# Patient Record
Sex: MALE | Race: BLACK OR AFRICAN AMERICAN | Employment: OTHER | URBAN - METROPOLITAN AREA
[De-identification: names, ages, dates, MRNs, and addresses within clinical notes are randomized per-mention and may not be internally consistent; named-entity substitution may affect disease eponyms.]

---

## 2018-08-08 ENCOUNTER — OP HISTORICAL/CONVERTED ENCOUNTER (OUTPATIENT)
Dept: OTHER | Age: 63
End: 2018-08-08

## 2018-08-10 ENCOUNTER — OP HISTORICAL/CONVERTED ENCOUNTER (OUTPATIENT)
Dept: OTHER | Age: 63
End: 2018-08-10

## 2018-09-22 ENCOUNTER — ED HISTORICAL/CONVERTED ENCOUNTER (OUTPATIENT)
Dept: OTHER | Age: 63
End: 2018-09-22

## 2018-11-04 NOTE — H&P
Patient Name: Diego Brown Account #: [id number] Gender: Male  
 (age): 1955 (61) Provider:    
FREDI Brewer MD  
  
  
Referring Physician:    
Leander Garza [address] [place] [address] [address] [place], [address]  
[phone] (phone) 
[phone] (fax)  
  
[facility], [address] [place] [address] 
[phone] (phone) 
[phone] (fax)  
  
  Chief Complaint: pt in for abdominal pain, colitis  
  
  
  
History of Present Illness:  
Patient is here today for a follow-up visit. Personal history of recent myocardial infarction in 2018. Two stents placed. Remains on blood thinner medication. He is in cardiac rehab three times per week. Patient states he's had chronic issues with intermittent abdominal pain that was worse with laying on his stomach but improves when he lays on his back. One week ago he again had another episode of abdominal pain but did not have any improvement. Pain is located in lower abdomen primarily in suprapubic and RLQ into groin region. Pain rate from 5/10 to 10/10. Denies fever or chills. Associated with mild nausea and now started to have decreased appetite. Denies fever, chills, change in bowel habits or visible blood in stool. Denies unintentional weight loss. Symptoms prompted ED visit and CT scan reveled nonspecific colitis. He was prescribed Cipro and Flagyl. He did not take Cipro and he's not getting any relief with Flagyl. 
  
Colonoscopy  (Dr. Terry Shafer) - normal per patient. Past Medical History Medical Conditions: Colitis High blood pressure High cholesterol Ischemic Heart Disease Sleep apnea Surgical Procedures: Other major surgeries:, 2006 Gallbladder surgery, 2005 Dx Studies: Colonoscopy, 2017 CT Scan, 2018 Endo Posting, 2018 Medications: Acid Control (ranitidine) 150 mg Adult Aspirin Regimen 81 mg Align 4 mg Brilinta 90 mg 
 Detrol LA 2 mg Dutoprol 25-12.5 mg 
Flagyl 500 mg Flonase Allergy Relief 50 mcg/actuation Lipitor 10 mg Allergies: Patient has no known drug allergies Immunizations: No Immunizations Social History Alcohol: None Tobacco: Never smoker Drugs: None Exercise: Exercise 3 or more times a week 1 times a day. Caffeine: None   
  
Family History No history of IBD (Crohn's or UC) Review of Systems:  
Cardiovascular: Denies chest pain, irregular heart beat, palpitations, peripheral edema, syncope, Sweats. Constitutional: Denies fatigue, fever, loss of appetite, weight gain, weight loss. ENMT: Denies nose bleeds, sore throat, hearing loss. Endocrine: Denies excessive thirst, heat intolerance. Eyes: Denies loss of vision. Gastrointestinal: Denies abdominal pain, abdominal swelling, change in bowel habits, constipation, diarrhea, Bloating/gas, heartburn, jaundice, nausea, rectal bleeding, stomach cramps, vomiting, dysphagia, rectal pain, Stool incontinence, hematemesis. Genitourinary: Denies dark urine, dysuria, frequent urination, hematuria, incontinence. Hematologic/Lymphatic: Denies easy bruising, prolonged bleeding. Integumentary: Denies itching, rashes, sun sensitivity. Musculoskeletal: Presents suffers from arthritis, back pain. Denies gout, joint pain, muscle weakness, stiffness. Neurological: Denies dizziness, fainting, frequent headaches, memory loss. Psychiatric: Denies anxiety, depression, difficulty sleeping, hallucinations, nervousness, panic attacks, paranoia. Respiratory: Denies cough, dyspnea, wheezing. Vital Signs: see nursing notes Physical Exam:  
Constitutional:   
  
Appearance: No distress, appears comfortable. Communication: Understands/receives spoken information. Head/face:   
  
Inspection: Normacephalic, atraumatic.   
Palpation: normal.  
Eyes:   
  
Conjunctivae/lids: Normal.  
Pupils/irises: Pupils equal, round and normal.  
 ENMT:   
  
External: Normal.  
Hearing: Normal.  
Neck:   
  
Neck: Normal appearance, trachea midline. Respiratory:   
  
Effort: Normal respiratory effort, comfortable, speaks in complete sentences. Auscultation: normal breath sounds, no rubs, wheezes or rhonchi. Cardiovascular:   
  
Auscultation: normal, S1 and S2, no gallops , no rubs or murmurs . Gastrointestinal/Abdomen:   
  
Abdomen: bowel sounds present, soft, nondistended, mild RLQ tenderness. No rebound or guarding. [facility] Psychiatric:   
  
Judgment/insight: Normal, normal judgement, normal insight. Orientation: oriented to time, space and person. Memory: normal short term memory, normal long term memory, no memory loss. Mood and affect: Normal mood, affect full, no evidence of depression, anxiety or agitation.  
  
  
  
Impressions: abnormal CT scan; Colitis - nonpecific Abdominal pain Atherosclerotic heart disease of native coronary artery without angina pectoris Long-term (current) use of antiplatelets/antithromboticsI've discussed colonoscopy possible biopsy, polypectomy, cautery, injection, alternatives, complications including but not limited to pain, cardiopulmonary event, bleeding, perforation requiring additional blood transfusion or operative repair; all questions answered.

## 2018-11-12 ENCOUNTER — ANESTHESIA (OUTPATIENT)
Dept: ENDOSCOPY | Age: 63
End: 2018-11-12
Payer: OTHER GOVERNMENT

## 2018-11-12 ENCOUNTER — HOSPITAL ENCOUNTER (OUTPATIENT)
Age: 63
Setting detail: OUTPATIENT SURGERY
Discharge: HOME | End: 2018-11-12
Attending: INTERNAL MEDICINE | Admitting: INTERNAL MEDICINE
Payer: OTHER GOVERNMENT

## 2018-11-12 ENCOUNTER — ANESTHESIA EVENT (OUTPATIENT)
Dept: ENDOSCOPY | Age: 63
End: 2018-11-12
Payer: OTHER GOVERNMENT

## 2018-11-12 VITALS
WEIGHT: 198 LBS | HEART RATE: 58 BPM | TEMPERATURE: 98.3 F | RESPIRATION RATE: 17 BRPM | OXYGEN SATURATION: 100 % | HEIGHT: 69 IN | BODY MASS INDEX: 29.33 KG/M2 | DIASTOLIC BLOOD PRESSURE: 64 MMHG | SYSTOLIC BLOOD PRESSURE: 119 MMHG

## 2018-11-12 PROCEDURE — 76040000019: Performed by: INTERNAL MEDICINE

## 2018-11-12 PROCEDURE — 74011250636 HC RX REV CODE- 250/636

## 2018-11-12 PROCEDURE — 76060000031 HC ANESTHESIA FIRST 0.5 HR: Performed by: INTERNAL MEDICINE

## 2018-11-12 PROCEDURE — 74011250636 HC RX REV CODE- 250/636: Performed by: PHYSICIAN ASSISTANT

## 2018-11-12 RX ORDER — FENTANYL CITRATE 50 UG/ML
100 INJECTION, SOLUTION INTRAMUSCULAR; INTRAVENOUS
Status: DISCONTINUED | OUTPATIENT
Start: 2018-11-12 | End: 2018-11-12 | Stop reason: HOSPADM

## 2018-11-12 RX ORDER — MIDAZOLAM HYDROCHLORIDE 1 MG/ML
.25-5 INJECTION, SOLUTION INTRAMUSCULAR; INTRAVENOUS AS NEEDED
Status: DISCONTINUED | OUTPATIENT
Start: 2018-11-12 | End: 2018-11-12 | Stop reason: HOSPADM

## 2018-11-12 RX ORDER — CALCIUM CARBONATE 200(500)MG
1 TABLET,CHEWABLE ORAL DAILY
COMMUNITY
End: 2020-01-22

## 2018-11-12 RX ORDER — PROPOFOL 10 MG/ML
INJECTION, EMULSION INTRAVENOUS AS NEEDED
Status: DISCONTINUED | OUTPATIENT
Start: 2018-11-12 | End: 2018-11-12 | Stop reason: HOSPADM

## 2018-11-12 RX ORDER — ATROPINE SULFATE 0.1 MG/ML
0.5 INJECTION INTRAVENOUS
Status: DISCONTINUED | OUTPATIENT
Start: 2018-11-12 | End: 2018-11-12 | Stop reason: HOSPADM

## 2018-11-12 RX ORDER — FLUMAZENIL 0.1 MG/ML
0.2 INJECTION INTRAVENOUS
Status: DISCONTINUED | OUTPATIENT
Start: 2018-11-12 | End: 2018-11-12 | Stop reason: HOSPADM

## 2018-11-12 RX ORDER — SODIUM CHLORIDE 9 MG/ML
50 INJECTION, SOLUTION INTRAVENOUS CONTINUOUS
Status: DISCONTINUED | OUTPATIENT
Start: 2018-11-12 | End: 2018-11-12 | Stop reason: HOSPADM

## 2018-11-12 RX ORDER — EPINEPHRINE 0.1 MG/ML
1 INJECTION INTRACARDIAC; INTRAVENOUS
Status: DISCONTINUED | OUTPATIENT
Start: 2018-11-12 | End: 2018-11-12 | Stop reason: HOSPADM

## 2018-11-12 RX ORDER — NALOXONE HYDROCHLORIDE 0.4 MG/ML
0.4 INJECTION, SOLUTION INTRAMUSCULAR; INTRAVENOUS; SUBCUTANEOUS
Status: DISCONTINUED | OUTPATIENT
Start: 2018-11-12 | End: 2018-11-12 | Stop reason: HOSPADM

## 2018-11-12 RX ORDER — LIDOCAINE HYDROCHLORIDE 20 MG/ML
INJECTION, SOLUTION EPIDURAL; INFILTRATION; INTRACAUDAL; PERINEURAL AS NEEDED
Status: DISCONTINUED | OUTPATIENT
Start: 2018-11-12 | End: 2018-11-12 | Stop reason: HOSPADM

## 2018-11-12 RX ORDER — DEXTROMETHORPHAN/PSEUDOEPHED 2.5-7.5/.8
1.2 DROPS ORAL
Status: DISCONTINUED | OUTPATIENT
Start: 2018-11-12 | End: 2018-11-12 | Stop reason: HOSPADM

## 2018-11-12 RX ORDER — MIDAZOLAM HYDROCHLORIDE 1 MG/ML
.25-5 INJECTION, SOLUTION INTRAMUSCULAR; INTRAVENOUS
Status: DISCONTINUED | OUTPATIENT
Start: 2018-11-12 | End: 2018-11-12 | Stop reason: HOSPADM

## 2018-11-12 RX ORDER — FENTANYL CITRATE 50 UG/ML
25 INJECTION, SOLUTION INTRAMUSCULAR; INTRAVENOUS AS NEEDED
Status: DISCONTINUED | OUTPATIENT
Start: 2018-11-12 | End: 2018-11-12 | Stop reason: HOSPADM

## 2018-11-12 RX ORDER — PROPOFOL 10 MG/ML
INJECTION, EMULSION INTRAVENOUS
Status: DISCONTINUED | OUTPATIENT
Start: 2018-11-12 | End: 2018-11-12 | Stop reason: HOSPADM

## 2018-11-12 RX ADMIN — PROPOFOL 140 MCG/KG/MIN: 10 INJECTION, EMULSION INTRAVENOUS at 08:03

## 2018-11-12 RX ADMIN — SODIUM CHLORIDE: 9 INJECTION, SOLUTION INTRAVENOUS at 07:45

## 2018-11-12 RX ADMIN — PROPOFOL 80 MG: 10 INJECTION, EMULSION INTRAVENOUS at 08:03

## 2018-11-12 RX ADMIN — LIDOCAINE HYDROCHLORIDE 40 MG: 20 INJECTION, SOLUTION EPIDURAL; INFILTRATION; INTRACAUDAL; PERINEURAL at 08:02

## 2018-11-12 NOTE — PROCEDURES
[address]  [phone]      2018    Colonoscopy Procedure Note  Brisa Brink  :  1955  [facility] Medical Record Number: [id number]    Indications:     Abdominal pain, LLQ, abnormal CT scan  PCP:  Rusty Lyon MD  Anesthesia/Sedation: Conscious Sedation/Moderate Sedation  Endoscopist:  Dr. Cornelia Dewey; no surgical assistants  Complications:  None  Estimate Blood Loss:  None    Permit:  The indications, risks, benefits and alternatives were reviewed with the patient or their decision maker who was provided an opportunity to ask questions and all questions were answered. The specific risks of colonoscopy with conscious sedation were reviewed, including but not limited to anesthetic complication, bleeding, adverse drug reaction, missed lesion, infection, IV site reactions, and intestinal perforation which would lead to the need for surgical repair. Alternatives to colonoscopy including radiographic imaging, observation without testing, or laboratory testing were reviewed including the limitations of those alternatives. After considering the options and having all their questions answered, the patient or their decision maker provided both verbal and written consent to proceed. Procedure in Detail:  After obtaining informed consent, positioning of the patient in the left lateral decubitus position, and conduction of a pre-procedure pause or \"time out\" the endoscope was introduced into the anus and advanced to the cecum, which was identified by the ileocecal valve and appendiceal orifice. The quality of the colonic preparation was good. A careful inspection was made as the colonoscope was withdrawn, findings and interventions are described below.     Appendiceal orifice photographed    Findings:   normal  no mucosal lesion appreciated    Specimens:    none    Complications:   None; patient tolerated the procedure well. Estimated blood loss: none    Impression:  Normal colonoscopy to the cecum, with no evidence of neoplasia, diverticular disease, or mucosal abnormality. Recommendations:      - vascular evaluation for worsening symtptoms   Ten year follow up exam    Thank you for entrusting me with this patient's care. Please do not hesitate to contact me with any questions or if I can be of assistance with any of your other patients' GI needs.     Signed By: Alf Dela Cruz MD                        November 12, 2018

## 2018-11-12 NOTE — DISCHARGE INSTRUCTIONS
Darrius Rodrigez  [id number]  1955    DISCHARGE INSTRUCTIONS  Discomfort:  Redness at IV site- apply warm compress to area; if redness or soreness persist- contact your physician. There may be a slight amount of blood passed from the rectum. Gaseous discomfort - walking, belching will help relieve any discomfort. You may not operate a vehicle for 12 hours. You may not engage in an occupation involving machinery or appliances for rest of today. You may not drink alcoholic beverages for at least 12 hours. Avoid making any critical decisions for at least 24 hours. DIET:   High fiber diet. - however -  remember your colon is empty and a heavy meal will produce gas. Avoid these foods:  vegetables, fried / greasy foods, carbonated drinks for today. ACTIVITY:  You may resume your normal daily activities it is recommended that you spend the remainder of the day resting -  avoid any strenuous activity. CALL M.D. ANY SIGN OF:   Increasing pain, nausea, vomiting  Abdominal distension (swelling)  New increased bleeding (oral or rectal)  Fever (chills)  Pain in chest area  Bloody discharge from nose or mouth  Shortness of breath     Follow-up Instructions:  Call Dr. Queenie Clifford if you have any questions or problems.   Advise ER staff with any worsening symptoms potential need evaluate mesenteric vascular integrity        DISCHARGE SUMMARY from Nurse    The following personal items collected during your admission are returned to you:   Dental Appliance: Dental Appliances: None  Vision: Visual Aid: Glasses  Hearing Aid:    Jewelry:    Clothing:    Other Valuables:    Valuables sent to safe:

## 2018-11-12 NOTE — PERIOP NOTES
Divya Da 1955 
[id number] Situation: 
Verbal report received from: Viri Duong Procedure: Procedure(s): 
COLONOSCOPY Background: 
 
Preoperative diagnosis: COLITIS, ABDOMINAL PAIN Postoperative diagnosis: normal colonoscopy :  Dr. Parveen Burns Assistant(s): Endoscopy Technician-1: Yadira Ken Endoscopy RN-1: Solange Swain RN Specimens: * No specimens in log * H. Pylori  no Assessment: 
Intra-procedure medications Anesthesia gave intra-procedure sedation and medications, see anesthesia flow sheet yes Intravenous fluids: NS@ Lucianne Glatter Vital signs stable yes Abdominal assessment: round and soft yes Recommendation: 
 
Family or Friend yes Permission to share finding with family or friend yes

## 2018-11-12 NOTE — ANESTHESIA PREPROCEDURE EVALUATION
Anesthetic History No history of anesthetic complications Review of Systems / Medical History Patient summary reviewed and nursing notes reviewed Pulmonary Sleep apnea Neuro/Psych Within defined limits Cardiovascular Hypertension: well controlled Past MI, cardiac stents and hyperlipidemia Exercise tolerance: >4 METS Comments: MI 6/2018 : finished cardiac rehab GI/Hepatic/Renal 
  
GERD: well controlled Endo/Other Within defined limits Other Findings Physical Exam 
 
Airway Mallampati: II 
 
 
 
 
 Cardiovascular Rhythm: regular Rate: normal 
 
 
 
 Dental 
No notable dental hx Pulmonary Breath sounds clear to auscultation Abdominal 
 
 
 
 Other Findings Anesthetic Plan ASA: 3 Anesthesia type: MAC Anesthetic plan and risks discussed with: Patient Informed consent obtained.

## 2018-11-12 NOTE — PERIOP NOTES
Received report from [address] [place] [address], see anesthesia records. ABD remains soft and non-tender post procedure. Pt has no complaints at this time and tolerated the procedure well. Endoscope was pre-cleaned at bedside immediately following procedure by Cesilia Garay.

## 2018-11-13 NOTE — ANESTHESIA POSTPROCEDURE EVALUATION
Procedure(s): 
COLONOSCOPY. Anesthesia Post Evaluation Multimodal analgesia: multimodal analgesia not used between 6 hours prior to anesthesia start to PACU discharge Patient location during evaluation: PACU Patient participation: complete - patient participated Level of consciousness: awake Pain score: 0 Pain management: adequate Airway patency: patent Anesthetic complications: no 
Cardiovascular status: acceptable Respiratory status: acceptable Hydration status: acceptable Comments: Late entry: patient seen and evaluated 11/12 Post anesthesia nausea and vomiting:  none Visit Vitals /64 Pulse (!) 58 Temp 36.8 °C (98.3 °F) Resp 17 Ht 5' 9\" (1.753 m) Wt 89.8 kg (198 lb) SpO2 100% BMI 29.24 kg/m²

## 2019-01-03 ENCOUNTER — OP HISTORICAL/CONVERTED ENCOUNTER (OUTPATIENT)
Dept: OTHER | Age: 64
End: 2019-01-03

## 2019-02-10 ENCOUNTER — ED HISTORICAL/CONVERTED ENCOUNTER (OUTPATIENT)
Dept: OTHER | Age: 64
End: 2019-02-10

## 2019-07-09 ENCOUNTER — ED HISTORICAL/CONVERTED ENCOUNTER (OUTPATIENT)
Dept: OTHER | Age: 64
End: 2019-07-09

## 2019-07-22 ENCOUNTER — ANESTHESIA EVENT (OUTPATIENT)
Dept: ENDOSCOPY | Age: 64
End: 2019-07-22
Payer: OTHER GOVERNMENT

## 2019-07-22 ENCOUNTER — ANESTHESIA (OUTPATIENT)
Dept: ENDOSCOPY | Age: 64
End: 2019-07-22
Payer: OTHER GOVERNMENT

## 2019-07-22 ENCOUNTER — HOSPITAL ENCOUNTER (OUTPATIENT)
Age: 64
Setting detail: OUTPATIENT SURGERY
Discharge: HOME | End: 2019-07-22
Attending: INTERNAL MEDICINE | Admitting: INTERNAL MEDICINE
Payer: OTHER GOVERNMENT

## 2019-07-22 VITALS
TEMPERATURE: 97.5 F | HEART RATE: 55 BPM | WEIGHT: 197.53 LBS | RESPIRATION RATE: 18 BRPM | DIASTOLIC BLOOD PRESSURE: 60 MMHG | OXYGEN SATURATION: 99 % | SYSTOLIC BLOOD PRESSURE: 106 MMHG | BODY MASS INDEX: 29.26 KG/M2 | HEIGHT: 69 IN

## 2019-07-22 PROCEDURE — 76040000019: Performed by: INTERNAL MEDICINE

## 2019-07-22 PROCEDURE — 74011250636 HC RX REV CODE- 250/636: Performed by: PHYSICIAN ASSISTANT

## 2019-07-22 PROCEDURE — 76060000031 HC ANESTHESIA FIRST 0.5 HR: Performed by: INTERNAL MEDICINE

## 2019-07-22 PROCEDURE — 74011250636 HC RX REV CODE- 250/636

## 2019-07-22 RX ORDER — ATROPINE SULFATE 0.1 MG/ML
0.5 INJECTION INTRAVENOUS
Status: DISCONTINUED | OUTPATIENT
Start: 2019-07-22 | End: 2019-07-22 | Stop reason: HOSPADM

## 2019-07-22 RX ORDER — LIDOCAINE HYDROCHLORIDE 20 MG/ML
INJECTION, SOLUTION EPIDURAL; INFILTRATION; INTRACAUDAL; PERINEURAL AS NEEDED
Status: DISCONTINUED | OUTPATIENT
Start: 2019-07-22 | End: 2019-07-22 | Stop reason: HOSPADM

## 2019-07-22 RX ORDER — FLUMAZENIL 0.1 MG/ML
0.2 INJECTION INTRAVENOUS
Status: DISCONTINUED | OUTPATIENT
Start: 2019-07-22 | End: 2019-07-22 | Stop reason: HOSPADM

## 2019-07-22 RX ORDER — PROPOFOL 10 MG/ML
INJECTION, EMULSION INTRAVENOUS AS NEEDED
Status: DISCONTINUED | OUTPATIENT
Start: 2019-07-22 | End: 2019-07-22 | Stop reason: HOSPADM

## 2019-07-22 RX ORDER — ROSUVASTATIN CALCIUM 5 MG/1
5 TABLET, COATED ORAL
COMMUNITY
End: 2020-01-22

## 2019-07-22 RX ORDER — EPINEPHRINE 0.1 MG/ML
1 INJECTION INTRACARDIAC; INTRAVENOUS
Status: DISCONTINUED | OUTPATIENT
Start: 2019-07-22 | End: 2019-07-22 | Stop reason: HOSPADM

## 2019-07-22 RX ORDER — SODIUM CHLORIDE 9 MG/ML
50 INJECTION, SOLUTION INTRAVENOUS CONTINUOUS
Status: DISCONTINUED | OUTPATIENT
Start: 2019-07-22 | End: 2019-07-22 | Stop reason: HOSPADM

## 2019-07-22 RX ORDER — PROPOFOL 10 MG/ML
INJECTION, EMULSION INTRAVENOUS
Status: DISCONTINUED | OUTPATIENT
Start: 2019-07-22 | End: 2019-07-22 | Stop reason: HOSPADM

## 2019-07-22 RX ORDER — DEXTROMETHORPHAN/PSEUDOEPHED 2.5-7.5/.8
1.2 DROPS ORAL
Status: DISCONTINUED | OUTPATIENT
Start: 2019-07-22 | End: 2019-07-22 | Stop reason: HOSPADM

## 2019-07-22 RX ORDER — FENTANYL CITRATE 50 UG/ML
25 INJECTION, SOLUTION INTRAMUSCULAR; INTRAVENOUS AS NEEDED
Status: DISCONTINUED | OUTPATIENT
Start: 2019-07-22 | End: 2019-07-22 | Stop reason: HOSPADM

## 2019-07-22 RX ORDER — PANTOPRAZOLE SODIUM 40 MG/1
40 TABLET, DELAYED RELEASE ORAL DAILY
COMMUNITY
End: 2021-07-30

## 2019-07-22 RX ORDER — AMLODIPINE BESYLATE 5 MG/1
5 TABLET ORAL DAILY
COMMUNITY

## 2019-07-22 RX ORDER — MIDAZOLAM HYDROCHLORIDE 1 MG/ML
.25-5 INJECTION, SOLUTION INTRAMUSCULAR; INTRAVENOUS AS NEEDED
Status: DISCONTINUED | OUTPATIENT
Start: 2019-07-22 | End: 2019-07-22 | Stop reason: HOSPADM

## 2019-07-22 RX ORDER — NALOXONE HYDROCHLORIDE 0.4 MG/ML
0.4 INJECTION, SOLUTION INTRAMUSCULAR; INTRAVENOUS; SUBCUTANEOUS
Status: DISCONTINUED | OUTPATIENT
Start: 2019-07-22 | End: 2019-07-22 | Stop reason: HOSPADM

## 2019-07-22 RX ADMIN — SODIUM CHLORIDE 50 ML/HR: 900 INJECTION, SOLUTION INTRAVENOUS at 07:40

## 2019-07-22 RX ADMIN — LIDOCAINE HYDROCHLORIDE 100 MG: 20 INJECTION, SOLUTION EPIDURAL; INFILTRATION; INTRACAUDAL; PERINEURAL at 08:05

## 2019-07-22 RX ADMIN — PROPOFOL 50 MG: 10 INJECTION, EMULSION INTRAVENOUS at 08:11

## 2019-07-22 RX ADMIN — PROPOFOL 50 MG: 10 INJECTION, EMULSION INTRAVENOUS at 08:07

## 2019-07-22 RX ADMIN — PROPOFOL 100 MG: 10 INJECTION, EMULSION INTRAVENOUS at 08:05

## 2019-07-22 RX ADMIN — PROPOFOL 150 MCG/KG/MIN: 10 INJECTION, EMULSION INTRAVENOUS at 08:05

## 2019-07-22 RX ADMIN — PROPOFOL 50 MG: 10 INJECTION, EMULSION INTRAVENOUS at 08:06

## 2019-07-22 RX ADMIN — PROPOFOL 50 MG: 10 INJECTION, EMULSION INTRAVENOUS at 08:10

## 2019-07-22 RX ADMIN — PROPOFOL 100 MG: 10 INJECTION, EMULSION INTRAVENOUS at 08:09

## 2019-07-22 NOTE — H&P
Patient Name: Jennifer Diez   Account #: [id number]    Gender: Male    (age): 1955 (59)       Provider:     Roshan Eddy. Sharon Garcia PA-C        Referring Physician:     Hubert Terry. [address]  [facility] [address], [address]   [phone] (phone)  [phone] (fax)        Chief Complaint: pt in for abdominal pain           History of Present Illness:   Mr. Foreign Mclain is here for a ER follow up visit. He reports \"my stomach is bothering me again\". States that he's had issues in the past but recently symptoms have progressed. Complaints of burning abdominal pain and feels \"the lining of my stomach is irritated\". Symptoms increased at night but occur during the day also. Worse with meals, especially sweets. Asociated with nausea. Denies fever, chills, CP, SOB, dysphagia, vomiting, or change in bowel habits. No improvement with Pepto-Bismol. Denies NSAID use. Does recall that one ago he went to PCP office for persistent cough and symptoms resolved with taking Zantac. Recently symptoms prompted ED visit to to severity. Workup at ED 19 included CT scan no acute process, CBC, CMP, lipase, cardaic levels unremarkable. Symptoms improved with Pepcid and Zofran. Discharged home with pantoprazole and advised GI follow up for EGD. Per patient last EGD was many years ago in [place]     Patient is currently on Brilinta due to MI 2018. Patient has upcoming appointment with cardiologist and will likely be taken off Brilinta (per patient). ? Recent ER where troponin, lipase, CMP unremarkable; CT scan showed lumbar spine disease without intestinal abnormality. ? Past Medical History      Medical Conditions: Colitis  High blood pressure  High cholesterol  Ischemic Heart Disease  Sleep apnea   Surgical Procedures:  Other major surgeries:, 2006  Gallbladder surgery, 2005   Dx Studies: Colonoscopy, 2018  Colonoscopy, 2017  CT Scan, 2018  CT Scan, 2018  Endo Posting, 7/11/2019  Endo Posting, 10/23/2018  Endo Posting, 10/22/2018  Endo Posting, 9/27/2018  Hemoccult cards, 3/15/2019   Medications: Acid Control (ranitidine) 150 mg  Adult Aspirin Regimen 81 mg  Align 4 mg  amlodipine 2.5 mg  Brilinta 90 mg  butalbital-acetaminop-caf-cod -27-30 mg  Flonase Allergy Relief 50 mcg/actuation  metformin 500 mg  metoprolol succinate 25 mg  Protonix 40 mg  rosuvastatin 5 mg  tolterodine 4 mg  tramadol 50 mg Take 1 tablet by mouth every six hours as needed for pain  Zofran 4 mg   Allergies: Patient has no known allergies or drug allergies   Immunizations: No Immunizations      Social History      Alcohol: None   Tobacco: Never smoker   Drugs: None   Exercise: Exercise 3 or more times a week 1 times a day. Caffeine: None   Marital Status:          Occupation:               Family History No history of IBD (Crohn's or UC)        Review of Systems:   Cardiovascular: Denies chest pain, irregular heart beat, palpitations, peripheral edema, syncope, Sweats. Constitutional: Denies fatigue, fever, loss of appetite, weight gain, weight loss. ENMT: Denies nose bleeds, sore throat, hearing loss. Endocrine: Denies excessive thirst, heat intolerance. Eyes: Denies loss of vision. Gastrointestinal: Denies abdominal pain, abdominal swelling, change in bowel habits, constipation, diarrhea, Bloating/gas, heartburn, jaundice, nausea, rectal bleeding, stomach cramps, vomiting, dysphagia, rectal pain, Stool incontinence, hematemesis. Genitourinary: Denies dark urine, dysuria, frequent urination, hematuria, incontinence. Hematologic/Lymphatic: Denies easy bruising, prolonged bleeding. Integumentary: Denies itching, rashes, sun sensitivity. Musculoskeletal: Denies arthritis, back pain, gout, joint pain, muscle weakness, stiffness. Neurological: Denies dizziness, fainting, frequent headaches, memory loss.    Psychiatric: Denies anxiety, depression, difficulty sleeping, hallucinations, nervousness, panic attacks, paranoia. Respiratory: Denies cough, dyspnea, wheezing. Vital Signs: see nursing notes     Physical Exam:   Constitutional:      Appearance: No distress, appears comfortable. Communication: Understands/receives spoken information. Skin:      Inspection: No rash, no jaundice. Head/face: Inspection: Normacephalic, atraumatic. Palpation: normal.   Eyes:      Conjunctivae/lids: Normal.   Pupils/irises: Pupils equal, round and normal.   ENMT:      External: Normal.   Hearing: Normal.   Neck:      Neck: Normal appearance, trachea midline. Jugular veins: No JVD noted. Respiratory:      Effort: Normal respiratory effort, comfortable, speaks in complete sentences. Auscultation: normal breath sounds, no rubs, wheezes or rhonchi. Cardiovascular: Auscultation: normal, S1 and S2, no gallops , no rubs or murmurs . Gastrointestinal/Abdomen:      Abdomen: bowel sounds present, soft, nondistended, mild epigastric tenderness. No rebound or gaurding. .   Musculoskeletal:      Gait/station: normal.   Digits/nails: Normal, no spooning of nails, clubbing, or splinter hemorrhages, no clubbing, cyanosis, petechiae or other inflammatory conditions. Back: no kyphosis or scoliosis. Muscles: normal strength and tone, no atrophy or abnormal movements. Psychiatric:      Judgment/insight: Normal, normal judgement, normal insight. Orientation: oriented to time, space and person. Memory: normal short term memory, normal long term memory, no memory loss. Mood and affect: Normal mood, affect full, no evidence of depression, anxiety or agitation. Impressions: Burning epigastric pain  Nausea  Heartburn  Atherosclerotic heart disease of native coronary artery without angina pectoris  Long-term (current) use of antiplatelets/antithrombotics?  ?       Plan: I have discussed EGD biopsy alternatives complications including but not limited to pain, cardiopulmonary event, bleeding, perforation; all questions answered.

## 2019-07-22 NOTE — DISCHARGE INSTRUCTIONS
Yara De Souza  [id number]  1955    DISCHARGE INSTRUCTIONS  Discomfort:  Redness at IV site- apply warm compress to area; if redness or soreness persist- contact your physician. There may be a slight amount of blood passed from the rectum. Gaseous discomfort - walking, belching will help relieve any discomfort. You may not operate a vehicle for 12 hours. You may not engage in an occupation involving machinery or appliances for rest of today. You may not drink alcoholic beverages for at least 12 hours. Avoid making any critical decisions for at least 24 hours. DIET:   High fiber diet. ACTIVITY:  You may resume your normal daily activities it is recommended that you spend the remainder of the day resting -  avoid any strenuous activity. CALL M.D. ANY SIGN OF:   Increasing pain, nausea, vomiting  Abdominal distension (swelling)  New increased bleeding (oral or rectal)  Fever (chills)  Pain in chest area  Bloody discharge from nose or mouth  Shortness of breath     Follow-up Instructions:  Call Dr. Ketan Yanez if you have any questions or problems.           DISCHARGE SUMMARY from Nurse    The following personal items collected during your admission are returned to you:   Dental Appliance: Dental Appliances: None  Vision:    Hearing Aid:    Jewelry:    Clothing:    Other Valuables:    Valuables sent to safe:

## 2019-07-22 NOTE — ANESTHESIA POSTPROCEDURE EVALUATION
Procedure(s):  ESOPHAGOGASTRODUODENOSCOPY (EGD). MAC    Anesthesia Post Evaluation      Multimodal analgesia: multimodal analgesia not used between 6 hours prior to anesthesia start to PACU discharge  Patient location during evaluation: PACU  Patient participation: complete - patient participated  Level of consciousness: awake  Pain management: adequate  Airway patency: patent  Anesthetic complications: no  Cardiovascular status: acceptable, blood pressure returned to baseline and hemodynamically stable  Respiratory status: acceptable  Hydration status: acceptable  Post anesthesia nausea and vomiting:  controlled      Vitals Value Taken Time   /58 7/22/2019  9:04 AM   Temp 36.4 °C (97.5 °F) 7/22/2019  8:54 AM   Pulse 63 7/22/2019  9:07 AM   Resp 19 7/22/2019  9:07 AM   SpO2 100 % 7/22/2019  9:07 AM   Vitals shown include unvalidated device data.

## 2019-07-22 NOTE — PROGRESS NOTES

## 2019-07-22 NOTE — PROCEDURES
Dima Vines M.D. 2019    Esophagogastroduodenoscopy (EGD) Procedure Note  Millicent Rivera  : 1955  [facility] Medical Record Number: [id number]      Indications:    Abdominal pain, epigastric  Referring Physician:  Chitra Diaz DO  Anesthesia/Sedation: Conscious Sedation/Moderate Sedation  Endoscopist:  Dr. Boogie Villalba  Assistants: None  Permit:  The indications, risks, benefits and alternatives were reviewed with the patient or their decision maker who was provided an opportunity to ask questions and all questions were answered. The specific risks of esophagogastroduodenoscopy with conscious sedation were reviewed, including but not limited to anesthetic complication, bleeding, adverse drug reaction, missed lesion, infection, IV site reactions, and intestinal perforation which would lead to the need for surgical repair. Alternatives to EGD including radiographic imaging, observation without testing, or laboratory testing were reviewed as well as the limitations of those alternatives discussed. After considering the options and having all their questions answered, the patient or their decision maker provided both verbal and written consent to proceed. Procedure in Detail:  After obtaining informed consent, positioning of the patient in the left lateral decubitus position, and conduction of a pre-procedure pause or \"time out\" the endoscope was introduced into the mouth and advanced to the duodenum. A careful inspection was made, and findings or interventions are described below.     Findings:   Esophagus:normal  Stomach: normal   Duodenum/jejunum: normal    Complications/estimated blood loss: none    Therapies:  none    Specimens: none    Implants:none           Recommendations:  -Acid suppression with a proton pump inhibitor as long as using any aspirin dose, anti platelet agent.  Thank you for entrusting me with this patient's care. Please do not hesitate to contact me with any questions or if I can be of assistance with any of your other patients' GI needs.   Signed By: Becky Hatch MD                        July 22, 2019

## 2019-07-22 NOTE — ANESTHESIA PREPROCEDURE EVALUATION
Anesthetic History   No history of anesthetic complications            Review of Systems / Medical History  Patient summary reviewed and pertinent labs reviewed    Pulmonary        Sleep apnea           Neuro/Psych   Within defined limits           Cardiovascular    Hypertension          CAD    Exercise tolerance: >4 METS  Comments: 2 stents after MI last year, on Brilinta   GI/Hepatic/Renal     GERD           Endo/Other  Within defined limits           Other Findings              Physical Exam    Airway  Mallampati: II    Neck ROM: normal range of motion   Mouth opening: Normal     Cardiovascular    Rhythm: regular  Rate: normal         Dental  No notable dental hx       Pulmonary  Breath sounds clear to auscultation               Abdominal  GI exam deferred       Other Findings            Anesthetic Plan    ASA: 3  Anesthesia type: MAC          Induction: Intravenous  Anesthetic plan and risks discussed with: Patient

## 2019-07-22 NOTE — ROUTINE PROCESS
Juan Mathis  1955  [id number]    Situation:  Verbal report received from: Mary Melton  Procedure: Procedure(s):  ESOPHAGOGASTRODUODENOSCOPY (EGD)    Background:    Preoperative diagnosis: ABDOMINAL PAIN  Postoperative diagnosis: 1.- Normal EGD    :  Dr. Ana Kong  Assistant(s): Endoscopy Technician-1: Giovanni HERNANDEZ  Endoscopy RN-1: Carol Schwab, RN    Specimens: * No specimens in log *  H. Pylori  no    Assessment:  Intra-procedure medications     Anesthesia gave intra-procedure sedation and medications, see anesthesia flow sheet yes    Intravenous fluids: NS@ KVO     Vital signs stable     Abdominal assessment: round and soft     Recommendation:  Discharge patient per MD order.   Family or Friend   Permission to share finding with family or friend yes

## 2019-12-29 ENCOUNTER — ED HISTORICAL/CONVERTED ENCOUNTER (OUTPATIENT)
Dept: OTHER | Age: 64
End: 2019-12-29

## 2020-01-22 ENCOUNTER — OFFICE VISIT (OUTPATIENT)
Dept: CARDIOLOGY CLINIC | Age: 65
End: 2020-01-22

## 2020-01-22 VITALS
OXYGEN SATURATION: 97 % | BODY MASS INDEX: 30.33 KG/M2 | DIASTOLIC BLOOD PRESSURE: 82 MMHG | HEART RATE: 84 BPM | SYSTOLIC BLOOD PRESSURE: 126 MMHG | RESPIRATION RATE: 16 BRPM | WEIGHT: 204.8 LBS | HEIGHT: 69 IN

## 2020-01-22 DIAGNOSIS — I25.10 CORONARY ARTERY DISEASE INVOLVING NATIVE CORONARY ARTERY OF NATIVE HEART WITHOUT ANGINA PECTORIS: ICD-10-CM

## 2020-01-22 DIAGNOSIS — Z98.61 PERCUTANEOUS TRANSLUMINAL CORONARY ANGIOPLASTY STATUS: ICD-10-CM

## 2020-01-22 DIAGNOSIS — I49.3 PVC (PREMATURE VENTRICULAR CONTRACTION): Primary | ICD-10-CM

## 2020-01-22 RX ORDER — SILDENAFIL 50 MG/1
50 TABLET, FILM COATED ORAL AS NEEDED
COMMUNITY
End: 2021-07-30

## 2020-01-22 RX ORDER — CYCLOBENZAPRINE HCL 10 MG
TABLET ORAL
COMMUNITY
Start: 2019-11-26 | End: 2021-02-02

## 2020-01-22 RX ORDER — ATORVASTATIN CALCIUM 10 MG/1
10 TABLET, FILM COATED ORAL DAILY
COMMUNITY
End: 2021-02-02

## 2020-01-22 RX ORDER — BUTALBITAL, ACETAMINOPHEN AND CAFFEINE 300; 40; 50 MG/1; MG/1; MG/1
1 CAPSULE ORAL
COMMUNITY
End: 2021-07-30

## 2020-01-22 RX ORDER — ERGOCALCIFEROL 1.25 MG/1
50000 CAPSULE ORAL
COMMUNITY
End: 2021-02-02 | Stop reason: ALTCHOICE

## 2020-01-22 RX ORDER — FLUTICASONE PROPIONATE 50 MCG
SPRAY, SUSPENSION (ML) NASAL
COMMUNITY
Start: 2017-09-08 | End: 2021-07-30

## 2020-01-22 RX ORDER — LIDOCAINE 50 MG/G
PATCH TOPICAL AS NEEDED
COMMUNITY
End: 2021-12-01 | Stop reason: SDUPTHER

## 2020-01-22 RX ORDER — ALFUZOSIN HYDROCHLORIDE 10 MG/1
TABLET, EXTENDED RELEASE ORAL DAILY
COMMUNITY
End: 2021-02-02

## 2020-01-22 RX ORDER — AMLODIPINE BESYLATE 2.5 MG/1
2.5 TABLET ORAL
COMMUNITY
End: 2021-07-30

## 2020-01-22 NOTE — PROGRESS NOTES
HISTORY OF PRESENTING ILLNESS      Miguel Angel Ibarra is a 59 y.o. male with CAD, GERD, ROQUE who experienced chest pain in June 2018 and underwent coronary revascularization for obstructive CAD. Just prior to undergoing PCI he was noted to have cardiac arrest.  He has not experienced recurrent episodes of VT since undergoing PCI. He underwent repeat nuclear imaging and heart catheterization which demonstrated stable CAD. He has been noted to have ventricular bigeminy and PVCs on monitoring. He underwent a stress test with observation of ventricular bigeminy during the exercise portion of the stress test.  He was highly intolerant of beta-blockers in the past.  He now experiences episodes of dizziness and lightheadedness at times however it is unclear whether these are being driven by arrhythmias or an alternate etiology. ACTIVE PROBLEM LIST     There are no active problems to display for this patient.           PAST MEDICAL HISTORY     Past Medical History:   Diagnosis Date    CAD (coronary artery disease) 06/15/2018    MI     GERD (gastroesophageal reflux disease)     Ill-defined condition     Urinary urgency    Ill-defined condition     High cholesterol    Ill-defined condition     Seasonal allergies    Sleep apnea     Uses CPAP           PAST SURGICAL HISTORY     Past Surgical History:   Procedure Laterality Date    CARDIAC SURG PROCEDURE UNLIST      Cardiac cath Stents x2    COLONOSCOPY N/A 11/12/2018    COLONOSCOPY performed by Blade Sheridan MD at [facility] HX GI  2005    GB removed    HX ORTHOPAEDIC  2006    Lumbar laminectomy    HX OTHER SURGICAL  2017    Colonoscopy          ALLERGIES     Allergies   Allergen Reactions    Ibuprofen Other (comments)     Patient states ibuprofen upsets his stomach          FAMILY HISTORY     Family History   Problem Relation Age of Onset    Diabetes Mother     Stroke Father     Hypertension Father     negative for cardiac disease SOCIAL HISTORY     Social History     Socioeconomic History    Marital status:      Spouse name: Not on file    Number of children: Not on file    Years of education: Not on file    Highest education level: Not on file   Tobacco Use    Smoking status: Never Smoker    Smokeless tobacco: Never Used   Substance and Sexual Activity    Alcohol use: No    Drug use: No         MEDICATIONS     Current Outpatient Medications   Medication Sig    rosuvastatin (CRESTOR) 5 mg tablet Take 5 mg by mouth nightly.  amLODIPine (NORVASC) 10 mg tablet Take 10 mg by mouth daily.  pantoprazole (PROTONIX) 40 mg tablet Take 40 mg by mouth daily.  calcium carbonate (TUMS) 200 mg calcium (500 mg) chew Take 1 Tab by mouth daily.  metoprolol succinate (TOPROL-XL) 25 mg XL tablet Take 25 mg by mouth two (2) times a day. 1/2 tablet twice daily   Indications: Myocardial Reinfarction Prevention    ticagrelor (BRILINTA) 90 mg tablet Take 90 mg by mouth two (2) times a day. Indications: Myocardial Reinfarction Prevention    aspirin delayed-release 81 mg tablet Take 81 mg by mouth daily.  fluticasone (FLONASE SENSIMIST) 27.5 mcg/actuation nasal spray 1 [place] by Both Nostrils route as needed for Rhinitis.  codeine-butalbital-acetaminophen-caffeine (FIORICET WITH CODEINE) -47-30 mg capsule Take 1 Cap by mouth as needed for Headache.  Bifidobacterium Infantis (ALIGN) 4 mg cap Take 1 Cap by mouth every evening. No current facility-administered medications for this visit. I have reviewed the nurses notes, vitals, problem list, allergy list, medical history, family, social history and medications. REVIEW OF SYMPTOMS      General: Pt denies excessive weight gain or loss. Pt is able to conduct ADL's  HEENT: Denies blurred vision, headaches, hearing loss, epistaxis and difficulty swallowing. Respiratory: Denies cough, congestion, shortness of breath, RDZ, wheezing or stridor.   Cardiovascular: Denies precordial pain, palpitations, edema or PND  Gastrointestinal: Denies poor appetite, indigestion, abdominal pain or blood in stool  Genitourinary: Denies hematuria, dysuria, increased urinary frequency  Musculoskeletal: Denies joint pain or swelling from muscles or joints  Neurologic: Denies tremor, paresthesias, headache, or sensory motor disturbance  Psychiatric: Denies confusion, insomnia, depression  Integumentray: Denies rash, itching or ulcers. Hematologic: Denies easy bruising, bleeding       PHYSICAL EXAMINATION      There were no vitals filed for this visit. General: Well developed, in no acute distress. HEENT: No jaundice, oral mucosa moist, no oral ulcers  Neck: Supple, no stiffness, no lymphadenopathy, supple  Heart:  Normal S1/S2 negative S3 or S4. Regular, no murmur, gallop or rub, no jugular venous distention  Respiratory: Clear bilaterally x 4, no wheezing or rales  Abdomen:   Soft, non-tender, bowel sounds are active.   Extremities:  No edema, normal cap refill, no cyanosis. Musculoskeletal: No clubbing, no deformities  Neuro: A&Ox3, speech clear, gait stable, cooperative, no focal neurologic deficits  Skin: Skin color is normal. No rashes or lesions. Non diaphoretic, moist.  Vascular: 2+ pulses symmetric in all extremities       DIAGNOSTIC DATA      EKG:        LABORATORY DATA    No results found for: WBC, HGBPOC, HGB, HGBP, HCTPOC, HCT, PHCT, RBCH, PLT, MCV, HGBEXT, HCTEXT, PLTEXT   No results found for: NA, K, CL, CO2, AGAP, GLU, BUN, CREA, BUCR, GFRAA, GFRNA, CA, TBIL, TBILI, GPT, SGOT, AP, TP, ALB, GLOB, AGRAT, ALT        ASSESSMENT      1. PVCs  2. Cardiac arrest Hx  3. CAD, native  4. Percutaneous coronary transluminal angioplasty  5. ROQUE  6. Dizziness/lightheadedness       PLAN     Recommend injection of a loop recorder for enhanced rhythm monitoring to better correlate whether his current symptoms are being driven by arrhythmias or alternate.   If found to have PVCs as a  of his symptoms would consider an antiarrhythmic drug rather than beta-blockers given his intolerance in the past.       FOLLOW-UP     After loop recorder injection      Thank you, Bruna Sheets DO and Susie Chow NP/Artur Carroll MD for allowing me to participate in the care of this extraordinarily pleasant male. Please do not hesitate to contact me for further questions/concerns.          Mak Forte MD  Cardiac Electrophysiology / Cardiology    [address]  [address] [place] [address], [address] [facility]    [facility]  [phone] / [phone] Fax   [phone] / [phone] Fax

## 2020-01-22 NOTE — PROGRESS NOTES
ROOM # 2  Holter monitor:12/18/19  Occasional fast HR  Visit Vitals  /82 (BP 1 Location: Left arm, BP Patient Position: Sitting)   Pulse 84   Resp 16   Ht 5' 9\" (1.753 m)   Wt 204 lb 12.8 oz (92.9 kg)   SpO2 97%   BMI 30.24 kg/m²

## 2020-01-28 ENCOUNTER — ED HISTORICAL/CONVERTED ENCOUNTER (OUTPATIENT)
Dept: OTHER | Age: 65
End: 2020-01-28

## 2020-08-07 ENCOUNTER — VIRTUAL VISIT (OUTPATIENT)
Dept: FAMILY MEDICINE CLINIC | Age: 65
End: 2020-08-07
Payer: MEDICARE

## 2020-08-07 VITALS
DIASTOLIC BLOOD PRESSURE: 70 MMHG | BODY MASS INDEX: 30.51 KG/M2 | SYSTOLIC BLOOD PRESSURE: 116 MMHG | HEIGHT: 69 IN | WEIGHT: 206 LBS | TEMPERATURE: 98 F | HEART RATE: 66 BPM

## 2020-08-07 DIAGNOSIS — F41.9 ANXIETY: ICD-10-CM

## 2020-08-07 DIAGNOSIS — I25.10 CORONARY ARTERIOSCLEROSIS: Primary | ICD-10-CM

## 2020-08-07 DIAGNOSIS — R53.82 CHRONIC FATIGUE: ICD-10-CM

## 2020-08-07 DIAGNOSIS — E78.2 MIXED HYPERLIPIDEMIA: ICD-10-CM

## 2020-08-07 PROBLEM — E55.9 VITAMIN D DEFICIENCY: Status: ACTIVE | Noted: 2020-08-07

## 2020-08-07 PROBLEM — E78.5 HYPERLIPIDEMIA: Status: ACTIVE | Noted: 2020-08-07

## 2020-08-07 PROCEDURE — 99213 OFFICE O/P EST LOW 20 MIN: CPT | Performed by: FAMILY MEDICINE

## 2020-08-07 RX ORDER — CHOLECALCIFEROL (VITAMIN D3) 125 MCG
CAPSULE ORAL
COMMUNITY
End: 2020-11-09 | Stop reason: SDUPTHER

## 2020-08-07 RX ORDER — EVOLOCUMAB 140 MG/ML
140 INJECTION, SOLUTION SUBCUTANEOUS
COMMUNITY

## 2020-08-07 RX ORDER — LORAZEPAM 0.5 MG/1
0.5 TABLET ORAL
Qty: 21 TAB | Refills: 1 | Status: SHIPPED | OUTPATIENT
Start: 2020-08-07 | End: 2021-07-30

## 2020-08-07 RX ORDER — BLOOD-GLUCOSE CONTROL, NORMAL
EACH MISCELLANEOUS
COMMUNITY
End: 2021-07-30

## 2020-08-07 RX ORDER — BLOOD-GLUCOSE METER
KIT MISCELLANEOUS SEE ADMIN INSTRUCTIONS
COMMUNITY
End: 2021-07-30

## 2020-08-07 RX ORDER — LORAZEPAM 0.5 MG/1
0.5 TABLET ORAL
Qty: 21 TAB | Refills: 1 | Status: SHIPPED | OUTPATIENT
Start: 2020-08-07 | End: 2020-08-07 | Stop reason: SDUPTHER

## 2020-08-07 NOTE — PROGRESS NOTES
Dalila Latif gives permission to proceed with virtual visit using [email or web address] me and is aware that insurance will be billed and they may be responsible for charges depending on type of insurance. Tasha Wilson is a 72 y.o. male. [facility] complains of fatigue. Been on going. Has a leaky detector implanted is what he says? Says he saw cardio and had normal echo and labs and going for cath next week. No orthopnea pnd, no meza or sweats. I have personally reviewed the patients PAST MEDICAL HISTORY, MEDICATIONS, ALLERGIES, SOCIAL HISTORY, SURGICAL HISTORY and FAMILY HISTORY which is listed in Epic and updated as indicated. Review of Systems   Constitutional: Positive for malaise/fatigue. Negative for chills, fever and weight loss. HENT: Negative for congestion, ear discharge, ear pain, hearing loss, sinus pain, sore throat and tinnitus. Eyes: Negative for blurred vision, double vision, photophobia and pain. Respiratory: Negative for cough, sputum production and shortness of breath. Cardiovascular: Negative for chest pain, palpitations, orthopnea, claudication and leg swelling. Gastrointestinal: Negative for abdominal pain, constipation, diarrhea, heartburn, nausea and vomiting. Genitourinary: Negative for dysuria, frequency and urgency. Musculoskeletal: Positive for joint pain and myalgias. Negative for back pain and falls. Skin: Negative for itching and rash. Neurological: Negative for dizziness, tingling, tremors, sensory change, focal weakness and headaches. Endo/Heme/Allergies: Negative for environmental allergies and polydipsia. Does not bruise/bleed easily. Psychiatric/Behavioral: Negative for depression and suicidal ideas. The patient is not nervous/anxious and does not have insomnia. Objective  Physical Exam   By  video and virtual technology we observed the following today;  General:  Alert, no acute distress.    SKIN: Observable areas are without rashes, lesions, discoloration, jaundice  HEAD: Normocephalic and atraumatic. EYES: EOMI, PERRLA, Sclerae and conjunctivae clear bilaterally  NARES: Patent nares, no discharge  THROAT: Oral mucosa appears to be clear, pink without erythema or exudate  NECK: Trachea is midline, no masses and normal ROM. CHEST: NO tachypnea or respiratory distress. NO asymmetry in respirations or luana deformities  MSK: Arms FROM  NEURO: NO gross focal motor deficits, tremors, atrophy or asymmetry  PSYCH: Behavior, dress, speech and thought are appropriate. Mood is normal.  NO agitation or depression. Assessment & Plan  1. Coronary arteriosclerosis  Follow up with cardiologist.     2. Mixed hyperlipidemia      3. Anxiety  Warned of risk  - LORazepam (ATIVAN) 0.5 mg tablet; Take 1 Tab by mouth every eight (8) hours as needed for Anxiety. Max Daily Amount: 1.5 mg.  Dispense: 21 Tab; Refill: 1    4. Chronic fatigue  If cardiac work up negative, consider metabolic, hormonal.      Follow-up and Dispositions    · Return if symptoms worsen or fail to improve. Due to the current COVID-19 Pandemic this visit was performed using [email or web address] me which is a platform that uses audio and video technology and provides real time , face to face interaction with the patient.   The patient was at home and I was at Issac Toro, DO

## 2020-08-07 NOTE — PATIENT INSTRUCTIONS
General Health and concerns: HEART HEALTHY DIET: 
A heart healthy diet is one that is low in cholesterol (less than 300 mg daily), fat (less than 80 g daily) . You should also minimize carbohydrates / sugars (less amounts of breads, pastas, potato and potato products and sugary foods/snacks, cookies, cakes, etc) . Try to eat whole wheat/multigrain breads and pastas and eat more vegetables. Cook with olive oil (or no oil) and grill, bake, broil or boil foods. Less red meat and more chicken , fish and lean cuts of beef (limited). [id number] calories per day is sufficient 1235-1971 is acceptable for weight loss. EXERCISE: 
You should do exercise 3-5 days per week (minimum) to include increasing your heart rate for 30 to 45 minutes. At least a pace of a brisk walk should do that. This build up your heart and lung endurance and muscles and helps many function of the body. OTHER: 
    Routine Health maintenance: You need to get a yearly follow up/physical exam to review, discuss age and gender appropriate exams, labs, vaccines and screening tests. This includes cardiovascular health risk, cancer screens and other bernie related topics. Medications-take all medications as directed. Please do not stop unless you talk to your doctor or health care provider first. Report any problems immediately. Referrals: if you have been given a referral, please call the office if you do not hear from provider in one week. You may make the appointment yourself. Please keep all appointments with specialists and ask them to send their notes, thoughts, recommendations to us , as your PCP. Imaging/Labs:  Be sure to get these images in a timely manner. IF your test must be scheduled, let us know if you need help getting this done and if you do not hear from that provider in a week , call us or them.   BE SURE to call the office if you do not hear regarding the results in one week after the test is performed Image or lab). It is our intention to inform you of the results ALWAYS, even if normal you should get a notification (Call, portal message). PLEASE melecio if you do not get the results. PLEASE follow all recommendations and call/come in /ask questions if you do not understand of if problems develop after or in between visits. Failure to comply with recommended health care advise could result in serious health consequences. Thank you for choosing our practice and please let us know how we can help you feel better and stay well!

## 2020-08-07 NOTE — PROGRESS NOTES
Kain Suarez gives permission to proceed with virtual visit using doxy. me and is aware that insurance will be billed and they may be responsible for charges depending on type of insurance. Carlos Basilio is a 72 y.o. male. HPI     Pete Callahan calls in for a virtual visit today, complaining of fatigue. Had some pre test and has a leak implant also. Had an echocardiogram and results were good as well as lab by cardiology. May be getting a cath. SO he wants to hold on for now BUT does have a lot of anxiety and used to take some lorazepam.      I have personally reviewed the patients PAST MEDICAL HISTORY, MEDICATIONS, ALLERGIES, SOCIAL HISTORY, SURGICAL HISTORY and FAMILY HISTORY which is listed in Epic and updated as indicated. Review of Systems   Constitutional: Positive for malaise/fatigue. Negative for chills, diaphoresis, fever and weight loss. HENT: Negative for congestion, ear pain, hearing loss, sore throat and tinnitus. Eyes: Negative for blurred vision, double vision and photophobia. Respiratory: Negative for cough, sputum production, shortness of breath and wheezing. Cardiovascular: Positive for chest pain and palpitations. Negative for orthopnea and leg swelling. Gastrointestinal: Negative for abdominal pain, blood in stool, constipation, diarrhea, heartburn, melena, nausea and vomiting. Genitourinary: Negative for dysuria, frequency and urgency. Musculoskeletal: Negative for back pain, joint pain, myalgias and neck pain. Skin: Negative for itching and rash. Neurological: Negative for dizziness, tingling, tremors, sensory change, focal weakness and headaches. Psychiatric/Behavioral: Negative for depression and suicidal ideas. The patient is not nervous/anxious and does not have insomnia. Objective  Physical Exam   By  video and virtual technology we observed the following today;  General:  Alert, no acute distress.    SKIN: Observable areas are without rashes, lesions, discoloration, jaundice  HEAD: Normocephalic and atraumatic. EYES: EOMI, PERRLA, Sclerae and conjunctivae clear bilaterally  NARES: Patent nares, no discharge  THROAT: Oral mucosa appears to be clear, pink without erythema or exudate  NECK: Trachea is midline, no masses and normal ROM. CHEST: NO tachypnea or respiratory distress. NO asymmetry in respirations or luana deformities  MSK: Arms FROM  NEURO: NO gross focal motor deficits, tremors, atrophy or asymmetry  PSYCH: Behavior, dress, speech and thought are appropriate. Mood is normal.  NO agitation or depression. Assessment & Plan    1. Coronary arteriosclerosis  Sees cardio and has a catheterization on August 11 2020  2. Mixed hyperlipidemia      3. Anxiety  Small dose of lorazepam as requested seems appropriate.   - LORazepam (ATIVAN) 0.5 mg tablet; Take 1 Tab by mouth every eight (8) hours as needed for Anxiety. Max Daily Amount: 1.5 mg.  Dispense: 21 Tab; Refill: 1    4. Chronic fatigue  Perhaps related to the cardiac and he has had labs, He wanted to wait and see what results of labs and cath showed. I asked him to make sure we get all copies of that. Follow-up and Dispositions    · Return if symptoms worsen or fail to improve. Due to the current COVID-19 Pandemic this visit was performed using [email or web address] me which is a platform that uses audio and video technology and provides real time , face to face interaction with the patient. The patient was at home and [facility]    The patient and I discussed each diagnosis listed for today's visit. This includes medications, treatment, testing such as labs, imagine, referrals and when to call regarding results and appointments. The patient verbalized understanding of the care plan and all questions were answered to the patient's satisfaction prior to leaving the office. The patient is instructed to call the office or come back if problems develop.   The patient was told that failure to comply with recommended testing could result in abnormal health consequences. The patient was instructed to have yearly routine health maintenance including but not limited to age appropriate vaccines, testing, screening exams. The patient actively participated in medical decision making.       Leonidas Cornell, DO

## 2020-09-03 ENCOUNTER — HOSPITAL ENCOUNTER (OUTPATIENT)
Dept: MRI IMAGING | Age: 65
Discharge: HOME | End: 2020-09-03
Attending: ORTHOPAEDIC SURGERY
Payer: MEDICARE

## 2020-09-03 DIAGNOSIS — S46.012A TRAUMATIC TEAR OF LEFT ROTATOR CUFF: ICD-10-CM

## 2020-09-03 DIAGNOSIS — M25.512 LEFT SHOULDER PAIN: ICD-10-CM

## 2020-09-03 PROCEDURE — 73221 MRI JOINT UPR EXTREM W/O DYE: CPT

## 2020-10-09 ENCOUNTER — VIRTUAL VISIT (OUTPATIENT)
Dept: FAMILY MEDICINE CLINIC | Age: 65
End: 2020-10-09
Payer: MEDICARE

## 2020-10-09 DIAGNOSIS — E55.9 VITAMIN D DEFICIENCY: ICD-10-CM

## 2020-10-09 DIAGNOSIS — I25.10 CORONARY ARTERIOSCLEROSIS: ICD-10-CM

## 2020-10-09 DIAGNOSIS — R53.82 CHRONIC FATIGUE: Primary | ICD-10-CM

## 2020-10-09 PROCEDURE — 99213 OFFICE O/P EST LOW 20 MIN: CPT | Performed by: FAMILY MEDICINE

## 2020-10-09 NOTE — PROGRESS NOTES
IDENTIFICATION:  Jermain Salmeron 72 y.o. male     This is a video visit performed with doxy. me due to COVID-19 Pandemic. It utilizes video and audio technology that allows real time interaction with the patient. Brandyn Estrada realizes that this is a billable charge and may receive a bill and be responsible for payment. The patient agrees to proceed. CC:    Fatigue    HPI:    Brandyn Estrada calls in for a virtual visit today. He is actually feeling very fatigued. He says it comes and goes. He thinks it may be his Repatha. He never talked about this before and is uncertain the etiology. He had a procedure back in August and was tested for COVID-19 then and was negative. However, he is concerned that it still could be now as the fatigue is come back within the last few weeks. Is very difficult for him to do a lot of things driving sit down and rest.  He specifically denies chest pain orthopnea PND leg pain edema cough or congestion some sore throat off and on but that is not unusual.  He has no other complaints per se he does have some myalgias periodically.           HISTORY:  Patient Active Problem List   Diagnosis Code    Coronary arteriosclerosis I25.10    Hyperlipidemia E78.5    Vitamin D deficiency E55.9    Anxiety F41.9    Chronic fatigue R53.82    GERD (gastroesophageal reflux disease) K21.9     Social History     Tobacco Use    Smoking status: Never Smoker    Smokeless tobacco: Never Used   Substance Use Topics    Alcohol use: No    Drug use: No     Past Surgical History:   Procedure Laterality Date    CARDIAC SURG PROCEDURE UNLIST      Cardiac cath Stents x2    COLONOSCOPY N/A 11/12/2018    COLONOSCOPY performed by Kaya Hunter MD at [address] HX COLONOSCOPY  2018    HX GI  2005    GB removed    HX ORTHOPAEDIC  2006    Lumbar laminectomy    HX OTHER SURGICAL  2017    Colonoscopy     Family History   Problem Relation Age of Onset    Diabetes Mother     Stroke Father    [facility] [place] Hypertension Father        ALLERGIES AND MEDICATIONS:  Allergies   Allergen Reactions    Cefuroxime Unknown (comments)    Ibuprofen Other (comments)     Patient states ibuprofen upsets his stomach     Current Outpatient Medications on File Prior to Visit   Medication Sig Dispense Refill    lancets (BD Ultra-Fine II Lancets) 30 gauge misc by Does Not Apply route.  glucose blood VI test strips (FreeStyle Lite Strips) strip by Does Not Apply route See Admin Instructions.  evolocumab (Repatha SureClick) pen injection by SubCUTAneous route.  cholecalciferol, vitamin D3, (Vitamin D3) 50 mcg (2,000 unit) tab Take  by mouth.  LORazepam (ATIVAN) 0.5 mg tablet Take 1 Tab by mouth every eight (8) hours as needed for Anxiety. Max Daily Amount: 1.5 mg. 21 Tab 1    alfuzosin SR (UROXATRAL) 10 mg SR tablet Take  by mouth daily.  amLODIPine (NORVASC) 2.5 mg tablet Take 2.5 mg by mouth nightly.  atorvastatin (LIPITOR) 10 mg tablet Take  by mouth daily.  butalbital-acetaminophen-caff (FIORICET) -40 mg per capsule Take 1 Cap by mouth every four (4) hours as needed.  cyclobenzaprine (FLEXERIL) 10 mg tablet three (3) times daily as needed.  ergocalciferol (ERGOCALCIFEROL) 1,250 mcg (50,000 unit) capsule Take 50,000 Units by mouth every seven (7) days.  fluticasone propionate (FLONASE) 50 mcg/actuation nasal spray fluticasone propionate 50 mcg/actuation nasal spray,suspension   2 spray in each nostril daily      lidocaine (LIDODERM) 5 % by TransDERmal route as needed. Apply patch to the affected area for 12 hours a day and remove for 12 hours a day.  sildenafil citrate (VIAGRA) 50 mg tablet Take 50 mg by mouth as needed for Erectile Dysfunction.  amLODIPine (NORVASC) 5 mg tablet Take 5 mg by mouth daily. Every am      pantoprazole (PROTONIX) 40 mg tablet Take 40 mg by mouth daily.  ticagrelor (BRILINTA) 90 mg tablet Take 90 mg by mouth two (2) times a day. Indications: Myocardial Reinfarction Prevention      aspirin delayed-release 81 mg tablet Take 81 mg by mouth daily.  Bifidobacterium Infantis (ALIGN) 4 mg cap Take 1 Cap by mouth every evening. No current facility-administered medications on file prior to visit. REVIEW OF SYSTEMS:  Review of Systems   Constitutional: Positive for malaise/fatigue. Negative for chills, diaphoresis, fever and weight loss. HENT: Negative for congestion, ear pain, hearing loss, nosebleeds, sinus pain, sore throat and tinnitus. Eyes: Negative for blurred vision, double vision, photophobia and pain. Respiratory: Negative for cough, sputum production and shortness of breath. Cardiovascular: Negative for chest pain, palpitations, orthopnea, claudication, leg swelling and PND. Gastrointestinal: Negative for abdominal pain, blood in stool, constipation, diarrhea, heartburn, melena, nausea and vomiting. Genitourinary: Negative for dysuria, frequency and urgency. Musculoskeletal: Positive for back pain, joint pain and myalgias. Negative for falls and neck pain. Skin: Negative for itching and rash. Neurological: Negative for dizziness, tingling, tremors, sensory change, focal weakness and headaches. Psychiatric/Behavioral: Negative for depression and suicidal ideas. The patient is not nervous/anxious and does not have insomnia. VITALS:   Vitals were not taken by patient at current location. PHYSICAL:   By  video and virtual technology we observed the following today;  General:  Alert, no acute distress. SKIN: Observable areas are without rashes, lesions, discoloration, jaundice  HEAD: Normocephalic and atraumatic. EYES: EOMI, PERRLA, Sclerae and conjunctivae clear bilaterally  NARES: Patent nares, no discharge  THROAT: Oral mucosa appears to be clear, pink without erythema or exudate  NECK: Trachea is midline, no masses and normal ROM. CHEST: NO tachypnea or respiratory distress.  NO asymmetry in respirations or luana deformities  MSK: Arms FROM  NEURO: NO gross focal motor deficits, tremors, atrophy or asymmetry  PSYCH: Behavior, dress, speech and thought are appropriate. Mood is normal.  NO agitation or depression. ASSESSMENT AND PLAN:    1. Chronic fatigue      NO Specific etiology. Patient will go get tested for COVID-19 per his request.  I recommend the flu clinic at [address] [place] [address] in [place] or 1 of the local pharmacies that do that the.  Keeping in mind that the results may take a little longer than to 3 days. However, he should self quarantine until the results are back to stay consistent with the CDC recommendations. We will also get metabolic work-up for his fatigue. If all this is normal I will refer him back to his cardiologist to see if there is certainly the Repatha versus a cardiac etiology. - CBC WITH AUTOMATED DIFF  - METABOLIC PANEL, COMPREHENSIVE  - TSH 3RD GENERATION  - CBC WITH AUTOMATED DIFF  - SED RATE (ESR)  - MELLISSA BY MULTIPLEX FLOW IA, QL    2. Vitamin D deficiency    - VITAMIN D, 25 HYDROXY; Future    3. Coronary arteriosclerosis        FOLLOW UP:    Patient is advised to keep all future appointments unless otherwise discussed and keep ALL appointments for other providers, specialists and testing if scheduled. Failure to do so could result in adverse health consequences. DISCUSSION:    The patient and I discussed the following; That this video visit does not replace nor is as accurate / reliable as a face to face visit with a medical professional/physician/provider and that all diagnoses are based on information given by patient /others representing patient if present and records available. Each diagnosis listed for today's visit including medications, treatment, testing such as labs, imagine, referrals and when to call regarding results and appointments. Reminded patient to keep any and all appointments with specialists, labs, imaging. Reminded patient to make sure we get copies of any specialists care, labs and imaging. Reminded patient to call of come by the office if there are any concerns, questions , comments or problems. The patient verbalized understanding of the care plan and all questions were answered to the patient's satisfaction prior to leaving the office. The patient was told that failure to comply with recommended testing could result in abnormal health consequences. The patient was instructed to have yearly routine health maintenance including but not limited to age appropriate vaccines, testing, screening exams. The patient actively participated in medical decision making. This visit was completed using manually typed information. There may be errors despite editing. This visit was completed using doxy. me which is audio and video technology that allows real time interaction with the patient. The patient was at [address] and I was at OFFICE.         Gaby Richey, DO

## 2020-10-09 NOTE — PATIENT INSTRUCTIONS
     
Routine Health maintenance: You need to get a yearly follow up/physical exam to review, discuss age and gender appropriate exams, labs, vaccines and screening tests. This includes cardiovascular health risk, cancer screens and other bernie related topics. Medications-take all medications as directed. Please do not stop unless you talk to your doctor or health care provider first. Report any problems immediately. Referrals: if you have been given a referral, please call the office if you do not hear from provider in one week. You may make the appointment yourself. Please keep all appointments with specialists and ask them to send their notes, thoughts, recommendations to us , as your PCP. Imaging/Labs:  Be sure to get these images in a timely manner. IF your test must be scheduled, let us know if you need help getting this done and if you do not hear from that provider in a week , call us or them.   BE SURE to call the office if you do not hear regarding the results in one week after the test is performed Image or lab). It is our intention to inform you of the results ALWAYS, even if normal you should get a notification (Call, portal message). PLEASE melecio if you do not get the results. PLEASE follow all recommendations and call/come in /ask questions if you do not understand of if problems develop after or in between visits. Failure to comply with recommended health care advise could result in serious health consequences. Thank you for choosing our practice and please let us know how we can help you feel better and stay well!

## 2020-11-09 ENCOUNTER — TELEPHONE (OUTPATIENT)
Dept: FAMILY MEDICINE CLINIC | Age: 65
End: 2020-11-09

## 2020-11-09 DIAGNOSIS — E55.9 VITAMIN D DEFICIENCY: Primary | ICD-10-CM

## 2020-11-09 RX ORDER — CHOLECALCIFEROL (VITAMIN D3) 125 MCG
CAPSULE ORAL
Qty: 12 TAB | Refills: 3 | Status: SHIPPED | OUTPATIENT
Start: 2020-11-09 | End: 2020-11-19 | Stop reason: SDUPTHER

## 2020-11-09 NOTE — TELEPHONE ENCOUNTER
Patient called requesting RX-Vitamin D be sent to [facility] pharmacy.  Please call patient at [phone]

## 2020-11-09 NOTE — TELEPHONE ENCOUNTER
DONE    72 y.o. , Seth Doyle , male       Allergies   Allergen Reactions    Cefuroxime Unknown (comments)    Ibuprofen Other (comments)     Patient states ibuprofen upsets his stomach     Current Outpatient Medications on File Prior to Visit   Medication Sig Dispense Refill    lancets (BD Ultra-Fine II Lancets) 30 gauge misc by Does Not Apply route.  glucose blood VI test strips (FreeStyle Lite Strips) strip by Does Not Apply route See Admin Instructions.  evolocumab (Repatha SureClick) pen injection by SubCUTAneous route.  LORazepam (ATIVAN) 0.5 mg tablet Take 1 Tab by mouth every eight (8) hours as needed for Anxiety. Max Daily Amount: 1.5 mg. 21 Tab 1    [DISCONTINUED] cholecalciferol, vitamin D3, (Vitamin D3) 50 mcg (2,000 unit) tab Take  by mouth.  alfuzosin SR (UROXATRAL) 10 mg SR tablet Take  by mouth daily.  amLODIPine (NORVASC) 2.5 mg tablet Take 2.5 mg by mouth nightly.  atorvastatin (LIPITOR) 10 mg tablet Take  by mouth daily.  butalbital-acetaminophen-caff (FIORICET) -40 mg per capsule Take 1 Cap by mouth every four (4) hours as needed.  cyclobenzaprine (FLEXERIL) 10 mg tablet three (3) times daily as needed.  ergocalciferol (ERGOCALCIFEROL) 1,250 mcg (50,000 unit) capsule Take 50,000 Units by mouth every seven (7) days.  fluticasone propionate (FLONASE) 50 mcg/actuation nasal spray fluticasone propionate 50 mcg/actuation nasal spray,suspension   2 spray in each nostril daily      lidocaine (LIDODERM) 5 % by TransDERmal route as needed. Apply patch to the affected area for 12 hours a day and remove for 12 hours a day.  sildenafil citrate (VIAGRA) 50 mg tablet Take 50 mg by mouth as needed for Erectile Dysfunction.  amLODIPine (NORVASC) 5 mg tablet Take 5 mg by mouth daily. Every am      pantoprazole (PROTONIX) 40 mg tablet Take 40 mg by mouth daily.       ticagrelor (BRILINTA) 90 mg tablet Take 90 mg by mouth two (2) times a day. Indications: Myocardial Reinfarction Prevention      aspirin delayed-release 81 mg tablet Take 81 mg by mouth daily.  Bifidobacterium Infantis (ALIGN) 4 mg cap Take 1 Cap by mouth every evening. No current facility-administered medications on file prior to visit. Patient Active Problem List   Diagnosis Code    Coronary arteriosclerosis I25.10    Hyperlipidemia E78.5    Vitamin D deficiency E55.9    Anxiety F41.9    Chronic fatigue R53.82    GERD (gastroesophageal reflux disease) K21.9       1. Vitamin D deficiency    - cholecalciferol, vitamin D3, (Vitamin D3) 50 mcg (2,000 unit) tab; One tablet orally every week  Dispense: 12 Tab;  Refill: [facility],

## 2020-11-19 ENCOUNTER — TELEPHONE (OUTPATIENT)
Dept: FAMILY MEDICINE CLINIC | Age: 65
End: 2020-11-19

## 2020-11-19 DIAGNOSIS — E55.9 VITAMIN D DEFICIENCY: ICD-10-CM

## 2020-11-19 RX ORDER — CHOLECALCIFEROL (VITAMIN D3) 125 MCG
CAPSULE ORAL
Qty: 180 TAB | Refills: 3 | Status: SHIPPED
Start: 2020-11-19 | End: 2021-07-30

## 2020-11-19 NOTE — TELEPHONE ENCOUNTER
72 y.o. , Mack Vee , male       Allergies   Allergen Reactions    Cefuroxime Unknown (comments)    Ibuprofen Other (comments)     Patient states ibuprofen upsets his stomach     Current Outpatient Medications on File Prior to Visit   Medication Sig Dispense Refill    [DISCONTINUED] cholecalciferol, vitamin D3, (Vitamin D3) 50 mcg (2,000 unit) tab One tablet orally every week 12 Tab 3    lancets (BD Ultra-Fine II Lancets) 30 gauge misc by Does Not Apply route.  glucose blood VI test strips (FreeStyle Lite Strips) strip by Does Not Apply route See Admin Instructions.  evolocumab (Repatha SureClick) pen injection by SubCUTAneous route.  LORazepam (ATIVAN) 0.5 mg tablet Take 1 Tab by mouth every eight (8) hours as needed for Anxiety. Max Daily Amount: 1.5 mg. 21 Tab 1    alfuzosin SR (UROXATRAL) 10 mg SR tablet Take  by mouth daily.  amLODIPine (NORVASC) 2.5 mg tablet Take 2.5 mg by mouth nightly.  atorvastatin (LIPITOR) 10 mg tablet Take  by mouth daily.  butalbital-acetaminophen-caff (FIORICET) -40 mg per capsule Take 1 Cap by mouth every four (4) hours as needed.  cyclobenzaprine (FLEXERIL) 10 mg tablet three (3) times daily as needed.  ergocalciferol (ERGOCALCIFEROL) 1,250 mcg (50,000 unit) capsule Take 50,000 Units by mouth every seven (7) days.  fluticasone propionate (FLONASE) 50 mcg/actuation nasal spray fluticasone propionate 50 mcg/actuation nasal spray,suspension   2 spray in each nostril daily      lidocaine (LIDODERM) 5 % by TransDERmal route as needed. Apply patch to the affected area for 12 hours a day and remove for 12 hours a day.  sildenafil citrate (VIAGRA) 50 mg tablet Take 50 mg by mouth as needed for Erectile Dysfunction.  amLODIPine (NORVASC) 5 mg tablet Take 5 mg by mouth daily. Every am      pantoprazole (PROTONIX) 40 mg tablet Take 40 mg by mouth daily.       ticagrelor (BRILINTA) 90 mg tablet Take 90 mg by mouth two (2) times a day. Indications: Myocardial Reinfarction Prevention      aspirin delayed-release 81 mg tablet Take 81 mg by mouth daily.  Bifidobacterium Infantis (ALIGN) 4 mg cap Take 1 Cap by mouth every evening. No current facility-administered medications on file prior to visit. Patient Active Problem List   Diagnosis Code    Coronary arteriosclerosis I25.10    Hyperlipidemia E78.5    Vitamin D deficiency E55.9    Anxiety F41.9    Chronic fatigue R53.82    GERD (gastroesophageal reflux disease) K21.9       1. Vitamin D deficiency    - cholecalciferol, vitamin D3, (Vitamin D3) 50 mcg (2,000 unit) tab; Two tablets daily  Dispense: 180 Tab;  Refill: [facility],

## 2020-11-19 NOTE — TELEPHONE ENCOUNTER
Spoke with pt. States that Dr. Sheldon Guerra did blood work in January and was told the stop the 50,000 unit weekly. Then was put on Vit D 1,000 2 tablets.

## 2020-11-19 NOTE — TELEPHONE ENCOUNTER
Pt left message regarding Vit D rx. States that Dr. Raquel Smith had him taking  2 tablets daily. The new rx came in as 2 tablets every week.

## 2021-01-08 ENCOUNTER — HOSPITAL ENCOUNTER (OUTPATIENT)
Dept: PREADMISSION TESTING | Age: 66
Discharge: HOME | End: 2021-01-08
Payer: MEDICARE

## 2021-01-08 PROCEDURE — 87635 SARS-COV-2 COVID-19 AMP PRB: CPT

## 2021-01-09 LAB
HEALTH STATUS, XMCV2T: NORMAL
SARS-COV-2, COV2NT: NOT DETECTED
SOURCE, COVRS: NORMAL
SPECIMEN SOURCE, FCOV2M: NORMAL
SPECIMEN TYPE, XMCV1T: NORMAL

## 2021-01-12 ENCOUNTER — HOSPITAL ENCOUNTER (OUTPATIENT)
Age: 66
Setting detail: OUTPATIENT SURGERY
Discharge: HOME | End: 2021-01-12
Attending: SPECIALIST | Admitting: SPECIALIST
Payer: MEDICARE

## 2021-01-12 VITALS
DIASTOLIC BLOOD PRESSURE: 76 MMHG | RESPIRATION RATE: 16 BRPM | OXYGEN SATURATION: 98 % | BODY MASS INDEX: 30.42 KG/M2 | SYSTOLIC BLOOD PRESSURE: 142 MMHG | HEART RATE: 64 BPM | HEIGHT: 69 IN

## 2021-01-12 PROCEDURE — 2709999900 HC NON-CHARGEABLE SUPPLY: Performed by: SPECIALIST

## 2021-01-12 PROCEDURE — 74011000250 HC RX REV CODE- 250: Performed by: SPECIALIST

## 2021-01-12 PROCEDURE — 76040000007: Performed by: SPECIALIST

## 2021-01-12 RX ORDER — LIDOCAINE HYDROCHLORIDE 20 MG/ML
JELLY TOPICAL ONCE
Status: COMPLETED | OUTPATIENT
Start: 2021-01-12 | End: 2021-01-12

## 2021-01-12 RX ADMIN — LIDOCAINE HYDROCHLORIDE 5 ML: 20 JELLY TOPICAL at 08:28

## 2021-01-12 NOTE — DISCHARGE INSTRUCTIONS
Conner Muñoz  [id number]  1955      MANOMETRY DISCHARGE INSTRUCTION    You may resume your regular diet as tolerated. You may resume your normal daily activities. If you develop a sore throat- throat lozenges or warm salt water gargles will help. Call your Physician if you have any complications or questions. [employer] Activation    Thank you for requesting access to [employer]. Please follow the instructions below to securely access and download your online medical record. [employer] allows you to send messages to your doctor, view your test results, renew your prescriptions, schedule appointments, and more. How Do I Sign Up? 1. In your internet browser, go to [email or web address]  2. Click on the First Time User? Click Here link in the Sign In box. You will be redirect to the New Member Sign Up page. 3. Enter your [employer] Access Code exactly as it appears below. You will not need to use this code after youve completed the sign-up process. If you do not sign up before the expiration date, you must request a new code. [employer] Access Code: Activation code not generated  Current [employer] Status: Active (This is the date your [employer] access code will )    4. Enter the last four digits of your Social Security Number (xxxx) and Date of Birth (mm/dd/yyyy) as indicated and click Submit. You will be taken to the next sign-up page. 5. Create a [employer] ID. This will be your [employer] login ID and cannot be changed, so think of one that is secure and easy to remember. 6. Create a [employer] password. You can change your password at any time. 7. Enter your Password Reset Question and Answer. This can be used at a later time if you forget your password. 8. Enter your e-mail address. You will receive e-mail notification when new information is available in [address] 9. Click Sign Up. You can now view and download portions of your medical record.   10. Click the Download Summary menu link to download a portable copy of your medical information. Additional Information    If you have questions, please visit the Frequently Asked Questions section of the [employer] website at [email or web address] Remember, [employer] is NOT to be used for urgent needs. For medical emergencies, dial 911.

## 2021-01-12 NOTE — PROGRESS NOTES
5cc viscous lidocaine inhaled into left nare per MD orders. Probe inserted into  left nare with difficulty due to gag reflex. Cetacaine spray used in addition to lidocaine. 3 attempts made but pt unable to tolerate.

## 2021-01-18 NOTE — PROGRESS NOTES
No procedure note dictated. Patient did not tolearate the probe placement so no report made.   Rola Horton MD  9:04 AM  1/18/2021

## 2021-02-02 NOTE — PERIOP NOTES
PAT call to patient. Will be out of town until 2/6 pm.  Covid testing scheduled 2/7 am.  Pt aware if results are not back in time, procedure will be cancelled. 8412 cardio notes requested from [facility]. Call log is in their system that NP has given pt to ok to hold brillinta 7 days prior to case and resume the day after surgery.

## 2021-02-02 NOTE — PERIOP NOTES
[facility]  Ambulatory Surgery Unit  Pre-operative Instructions    Surgery/Procedure Date  2/10            Tentative Arrival Time 11:30am      1. On the day of your surgery/procedure, please report to the Ambulatory Surgery Unit Registration Desk and sign in at your designated time. The Ambulatory Surgery Unit is located in [facility] on the [facility] side of the [facility] across from the [facility]. Please have all of your health insurance cards and a photo ID. 2. You must have someone with you to drive you home, as you should not drive a car for 24 hours following anesthesia. Please make arrangements for a responsible adult friend or family member to stay with you for at least the first 24 hours after your surgery. 3. Do not have anything to eat or drink (including water, gum, mints, coffee, juice) after 11:59 PM  2/9. This may not apply to medications prescribed by your physician. (Please note below the special instructions with medications to take the morning of surgery, if applicable.)    4. We recommend you do not drink any alcoholic beverages for 24 hours before and after your surgery. 5. Contact your surgeons office for instructions on the following medications: non-steroidal anti-inflammatory drugs (i.e. Advil, Aleve), vitamins, and supplements. (Some surgeons will want you to stop these medications prior to surgery and others may allow you to take them)   **If you are currently taking Plavix, Coumadin, Aspirin and/or other blood-thinning agents, contact your surgeon for instructions. ** Your surgeon will partner with the physician prescribing these medications to determine if it is safe to stop or if you need to continue taking. Please do not stop taking these medications without instructions from your surgeon.     6. In an effort to help prevent surgical site infection, we ask that you shower with an anti-bacterial soap (i.e. Dial/Safeguard, or the soap provided to you at your preadmission testing appointment) for 3 days prior to and on the morning of surgery, using a fresh towel after each shower. (Please begin this process with fresh bed linens.) Do not apply any lotions, powders, or deodorants after the shower on the day of your procedure. If applicable, please do not shave the operative site for 48 hours prior to surgery. 7. Wear comfortable clothes. Wear glasses instead of contacts. Do not bring any jewelry or money (other than copays or fees as instructed). Do not wear make-up, particularly mascara, the morning of your surgery. Do not wear nail polish, particularly if you are having foot /hand surgery. Wear your hair loose or down, no ponytails, buns, maykel pins or clips. All body piercings must be removed. 8. You should understand that if you do not follow these instructions your surgery may be cancelled. If your physical condition changes (i.e. fever, cold or flu) please contact your surgeon as soon as possible. 9. It is important that you be on time. If a situation occurs where you may be late, or if you have any questions or problems, please call [phone].    10. Your surgery time may be subject to change. You will receive a phone call the day prior to surgery to confirm your arrival time. Special Instructions: Take all medications and inhalers, as prescribed, on the morning of surgery with a sip of water EXCEPT: brillinta per Dr. Brissa Rubio and cardiology    I understand a pre-operative phone call will be made to verify my surgery time. In the event that I am not available, I give permission for a message to be left on my answering service and/or with another person?       yes      Reviewed by phone with pt, verbalized understanding.     ___________________      ___________________      ________________  (Signature of Patient)          (Witness)                   (Date and Time)

## 2021-02-07 ENCOUNTER — HOSPITAL ENCOUNTER (OUTPATIENT)
Dept: PREADMISSION TESTING | Age: 66
Discharge: HOME | End: 2021-02-07
Payer: MEDICARE

## 2021-02-07 LAB — SARS-COV-2, COV2: NORMAL

## 2021-02-07 PROCEDURE — U0003 INFECTIOUS AGENT DETECTION BY NUCLEIC ACID (DNA OR RNA); SEVERE ACUTE RESPIRATORY SYNDROME CORONAVIRUS 2 (SARS-COV-2) (CORONAVIRUS DISEASE [COVID-19]), AMPLIFIED PROBE TECHNIQUE, MAKING USE OF HIGH THROUGHPUT TECHNOLOGIES AS DESCRIBED BY CMS-2020-01-R: HCPCS

## 2021-02-08 LAB — SARS-COV-2, COV2NT: NOT DETECTED

## 2021-02-09 ENCOUNTER — ANESTHESIA EVENT (OUTPATIENT)
Dept: SURGERY | Age: 66
End: 2021-02-09
Payer: MEDICARE

## 2021-02-10 ENCOUNTER — ANESTHESIA (OUTPATIENT)
Dept: SURGERY | Age: 66
End: 2021-02-10
Payer: MEDICARE

## 2021-02-10 ENCOUNTER — HOSPITAL ENCOUNTER (OUTPATIENT)
Age: 66
Setting detail: OUTPATIENT SURGERY
Discharge: HOME | End: 2021-02-10
Attending: ORTHOPAEDIC SURGERY | Admitting: ORTHOPAEDIC SURGERY
Payer: MEDICARE

## 2021-02-10 VITALS
DIASTOLIC BLOOD PRESSURE: 70 MMHG | HEIGHT: 69 IN | SYSTOLIC BLOOD PRESSURE: 110 MMHG | WEIGHT: 204 LBS | BODY MASS INDEX: 30.21 KG/M2 | TEMPERATURE: 98 F | HEART RATE: 68 BPM | OXYGEN SATURATION: 98 % | RESPIRATION RATE: 16 BRPM

## 2021-02-10 DIAGNOSIS — M65.332 TRIGGER FINGER, LEFT MIDDLE FINGER: Primary | Chronic | ICD-10-CM

## 2021-02-10 PROCEDURE — 74011250636 HC RX REV CODE- 250/636: Performed by: NURSE ANESTHETIST, CERTIFIED REGISTERED

## 2021-02-10 PROCEDURE — 76210000040 HC AMBSU PH I REC FIRST 0.5 HR: Performed by: ORTHOPAEDIC SURGERY

## 2021-02-10 PROCEDURE — 74011250636 HC RX REV CODE- 250/636: Performed by: ANESTHESIOLOGY

## 2021-02-10 PROCEDURE — 76210000046 HC AMBSU PH II REC FIRST 0.5 HR: Performed by: ORTHOPAEDIC SURGERY

## 2021-02-10 PROCEDURE — 77030002916 HC SUT ETHLN J&J -A: Performed by: ORTHOPAEDIC SURGERY

## 2021-02-10 PROCEDURE — 74011000250 HC RX REV CODE- 250: Performed by: ORTHOPAEDIC SURGERY

## 2021-02-10 PROCEDURE — 76060000073 HC AMB SURG ANES FIRST 0.5 HR: Performed by: ORTHOPAEDIC SURGERY

## 2021-02-10 PROCEDURE — 77030040356 HC CORD BPLR FRCP COVD -A: Performed by: ORTHOPAEDIC SURGERY

## 2021-02-10 PROCEDURE — 76030000002 HC AMB SURG OR TIME FIRST 0.: Performed by: ORTHOPAEDIC SURGERY

## 2021-02-10 PROCEDURE — 77030000032 HC CUF TRNQT ZIMM -B: Performed by: ORTHOPAEDIC SURGERY

## 2021-02-10 PROCEDURE — 74011250637 HC RX REV CODE- 250/637: Performed by: ORTHOPAEDIC SURGERY

## 2021-02-10 PROCEDURE — 2709999900 HC NON-CHARGEABLE SUPPLY: Performed by: ORTHOPAEDIC SURGERY

## 2021-02-10 PROCEDURE — 74011250636 HC RX REV CODE- 250/636: Performed by: ORTHOPAEDIC SURGERY

## 2021-02-10 PROCEDURE — 77030006689 HC BLD OPHTH BVR BD -A: Performed by: ORTHOPAEDIC SURGERY

## 2021-02-10 PROCEDURE — 77030039825 HC MSK NSL PAP SUPERNO2VA VYRM -B: Performed by: ANESTHESIOLOGY

## 2021-02-10 RX ORDER — ONDANSETRON 2 MG/ML
INJECTION INTRAMUSCULAR; INTRAVENOUS AS NEEDED
Status: DISCONTINUED | OUTPATIENT
Start: 2021-02-10 | End: 2021-02-10 | Stop reason: HOSPADM

## 2021-02-10 RX ORDER — MORPHINE SULFATE 10 MG/ML
2 INJECTION, SOLUTION INTRAMUSCULAR; INTRAVENOUS
Status: DISCONTINUED | OUTPATIENT
Start: 2021-02-10 | End: 2021-02-10 | Stop reason: HOSPADM

## 2021-02-10 RX ORDER — HYDROMORPHONE HYDROCHLORIDE 1 MG/ML
.2-.5 INJECTION, SOLUTION INTRAMUSCULAR; INTRAVENOUS; SUBCUTANEOUS
Status: DISCONTINUED | OUTPATIENT
Start: 2021-02-10 | End: 2021-02-10 | Stop reason: HOSPADM

## 2021-02-10 RX ORDER — LIDOCAINE HYDROCHLORIDE 10 MG/ML
0.1 INJECTION, SOLUTION EPIDURAL; INFILTRATION; INTRACAUDAL; PERINEURAL AS NEEDED
Status: DISCONTINUED | OUTPATIENT
Start: 2021-02-10 | End: 2021-02-10 | Stop reason: HOSPADM

## 2021-02-10 RX ORDER — SODIUM CHLORIDE 0.9 % (FLUSH) 0.9 %
5-40 SYRINGE (ML) INJECTION AS NEEDED
Status: DISCONTINUED | OUTPATIENT
Start: 2021-02-10 | End: 2021-02-10 | Stop reason: HOSPADM

## 2021-02-10 RX ORDER — FENTANYL CITRATE 50 UG/ML
INJECTION, SOLUTION INTRAMUSCULAR; INTRAVENOUS AS NEEDED
Status: DISCONTINUED | OUTPATIENT
Start: 2021-02-10 | End: 2021-02-10 | Stop reason: HOSPADM

## 2021-02-10 RX ORDER — MIDAZOLAM HYDROCHLORIDE 1 MG/ML
0.5 INJECTION, SOLUTION INTRAMUSCULAR; INTRAVENOUS
Status: DISCONTINUED | OUTPATIENT
Start: 2021-02-10 | End: 2021-02-10 | Stop reason: HOSPADM

## 2021-02-10 RX ORDER — PROPOFOL 10 MG/ML
INJECTION, EMULSION INTRAVENOUS AS NEEDED
Status: DISCONTINUED | OUTPATIENT
Start: 2021-02-10 | End: 2021-02-10 | Stop reason: HOSPADM

## 2021-02-10 RX ORDER — HYDROCODONE BITARTRATE AND ACETAMINOPHEN 5; 325 MG/1; MG/1
1 TABLET ORAL
Qty: 15 TAB | Refills: 0 | Status: SHIPPED | OUTPATIENT
Start: 2021-02-10 | End: 2021-02-15

## 2021-02-10 RX ORDER — PROPOFOL 10 MG/ML
INJECTION, EMULSION INTRAVENOUS
Status: DISCONTINUED | OUTPATIENT
Start: 2021-02-10 | End: 2021-02-10 | Stop reason: HOSPADM

## 2021-02-10 RX ORDER — FENTANYL CITRATE 50 UG/ML
50 INJECTION, SOLUTION INTRAMUSCULAR; INTRAVENOUS AS NEEDED
Status: DISCONTINUED | OUTPATIENT
Start: 2021-02-10 | End: 2021-02-10 | Stop reason: HOSPADM

## 2021-02-10 RX ORDER — SODIUM CHLORIDE, SODIUM LACTATE, POTASSIUM CHLORIDE, CALCIUM CHLORIDE 600; 310; 30; 20 MG/100ML; MG/100ML; MG/100ML; MG/100ML
25 INJECTION, SOLUTION INTRAVENOUS CONTINUOUS
Status: DISCONTINUED | OUTPATIENT
Start: 2021-02-10 | End: 2021-02-10 | Stop reason: HOSPADM

## 2021-02-10 RX ORDER — SODIUM CHLORIDE 0.9 % (FLUSH) 0.9 %
5-40 SYRINGE (ML) INJECTION EVERY 8 HOURS
Status: DISCONTINUED | OUTPATIENT
Start: 2021-02-10 | End: 2021-02-10 | Stop reason: HOSPADM

## 2021-02-10 RX ORDER — MIDAZOLAM HYDROCHLORIDE 1 MG/ML
INJECTION, SOLUTION INTRAMUSCULAR; INTRAVENOUS AS NEEDED
Status: DISCONTINUED | OUTPATIENT
Start: 2021-02-10 | End: 2021-02-10 | Stop reason: HOSPADM

## 2021-02-10 RX ORDER — DIPHENHYDRAMINE HYDROCHLORIDE 50 MG/ML
12.5 INJECTION, SOLUTION INTRAMUSCULAR; INTRAVENOUS AS NEEDED
Status: DISCONTINUED | OUTPATIENT
Start: 2021-02-10 | End: 2021-02-10 | Stop reason: HOSPADM

## 2021-02-10 RX ORDER — ONDANSETRON 2 MG/ML
4 INJECTION INTRAMUSCULAR; INTRAVENOUS AS NEEDED
Status: DISCONTINUED | OUTPATIENT
Start: 2021-02-10 | End: 2021-02-10 | Stop reason: HOSPADM

## 2021-02-10 RX ORDER — FENTANYL CITRATE 50 UG/ML
25 INJECTION, SOLUTION INTRAMUSCULAR; INTRAVENOUS
Status: DISCONTINUED | OUTPATIENT
Start: 2021-02-10 | End: 2021-02-10 | Stop reason: HOSPADM

## 2021-02-10 RX ADMIN — PROPOFOL 50 MG: 10 INJECTION, EMULSION INTRAVENOUS at 13:21

## 2021-02-10 RX ADMIN — FENTANYL CITRATE 50 MCG: 50 INJECTION, SOLUTION INTRAMUSCULAR; INTRAVENOUS at 13:26

## 2021-02-10 RX ADMIN — MIDAZOLAM HYDROCHLORIDE 2 MG: 1 INJECTION, SOLUTION INTRAMUSCULAR; INTRAVENOUS at 13:20

## 2021-02-10 RX ADMIN — FENTANYL CITRATE 50 MCG: 50 INJECTION, SOLUTION INTRAMUSCULAR; INTRAVENOUS at 13:21

## 2021-02-10 RX ADMIN — PROPOFOL 50 MG: 10 INJECTION, EMULSION INTRAVENOUS at 13:26

## 2021-02-10 RX ADMIN — ONDANSETRON HYDROCHLORIDE 4 MG: 2 INJECTION, SOLUTION INTRAMUSCULAR; INTRAVENOUS at 13:34

## 2021-02-10 RX ADMIN — WATER 2 G: 1 INJECTION INTRAMUSCULAR; INTRAVENOUS; SUBCUTANEOUS at 13:21

## 2021-02-10 RX ADMIN — Medication 3 AMPULE: at 12:16

## 2021-02-10 RX ADMIN — PROPOFOL 50 MCG/KG/MIN: 10 INJECTION, EMULSION INTRAVENOUS at 13:21

## 2021-02-10 RX ADMIN — SODIUM CHLORIDE, POTASSIUM CHLORIDE, SODIUM LACTATE AND CALCIUM CHLORIDE 25 ML/HR: 600; 310; 30; 20 INJECTION, SOLUTION INTRAVENOUS at 12:16

## 2021-02-10 NOTE — PERIOP NOTES
Permission received to review discharge instructions and discuss private health information with Brissa Rubin, wife.

## 2021-02-10 NOTE — BRIEF OP NOTE
Brief Postoperative Note    Patient: Kendal Raymond  YOB: 1955  MRN: [id number]    Date of Procedure: 2/10/2021     Pre-Op Diagnosis: TRIGGER FINGER LEFT MIDDLE FINGER     Post-Op Diagnosis: Left middle trigger finger; volar retinacular cyst      Procedure(s):  LEFT MIDDLE FINGER TRIGGER FINGER RELEASE with volar cyst excision    Surgeon(s):  Gloria Wray MD    Surgical Assistant: None    Anesthesia: MAC     Estimated Blood Loss (mL): Minimal    Complications: None    Specimens: * No specimens in log *     Implants: * No implants in log *    Drains: * No LDAs found *    Findings: Above with volar cyst    Electronically Signed by Heath Landa MD on 2/10/2021 at 1:51 PM

## 2021-02-10 NOTE — ANESTHESIA POSTPROCEDURE EVALUATION
Procedure(s):  LEFT MIDDLE FINGER TRIGGER FINGER RELEASE. MAC    Anesthesia Post Evaluation        Patient location during evaluation: PACU  Note status: Adequate. Level of consciousness: responsive to verbal stimuli and sleepy but conscious  Pain management: satisfactory to patient  Airway patency: patent  Anesthetic complications: no  Cardiovascular status: acceptable  Respiratory status: acceptable  Hydration status: acceptable  Comments: +Post-Anesthesia Evaluation and Assessment    Patient: Ayleen Peace MRN: [id number]  SSN: [id number]   YOB: 1955  Age: 77 y.o. Sex: male      Cardiovascular Function/Vital Signs    /62   Pulse 67   Temp 36.6 °C (97.8 °F)   Resp 14   Ht 5' 9\" (1.753 m)   Wt 92.5 kg (204 lb)   SpO2 95%   BMI 30.13 kg/m²     Patient is status post Procedure(s):  LEFT MIDDLE FINGER TRIGGER FINGER RELEASE. Nausea/Vomiting: Controlled. Postoperative hydration reviewed and adequate. Pain:  Pain Scale 1: Numeric (0 - 10) (02/10/21 1349)  Pain Intensity 1: 0 (02/10/21 1349)   Managed. Neurological Status:   Neuro (WDL): Within Defined Limits (02/10/21 1202)   At baseline. Mental Status and Level of Consciousness: Arousable. Pulmonary Status:   O2 Device: Room air (02/10/21 1349)   Adequate oxygenation and airway patent. Complications related to anesthesia: None    Post-anesthesia assessment completed. No concerns. Signed By: Reno Marino MD    2/10/2021  Post anesthesia nausea and vomiting:  controlled      INITIAL Post-op Vital signs:   Vitals Value Taken Time   /62 02/10/21 1359   Temp 36.6 °C (97.8 °F) 02/10/21 1348   Pulse 66 02/10/21 1408   Resp 16 02/10/21 1408   SpO2 97 % 02/10/21 1408   Vitals shown include unvalidated device data.

## 2021-02-10 NOTE — PERIOP NOTES
Jaime De León  1955  [id number]    Situation:  Verbal report given from: Meghan  Procedure: Procedure(s):  LEFT MIDDLE FINGER TRIGGER FINGER RELEASE    Background:    Preoperative diagnosis: TRIGGER FINGER LEFT MIDDLE FINGER    Postoperative diagnosis: TRIGGER FINGER LEFT MIDDLE FINGER    :  Dr. Montrell Guerra    Assistant(s): Circ-1: Apple Arreola RN  Scrub Tech-1: Gris Fernandez    Specimens: * No specimens in log *    Assessment:  Intra-procedure medications         Anesthesia gave intra-procedure sedation and medications, see anesthesia flow sheet     Intravenous fluids: LR@ KVO     Vital signs stable       Recommendation:    Permission to share finding with family: yes

## 2021-02-10 NOTE — DISCHARGE INSTRUCTIONS
Patient Education        Trigger Finger Release: What to Expect at Home  Your Recovery  Your finger and hand may be sore and swollen for several days. It may be hard to move your finger at first. This usually gets better after several weeks. You may feel numbness or tingling near the cut, called an incision, that the doctor made. This feeling will probably get better in a few days, but it may take several months to completely go away. Your doctor will take out your stitches 1 to 2 weeks after surgery. It will probably take about 6 weeks for your finger to heal completely. Once healed, your finger may move easily without pain. How soon you can return to work depends on your job. If you can do your job without using the hand, you may be able to go back 1 or 2 days after surgery. But if your job requires you to do repeated finger movements, put pressure on your hand, or lift things, you may need to take up to 6 weeks off work. Your doctor can help you decide how much time you will need to take off work. This care sheet gives you a general idea about how long it will take for you to recover. But each person recovers at a different pace. Follow the steps below to get better as quickly as possible. How can you care for yourself at home? Activity    · Rest when you feel tired. Getting enough sleep will help you recover.     · Try to walk each day. Start by walking a little more than you did the day before. Bit by bit, increase the amount you walk.     · For 1 to 2 weeks after surgery, avoid using your hand. This includes lifting things heavier than 1 to 2 pounds or doing repeated finger or hand movements, such as typing, using a computer mouse, washing windows, vacuuming, or chopping food. Do not use power tools, and avoid other activities that make your hand vibrate.     · Ask your doctor when you can drive again.     · You may be able to go back to work 1 or 2 days after surgery.  It depends on the type of work you do and how you feel.     · You may shower, but do not get your hand wet until your doctor says it is okay. Keep the bandage dry by covering it with plastic. Do not take a bath, swim, use a hot tub, or soak your hand until your doctor says it is okay. Diet    · You can eat your normal diet. If your stomach is upset, try bland, low-fat foods like plain rice, broiled chicken, toast, and yogurt. Medicines    · Your doctor will tell you if and when you can restart your medicines. He or she will also give you instructions about taking any new medicines.     · If you take aspirin or some other blood thinner, ask your doctor if and when to start taking it again. Make sure that you understand exactly what your doctor wants you to do.     · Take pain medicines exactly as directed. ? If the doctor gave you a prescription medicine for pain, take it as prescribed. ? If you are not taking a prescription pain medicine, ask your doctor if you can take an over-the-counter medicine.     · If you think your pain medicine is making you sick to your stomach:  ? Take your medicine after meals (unless your doctor has told you not to). ? Ask your doctor for a different pain medicine.     · If your doctor prescribed antibiotics, take them as directed. Do not stop taking them just because you feel better. You need to take the full course of antibiotics. Incision care    · Leave the bandage on your hand until the doctor says it is okay to remove it. This is usually 2 or 3 days after surgery.     · After the doctor says you can take off your bandage, wash the area daily with warm, soapy water and pat it dry. Don't use hydrogen peroxide or alcohol, which can slow healing. You may cover the area with a gauze bandage if it weeps or rubs against clothing. Change the bandage every day.     · Keep the area clean and dry.    Exercise    · Gently bend and straighten your fingers throughout the day to keep them flexible and help reduce swelling.     · You may need finger and hand therapy. This helps you regain range of motion, strength, and  in your finger and hand. To get the best results, you need to do the exercises correctly and as often and as long as your doctor or your physical or occupational therapist tells you to. Ice and elevation    · Put ice or a cold pack on your hand and wrist for 10 to 20 minutes at a time. Try to do this every 1 to 2 hours for the next 3 days (when you are awake) or until the swelling goes down. Put a thin cloth between the ice and your skin.     · Prop up your hand on a pillow anytime you sit or lie down during the first 2 or 3 days after surgery. Try to keep the hand above the level of your heart. This will help reduce swelling. Follow-up care is a key part of your treatment and safety. Be sure to make and go to all appointments, and call your doctor if you are having problems. It's also a good idea to know your test results and keep a list of the medicines you take. When should you call for help? Call 911 anytime you think you may need emergency care. For example, call if:    · You passed out (lost consciousness).     · You have severe trouble breathing.     · You have sudden chest pain and shortness of breath, or you cough up blood. Call your doctor now or seek immediate medical care if:    · You have pain that does not get better after you take pain medicine.     · You have loose stitches, or your incision comes open.     · Your incision bleeds through a large bandage.     · You have signs of infection, such as:  ? Increased pain, swelling, warmth, or redness. ? Red streaks leading from the incision. ? Pus draining from the incision. ? Swollen lymph nodes in your neck, armpits, or groin. ? A fever.     · Your hand or fingers are cool or pale or change color.     · You have tingling or numbness in your hand or fingers.     · You cannot move your fingers.    Watch closely for any changes in your health, and be sure to contact your doctor if:    · You are not getting better as expected. Where can you learn more? Go to http://www.gray.com/  Enter L254 in the search box to learn more about \"Trigger Finger Release: What to Expect at Home. \"  Current as of: March 2, 2020               Content Version: 12.6  © 2006-2020 [employer]. Care instructions adapted under license by [employer] (which disclaims liability or warranty for this information). If you have questions about a medical condition or this instruction, always ask your healthcare professional. [place] [address] any warranty or liability for your use of this information. Dr. Suzie Kowalski Instructions for Elbow/Wrist/Hand Surgery    Medications/Diet  1. Eat only light, non-greasy foods today  2. Take pain medicine with food  3. While taking pain medicines, you may not operate a vehicle, heavy machinery, or appliances  4. While taking pain medicines, you may not drink alcoholic beverages  5. While taking pain medicines, you may not make critical decisions or sign legal papers  6. If you have any reactions to your medicines, stop taking them and call my office immediately  7. Please keep in mind that constipation is a very common side   effect of taking narcotic pain medication. We recommend that patients take precautions to prevent constipation:   Drink plenty of water (6-8 glasses of 8 oz. a day)   Avoid alcohol, caffeine, and dairy products   Eat plenty of fiber (fruits, vegetables and whole grains)   Take an over the counter stool softener (colace or dulcolax)   Patients that have had upper extremity surgery should take frequent walks      Activity/Exercise    1. Exercises are not necessary at this stage, and you will be given exercises at your first post operative visit  2.  You are in a splint and should remain in this until your first postoperative visit  3. Please keep ice applied to the wrist for the first 72 hours or as long as pain or swelling persist. Do not apply ice directly to skin, or allow water to leak on your dressing    Dressings/Shower    1. Please keep dressing dry  2. If you have had arthroscopy, you may expect some drainage  3. Please reinforce your dressing with a dry sterile dressing  4. Your dressing will be removed at your first post operative visit  5. You may not shower until after your first post operative visit    Emergency/Follow-Up    1. Please notify my office at [phone] if you develop any fever (101° or above), unexpected warmth, redness or swelling in your hand. Please call if your fingers become cold, purple, numb, or there is excessive bleeding  2. Please call the office within 24 hours at [phone] (ext. [phone]) to schedule a follow up appointment next week  Please call the office before 3pm on Friday if you do not have enough pain medicine for the weekend. Narcotic pain medication cannot be called into your pharmacy and the prescription must be picked up at our office. TO PREVENT AN INFECTION      1. [address] YOUR HANDS     To prevent infection, good handwashing is the most important thing you or your caregiver can do.  Wash your hands with soap and water or use the hand  we gave you before you touch any wounds. 2. SHOWER     Use the antibacterial soap we gave you when you take a shower.  Shower with this soap until your wounds are healed.  To reach all areas of your body, you may need someone to help you.  Dont forget to clean your belly button with every shower. 3.  USE CLEAN SHEETS     Use freshly cleaned sheets on your bed after surgery.  To keep the surgery site clean, do not allow pets to sleep with you while your wound is still healing. 4. STOP SMOKING     Stop smoking, or at least cut back on smoking     Smoking slows your healing.      DO NOT TAKE TYLENOL/ACETAMINOPHEN WITH PERCOCET, [address], [address] TAKE NARCOTIC PAIN MEDICATIONS WITH FOOD     Narcotics tend to be constipating, we suggest taking a stool softener such as Colace or Miralax (follow package instructions). DO NOT DRIVE WHILE TAKING NARCOTIC PAIN MEDICATIONS. DO NOT TAKE SLEEPING MEDICATIONS OR ANTIANXIETY MEDICATIONS WHILE TAKING NARCOTIC PAIN MEDICATIONS,  ESPECIALLY THE NIGHT OF ANESTHESIA! CPAP PATIENTS BE SURE TO WEAR MACHINE WHENEVER NAPPING OR SLEEPING! DISCHARGE SUMMARY from Nurse    The following personal items collected during your admission are returned to you:   Dental Appliance: Dental Appliances: None  Vision: Visual Aid: Glasses(PACU)  Hearing Aid:    Jewelry: Jewelry: None  Clothing: Clothing: With patient  Other Valuables: Other Valuables: Eyeglasses(PACU)  Valuables sent to safe:        PATIENT INSTRUCTIONS:    After General Anesthesia or Intravenous Sedation, for 24 hours or while taking prescription Narcotics:        Someone should be with you for the next 24 hours. For your own safety, a responsible adult must drive you home. · Limit your activities  · Recommended activity: Rest today, up with assistance today. Do not climb stairs or shower unattended for the next 24 hours. · Please start with a soft bland diet and advance as tolerated (no nausea) to regular diet. · If you have a sore throat you should try the following: fluids, warm salt water gargles, or throat lozenges. If it does not improve after several days please follow up with your primary physician. · Do not drive and operate hazardous machinery  · Do not make important personal or business decisions  · Do  not drink alcoholic beverages  · If you have not urinated within 8 hours after discharge, please contact your surgeon on call.     Report the following to your surgeon:  · Excessive pain, swelling, redness or odor of or around the surgical area  · Temperature over 100.5  · Nausea and vomiting lasting longer than 4 hours or if unable to take medications  · Any signs of decreased circulation or nerve impairment to extremity: change in color, persistent  numbness, tingling, coldness or increase pain      · You will receive a Post Operative Call from one of the Recovery Room Nurses on the day after your surgery to check on you. It is very important for us to know how you are recovering after your surgery. If you have an issue or need to speak with someone, please call your surgeon, do not wait for the post operative call. · You may receive an e-mail or letter in the mail from [employer] regarding your experience with us in the Ambulatory Surgery Unit. Your feedback is valuable to us and we appreciate your participation in the survey. · If the above instructions are not adequate or you are having problems after your surgery, call the physician at their office number. · We wish you a speedy recovery ? What to do at Home:      *  Please give a list of your current medications to your Primary Care Provider. *  Please update this list whenever your medications are discontinued, doses are      changed, or new medications (including over-the-counter products) are added. *  Please carry medication information at all times in case of emergency situations. If you have not received your influenza and/or pneumococcal vaccine, please follow up with your primary care physician. The discharge information has been reviewed with the patient and caregiver. The patient and caregiver verbalized understanding.

## 2021-02-10 NOTE — H&P
Date of Surgery Update:  Taurus Clement was seen and examined. History and physical has been reviewed. The patient has been examined. There have been no significant clinical changes since the completion of the originally dated History and Physical.  Patient identified by surgeon; surgical site was confirmed by patient and surgeon.     Signed By: Jo Rice MD     February 10, 2021 1:14 PM

## 2021-02-10 NOTE — PERIOP NOTES
1400 Pt alert and oriented. VSS. Denies pain. Dressing on left wrist CDI. Left fingers warm and appropriate color. 1415 Pt wearing glasses. Drinking PO. Discharge instructions reviewed with ride. Sling placed on left arm per pts request. Pt meets discharge criteria.

## 2021-02-10 NOTE — BRIEF OP NOTE
Brief Postoperative Note Patient: Marylu Saab YOB: 1955 MRN: [id number] Date of Procedure: 2/10/2021 Pre-Op Diagnosis: TRIGGER FINGER LEFT MIDDLE FINGER Post-Op Diagnosis: Same as preoperative diagnosis. Procedure(s): LEFT MIDDLE FINGER TRIGGER FINGER RELEASE Surgeon(s): 
Danny Gonzalez MD 
 
Surgical Assistant: None Anesthesia: MAC Estimated Blood Loss (mL): Minimal 
 
Complications: None Specimens: * No specimens in log * Implants: * No implants in log * Drains: * No LDAs found * Findings: above Electronically Signed by Nilesh Reveles MD on 2/10/2021 at 1:46 PM

## 2021-02-11 NOTE — OP NOTES
[address]  OPERATIVE REPORT    Name:  Laverna Lesch  MR#:  [id number]  :  1955  ACCOUNT #:  [de-identified]  DATE OF SERVICE:  02/10/2021    PREOPERATIVE DIAGNOSIS:  Left middle finger stenosing tenosynovitis with locking. POSTOPERATIVE DIAGNOSES:  1. Left middle finger stenosing tenosynovitis with locking. 2.  Left middle finger volar retinacular cyst.    PROCEDURES PERFORMED:  1. Left middle trigger finger A1 pulley release. 2.  Excision of left middle finger volar retinacular cyst.    SURGEON:  Harvey Dillard MD    ASSISTANT:  There were no assistants. ANESTHESIA:  Monitored anesthesia care. COMPLICATIONS:  There were no complications. SPECIMENS REMOVED:  There were no specimens. IMPLANTS:  There were no implants. ESTIMATED BLOOD LOSS:  Less than 5 mL. DRAINS:  There were no drains placed. TOURNIQUET TIME:  9 minutes at 250 mmHg in the left arm. INDICATIONS:  The patient is a 54-year-old right-hand dominant gentleman with left middle finger catching, triggering, and locking that has failed conservative management and elected to be taken to the operating room at this time for surgical treatment. REPORT OF OPERATION:  The patient was identified, taken into the operating room, placed supine on the operating room table and received intravenous sedation and monitored care from Anesthesia. His left upper extremity was prepped and draped sterilely. After Esmarch exsanguination, the tourniquet was inflated to 250 mmHg. A 10 mL of 2% lidocaine and 0.5% Marcaine in a 50:50 mixture was utilized for local anesthesia. A 1-1.5 cm incision made longitudinally opposite to A1 pulley of the left middle finger. Skin flaps were elevated and flexor sheath was exposed. The first annular pulley was markedly thickened and there indeed was a small volar retinacular cyst.  This was excised with the central third of the A1 pulley. Distal dissection was to the A2 pulley. Proximally, the palmar aponeurotic pulley and sheath were released with tenotomy scissors. The flexor tendons glided smoothly without further clicking or locking. The wound was irrigated with saline and closed with 4-0 nylon suture. A soft conforming dressing was applied. The tourniquet released. The hand was pink and had good refill. He was transported into the recovery room postoperatively in good condition.       MD JAGJIT [id number]  D:  02/10/2021 13:51  T:  02/10/2021 21:31  JOB #:  [id number]

## 2021-02-23 ENCOUNTER — VIRTUAL VISIT (OUTPATIENT)
Dept: FAMILY MEDICINE CLINIC | Age: 66
End: 2021-02-23
Payer: MEDICARE

## 2021-02-23 DIAGNOSIS — R10.9 ABDOMINAL PAIN, UNSPECIFIED ABDOMINAL LOCATION: Primary | ICD-10-CM

## 2021-02-23 DIAGNOSIS — I25.10 CORONARY ARTERIOSCLEROSIS: ICD-10-CM

## 2021-02-23 DIAGNOSIS — E55.9 VITAMIN D DEFICIENCY: ICD-10-CM

## 2021-02-23 DIAGNOSIS — R31.9 HEMATURIA, UNSPECIFIED TYPE: ICD-10-CM

## 2021-02-23 DIAGNOSIS — E78.2 MIXED HYPERLIPIDEMIA: ICD-10-CM

## 2021-02-23 PROCEDURE — 99213 OFFICE O/P EST LOW 20 MIN: CPT | Performed by: FAMILY MEDICINE

## 2021-02-23 PROCEDURE — 3017F COLORECTAL CA SCREEN DOC REV: CPT | Performed by: FAMILY MEDICINE

## 2021-02-23 PROCEDURE — G8432 DEP SCR NOT DOC, RNG: HCPCS | Performed by: FAMILY MEDICINE

## 2021-02-23 PROCEDURE — G8417 CALC BMI ABV UP PARAM F/U: HCPCS | Performed by: FAMILY MEDICINE

## 2021-02-23 PROCEDURE — G8427 DOCREV CUR MEDS BY ELIG CLIN: HCPCS | Performed by: FAMILY MEDICINE

## 2021-02-23 PROCEDURE — 1101F PT FALLS ASSESS-DOCD LE1/YR: CPT | Performed by: FAMILY MEDICINE

## 2021-02-23 PROCEDURE — G8536 NO DOC ELDER MAL SCRN: HCPCS | Performed by: FAMILY MEDICINE

## 2021-02-23 NOTE — PATIENT INSTRUCTIONS
     
Routine Health maintenance: You need to get a yearly follow up/physical exam to review, discuss age and gender appropriate exams, labs, vaccines and screening tests. This includes cardiovascular health risk, cancer screens and other bernie related topics. Medications-take all medications as directed. Please do not stop unless you talk to your doctor or health care provider first. Report any problems immediately. Referrals: if you have been given a referral, please call the office if you do not hear from provider in one week. You may make the appointment yourself. Please keep all appointments with specialists and ask them to send their notes, thoughts, recommendations to us , as your PCP. Imaging/Labs:  Be sure to get these images in a timely manner. IF your test must be scheduled, let us know if you need help getting this done and if you do not hear from that provider in a week , call us or them. BE SURE to call the office if you do not hear regarding the results in one week after the test is performed Image or lab). It is our intention to inform you of the results ALWAYS, even if normal you should get a notification (Call, portal message). PLEASE melecio if you do not get the results. PLEASE follow all recommendations and call/come in /ask questions if you do not understand of if problems develop after or in between visits. Failure to comply with recommended health care advise could result in serious health consequences. You have had a virtual visit today using technology that allows real-time interaction between you and the physician. It does NOT replace personal, face to face , in person visits with a health care provider. Please note that certain diagnoses and advised comes from knowledge of medical conditions and depends HEAVILY upon information you provide the physician. IF there are any concerns or you do not improve you should be seen in person, face to face by a healthcare provider. Thank you for choosing our practice and please let us know how we can help you feel better and stay well!

## 2021-02-23 NOTE — PROGRESS NOTES
IDENTIFICATION:  Babatunde Deleon 77 y.o. male   [unfilled]      This is a video visit performed with doxy. me due to COVID-19 Pandemic. It utilizes video and audio technology that allows real time interaction with the patient. It is documented in [address] [place] [address] Avenue  realizes that this is a billable charge and agrees to proceed. CC (Chief Complaint):  Chief Complaint   Patient presents with    Abdominal Pain         MEDICAL PROBLEM LIST (Past and Current):   Past Medical History:   Diagnosis Date    CAD (coronary artery disease) 06/15/2018    Coronary arteriosclerosis 8/7/2020    GERD (gastroesophageal reflux disease)     H/O seasonal allergies     High cholesterol     Hx of cardiac arrest     VT arrest     Hyperlipidemia 8/7/2020    Hypertension     Ill-defined condition     Urinary urgency    MI (myocardial infarction) ([address]     ROQUE on CPAP     2/2/21 pt reports does not use cpap each night, only a few times per week    Vitamin D deficiency 8/7/2020         HPI(History of the Present Illness):  Babatunde Deleon is a 77 y.o. male  with the above listed medical problems whom calls in for a virtual visit for follow up form ER last week. Went to [facility] because abd pain, under belly button and into right flak> Had CT and Labs and urine-that shosed blood-says PA told him he was passing a kidney stone. Got toradol in ER nd tramadol, ondansetron to go home with and he had some reservation regarding usage with Corrine Leach half a tab and was releived of pain, \"took the edge off\"  Has Uro appointment on 2-26-21.   Feels \"ok\" but not best.            HISTORY (Social, surgical and family histories) :    Social History     Tobacco Use    Smoking status: Never Smoker    Smokeless tobacco: Never Used   Substance Use Topics    Alcohol use: No    Drug use: No     Past Surgical History:   Procedure Laterality Date    COLONOSCOPY N/A 11/12/2018    COLONOSCOPY performed by Barrera Coppola MD at [facility] HX COLONOSCOPY  2018    HX COLONOSCOPY      HX ENDOSCOPY      HX HEART CATHETERIZATION  2018    Cardiac cath Stents x2    HX HEART CATHETERIZATION  02/2020    \"patent stent obtuse marginal 1. significant disease noted in branch artery of RCA. THis artery appears to be borderline for any kind of intervention\"    HX LAP CHOLECYSTECTOMY  2005    HX LUMBAR DISKECTOMY      L4-L4    HX ORTHOPAEDIC  2006    Lumbar laminectomy    HX PACEMAKER      linq monitor     Family History   Problem Relation Age of Onset    Diabetes Mother     Stroke Father     Hypertension Father        ALLERGIES AND MEDICATIONS:  Allergies-  Allergies   Allergen Reactions    Cefuroxime Unknown (comments)     abd pain    Ibuprofen Other (comments)     Patient states NSAIDS/ ibuprofen upsets his stomach     Medications-  Current Outpatient Medications on File Prior to Visit   Medication Sig Dispense Refill    cholecalciferol, vitamin D3, (Vitamin D3) 50 mcg (2,000 unit) tab Two tablets daily 180 Tab 3    lancets (BD Ultra-Fine II Lancets) 30 gauge misc by Does Not Apply route.  glucose blood VI test strips (FreeStyle Lite Strips) strip by Does Not Apply route See Admin Instructions.  evolocumab (Repatha SureClick) pen injection 625 mg by SubCUTAneous route every fourteen (14) days.  LORazepam (ATIVAN) 0.5 mg tablet Take 1 Tab by mouth every eight (8) hours as needed for Anxiety. Max Daily Amount: 1.5 mg. 21 Tab 1    amLODIPine (NORVASC) 2.5 mg tablet Take 2.5 mg by mouth nightly.  butalbital-acetaminophen-caff (FIORICET) -40 mg per capsule Take 1 Cap by mouth every four (4) hours as needed.  fluticasone propionate (FLONASE) 50 mcg/actuation nasal spray fluticasone propionate 50 mcg/actuation nasal spray,suspension   2 spray in each nostril daily      lidocaine (LIDODERM) 5 % by TransDERmal route as needed.  Apply patch to the affected area for 12 hours a day and remove for 12 hours a day.  sildenafil citrate (VIAGRA) 50 mg tablet Take 50 mg by mouth as needed for Erectile Dysfunction.  amLODIPine (NORVASC) 5 mg tablet Take 5 mg by mouth daily. Every am      pantoprazole (PROTONIX) 40 mg tablet Take 40 mg by mouth daily.  ticagrelor (BRILINTA) 90 mg tablet Take 90 mg by mouth two (2) times a day. Indications: Myocardial Reinfarction Prevention      Bifidobacterium Infantis (ALIGN) 4 mg cap Take 1 Cap by mouth every evening. No current facility-administered medications on file prior to visit. REVIEW OF SYSTEMS (information comes from all available records and patient/family present with patient) :  Review of Systems   Constitutional: Negative for chills, diaphoresis, fever and weight loss. HENT: Negative for congestion, ear pain, hearing loss, nosebleeds, sinus pain, sore throat and tinnitus. Eyes: Negative for blurred vision, double vision, photophobia and pain. Respiratory: Negative for cough, sputum production and shortness of breath. Cardiovascular: Negative for chest pain, palpitations, orthopnea, claudication, leg swelling and PND. Gastrointestinal: Positive for abdominal pain, nausea and vomiting. Negative for blood in stool, constipation, diarrhea, heartburn and melena. Genitourinary: Positive for flank pain and hematuria. Negative for dysuria, frequency and urgency. Musculoskeletal: Negative for back pain, falls, joint pain, myalgias and neck pain. Skin: Negative for itching and rash. Neurological: Negative for dizziness, tingling, tremors, sensory change, focal weakness and headaches. Psychiatric/Behavioral: Negative for depression and suicidal ideas. The patient is not nervous/anxious and does not have insomnia. VITAL SIGNS:   NO vitals signs reported by patient from current location this day.     Wt Readings from Last 3 Encounters:   02/10/21 204 lb (92.5 kg)   03/11/20 206 lb (93.4 kg)   01/22/20 204 lb 12.8 oz (92.9 kg)       BP Readings from Last 3 Encounters:   02/10/21 110/70   01/12/21 (!) 142/76   03/11/20 116/70             PHYSICAL EXAMINATION:   By  video and virtual technology we observed the following today;  General:  Alert, no acute distress. SKIN: Observable areas are without rashes, lesions, discoloration, jaundice  HEAD: Normocephalic and atraumatic. EYES: EOMI, PERRLA, Sclerae and conjunctivae clear bilaterally  NARES: Patent nares, no discharge  THROAT: Oral mucosa appears to be clear, pink without erythema or exudate  NECK: Trachea is midline, no masses and normal ROM. CHEST: NO tachypnea or respiratory distress. NO asymmetry in respirations or luana deformities  MSK: Arms FROM  NEURO: NO gross focal motor deficits, tremors, atrophy or asymmetry  PSYCH: Behavior, dress, speech and thought are appropriate. Mood is normal.  NO agitation or depression. ASSESSMENT AND PLAN:    Discussion-  This is likley a kidney stone, [facility] and I agrees, contineu tramadol 1/2-1 tab prn, stay ydrated and we will get records. If anything is needed to follow we will let him know-call when we get them. Keep uro appointment also. ICD-10-CM ICD-9-CM    1. Abdominal pain, unspecified abdominal location  R10.9 789.00    2. Vitamin D deficiency  E55.9 268.9    3. Mixed hyperlipidemia  E78.2 272.2    4. Coronary arteriosclerosis  I25.10 414.00    5. Hematuria, unspecified type  R31.9 599.70            OTHER DISCUSSION:  The patient and I also discussed the following; That this video visit does not replace nor is as accurate / reliable as a face to face visit with a medical professional/physician/provider and that all diagnoses are based on information given by patient /others representing patient if present and records available.     Each diagnosis listed for today's visit including medications, treatment, testing such as labs, imagine, referrals and when to call regarding results and appointments. Reminded patient to keep any and all appointments with specialists, labs, imaging. Reminded patient to make sure we get copies of any specialists care, labs and imaging. Reminded patient to call of come by the office if there are any concerns, questions , comments or problems. The patient verbalized understanding of the care plan and all questions were answered to the patient's satisfaction prior to leaving the office. The patient was told that failure to comply with recommended testing could result in abnormal health consequences. The patient was instructed to have yearly routine health maintenance including but not limited to age appropriate vaccines, testing, screening exams. The patient actively participated in medical decision making. FOLLOW UP:    Patient is advised to keep all future appointments unless otherwise discussed and keep ALL appointments for other providers, specialists and testing if scheduled. Failure to do so could result in adverse health consequences. Follow-up and Dispositions    · Return if symptoms worsen or fail to improve. This visit was completed using manually typed information and voice recognition software. There may be errors despite editing. This visit was completed using doxy. me which is audio and video technology that allows real time interaction with the patient.     Darwin Bustillo, DO

## 2021-05-19 ENCOUNTER — OFFICE VISIT (OUTPATIENT)
Dept: FAMILY MEDICINE CLINIC | Age: 66
End: 2021-05-19
Payer: MEDICARE

## 2021-05-19 VITALS
DIASTOLIC BLOOD PRESSURE: 72 MMHG | OXYGEN SATURATION: 98 % | TEMPERATURE: 97.5 F | WEIGHT: 207 LBS | SYSTOLIC BLOOD PRESSURE: 128 MMHG | BODY MASS INDEX: 30.66 KG/M2 | HEART RATE: 77 BPM | HEIGHT: 69 IN

## 2021-05-19 DIAGNOSIS — I25.10 CORONARY ARTERIOSCLEROSIS: ICD-10-CM

## 2021-05-19 DIAGNOSIS — G89.29 CHRONIC BILATERAL LOW BACK PAIN WITH LEFT-SIDED SCIATICA: Primary | ICD-10-CM

## 2021-05-19 DIAGNOSIS — E78.2 MIXED HYPERLIPIDEMIA: ICD-10-CM

## 2021-05-19 DIAGNOSIS — R53.82 CHRONIC FATIGUE: ICD-10-CM

## 2021-05-19 DIAGNOSIS — M54.42 CHRONIC BILATERAL LOW BACK PAIN WITH LEFT-SIDED SCIATICA: Primary | ICD-10-CM

## 2021-05-19 PROCEDURE — G8427 DOCREV CUR MEDS BY ELIG CLIN: HCPCS | Performed by: FAMILY MEDICINE

## 2021-05-19 PROCEDURE — 3017F COLORECTAL CA SCREEN DOC REV: CPT | Performed by: FAMILY MEDICINE

## 2021-05-19 PROCEDURE — G8510 SCR DEP NEG, NO PLAN REQD: HCPCS | Performed by: FAMILY MEDICINE

## 2021-05-19 PROCEDURE — G8536 NO DOC ELDER MAL SCRN: HCPCS | Performed by: FAMILY MEDICINE

## 2021-05-19 PROCEDURE — G8417 CALC BMI ABV UP PARAM F/U: HCPCS | Performed by: FAMILY MEDICINE

## 2021-05-19 PROCEDURE — 1101F PT FALLS ASSESS-DOCD LE1/YR: CPT | Performed by: FAMILY MEDICINE

## 2021-05-19 PROCEDURE — 99213 OFFICE O/P EST LOW 20 MIN: CPT | Performed by: FAMILY MEDICINE

## 2021-05-19 RX ORDER — TRAMADOL HYDROCHLORIDE 50 MG/1
50 TABLET ORAL
Qty: 20 TABLET | Refills: 3 | Status: SHIPPED | OUTPATIENT
Start: 2021-05-19 | End: 2021-05-24

## 2021-05-19 NOTE — PROGRESS NOTES
IDENTIFYING INFORMATION:      Robert Lee , 77 y.o., male  [address],  [address]  [address] [facility]     Medical Record Number: [id number]          CHIEF COMPLAINT:     Chief Complaint   Patient presents with    Back Pain     c/o back pain 3-4 days. Has improved some but still causing pain. HISTORY OF PRESENT ILLNESS:    Robert Lee is a 77 y.o. male  has a past medical history of CAD (coronary artery disease) (06/15/2018), Coronary arteriosclerosis (8/7/2020), GERD (gastroesophageal reflux disease), H/O seasonal allergies, High cholesterol, cardiac arrest, Hyperlipidemia (8/7/2020), Hypertension, Ill-defined condition, MI (myocardial infarction) ([facility] [address] ROQUE on CPAP, and Vitamin D deficiency (8/7/2020). .  he comes in for back pain. He has a longstanding history of back pain and has had surgery  Also had a brace from [employer]. Cannot take muscle relaxants due to heart problems and says that his cardiologist has recommended him not to take them. Does not have a brace now, it is over 14 years and he had to throw it away. Thinks he needs another one. Cannot take ibuprofen, makes his stomach burn, anything like that does. Tramadol from [facility], had a kidney stone a couple months ago and that he only took half a tablet. Started on Saturday night after he had rode a lawnmower most of day. Worse Sunday and then getting a little better. Using heat also. Cannot do prednisone because it raises blood pressure.      PAST MEDICAL HISTORY:     Past Medical History:   Diagnosis Date    CAD (coronary artery disease) 06/15/2018    Coronary arteriosclerosis 8/7/2020    GERD (gastroesophageal reflux disease)     H/O seasonal allergies     High cholesterol     Hx of cardiac arrest     VT arrest     Hyperlipidemia 8/7/2020    Hypertension     Ill-defined condition     Urinary urgency    MI (myocardial infarction) ([facility] [address]     ROQUE on CPAP     2/2/21 pt reports does not use cpap each night, only a few times per week    Vitamin D deficiency 8/7/2020       MEDICATIONS:     Current Outpatient Medications on File Prior to Visit   Medication Sig Dispense Refill    cholecalciferol, vitamin D3, (Vitamin D3) 50 mcg (2,000 unit) tab Two tablets daily 180 Tab 3    lancets (BD Ultra-Fine II Lancets) 30 gauge misc by Does Not Apply route.  glucose blood VI test strips (FreeStyle Lite Strips) strip by Does Not Apply route See Admin Instructions.  evolocumab (Repatha SureClick) pen injection 216 mg by SubCUTAneous route every fourteen (14) days.  LORazepam (ATIVAN) 0.5 mg tablet Take 1 Tab by mouth every eight (8) hours as needed for Anxiety. Max Daily Amount: 1.5 mg. 21 Tab 1    amLODIPine (NORVASC) 2.5 mg tablet Take 2.5 mg by mouth nightly.  butalbital-acetaminophen-caff (FIORICET) -40 mg per capsule Take 1 Cap by mouth every four (4) hours as needed.  fluticasone propionate (FLONASE) 50 mcg/actuation nasal spray fluticasone propionate 50 mcg/actuation nasal spray,suspension   2 spray in each nostril daily      lidocaine (LIDODERM) 5 % by TransDERmal route as needed. Apply patch to the affected area for 12 hours a day and remove for 12 hours a day.  amLODIPine (NORVASC) 5 mg tablet Take 5 mg by mouth daily. Every am      pantoprazole (PROTONIX) 40 mg tablet Take 40 mg by mouth daily.  ticagrelor (BRILINTA) 90 mg tablet Take 90 mg by mouth two (2) times a day. Indications: Myocardial Reinfarction Prevention      Bifidobacterium Infantis (ALIGN) 4 mg cap Take 1 Cap by mouth every evening.  sildenafil citrate (VIAGRA) 50 mg tablet Take 50 mg by mouth as needed for Erectile Dysfunction. (Patient not taking: Reported on 5/19/2021)       No current facility-administered medications on file prior to visit.        ALLERGIES:    Allergies   Allergen Reactions    Cefuroxime Unknown (comments)     abd pain    Ibuprofen Other (comments)     Patient states NSAIDS/ ibuprofen upsets his stomach         SOCIAL HISTORY:     Social History     Tobacco Use    Smoking status: Never Smoker    Smokeless tobacco: Never Used   Substance Use Topics    Alcohol use: No    Drug use: No       SURGICAL HISTORY:    Past Surgical History:   Procedure Laterality Date    COLONOSCOPY N/A 11/12/2018    COL[facility] performed by Zelda Wang MD at [address] [facility] HX COLONOSCOPY  2018    HX COLONOSCOPY      HX ENDOSCOPY      HX HEART CATHETERIZATION  2018    Cardiac cath Stents x2    HX HEART CATHETERIZATION  02/2020    \"patent stent obtuse marginal 1. significant disease noted in branch artery of RCA. THis artery appears to be borderline for any kind of intervention\"    HX LAP CHOLECYSTECTOMY  2005    HX LUMBAR DISKECTOMY      L4-L4    HX ORTHOPAEDIC  2006    Lumbar laminectomy    HX PACEMAKER      linq monitor        FAMILY HISTORY:    Family History   Problem Relation Age of Onset    Diabetes Mother     Stroke Father     Hypertension Father          REVIEW OF SYSTEMS:    I personally collected this information from all available source present (patient/others in room and records available) -[email or web address]    Review of Systems   Constitutional: Negative for chills, diaphoresis, fever, malaise/fatigue and weight loss. HENT: Negative for congestion, ear pain, hearing loss, nosebleeds, sinus pain, sore throat and tinnitus. Eyes: Negative for blurred vision, double vision, photophobia and pain. Respiratory: Negative for cough, sputum production and shortness of breath. Cardiovascular: Negative for chest pain, palpitations, orthopnea, claudication, leg swelling and PND. Gastrointestinal: Negative for abdominal pain, blood in stool, constipation, diarrhea, heartburn, melena, nausea and vomiting. Genitourinary: Negative for dysuria, frequency and urgency. Musculoskeletal: Positive for back pain. Negative for falls, joint pain, myalgias and neck pain.    Skin: Negative for itching and rash. Neurological: Negative for dizziness, tingling, tremors, sensory change, focal weakness and headaches. Psychiatric/Behavioral: Negative for depression and suicidal ideas. The patient is not nervous/anxious and does not have insomnia. PHYSICAL EXAMINATION:    Vital Signs:    Visit Vitals  /72 (BP 1 Location: Left upper arm, BP Patient Position: Sitting)   Pulse 77   Temp 97.5 °F (36.4 °C) (Temporal)   Ht 5' 9\" (1.753 m)   Wt 207 lb (93.9 kg)   SpO2 98%   BMI 30.57 kg/m²         Wt Readings from Last 3 Encounters:   05/19/21 207 lb (93.9 kg)   02/10/21 204 lb (92.5 kg)   03/11/20 206 lb (93.4 kg)     BP Readings from Last 3 Encounters:   05/19/21 128/72   02/10/21 110/70   01/12/21 (!) 142/76         Physical Exam  Vitals reviewed. Constitutional:       General: He is not in acute distress. Appearance: He is not ill-appearing. Eyes:      General: No scleral icterus. Extraocular Movements: Extraocular movements intact. Conjunctiva/sclera: Conjunctivae normal.      Pupils: Pupils are equal, round, and reactive to light. Neck:      Vascular: No carotid bruit. Cardiovascular:      Rate and Rhythm: Normal rate and regular rhythm. Pulses: Normal pulses. Heart sounds: No murmur heard. No friction rub. No gallop. Pulmonary:      Effort: Pulmonary effort is normal.      Breath sounds: Normal breath sounds. No wheezing, rhonchi or rales. Abdominal:      General: There is no distension. Palpations: Abdomen is soft. There is no mass. Tenderness: There is no abdominal tenderness. There is no guarding. Musculoskeletal:         General: Tenderness and deformity present. No swelling or signs of injury. Cervical back: No rigidity. No muscular tenderness. Right lower leg: No edema. Left lower leg: No edema. Lymphadenopathy:      Cervical: No cervical adenopathy. Skin:     General: Skin is warm and dry.       Capillary Refill: Capillary refill takes less than 2 seconds. Coloration: Skin is not jaundiced. Findings: No bruising, erythema or rash. Neurological:      General: No focal deficit present. Mental Status: He is alert and oriented to person, place, and time. Cranial Nerves: No cranial nerve deficit. Sensory: No sensory deficit. Motor: No weakness. Coordination: Coordination normal.      Gait: Gait normal.      Deep Tendon Reflexes: Reflexes normal.   Psychiatric:         Mood and Affect: Mood normal.         Behavior: Behavior normal.         Thought Content: Thought content normal.         Judgment: Judgment normal.           ASSESSMENT/PLAN:      Discussion (regarding today's visit with Negar Varner); Here is what I have to offer Cordelia Solomon;       Prednisone/Medrol or cortisone injection       Nonsteroidal anti-inflammatories     orally and/or intramuscular       Muscle relaxants       Back brace       Specialty consult       Tramadol which seems to help even in small doses. He chose the ketorolac shot. However, and unfortunately, we did not have any and it is ordered. He also choose a back brace which the order was done and was sent to the local medical equipment supply store. He does not want to take nonsteroidals because they upset his stomach. Even in small doses. He did not want a muscle relaxant because the cardiologist told him it would not be good for his heart. Some of them do cause arrhythmias. He did not want steroids because they raise his blood pressure and that includes a shot. He does not have diabetes per se. We can consider this consultation with a specialist because I think this is going to be a lifelong problem as it has been already. We did send in a prescription for 30 tablets of tramadol. Risk of this medication explained. He does not take benzos and is low risk for seizure. ICD-10-CM ICD-9-CM    1.  Chronic bilateral low back pain with left-sided sciatica  M54.42 724.2 [facility] SUPPLY ORDER    G89.29 724.3 traMADoL (ULTRAM) 50 mg tablet     338.29    2. Coronary arteriosclerosis  I25.10 414.00    3. Mixed hyperlipidemia  E78.2 272.2    4. Chronic fatigue  R53.82 780.79        WE reviewed each diagnosis listed for today's visit including medications, treatment, testing such as labs, imagine, referrals and when to call regarding results and appointments. Reminded patient to keep any and all appointments with specialists, labs, imaging. Reminded patient to make sure we get copies of any specialists care, labs and imaging. Reminded patient to call of come by the office if there are any concerns, questions , comments or problems. The patient verbalized understanding of the care plan and all questions were answered to the patient's satisfaction prior to leaving the office. The patient was told that failure to comply with recommended testing could result in abnormal health consequences. The patient was instructed to have yearly routine health maintenance including but not limited to age appropriate vaccines, testing, screening exams. ALL questions were answered to his satisfaction before leaving the office. The patient actively participated in medical decision making. FOLLOW UP:     Patient knows to keep any and all future visits scheduled unless told otherwise. Patient knows to call, come back if any concerns, questions, comments or problems arise. Follow-up and Dispositions    · Return if symptoms worsen or fail to improve. Ewelina Andrew DO    This visit was reviewed and signed electronically. It was been completed with voice recognition software and hand typing. It may have syntax and spelling errors despite editing.

## 2021-05-19 NOTE — PATIENT INSTRUCTIONS
     
Routine Health maintenance: You need to get a yearly follow up/physical exam to review, discuss age and gender appropriate exams, labs, vaccines and screening tests. This includes cardiovascular health risk, cancer screens and other bernie related topics. Medications-Take all medications as directed. Please do not stop unless you talk to your doctor or health care provider first. Report any problems immediately. Referrals: if you have been given a referral, please call the office if you do not hear from provider in one week. You may make the appointment yourself. Please keep all appointments with specialists and ask them to send their notes, thoughts, recommendations to us , as your PCP. Imaging/Labs:  Be sure to get these images in a timely manner. IF your test must be scheduled, let us know if you need help getting this done and if you do not hear from that provider in a week , call us or them.   BE SURE to call the office if you do not hear regarding the results in one week after the test is performed Image or lab). It is our intention to inform you of the results ALWAYS, even if normal you should get a notification (Call, portal message). PLEASE melecio if you do not get the results. PLEASE follow all recommendations and call/come in /ask questions if you do not understand of if problems develop after or in between visits. Failure to comply with recommended health care advise could result in serious health consequences. Thank you for choosing our practice and please let us know how we can help you feel better and stay well! Back Pain: Care Instructions Your Care Instructions Back pain has many possible causes. It is often related to problems with muscles and ligaments of the back. It may also be related to problems with the nerves, discs, or bones of the back. Moving, lifting, standing, sitting, or sleeping in an awkward way can strain the back. Sometimes you don't notice the injury until later. Arthritis is another common cause of back pain. Although it may hurt a lot, back pain usually improves on its own within several weeks. Most people recover in 12 weeks or less. Using good home treatment and being careful not to stress your back can help you feel better sooner. Follow-up care is a key part of your treatment and safety. Be sure to make and go to all appointments, and call your doctor if you are having problems. It's also a good idea to know your test results and keep a list of the medicines you take. How can you care for yourself at home? · Sit or lie in positions that are most comfortable and reduce your pain. Try one of these positions when you lie down: ? Lie on your back with your knees bent and supported by large pillows. ? Lie on the floor with your legs on the seat of a sofa or chair. ? Lie on your side with your knees and hips bent and a pillow between your legs. ? Lie on your stomach if it does not make pain worse.  
· Do not sit up in bed, and avoid soft couches and twisted positions. Bed rest can help relieve pain at first, but it delays healing. Avoid bed rest after the first day of back pain. · Change positions every 30 minutes. If you must sit for long periods of time, take breaks from sitting. Get up and walk around, or lie in a comfortable position. · Try using a heating pad on a low or medium setting for 15 to 20 minutes every 2 or 3 hours. Try a warm shower in place of one session with the heating pad. · You can also try an ice pack for 10 to 15 minutes every 2 to 3 hours. Put a thin cloth between the ice pack and your skin. · Take pain medicines exactly as directed. ? If the doctor gave you a prescription medicine for pain, take it as prescribed. ? If you are not taking a prescription pain medicine, ask your doctor if you can take an over-the-counter medicine. · Take short walks several times a day. You can start with 5 to 10 minutes, 3 or 4 times a day, and work up to longer walks. Walk on level surfaces and avoid hills and stairs until your back is better. · Return to work and other activities as soon as you can. Continued rest without activity is usually not good for your back. · To prevent future back pain, do exercises to stretch and strengthen your back and stomach. Learn how to use good posture, safe lifting techniques, and proper body mechanics. When should you call for help? Call your doctor now or seek immediate medical care if: 
  · You have new or worsening numbness in your legs.  
  · You have new or worsening weakness in your legs. (This could make it hard to stand up.)  
  · You lose control of your bladder or bowels. Watch closely for changes in your health, and be sure to contact your doctor if: 
  · You have a fever, lose weight, or don't feel well.  
  · You do not get better as expected. Where can you learn more? Go to http://www.gray.com/ Enter R689 in the search box to learn more about \"Back Pain: Care Instructions. \" Current as of: November 16, 2020               Content Version: 12.8 © 7732-3524 [employer]. Care instructions adapted under license by [employer] (which disclaims liability or warranty for this information). If you have questions about a medical condition or this instruction, always ask your healthcare professional. [place] [address] any warranty or liability for your use of this information.

## 2021-05-19 NOTE — PROGRESS NOTES
1. Have you been to the ER, urgent care clinic since your last visit? Hospitalized since your last visit? Seen at [facility] on Sunday for back pain. Was given shot of Tordol. 2. Have you seen or consulted any other health care providers outside of the [address] [place] [address] since your last visit? Include any pap smears or colon screening. No    Chief Complaint   Patient presents with    Back Pain     c/o back pain 3-4 days. Has improved some but still causing pain.       Visit Vitals  /72 (BP 1 Location: Left upper arm, BP Patient Position: Sitting)   Pulse 77   Temp 97.5 °F (36.4 °C) (Temporal)   Ht 5' 9\" (1.753 m)   Wt 207 lb (93.9 kg)   SpO2 98%   BMI 30.57 kg/m²

## 2021-06-11 ENCOUNTER — VIRTUAL VISIT (OUTPATIENT)
Dept: FAMILY MEDICINE CLINIC | Age: 66
End: 2021-06-11
Payer: MEDICARE

## 2021-06-11 DIAGNOSIS — R42 DIZZINESS: Primary | ICD-10-CM

## 2021-06-11 DIAGNOSIS — G89.29 CHRONIC BILATERAL LOW BACK PAIN WITH LEFT-SIDED SCIATICA: ICD-10-CM

## 2021-06-11 DIAGNOSIS — M54.42 CHRONIC BILATERAL LOW BACK PAIN WITH LEFT-SIDED SCIATICA: ICD-10-CM

## 2021-06-11 DIAGNOSIS — E78.2 MIXED HYPERLIPIDEMIA: ICD-10-CM

## 2021-06-11 DIAGNOSIS — I25.10 CORONARY ARTERIOSCLEROSIS: ICD-10-CM

## 2021-06-11 PROCEDURE — 99213 OFFICE O/P EST LOW 20 MIN: CPT | Performed by: FAMILY MEDICINE

## 2021-06-11 PROCEDURE — G8427 DOCREV CUR MEDS BY ELIG CLIN: HCPCS | Performed by: FAMILY MEDICINE

## 2021-06-11 PROCEDURE — G8536 NO DOC ELDER MAL SCRN: HCPCS | Performed by: FAMILY MEDICINE

## 2021-06-11 PROCEDURE — 3017F COLORECTAL CA SCREEN DOC REV: CPT | Performed by: FAMILY MEDICINE

## 2021-06-11 PROCEDURE — G8417 CALC BMI ABV UP PARAM F/U: HCPCS | Performed by: FAMILY MEDICINE

## 2021-06-11 PROCEDURE — G8432 DEP SCR NOT DOC, RNG: HCPCS | Performed by: FAMILY MEDICINE

## 2021-06-11 PROCEDURE — 1101F PT FALLS ASSESS-DOCD LE1/YR: CPT | Performed by: FAMILY MEDICINE

## 2021-06-11 NOTE — PROGRESS NOTES
IDENTIFICATION:  Juliana Valentine 77 y.o. male     Medical Record Number: [id number]      This is a video visit performed with doxy. me due to COVID-19 Pandemic. It utilizes video and audio technology that allows real time interaction with the patient. It is documented in [address] [place] [address] Avenue  realizes that this is a billable charge and agrees to proceed. The patient's identity is confirmed. Their location is confirmed to be in the state where provider is licensed. CC (Chief Complaint):    Chief Complaint   Patient presents with    Dizziness         MEDICAL PROBLEM LIST (Past and Current):     Past Medical History:   Diagnosis Date    CAD (coronary artery disease) 06/15/2018    Coronary arteriosclerosis 8/7/2020    GERD (gastroesophageal reflux disease)     H/O seasonal allergies     High cholesterol     Hx of cardiac arrest     VT arrest     Hyperlipidemia 8/7/2020    Hypertension     Ill-defined condition     Urinary urgency    MI [facility] infarction) [facility] [address]     ROQUE on CPAP     2/2/21 pt reports does not use cpap each night, only a few times per week    Vitamin D deficiency 8/7/2020         HPI(History of the Present Illness):    Juliana Valentine is a 77 y.o. male   has a past medical history of CAD (coronary artery disease) (06/15/2018), Coronary arteriosclerosis (8/7/2020), GERD (gastroesophageal reflux disease), H/O seasonal allergies, High cholesterol, cardiac arrest, Hyperlipidemia (8/7/2020), Hypertension, Ill-defined condition, MI (myocardial infarction) ([facility] [address] ROQUE on CPAP, and Vitamin D deficiency (8/7/2020). .  he asks for a virtual visit today due to dizziness. It started day prior. It was almost midnight. That he was feeling dizzy and got up and got worse. His wife took him to a local emergency room. They worked him up with labs EKGs and said he might have been a little dehydrated and came some fluid. They also gave him some Valium.   He has tried meclizine in the past but it did not help. He said the Valium ice helps. He gets these very periodically has been quite a while, even over a year. He has no nausea or vomiting. He has no headache. He has no blurry or double altered vision. He has no chest pain or palpitations. They just come on and he has trouble moving around. He denies ever having a brain scan. Also, he was seen on May 19, 2021 for low back pain. He has not received his back brace yet. HISTORICAL DATA (Social, surgical and family histories) :    Social History     Tobacco Use    Smoking status: Never Smoker    Smokeless tobacco: Never Used   Substance Use Topics    Alcohol use: No    Drug use: No     Past Surgical History:   Procedure Laterality Date    COLONOSCOPY N/A 11/12/2018    COLONOSCOPY performed by Roseanne Higgins MD at [address] [facility] HX COLONOSCOPY  2018    HX COLONOSCOPY      HX ENDOSCOPY      HX HEART CATHETERIZATION  2018    Cardiac cath Stents x2    HX HEART CATHETERIZATION  02/2020    \"patent stent obtuse marginal 1. significant disease noted in branch artery of RCA. THis artery appears to be borderline for any kind of intervention\"    HX LAP CHOLECYSTECTOMY  2005    HX LUMBAR DISKECTOMY      L4-L4    HX ORTHOPAEDIC  2006    Lumbar laminectomy    HX PACEMAKER      linq monitor     Family History   Problem Relation Age of Onset    Diabetes Mother     Stroke Father     Hypertension Father        ALLERGIES AND MEDICATIONS:  Allergies-    Allergies   Allergen Reactions    Cefuroxime Unknown (comments)     abd pain    Ibuprofen Other (comments)     Patient states NSAIDS/ ibuprofen upsets his stomach       Medications-    Current Outpatient Medications on File Prior to Visit   Medication Sig Dispense Refill    cholecalciferol, vitamin D3, (Vitamin D3) 50 mcg (2,000 unit) tab Two tablets daily 180 Tab 3    lancets (BD Ultra-Fine II Lancets) 30 gauge misc by Does Not Apply route.       glucose blood VI test strips (FreeStyle Lite Strips) strip by Does Not Apply route See Admin Instructions.  evolocumab (Repatha SureClick) pen injection 200 mg by SubCUTAneous route every fourteen (14) days.  LORazepam (ATIVAN) 0.5 mg tablet Take 1 Tab by mouth every eight (8) hours as needed for Anxiety. Max Daily Amount: 1.5 mg. 21 Tab 1    amLODIPine (NORVASC) 2.5 mg tablet Take 2.5 mg by mouth nightly.  butalbital-acetaminophen-caff (FIORICET) -40 mg per capsule Take 1 Cap by mouth every four (4) hours as needed.  fluticasone propionate (FLONASE) 50 mcg/actuation nasal spray fluticasone propionate 50 mcg/actuation nasal spray,suspension   2 spray in each nostril daily      lidocaine (LIDODERM) 5 % by TransDERmal route as needed. Apply patch to the affected area for 12 hours a day and remove for 12 hours a day.  sildenafil citrate (VIAGRA) 50 mg tablet Take 50 mg by mouth as needed for Erectile Dysfunction. (Patient not taking: Reported on 5/19/2021)      amLODIPine (NORVASC) 5 mg tablet Take 5 mg by mouth daily. Every am      pantoprazole (PROTONIX) 40 mg tablet Take 40 mg by mouth daily.  ticagrelor (BRILINTA) 90 mg tablet Take 90 mg by mouth two (2) times a day. Indications: Myocardial Reinfarction Prevention      Bifidobacterium Infantis (ALIGN) 4 mg cap Take 1 Cap by mouth every evening. No current facility-administered medications on file prior to visit. REVIEW OF SYSTEMS (information comes from all available records and patient/family present with patient) :    Review of Systems   Constitutional: Negative. HENT: Negative for congestion, ear pain, hearing loss, sinus pain, sore throat and tinnitus. Eyes: Negative. Respiratory: Negative. Cardiovascular: Negative. Gastrointestinal: Negative. Genitourinary: Negative. Musculoskeletal: Positive for back pain, joint pain and myalgias. Negative for falls and neck pain. Neurological: Positive for dizziness.  Negative for tingling, sensory change, focal weakness and headaches. VITAL SIGNS:    NO vitals signs reported by patient from current location this day. Wt Readings from Last 3 Encounters:   05/19/21 207 lb (93.9 kg)   02/10/21 204 lb (92.5 kg)   03/11/20 206 lb (93.4 kg)       BP Readings from Last 3 Encounters:   05/19/21 128/72   02/10/21 110/70   01/12/21 (!) 142/76         PHYSICAL EXAMINATION:     By  video and virtual technology we observed the following today;  General:  Alert, no acute distress. SKIN: Observable areas are without rashes, lesions, discoloration, jaundice  HEAD: Normocephalic and atraumatic. EYES: EOMI, PERRLA, Sclerae and conjunctivae clear bilaterally  NARES: Patent nares, no discharge  THROAT: Oral mucosa appears to be clear, pink without erythema or exudate  NECK: Trachea is midline, no masses and normal ROM. CHEST: NO tachypnea or respiratory distress. NO asymmetry in respirations or luana deformities  MSK: Arms FROM  NEURO: NO gross focal motor deficits, tremors, atrophy or asymmetry  PSYCH: Behavior, dress, speech and thought are appropriate. Mood is normal.  NO agitation or depression. ASSESSMENT AND PLAN:  Discussion:      This dizziness has actually resolved.  It sounds like it is paroxysmal and infrequent.  I recommend an office visit if it recurs if we are open. If not he should follow up anyway in the office.  I recommend either seeing a neurologist versus brain imaging versus higher dose of meclizine should it recur.  It does sound like some type of benign paroxysmal issue. He was reassured. ICD-10-CM ICD-9-CM    1. Dizziness  R42 780.4    2. Chronic bilateral low back pain with left-sided sciatica  M54.42 724.2     G89.29 724.3      338.29    3. Coronary arteriosclerosis  I25.10 414.00    4. Mixed hyperlipidemia  E78.2 272.2          Other Discussion-  The patient and I also discussed the following;     That this video visit does not replace nor is as accurate / reliable as a face to face visit with a medical professional/physician/provider and that all diagnoses are based on information given by patient /others representing patient if present and records available. Each diagnosis listed for today's visit including medications, treatment, testing such as labs, imagine, referrals and when to call regarding results and appointments. Reminded patient to keep any and all appointments with specialists, labs, imaging. Reminded patient to make sure we get copies of any specialists care, labs and imaging. Reminded patient to call of come by the office if there are any concerns, questions , comments or problems. The patient verbalized understanding of the care plan and all questions were answered to the patient's satisfaction prior to leaving the office. The patient was told that failure to comply with recommended testing could result in abnormal health consequences. The patient was instructed to have yearly routine health maintenance including but not limited to age appropriate vaccines, testing, screening exams. The patient actively participated in medical decision making. FOLLOW UP:    Patient is advised to keep all future appointments unless otherwise discussed and keep ALL appointments for other providers, specialists and testing if scheduled. Failure to do so could result in adverse health consequences. Follow-up and Dispositions    · Return if symptoms worsen or fail to improve. This visit was completed using manually typed information and voice recognition software. There may be errors despite editing. This visit was completed using doxy. me which is audio and video technology that allows real time interaction with the patient.     Jyoti Swanson DO

## 2021-06-11 NOTE — PATIENT INSTRUCTIONS
     
Routine Health maintenance: You need to get a yearly follow up/physical exam to review, discuss age and gender appropriate exams, labs, vaccines and screening tests. This includes cardiovascular health risk, cancer screens and other bernie related topics. Medications-take all medications as directed. Please do not stop unless you talk to your doctor or health care provider first. Report any problems immediately. Referrals: if you have been given a referral, please call the office if you do not hear from provider in one week. You may make the appointment yourself. Please keep all appointments with specialists and ask them to send their notes, thoughts, recommendations to us , as your PCP. Imaging/Labs:  Be sure to get these images in a timely manner. IF your test must be scheduled, let us know if you need help getting this done and if you do not hear from that provider in a week , call us or them.   BE SURE to call the office if you do not hear regarding the results in one week after the test is performed Image or lab). It is our intention to inform you of the results ALWAYS, even if normal you should get a notification (Call, portal message). PLEASE melecio if you do not get the results. PLEASE follow all recommendations and call/come in /ask questions if you do not understand of if problems develop after or in between visits. Failure to comply with recommended health care advise could result in serious health consequences. You have had a virtual visit today using technology that allows real-time interaction between you and the physician. It does NOT replace personal, face to face , in person visits with a health care provider. Please note that certain diagnoses and advised comes from knowledge of medical conditions and depends HEAVILY upon information you provide the physician. IF there are any concerns or you do not improve you should be seen in person, face to face by a healthcare provider. Thank you for choosing our practice and please let us know how we can help you feel better and stay well! Dr Keisha Multani prescribe VALIUM for your dizziness, should need a refill will have to see one of our other providers. Thanks!

## 2021-07-13 ENCOUNTER — OFFICE VISIT (OUTPATIENT)
Dept: FAMILY MEDICINE CLINIC | Age: 66
End: 2021-07-13
Payer: MEDICARE

## 2021-07-13 VITALS
HEART RATE: 71 BPM | HEIGHT: 69 IN | TEMPERATURE: 97.8 F | SYSTOLIC BLOOD PRESSURE: 124 MMHG | OXYGEN SATURATION: 98 % | BODY MASS INDEX: 30.36 KG/M2 | WEIGHT: 205 LBS | DIASTOLIC BLOOD PRESSURE: 69 MMHG

## 2021-07-13 DIAGNOSIS — M54.42 CHRONIC BILATERAL LOW BACK PAIN WITH LEFT-SIDED SCIATICA: ICD-10-CM

## 2021-07-13 DIAGNOSIS — G89.29 CHRONIC BILATERAL LOW BACK PAIN WITH LEFT-SIDED SCIATICA: ICD-10-CM

## 2021-07-13 DIAGNOSIS — Z09 HOSPITAL DISCHARGE FOLLOW-UP: Primary | ICD-10-CM

## 2021-07-13 PROCEDURE — 99214 OFFICE O/P EST MOD 30 MIN: CPT | Performed by: FAMILY MEDICINE

## 2021-07-13 PROCEDURE — G8427 DOCREV CUR MEDS BY ELIG CLIN: HCPCS | Performed by: FAMILY MEDICINE

## 2021-07-13 PROCEDURE — G8417 CALC BMI ABV UP PARAM F/U: HCPCS | Performed by: FAMILY MEDICINE

## 2021-07-13 PROCEDURE — G8536 NO DOC ELDER MAL SCRN: HCPCS | Performed by: FAMILY MEDICINE

## 2021-07-13 PROCEDURE — 1111F DSCHRG MED/CURRENT MED MERGE: CPT | Performed by: FAMILY MEDICINE

## 2021-07-13 PROCEDURE — 1101F PT FALLS ASSESS-DOCD LE1/YR: CPT | Performed by: FAMILY MEDICINE

## 2021-07-13 PROCEDURE — 3017F COLORECTAL CA SCREEN DOC REV: CPT | Performed by: FAMILY MEDICINE

## 2021-07-13 PROCEDURE — G8510 SCR DEP NEG, NO PLAN REQD: HCPCS | Performed by: FAMILY MEDICINE

## 2021-07-13 RX ORDER — DIAZEPAM 5 MG/1
5 TABLET ORAL
COMMUNITY
End: 2021-07-30

## 2021-07-13 RX ORDER — AMIODARONE HYDROCHLORIDE 200 MG/1
200 TABLET ORAL DAILY
COMMUNITY
End: 2021-09-09

## 2021-07-13 RX ORDER — LIDOCAINE 50 MG/G
PATCH TOPICAL
Qty: 90 EACH | Refills: 0 | Status: SHIPPED
Start: 2021-07-13 | End: 2021-07-23 | Stop reason: SDUPTHER

## 2021-07-13 RX ORDER — TRAMADOL HYDROCHLORIDE 50 MG/1
50 TABLET ORAL
COMMUNITY
End: 2021-07-30

## 2021-07-13 RX ORDER — ONDANSETRON 4 MG/1
4 TABLET, ORALLY DISINTEGRATING ORAL
COMMUNITY
End: 2021-07-30

## 2021-07-13 RX ORDER — TOLTERODINE TARTRATE 2 MG/1
2 TABLET, EXTENDED RELEASE ORAL 2 TIMES DAILY
COMMUNITY
End: 2021-09-27

## 2021-07-13 NOTE — PROGRESS NOTES
IDENTIFYING INFORMATION:      Claude Knife , 77 y.o., male  [address] [facility],  [address]  [address] [facility]     Medical Record Number: [id number]          CHIEF COMPLAINT:     Chief Complaint   Patient presents with   [facility] Follow Up     Was admitted to [facility] 2-3 days for a-fib.  Chest Pain     c/o discomfort \"under\" right upper chest.         HISTORY OF PRESENT ILLNESS:    Claude Knife is a 77 y.o. male  has a past medical history of CAD (coronary artery disease) (06/15/2018), Coronary arteriosclerosis (8/7/2020), GERD (gastroesophageal reflux disease), H/O seasonal allergies, High cholesterol, cardiac arrest, Hyperlipidemia (8/7/2020), Hypertension, Ill-defined condition, MI (myocardial infarction) ([facility] [address] ROQUE on CPAP, and Vitamin D deficiency (8/7/2020). .  he comes in for follow up form [facility] for atrial fibrilation. Was at home and sitting, bent down to get something and he sat up, felt chest pain, discomfort and palpitations. Went to [facility] and was steff and kept in the hosptial for 3 days. IV drip of amiodarone and now on the oral form of it. He has a cardiologist.  Has some lorazepam for his anxiety, takes 1.2 of a 0.5 mg tablet. Wants him to see an electrophysiologist. Says he converted to sinus rhythm on his own after the drip started it. On billinta and eliquis. Also had been taken off his blood pressure medicine and bp was good in hospital, came up and he called cardio, they recommedned he restart it. He did and it came back down. Had some of the some pain in his right chest this morning around 200 am when he went to bathroom.        PAST MEDICAL HISTORY:     Past Medical History:   Diagnosis Date    CAD (coronary artery disease) 06/15/2018    Coronary arteriosclerosis 8/7/2020    GERD (gastroesophageal reflux disease)     H/O seasonal allergies     High cholesterol     Hx of cardiac arrest     VT arrest     Hyperlipidemia 8/7/2020    Hypertension     Ill-defined condition     Urinary urgency    MI (myocardial infarction) ([facility] [address]     ROQUE on CPAP     2/2/21 pt reports does not use cpap each night, only a few times per week    Vitamin D deficiency 8/7/2020       MEDICATIONS:     Current Outpatient Medications on File Prior to Visit   Medication Sig Dispense Refill    amiodarone (CORDARONE) 200 mg tablet Take 200 mg by mouth daily.  apixaban (Eliquis) 5 mg tablet Take 5 mg by mouth two (2) times a day.  tolterodine (DETROL) 2 mg tablet Take 2 mg by mouth two (2) times a day.  diazePAM (VALIUM) 5 mg tablet Take 5 mg by mouth daily as needed for Anxiety.  ondansetron (ZOFRAN ODT) 4 mg disintegrating tablet Take 4 mg by mouth every eight (8) hours as needed for Nausea or Vomiting.  traMADoL (ULTRAM) 50 mg tablet Take 50 mg by mouth every six (6) hours as needed for Pain.  cholecalciferol, vitamin D3, (Vitamin D3) 50 mcg (2,000 unit) tab Two tablets daily 180 Tab 3    lancets (BD Ultra-Fine II Lancets) 30 gauge misc by Does Not Apply route.  glucose blood VI test strips (FreeStyle Lite Strips) strip by Does Not Apply route See Admin Instructions.  LORazepam (ATIVAN) 0.5 mg tablet Take 1 Tab by mouth every eight (8) hours as needed for Anxiety. Max Daily Amount: 1.5 mg. 21 Tab 1    amLODIPine (NORVASC) 2.5 mg tablet Take 2.5 mg by mouth nightly.  butalbital-acetaminophen-caff (FIORICET) -40 mg per capsule Take 1 Cap by mouth every four (4) hours as needed.  lidocaine (LIDODERM) 5 % by TransDERmal route as needed. Apply patch to the affected area for 12 hours a day and remove for 12 hours a day.  amLODIPine (NORVASC) 5 mg tablet Take 5 mg by mouth daily. Every am      pantoprazole (PROTONIX) 40 mg tablet Take 40 mg by mouth daily.  ticagrelor (BRILINTA) 90 mg tablet Take 90 mg by mouth two (2) times a day.  Indications: Myocardial Reinfarction Prevention      evolocumab (Repatha SureClick) pen injection 349 mg by SubCUTAneous route every fourteen (14) days. (Patient not taking: Reported on 7/13/2021)      fluticasone propionate (FLONASE) 50 mcg/actuation nasal spray fluticasone propionate 50 mcg/actuation nasal spray,suspension   2 spray in each nostril daily (Patient not taking: Reported on 7/13/2021)      sildenafil citrate (VIAGRA) 50 mg tablet Take 50 mg by mouth as needed for Erectile Dysfunction. (Patient not taking: Reported on 5/19/2021)      Bifidobacterium Infantis (ALIGN) 4 mg cap Take 1 Cap by mouth every evening. (Patient not taking: Reported on 7/13/2021)       No current facility-administered medications on file prior to visit. ALLERGIES:    Allergies   Allergen Reactions    Cefuroxime Unknown (comments)     abd pain    Ibuprofen Other (comments)     Patient states NSAIDS/ ibuprofen upsets his stomach         SOCIAL HISTORY:     Social History     Tobacco Use    Smoking status: Never Smoker    Smokeless tobacco: Never Used   Substance Use Topics    Alcohol use: No    Drug use: No       SURGICAL HISTORY:    Past Surgical History:   Procedure Laterality Date    COLONOSCOPY N/A 11/12/2018    COLONOSCOPY performed by Tal Sepulveda MD at [address] [place] [address] HX COLONOSCOPY  2018    HX COLONOSCOPY      HX ENDOSCOPY      HX HEART CATHETERIZATION  2018    Cardiac cath Stents x2    HX HEART CATHETERIZATION  02/2020    \"patent stent obtuse marginal 1. significant disease noted in branch artery of RCA.   THis artery appears to be borderline for any kind of intervention\"    HX LAP CHOLECYSTECTOMY  2005    HX LUMBAR DISKECTOMY      L4-L4    HX ORTHOPAEDIC  2006    Lumbar laminectomy    HX PACEMAKER      linq monitor        FAMILY HISTORY:    Family History   Problem Relation Age of Onset    Diabetes Mother     Stroke Father     Hypertension Father          REVIEW OF SYSTEMS:    I personally collected this information from all available source present (patient/others in room and records available) Cheo Brice    Review of Systems   Constitutional: Negative for chills, diaphoresis, fever, malaise/fatigue and weight loss. HENT: Positive for tinnitus. Negative for ear discharge, hearing loss, sinus pain and sore throat. Respiratory: Negative for cough, sputum production and shortness of breath. Gastrointestinal: Negative for abdominal pain, constipation, diarrhea, heartburn, nausea and vomiting. Genitourinary: Negative for dysuria. Musculoskeletal: Positive for back pain and joint pain. Negative for myalgias and neck pain. Skin: Negative for itching and rash. Neurological: Negative for dizziness, tingling, sensory change and headaches. Psychiatric/Behavioral: Negative for depression. The patient is nervous/anxious. The patient does not have insomnia. PHYSICAL EXAMINATION:    Vital Signs:    Visit Vitals  /69 (BP 1 Location: Left upper arm, BP Patient Position: Sitting)   Pulse 71   Temp 97.8 °F (36.6 °C) (Temporal)   Ht 5' 9\" (1.753 m)   Wt 205 lb (93 kg)   SpO2 98%   BMI 30.27 kg/m²         Wt Readings from Last 3 Encounters:   07/13/21 205 lb (93 kg)   05/19/21 207 lb (93.9 kg)   02/10/21 204 lb (92.5 kg)     BP Readings from Last 3 Encounters:   07/13/21 124/69   05/19/21 128/72   02/10/21 110/70         Physical Exam  Nursing note reviewed. Constitutional:       General: He is not in acute distress. Appearance: Normal appearance. He is not ill-appearing or toxic-appearing. HENT:      Right Ear: Tympanic membrane, ear canal and external ear normal.      Left Ear: Tympanic membrane, ear canal and external ear normal.      Nose: No congestion or rhinorrhea. Mouth/Throat:      Pharynx: No oropharyngeal exudate or posterior oropharyngeal erythema. Eyes:      General: No scleral icterus. Extraocular Movements: Extraocular movements intact. Conjunctiva/sclera: Conjunctivae normal.      Pupils: Pupils are equal, round, and reactive to light.    Neck: Vascular: No carotid bruit. Cardiovascular:      Rate and Rhythm: Normal rate and regular rhythm. Heart sounds: No murmur heard. No friction rub. No gallop. Pulmonary:      Effort: Pulmonary effort is normal.      Breath sounds: Normal breath sounds. No wheezing, rhonchi or rales. Abdominal:      General: Bowel sounds are normal. There is no distension. Palpations: Abdomen is soft. There is no mass. Tenderness: There is no abdominal tenderness. Musculoskeletal:         General: Tenderness present. No swelling or deformity. Cervical back: No rigidity. No muscular tenderness. Right lower leg: No edema. Left lower leg: No edema. Comments: The right upper anterior margin of the pectoralis major is sore but there is some pain over into the midclavicular line on the ribs 3 and 4 where this muscle attaches. I do not perceive any knots masses or swelling. It is worse with retraction of the shoulder. Lymphadenopathy:      Cervical: No cervical adenopathy. Skin:     Coloration: Skin is not jaundiced. Findings: No erythema or rash. Neurological:      General: No focal deficit present. Mental Status: He is alert and oriented to person, place, and time. Mental status is at baseline. Psychiatric:         Mood and Affect: Mood normal.         Behavior: Behavior normal.         Thought Content: Thought content normal.         Judgment: Judgment normal.           ASSESSMENT/PLAN:      Discussion (regarding today's visit with Sheila Abbott);    ICD-10-CM ICD-9-CM    1. Hospital discharge follow-up  Z09 V67.59 NM DISCHARGE MEDS RECONCILED W/ CURRENT OUTPATIENT MED LIST      AMB SUPPLY ORDER   2. Chronic bilateral low back pain with left-sided sciatica  M54.42 724.2 lidocaine (LIDODERM) 5 %    G89.29 724.3      338.29       Rapid atrial fibrillation with recent hospitalization.  Continue cardiology care and medications which is amiodarone.    Needs a chest x-ray and TSH periodically   The soreness on the right pectoralis I think is just some muscle issues perhaps when he was in the hospital he may have turned or twisted funny   If it continues he will let me know.  WE reviewed each diagnosis listed for today's visit.  WE reviewed medications, treatment, testing such as labs, imagine, referrals and when to call regarding results and appointments.  Reminded patient to keep any and all appointments with specialists, labs, imaging.  Reminded patient to make sure we get copies of any specialists care, labs and imaging.  Reminded patient to call of come by the office if there are any concerns, questions , comments or problems.  The patient verbalized understanding of the care plan and all questions were answered to the patient's satisfaction prior to leaving the office.  The patient was told that failure to comply with recommended testing could result in abnormal health consequences.  The patient was instructed to have yearly routine health maintenance including but not limited to age appropriate vaccines, testing, screening exams.  ALL questions were answered to his satisfaction before leaving the office. The patient actively participated in medical decision making. FOLLOW UP:     Patient knows to keep any and all future visits scheduled unless told otherwise. Patient knows to call, come back if any concerns, questions, comments or problems arise. Donato Cárdenas DO    This visit was reviewed and signed electronically. It was been completed with voice recognition software and hand typing. It may have syntax and spelling errors despite editing.

## 2021-07-13 NOTE — PROGRESS NOTES
1. Have you been to the ER, urgent care clinic since your last visit? Hospitalized since your last visit?see note below    2. Have you seen or consulted any other health care providers outside of the [address] [place] [address] since your last visit? Include any pap smears or colon screening. No    Chief Complaint   Patient presents with   [facility] Follow Up     Was admitted to [facility] days for a-fib.       Chest Pain     c/o discomfort \"under\" right upper chest.     Visit Vitals  /69 (BP 1 Location: Left upper arm, BP Patient Position: Sitting)   Pulse 71   Temp 97.8 °F (36.6 °C) (Temporal)   Ht 5' 9\" (1.753 m)   Wt 205 lb (93 kg)   SpO2 98%   BMI 30.27 kg/m²

## 2021-07-13 NOTE — PATIENT INSTRUCTIONS
General Health and concerns:  HEART HEALTHY DIET:  A heart healthy diet is one that is low in cholesterol (less than 300 mg daily), fat (less than 80 g daily) . You should also minimize carbohydrates / sugars (less amounts of breads, pastas, potato and potato products and sugary foods/snacks, cookies, cakes, etc) . Try to eat whole wheat/multigrain breads and pastas and eat more vegetables. Cook with olive oil (or no oil) and grill, bake, broil or boil foods. Less red meat and more chicken , fish and lean cuts of beef (limited). 2556-2417 calories per day is sufficient 4112-0947 is acceptable for weight loss. EXERCISE:  You should do exercise 3-5 days per week (minimum) to include increasing your heart rate for 30 to 45 minutes. At least a pace of a brisk walk should do that. This build up your heart and lung endurance and muscles and helps many function of the body. OTHER:        Routine Health maintenance: You need to get a yearly follow up/physical exam to review, discuss age and gender appropriate exams, labs, vaccines and screening tests. This includes cardiovascular health risk, cancer screens and other bernie related topics. Medications-Take all medications as directed. Please do not stop unless you talk to your doctor or health care provider first. Report any problems immediately. Referrals: if you have been given a referral, please call the office if you do not hear from provider in one week. You may make the appointment yourself. Please keep all appointments with specialists and ask them to send their notes, thoughts, recommendations to us , as your PCP. Imaging/Labs:  Be sure to get these images in a timely manner. IF your test must be scheduled, let us know if you need help getting this done and if you do not hear from that provider in a week , call us or them.   BE SURE to call the office if you do not hear regarding the results in one week after the test is performed Image or lab). It is our intention to inform you of the results ALWAYS, even if normal you should get a notification (Call, portal message). PLEASE melecio if you do not get the results. PLEASE follow all recommendations and call/come in /ask questions if you do not understand of if problems develop after or in between visits. Failure to comply with recommended health care advise could result in serious health consequences. Thank you for choosing our practice and please let us know how we can help you feel better and stay well!

## 2021-07-22 NOTE — PROGRESS NOTES
HISTORY OF PRESENTING ILLNESS      Ulysses Saavedra is a 77 y.o. male  with CAD, GERD, ROQUE who experienced chest pain in June 2018 and underwent coronary revascularization for obstructive CAD. Just prior to undergoing PCI he was noted to have cardiac arrest.  He has not experienced recurrent episodes of VT since undergoing PCI. He underwent repeat nuclear imaging and heart catheterization which demonstrated stable CAD. He has been noted to have ventricular bigeminy and PVCs on monitoring. He underwent a stress test with observation of ventricular bigeminy during the exercise portion of the stress test.  He was highly intolerant of beta-blockers in the past. He was recently hospitalized with palpitations and was found to have AF with RVR that required amiodarone for control. PAST MEDICAL HISTORY     Past Medical History:   Diagnosis Date    CAD (coronary artery disease) 06/15/2018    Coronary arteriosclerosis 8/7/2020    GERD (gastroesophageal reflux disease)     H/O seasonal allergies     High cholesterol     Hx of cardiac arrest     VT arrest     Hyperlipidemia 8/7/2020    Hypertension     Ill-defined condition     Urinary urgency    [facility] ([facility] [address]     ROQUE on CPAP     2/2/21 pt reports does not use cpap each night, only a few times per week    Vitamin D deficiency 8/7/2020           PAST SURGICAL HISTORY     Past Surgical History:   Procedure Laterality Date    COLONOSCOPY N/A 11/12/2018    COLONOSCOPY performed by Fracisco Bear MD at [address] [facility] HX COLONOSCOPY  2018    HX COLONOSCOPY      HX ENDOSCOPY      HX HEART CATHETERIZATION  2018    Cardiac cath Stents x2    HX HEART CATHETERIZATION  02/2020    \"patent stent obtuse marginal 1. significant disease noted in branch artery of RCA.   THis artery appears to be borderline for any kind of intervention\"    HX LAP CHOLECYSTECTOMY  2005    HX LUMBAR DISKECTOMY      L4-L4    HX ORTHOPAEDIC  2006 Lumbar laminectomy    HX PACEMAKER      linq monitor          ALLERGIES     Allergies   Allergen Reactions    Cefuroxime Unknown (comments)     abd pain    Ibuprofen Other (comments)     Patient states NSAIDS/ ibuprofen upsets his stomach          FAMILY HISTORY     Family History   Problem Relation Age of Onset    Diabetes Mother    Sherrel Maru Stroke Father     Hypertension Father     negative for cardiac disease       SOCIAL HISTORY     Social History     Socioeconomic History    Marital status:      Spouse name: Not on file    Number of children: Not on file    Years of education: Not on file    Highest education level: Not on file   Tobacco Use    Smoking status: Never Smoker    Smokeless tobacco: Never Used   Substance and Sexual Activity    Alcohol use: No    Drug use: No    Sexual activity: Yes     Partners: Female     Social Determinants of Health     Financial Resource Strain:     Difficulty of Paying Living Expenses:    Food Insecurity:     Worried About Running Out of Food in the Last Year:     [address] in the Last Year:    Transportation Needs:     Lack of Transportation (Medical):  Lack of Transportation (Non-Medical):    Physical Activity:     Days of Exercise per Week:     Minutes of Exercise per Session:    Stress:     Feeling of Stress :    Social Connections:     Frequency of Communication with Friends and Family:     Frequency of Social Gatherings with Friends and Family:     Attends [employer] Services:     Active Member of Clubs or Organizations:     Attends Club or Organization Meetings:     Marital Status:          MEDICATIONS     Current Outpatient Medications   Medication Sig    amiodarone (CORDARONE) 200 mg tablet Take 200 mg by mouth daily.  apixaban (Eliquis) 5 mg tablet Take 5 mg by mouth two (2) times a day.  tolterodine (DETROL) 2 mg tablet Take 2 mg by mouth two (2) times a day.     diazePAM (VALIUM) 5 mg tablet Take 5 mg by mouth daily as needed for Anxiety.  ondansetron (ZOFRAN ODT) 4 mg disintegrating tablet Take 4 mg by mouth every eight (8) hours as needed for Nausea or Vomiting.  traMADoL (ULTRAM) 50 mg tablet Take 50 mg by mouth every six (6) hours as needed for Pain.  cholecalciferol, vitamin D3, (Vitamin D3) 50 mcg (2,000 unit) tab Two tablets daily    lancets (BD Ultra-Fine II Lancets) 30 gauge misc by Does Not Apply route.  glucose blood VI test strips (FreeStyle Lite Strips) strip by Does Not Apply route See Admin Instructions.  evolocumab (Repatha SureClick) pen injection 498 mg by SubCUTAneous route every fourteen (14) days.  LORazepam (ATIVAN) 0.5 mg tablet Take 1 Tab by mouth every eight (8) hours as needed for Anxiety. Max Daily Amount: 1.5 mg.    amLODIPine (NORVASC) 2.5 mg tablet Take 2.5 mg by mouth nightly.  butalbital-acetaminophen-caff (FIORICET) -40 mg per capsule Take 1 Cap by mouth every four (4) hours as needed.  fluticasone propionate (FLONASE) 50 mcg/actuation nasal spray fluticasone propionate 50 mcg/actuation nasal spray,suspension   2 spray in each nostril daily    lidocaine (LIDODERM) 5 % by TransDERmal route as needed. Apply patch to the affected area for 12 hours a day and remove for 12 hours a day.  amLODIPine (NORVASC) 5 mg tablet Take 5 mg by mouth daily. Every am    pantoprazole (PROTONIX) 40 mg tablet Take 40 mg by mouth daily.  ticagrelor (BRILINTA) 90 mg tablet Take 90 mg by mouth two (2) times a day. Indications: Myocardial Reinfarction Prevention    Bifidobacterium Infantis (ALIGN) 4 mg cap Take 1 Capsule by mouth every evening.  sildenafil citrate (VIAGRA) 50 mg tablet Take 50 mg by mouth as needed for Erectile Dysfunction. (Patient not taking: Reported on 5/19/2021)     No current facility-administered medications for this visit.        I have reviewed the nurses notes, vitals, problem list, allergy list, medical history, family, social history and medications. REVIEW OF SYMPTOMS      General: Pt denies excessive weight gain or loss. Pt is able to conduct ADL's  HEENT: Denies blurred vision, headaches, hearing loss, epistaxis and difficulty swallowing. Respiratory: Denies cough, congestion, shortness of breath, RDZ, wheezing or stridor. Cardiovascular: Denies precordial pain, palpitations, edema or PND  Gastrointestinal: Denies poor appetite, indigestion, abdominal pain or blood in stool  Genitourinary: Denies hematuria, dysuria, increased urinary frequency  Musculoskeletal: Denies joint pain or swelling from muscles or joints  Neurologic: Denies tremor, paresthesias, headache, or sensory motor disturbance  Psychiatric: Denies confusion, insomnia, depression  Integumentray: Denies rash, itching or ulcers. Hematologic: Denies easy bruising, bleeding       PHYSICAL EXAMINATION      Vitals: see vitals section  General: Well developed, in no acute distress. HEENT: No jaundice, oral mucosa moist, no oral ulcers  Neck: Supple, no stiffness, no lymphadenopathy, supple  Heart:  Normal S1/S2 negative S3 or S4. Regular, no murmur, gallop or rub, no jugular venous distention  Respiratory: Clear bilaterally x 4, no wheezing or rales  Abdomen:   Soft, non-tender, bowel sounds are active. Extremities:  No edema, normal cap refill, no cyanosis. Musculoskeletal: No clubbing, no deformities  Neuro: A&Ox3, speech clear, gait stable, cooperative, no focal neurologic deficits  Skin: Skin color is normal. No rashes or lesions.  Non diaphoretic, moist.  Vascular: 2+ pulses symmetric in all extremities       DIAGNOSTIC DATA      EKG: Sinus rhythm    Visit Vitals  /70 (BP 1 Location: Left upper arm, BP Patient Position: Sitting)   Pulse 64   Resp 16   Ht 5' 9\" (1.753 m)   Wt 205 lb (93 kg)   SpO2 99%   BMI 30.27 kg/m²        LABORATORY DATA    No results found for: WBC, HGBPOC, HGB, HGBP, HCTPOC, HCT, PHCT, RBCH, PLT, MCV, HGBEXT, HCTEXT, PLTEXT, HGBEXT, HCTEXT, PLTEXT   No results found for: NA, K, CL, CO2, AGAP, GLU, BUN, CREA, BUCR, GFRAA, GFRNA, CA, TBIL, TBILI, AP, TP, ALB, GLOB, AGRAT, ALT        ASSESSMENT      1. Atrial fibrillation   A. Symptomatic   B. Paroxysmal   C. ILR  2. Cardiac arrest Hx  3. CAD, native  4. Percutaneous coronary transluminal angioplasty  5. ROQUE on CPAP  6. Dizziness/lightheadedness       PLAN     Discussed alternate drugs vs AF ablation. He wishes to avoid drug therapy. Encouraged CPAP use. Proceed with AF ablation, [facility], [facility]. Plan to DC amiodarone post ablation. ICD-10-CM ICD-9-CM    1. PVC (premature ventricular contraction)  I49.3 427.69 AMB POC EKG ROUTINE W/ 12 LEADS, INTER & REP     Orders Placed This Encounter    AMB POC EKG ROUTINE W/ 12 LEADS, INTER & REP     Order Specific Question:   Reason for Exam:     Answer:   AF          FOLLOW-UP       Thank you, Roula Moss MD and Dr. Rosita Partida for allowing me to participate in the care of this extraordinarily pleasant male. Please do not hesitate to contact me for further questions/concerns.          Heber Garrison MD  Cardiac Electrophysiology / Cardiology    [address]  [address] [place] [address], [address]  [phone] / [phone] Fax   [phone] / [phone] Fax

## 2021-07-23 ENCOUNTER — OFFICE VISIT (OUTPATIENT)
Dept: CARDIOLOGY CLINIC | Age: 66
End: 2021-07-23
Payer: MEDICARE

## 2021-07-23 VITALS
DIASTOLIC BLOOD PRESSURE: 70 MMHG | HEART RATE: 64 BPM | RESPIRATION RATE: 16 BRPM | BODY MASS INDEX: 30.36 KG/M2 | OXYGEN SATURATION: 99 % | HEIGHT: 69 IN | WEIGHT: 205 LBS | SYSTOLIC BLOOD PRESSURE: 116 MMHG

## 2021-07-23 DIAGNOSIS — I49.3 PVC (PREMATURE VENTRICULAR CONTRACTION): Primary | ICD-10-CM

## 2021-07-23 PROCEDURE — G8432 DEP SCR NOT DOC, RNG: HCPCS | Performed by: INTERNAL MEDICINE

## 2021-07-23 PROCEDURE — G8536 NO DOC ELDER MAL SCRN: HCPCS | Performed by: INTERNAL MEDICINE

## 2021-07-23 PROCEDURE — 93005 ELECTROCARDIOGRAM TRACING: CPT | Performed by: INTERNAL MEDICINE

## 2021-07-23 PROCEDURE — 1101F PT FALLS ASSESS-DOCD LE1/YR: CPT | Performed by: INTERNAL MEDICINE

## 2021-07-23 PROCEDURE — G8417 CALC BMI ABV UP PARAM F/U: HCPCS | Performed by: INTERNAL MEDICINE

## 2021-07-23 PROCEDURE — 93010 ELECTROCARDIOGRAM REPORT: CPT | Performed by: INTERNAL MEDICINE

## 2021-07-23 PROCEDURE — 99215 OFFICE O/P EST HI 40 MIN: CPT | Performed by: INTERNAL MEDICINE

## 2021-07-23 PROCEDURE — 3017F COLORECTAL CA SCREEN DOC REV: CPT | Performed by: INTERNAL MEDICINE

## 2021-07-23 PROCEDURE — G8427 DOCREV CUR MEDS BY ELIG CLIN: HCPCS | Performed by: INTERNAL MEDICINE

## 2021-07-23 PROCEDURE — G0463 HOSPITAL OUTPT CLINIC VISIT: HCPCS | Performed by: INTERNAL MEDICINE

## 2021-07-23 NOTE — PROGRESS NOTES
Room EP 5  Visit Vitals  /70 (BP 1 Location: Left upper arm, BP Patient Position: Sitting)   Pulse 64   Resp 16   Ht 5' 9\" (1.753 m)   Wt 205 lb (93 kg)   SpO2 99%   BMI 30.27 kg/m²     Cardiac cath 8/10/21 with Dr. Prince Edwards  Not using CPAP for the last 2 weeks due to chest pain, seeing Dr. Yogesh Garcia  [facility]  Chest pain: no  Shortness of breath: no  Edema: no  Palpitations, Skipped beats, Rapid heartbeat: no  Dizziness: no  Fatigue:no    New diagnosis/Surgeries: no    ER/Hospitalizations: [facility] 3 weeks ago for AFib    Refills:no

## 2021-07-29 ENCOUNTER — TELEPHONE (OUTPATIENT)
Dept: CARDIOLOGY CLINIC | Age: 66
End: 2021-07-29

## 2021-07-29 DIAGNOSIS — Z01.812 PRE-PROCEDURE LAB EXAM: ICD-10-CM

## 2021-07-29 DIAGNOSIS — I48.0 PAROXYSMAL ATRIAL FIBRILLATION (HCC): Primary | ICD-10-CM

## 2021-07-29 NOTE — LETTER
7/29/2021 3:27 PM    Mr. Armando Rosario  [address] [address]      You are scheduled for the following procedure with Dr. Rod Queen:  AFIB ablation at [facility], [address] [facility], [address]      PLEASE be aware that your procedure date/time is tentative and subject to change due to emergency cases. Procedure date/time:    September 9, 2021  Time: 8:00 am - please arrive by 6:00 am      ARRIVAL time:  (You will need  to be discharged home with.)     o [facility] procedures: please arrive to check in on 2nd floor two hours prior to your procedure the day of your procedure.    o [facility] procedures: arrive to check in on 1st floor near [facility] entrance two hours prior to your procedure. Pre-procedure Labs/Imaging:    o PRE-PROCEDURE LABS NEEDED: YES   o Forms for labwork may be given at appointment. If not, they will be mailed to you. Labs should be completed within 5-7 days of procedure. These can be done at any [facility] or [facility] lab (one is located in [address] [place] [address]. No appointment needed). NO A COVID swab may be needed 4 days prior to your procedure. o YOU MAY NEED PRE-PROCEDURE IMAGING, CHEST CTA/CARDIAC MRI.           []  Chest CTA     [X]  Cardiac MRI    ([facility] scheduling to call you)  -  [phone]          Medication Instructions:     Do not stop the following blood thinner prior to procedure: Brilinta and Eliquis         If you take any of the following Diabetic medications, please use as follows:    []  Glucophage/Metformin  -  Hold morning dose on day of procedure.    []  Insulin  -  Hold morning dose on day of procedure.    []  Lantus  -  Reduce dose by ½ evening before procedure. Nothing to eat or drink after midnight before your procedure. You may take all other medications as directed the morning of your procedure with a sip of water unless otherwise indicated.         Please contact the office [phone] and ask for a member of Dr. Michelle Kincaid' team for procedure questions. There is a physician on call for the office after hours for immediate needs. FOLLOW UP:   Your appointments will be made for you post procedure based off of discharge instructions. You may have driving restrictions for a short time after your procedure (usually 2-3 days). Pacemaker/ICD patients will be unable to have an MRI until 6 weeks after implant, NO dental work for 8 weeks after your implant. Sincerely,      Ailin MD Dyana   Atrial Fibrillation Ablation   Discharge Instructions      You have just had an Atrial Fibrillation Ablation. There were catheters temporarily placed in your heart through a puncture in the Veins and/or Arteries in your groin. WHAT TO EXPECT      If you have had an Atrial Fibrillation Ablation please be aware that you may experience mild chest pain that will resolve within 24-48 hours. If the Chest Pain begins radiating down your shoulders or arms, becomes severe or breathing becomes difficult, call 911 or go to the closest emergency room.  Mild to moderate, non-painful, bruising or mild swelling at the puncture site is not un-common, and will resolve in 7  14 days, and may extend down your thigh as it heals. Application of Ice to the site may help with any tenderness.  If a closure device was used to seal your artery or vein, a separate pamphlet will be given to you with these discharge instructions. It is very important that you review the information in the pamphlet for different restrictions and precautions.  You have a small gauze dressing applied to the puncture site in your groin. You may remove this the following morning.  It is not uncommon to feel palpitations during the healing phase after your ablation. If you feel as though you are having recurrence of atrial fibrillation lasting longer than 30 minutes, please contact the office.    You MAY receive a 30 day heart monitor in the mail to wear after your procedure. This is to evaluate your heart rhythm post ablation. MEDICATIONS      Take only the medications prescribed to you at discharge. ACTIVITY      A responsible adult must take you home. Do not drive a car for 24 hours.  Rest quietly for the remainder of the day.  Do not lift anything greater than 10 pounds for 3 days.  Limit bending at the puncture site and use of stairs for at least 3 days.  You may remove the bandage and shower the morning after the procedure. Do not take a bath for 3 days. SYMPTOMS THAT NEED TO BE REPORTED IMMEDIATELY      Bleeding at the puncture site. If there is bleeding, lie down and hold firm direct pressure for at least 5 minutes. If the bleeding does not stop, go to the closest emergency room, or call 911.  Temperature more than 100.5 F.   Redness or warmth at the puncture site.  Increasing pain, numbness, coolness or blue discoloration of the extremity where the puncture is located.  Pulsating mass at the puncture site.  A new lump at the puncture site, or increasing swelling at the site. Please be aware that a pea or marble sized lump is normal, anything larger or increasing in size should be reported.  Bruising at the puncture site that enlarges or becomes painful (some bruising at the site is common and will go away in 7-14 days).  Rapid heart rate or palpitations.  Dizziness, lightheadedness, fainting.  REMEMBER: If you feel something is an emergency or cannot be handled over the phone, call 911 or go to the closest emergency room.       FOLLOW UP CARE     Oleg Juarez NP in 2 weeks   [facility] in 3 months        Kolton Edouard MD  Cardiac Electrophysiology / Cardiology    [place] [address].  [address] [place] [address], [facility], [place] [address] [address]    [address] [facility]  [phone] / [phone] Fax  [phone] / [phone] Fax

## 2021-07-29 NOTE — TELEPHONE ENCOUNTER
Called patient, ID verified using two patient identifiers. Patient scheduled for a AFIB ablation with Dr. Emile Lopez at [facility] on Thursday, 9/9/21 @ 8:00 am (patient to arrive at 6:00 am.)  Reviewed pre-procedure instructions with patient and time allowed for questions. Instructions to be mailed today to the address confirmed on file. Patient verbalized understanding and will call with any other questions.

## 2021-07-30 ENCOUNTER — APPOINTMENT (OUTPATIENT)
Dept: GENERAL RADIOLOGY | Age: 66
End: 2021-07-30
Attending: EMERGENCY MEDICINE
Payer: MEDICARE

## 2021-07-30 ENCOUNTER — HOSPITAL ENCOUNTER (OUTPATIENT)
Age: 66
Setting detail: OBSERVATION
Discharge: HOME | End: 2021-07-31
Attending: INTERNAL MEDICINE | Admitting: INTERNAL MEDICINE
Payer: MEDICARE

## 2021-07-30 DIAGNOSIS — R07.9 CHEST PAIN, UNSPECIFIED TYPE: Primary | ICD-10-CM

## 2021-07-30 DIAGNOSIS — Z86.79 HISTORY OF CORONARY ARTERY DISEASE: ICD-10-CM

## 2021-07-30 LAB
ALBUMIN SERPL-MCNC: 3.9 G/DL (ref 3.5–5)
ALBUMIN/GLOB SERPL: 1 {RATIO} (ref 1.1–2.2)
ALP SERPL-CCNC: 62 U/L (ref 45–117)
ALT SERPL-CCNC: 32 U/L (ref 12–78)
ANION GAP SERPL CALC-SCNC: 5 MMOL/L (ref 5–15)
AST SERPL W P-5'-P-CCNC: 17 U/L (ref 15–37)
ATRIAL RATE: 75 BPM
BASOPHILS # BLD: 0.1 K/UL (ref 0–0.1)
BASOPHILS NFR BLD: 1 % (ref 0–1)
BILIRUB SERPL-MCNC: 0.3 MG/DL (ref 0.2–1)
BUN SERPL-MCNC: 12 MG/DL (ref 6–20)
BUN/CREAT SERPL: 8 (ref 12–20)
CA-I BLD-MCNC: 8.8 MG/DL (ref 8.5–10.1)
CALCULATED P AXIS, ECG09: 63 DEGREES
CALCULATED R AXIS, ECG10: 24 DEGREES
CALCULATED T AXIS, ECG11: -44 DEGREES
CHLORIDE SERPL-SCNC: 110 MMOL/L (ref 97–108)
CO2 SERPL-SCNC: 25 MMOL/L (ref 21–32)
CREAT SERPL-MCNC: 1.47 MG/DL (ref 0.7–1.3)
DIAGNOSIS, 93000: NORMAL
DIFFERENTIAL METHOD BLD: NORMAL
EOSINOPHIL # BLD: 0.1 K/UL (ref 0–0.4)
EOSINOPHIL NFR BLD: 1 % (ref 0–7)
ERYTHROCYTE [DISTWIDTH] IN BLOOD BY AUTOMATED COUNT: 13.9 % (ref 11.5–14.5)
GLOBULIN SER CALC-MCNC: 3.9 G/DL (ref 2–4)
GLUCOSE SERPL-MCNC: 95 MG/DL (ref 65–100)
HCT VFR BLD AUTO: 43.5 % (ref 36.6–50.3)
HGB BLD-MCNC: 14.5 G/DL (ref 12.1–17)
IMM GRANULOCYTES # BLD AUTO: 0 K/UL (ref 0–0.04)
IMM GRANULOCYTES NFR BLD AUTO: 0 % (ref 0–0.5)
LYMPHOCYTES # BLD: 3 K/UL (ref 0.8–3.5)
LYMPHOCYTES NFR BLD: 33 % (ref 12–49)
MCH RBC QN AUTO: 29.7 PG (ref 26–34)
MCHC RBC AUTO-ENTMCNC: 33.3 G/DL (ref 30–36.5)
MCV RBC AUTO: 89 FL (ref 80–99)
MONOCYTES # BLD: 0.7 K/UL (ref 0–1)
MONOCYTES NFR BLD: 7 % (ref 5–13)
NEUTS SEG # BLD: 5.3 K/UL (ref 1.8–8)
NEUTS SEG NFR BLD: 58 % (ref 32–75)
NRBC # BLD: 0 K/UL (ref 0–0.01)
NRBC BLD-RTO: 0 PER 100 WBC
P-R INTERVAL, ECG05: 142 MS
PLATELET # BLD AUTO: 251 K/UL (ref 150–400)
PMV BLD AUTO: 10.4 FL (ref 8.9–12.9)
POTASSIUM SERPL-SCNC: 4 MMOL/L (ref 3.5–5.1)
PROT SERPL-MCNC: 7.8 G/DL (ref 6.4–8.2)
Q-T INTERVAL, ECG07: 372 MS
QRS DURATION, ECG06: 84 MS
QTC CALCULATION (BEZET), ECG08: 415 MS
RBC # BLD AUTO: 4.89 M/UL (ref 4.1–5.7)
SODIUM SERPL-SCNC: 140 MMOL/L (ref 136–145)
TROPONIN I SERPL-MCNC: <0.05 NG/ML
TROPONIN I SERPL-MCNC: <0.05 NG/ML
VENTRICULAR RATE, ECG03: 75 BPM
WBC # BLD AUTO: 9.2 K/UL (ref 4.1–11.1)

## 2021-07-30 PROCEDURE — 99218 HC RM OBSERVATION: CPT

## 2021-07-30 PROCEDURE — 74011250637 HC RX REV CODE- 250/637: Performed by: INTERNAL MEDICINE

## 2021-07-30 PROCEDURE — 71045 X-RAY EXAM CHEST 1 VIEW: CPT

## 2021-07-30 PROCEDURE — 93005 ELECTROCARDIOGRAM TRACING: CPT

## 2021-07-30 PROCEDURE — 96375 TX/PRO/DX INJ NEW DRUG ADDON: CPT

## 2021-07-30 PROCEDURE — 96374 THER/PROPH/DIAG INJ IV PUSH: CPT

## 2021-07-30 PROCEDURE — 74011250636 HC RX REV CODE- 250/636: Performed by: PHYSICIAN ASSISTANT

## 2021-07-30 PROCEDURE — 85025 COMPLETE CBC W/AUTO DIFF WBC: CPT

## 2021-07-30 PROCEDURE — 36415 COLL VENOUS BLD VENIPUNCTURE: CPT

## 2021-07-30 PROCEDURE — 84484 ASSAY OF TROPONIN QUANT: CPT

## 2021-07-30 PROCEDURE — 99285 EMERGENCY DEPT VISIT HI MDM: CPT

## 2021-07-30 PROCEDURE — 80053 COMPREHEN METABOLIC PANEL: CPT

## 2021-07-30 RX ORDER — SODIUM CHLORIDE 0.9 % (FLUSH) 0.9 %
5-40 SYRINGE (ML) INJECTION EVERY 8 HOURS
Status: DISCONTINUED | OUTPATIENT
Start: 2021-07-30 | End: 2021-07-31 | Stop reason: HOSPADM

## 2021-07-30 RX ORDER — ONDANSETRON 2 MG/ML
4 INJECTION INTRAMUSCULAR; INTRAVENOUS
Status: DISCONTINUED | OUTPATIENT
Start: 2021-07-30 | End: 2021-07-31 | Stop reason: HOSPADM

## 2021-07-30 RX ORDER — MELATONIN
2000 DAILY
Status: DISCONTINUED | OUTPATIENT
Start: 2021-07-31 | End: 2021-07-31 | Stop reason: HOSPADM

## 2021-07-30 RX ORDER — MORPHINE SULFATE 2 MG/ML
2 INJECTION, SOLUTION INTRAMUSCULAR; INTRAVENOUS ONCE
Status: COMPLETED | OUTPATIENT
Start: 2021-07-30 | End: 2021-07-30

## 2021-07-30 RX ORDER — AMIODARONE HYDROCHLORIDE 200 MG/1
200 TABLET ORAL DAILY
Status: DISCONTINUED | OUTPATIENT
Start: 2021-07-31 | End: 2021-07-31 | Stop reason: HOSPADM

## 2021-07-30 RX ORDER — TROSPIUM CHLORIDE 20 MG/1
20 TABLET, FILM COATED ORAL 2 TIMES DAILY
Status: DISCONTINUED | OUTPATIENT
Start: 2021-07-30 | End: 2021-07-31 | Stop reason: HOSPADM

## 2021-07-30 RX ORDER — ACETAMINOPHEN 650 MG/1
650 SUPPOSITORY RECTAL
Status: DISCONTINUED | OUTPATIENT
Start: 2021-07-30 | End: 2021-07-31 | Stop reason: HOSPADM

## 2021-07-30 RX ORDER — FLUTICASONE PROPIONATE 50 MCG
2 SPRAY, SUSPENSION (ML) NASAL DAILY
Status: DISCONTINUED | OUTPATIENT
Start: 2021-07-31 | End: 2021-07-31 | Stop reason: HOSPADM

## 2021-07-30 RX ORDER — ONDANSETRON 2 MG/ML
4 INJECTION INTRAMUSCULAR; INTRAVENOUS
Status: COMPLETED | OUTPATIENT
Start: 2021-07-30 | End: 2021-07-30

## 2021-07-30 RX ORDER — TRAMADOL HYDROCHLORIDE 50 MG/1
50 TABLET ORAL
Status: DISCONTINUED | OUTPATIENT
Start: 2021-07-30 | End: 2021-07-31 | Stop reason: HOSPADM

## 2021-07-30 RX ORDER — RANOLAZINE 500 MG/1
1 TABLET, FILM COATED, EXTENDED RELEASE ORAL 2 TIMES DAILY
COMMUNITY
Start: 2021-07-08 | End: 2021-07-31

## 2021-07-30 RX ORDER — SODIUM CHLORIDE 0.9 % (FLUSH) 0.9 %
5-40 SYRINGE (ML) INJECTION AS NEEDED
Status: DISCONTINUED | OUTPATIENT
Start: 2021-07-30 | End: 2021-07-31 | Stop reason: HOSPADM

## 2021-07-30 RX ORDER — PANTOPRAZOLE SODIUM 40 MG/1
40 TABLET, DELAYED RELEASE ORAL DAILY
Status: DISCONTINUED | OUTPATIENT
Start: 2021-07-31 | End: 2021-07-30

## 2021-07-30 RX ORDER — CHOLECALCIFEROL (VITAMIN D3) 25 MCG
2 TABLET ORAL DAILY
COMMUNITY
Start: 2021-04-30 | End: 2022-05-13 | Stop reason: SDUPTHER

## 2021-07-30 RX ORDER — CHOLECALCIFEROL (VITAMIN D3) 125 MCG
1 CAPSULE ORAL DAILY
Status: DISCONTINUED | OUTPATIENT
Start: 2021-07-31 | End: 2021-07-30

## 2021-07-30 RX ORDER — POLYETHYLENE GLYCOL 3350 17 G/17G
17 POWDER, FOR SOLUTION ORAL DAILY PRN
Status: DISCONTINUED | OUTPATIENT
Start: 2021-07-30 | End: 2021-07-31 | Stop reason: HOSPADM

## 2021-07-30 RX ORDER — ONDANSETRON 4 MG/1
4 TABLET, ORALLY DISINTEGRATING ORAL
Status: DISCONTINUED | OUTPATIENT
Start: 2021-07-30 | End: 2021-07-31 | Stop reason: HOSPADM

## 2021-07-30 RX ORDER — ACETAMINOPHEN 325 MG/1
650 TABLET ORAL
Status: DISCONTINUED | OUTPATIENT
Start: 2021-07-30 | End: 2021-07-31 | Stop reason: HOSPADM

## 2021-07-30 RX ORDER — RANOLAZINE 500 MG/1
1 TABLET, EXTENDED RELEASE ORAL 2 TIMES DAILY
Status: DISCONTINUED | OUTPATIENT
Start: 2021-07-30 | End: 2021-07-31 | Stop reason: HOSPADM

## 2021-07-30 RX ORDER — AMLODIPINE BESYLATE 2.5 MG/1
2.5 TABLET ORAL
Status: DISCONTINUED | OUTPATIENT
Start: 2021-07-30 | End: 2021-07-31 | Stop reason: HOSPADM

## 2021-07-30 RX ORDER — AMLODIPINE BESYLATE 5 MG/1
5 TABLET ORAL
Status: DISCONTINUED | OUTPATIENT
Start: 2021-07-31 | End: 2021-07-31 | Stop reason: HOSPADM

## 2021-07-30 RX ORDER — LORAZEPAM 0.5 MG/1
0.5 TABLET ORAL
Status: DISCONTINUED | OUTPATIENT
Start: 2021-07-30 | End: 2021-07-30

## 2021-07-30 RX ADMIN — MORPHINE SULFATE 2 MG: 2 INJECTION, SOLUTION INTRAMUSCULAR; INTRAVENOUS at 16:36

## 2021-07-30 RX ADMIN — Medication 10 ML: at 21:44

## 2021-07-30 RX ADMIN — AMLODIPINE BESYLATE 2.5 MG: 2.5 TABLET ORAL at 20:00

## 2021-07-30 RX ADMIN — TICAGRELOR 90 MG: 90 TABLET ORAL at 19:57

## 2021-07-30 RX ADMIN — ONDANSETRON 4 MG: 2 INJECTION INTRAMUSCULAR; INTRAVENOUS at 16:36

## 2021-07-30 RX ADMIN — APIXABAN 5 MG: 5 TABLET, FILM COATED ORAL at 20:00

## 2021-07-30 NOTE — PROGRESS NOTES
Patient arrived from ED via wheelchair. Vitals stable and no complaints of chest pain at this time. 4 eye skin assessment complete and intact. Dr Jania Vigil paged for medication missing brilenta and ampodopine orders.

## 2021-07-30 NOTE — ED PROVIDER NOTES
EMERGENCY DEPARTMENT HISTORY AND PHYSICAL EXAM      Date: 7/30/2021  Patient Name: Colby Hassan    History of Presenting Illness     Chief Complaint   Patient presents with    Chest Pain       History Provided By: Patient    HPI: Colby Hassan, 77 y.o. male with a past medical history significant for CAD, VT arrest, a-fib, GERD, hypertension, hyperlipidemia who presents to the ED with cc of intermittent waxing/waning diffuse upper chest discomfort which started last night worsening earlier today. No other associated symptoms. No particular alleviating or exacerbating factors. Has not treated symptoms at home. Became worried because he has a significant cardiac history. No recent injury or trauma. Scheduled to have cardiac cath at [facility] on 8/10/2021. Reports recent admission for A. fib and is seeing electrophysiology as an outpatient as well. Patient denies fever, chills, hemoptysis, leg swelling, shortness of breath, nausea, vomiting, diarrhea    There are no other complaints, changes, or physical findings at this time. PCP: Robyn Conrad DO   Cardiologist: Dr. Cristino Miguel      No current facility-administered medications on file prior to encounter. Current Outpatient Medications on File Prior to Encounter   Medication Sig Dispense Refill    amiodarone (CORDARONE) 200 mg tablet Take 200 mg by mouth daily.  apixaban (Eliquis) 5 mg tablet Take 5 mg by mouth two (2) times a day.  tolterodine (DETROL) 2 mg tablet Take 2 mg by mouth two (2) times a day.  diazePAM (VALIUM) 5 mg tablet Take 5 mg by mouth daily as needed for Anxiety.  ondansetron (ZOFRAN ODT) 4 mg disintegrating tablet Take 4 mg by mouth every eight (8) hours as needed for Nausea or Vomiting.  traMADoL (ULTRAM) 50 mg tablet Take 50 mg by mouth every six (6) hours as needed for Pain.       cholecalciferol, vitamin D3, (Vitamin D3) 50 mcg (2,000 unit) tab Two tablets daily 180 Tab 3    lancets (BD Ultra-Fine II Lancets) 30 gauge misc by Does Not Apply route.  glucose blood VI test strips (FreeStyle Lite Strips) strip by Does Not Apply route See Admin Instructions.  evolocumab (Repatha SureClick) pen injection 033 mg by SubCUTAneous route every fourteen (14) days.  LORazepam (ATIVAN) 0.5 mg tablet Take 1 Tab by mouth every eight (8) hours as needed for Anxiety. Max Daily Amount: 1.5 mg. 21 Tab 1    amLODIPine (NORVASC) 2.5 mg tablet Take 2.5 mg by mouth nightly.  butalbital-acetaminophen-caff (FIORICET) -40 mg per capsule Take 1 Cap by mouth every four (4) hours as needed.  fluticasone propionate (FLONASE) 50 mcg/actuation nasal spray fluticasone propionate 50 mcg/actuation nasal spray,suspension   2 spray in each nostril daily      lidocaine (LIDODERM) 5 % by TransDERmal route as needed. Apply patch to the affected area for 12 hours a day and remove for 12 hours a day.  sildenafil citrate (VIAGRA) 50 mg tablet Take 50 mg by mouth as needed for Erectile Dysfunction. (Patient not taking: Reported on 5/19/2021)      amLODIPine (NORVASC) 5 mg tablet Take 5 mg by mouth daily. Every am      pantoprazole (PROTONIX) 40 mg tablet Take 40 mg by mouth daily.  ticagrelor (BRILINTA) 90 mg tablet Take 90 mg by mouth two (2) times a day. Indications: Myocardial Reinfarction Prevention      Bifidobacterium Infantis (ALIGN) 4 mg cap Take 1 Capsule by mouth every evening.          Past History     Past Medical History:  Past Medical History:   Diagnosis Date    CAD (coronary artery disease) 06/15/2018    Coronary arteriosclerosis 8/7/2020    GERD (gastroesophageal reflux disease)     H/O seasonal allergies     High cholesterol     Hx of cardiac arrest     VT arrest     Hyperlipidemia 8/7/2020    Hypertension     Ill-defined condition     Urinary urgency    MI (myocardial infarction) ([facility] [address]     ROQUE on CPAP     2/2/21 pt reports does not use cpap each night, only a few times per week  Vitamin D deficiency 8/7/2020       Past Surgical History:  Past Surgical History:   Procedure Laterality Date    COLONOSCOPY N/A 11/12/2018    COLONOSCOPY performed by Martir Paredes MD at [address] [facility] HX COLONOSCOPY  2018    HX COLONOSCOPY      HX ENDOSCOPY      HX HEART CATHETERIZATION  2018    Cardiac cath Stents x2    HX HEART CATHETERIZATION  02/2020    \"patent stent obtuse marginal 1. significant disease noted in branch artery of RCA. THis artery appears to be borderline for any kind of intervention\"    HX LAP CHOLECYSTECTOMY  2005    HX LUMBAR DISKECTOMY      L4-L4    HX ORTHOPAEDIC  2006    Lumbar laminectomy    HX PACEMAKER      linq monitor       Family History:  Family History   Problem Relation Age of Onset    Diabetes Mother    Manuel Galla Stroke Father     Hypertension Father        Social History:  Social History     Tobacco Use    Smoking status: Never Smoker    Smokeless tobacco: Never Used   Substance Use Topics    Alcohol use: No    Drug use: No       Allergies: Allergies   Allergen Reactions    Cefuroxime Unknown (comments)     abd pain    Ibuprofen Other (comments)     Patient states NSAIDS/ ibuprofen upsets his stomach         Review of Systems     Review of Systems   Constitutional: Negative for chills, fatigue and fever. HENT: Negative. Respiratory: Negative for cough, chest tightness, shortness of breath and wheezing. Cardiovascular: Negative for chest pain and palpitations. Gastrointestinal: Negative for abdominal pain, diarrhea, nausea and vomiting. Genitourinary: Negative for frequency and urgency. Musculoskeletal: Negative for back pain, neck pain and neck stiffness. Skin: Negative for rash. Neurological: Negative for dizziness, weakness, light-headedness and headaches. Psychiatric/Behavioral: Negative. All other systems reviewed and are negative. Physical Exam     Physical Exam  Vitals and nursing note reviewed.    Constitutional: General: He is not in acute distress. Appearance: Normal appearance. He is well-developed. He is not ill-appearing, toxic-appearing or diaphoretic. HENT:      Head: Normocephalic and atraumatic. Nose: Nose normal. No congestion or rhinorrhea. Mouth/Throat:      Mouth: Mucous membranes are moist.      Pharynx: Oropharynx is clear. No oropharyngeal exudate or posterior oropharyngeal erythema. Eyes:      General: No scleral icterus. Conjunctiva/sclera: Conjunctivae normal.      Pupils: Pupils are equal, round, and reactive to light. Cardiovascular:      Rate and Rhythm: Normal rate and regular rhythm. Pulses:           Radial pulses are 2+ on the right side and 2+ on the left side. Dorsalis pedis pulses are 2+ on the right side and 2+ on the left side. Heart sounds: No murmur heard. No friction rub. No gallop. Pulmonary:      Effort: Pulmonary effort is normal. No tachypnea, accessory muscle usage, respiratory distress or retractions. Breath sounds: Normal breath sounds. No stridor. No decreased breath sounds, wheezing, rhonchi or rales. Chest:      Chest wall: No tenderness. Abdominal:      General: Bowel sounds are normal. There is no distension. Palpations: Abdomen is soft. There is no mass. Tenderness: There is no abdominal tenderness. There is no right CVA tenderness, left CVA tenderness, guarding or rebound. Musculoskeletal:         General: No deformity. Normal range of motion. Cervical back: Normal range of motion and neck supple. No rigidity. No muscular tenderness. Right lower leg: No edema. Left lower leg: No edema. Skin:     General: Skin is warm and dry. Capillary Refill: Capillary refill takes less than 2 seconds. Coloration: Skin is not jaundiced or pale. Findings: No bruising, erythema or rash. Neurological:      General: No focal deficit present.       Mental Status: He is alert and oriented to person, place, and time. Mental status is at baseline. Sensory: Sensation is intact. Motor: Motor function is intact. Psychiatric:         Mood and Affect: Mood normal.         Behavior: Behavior normal. Behavior is cooperative. Thought Content: Thought content normal.         Judgment: Judgment normal.         Lab and Diagnostic Study Results     Labs -     Recent Results (from the past 12 hour(s))   EKG, 12 LEAD, INITIAL    Collection Time: 07/30/21  3:04 PM   Result Value Ref Range    Ventricular Rate 75 BPM    Atrial Rate 75 BPM    P-R Interval 142 ms    QRS Duration 84 ms    Q-T Interval 372 ms    QTC Calculation (Bezet) 415 ms    Calculated P Axis 63 degrees    Calculated R Axis 24 degrees    Calculated T Axis -44 degrees    Diagnosis       Normal sinus rhythm  Nonspecific T wave abnormality  Abnormal ECG  No previous ECGs available  Confirmed by Kecia Search ([id number]) on 7/30/2021 3:20:23 PM     TROPONIN I    Collection Time: 07/30/21  3:15 PM   Result Value Ref Range    Troponin-I, Qt. <0.05 <7.62 ng/mL   METABOLIC PANEL, COMPREHENSIVE    Collection Time: 07/30/21  3:15 PM   Result Value Ref Range    Sodium 140 136 - 145 mmol/L    Potassium 4.0 3.5 - 5.1 mmol/L    Chloride 110 (H) 97 - 108 mmol/L    CO2 25 21 - 32 mmol/L    Anion gap 5 5 - 15 mmol/L    Glucose 95 65 - 100 mg/dL    BUN 12 6 - 20 mg/dL    Creatinine 1.47 (H) 0.70 - 1.30 mg/dL    BUN/Creatinine ratio 8 (L) 12 - 20      GFR est AA 58 (L) >60 ml/min/1.73m2    GFR est non-AA 48 (L) >60 ml/min/1.73m2    Calcium 8.8 8.5 - 10.1 mg/dL    Bilirubin, total 0.3 0.2 - 1.0 mg/dL    AST (SGOT) 17 15 - 37 U/L    ALT (SGPT) 32 12 - 78 U/L    Alk. phosphatase 62 45 - 117 U/L    Protein, total 7.8 6.4 - 8.2 g/dL    Albumin 3.9 3.5 - 5.0 g/dL    Globulin 3.9 2.0 - 4.0 g/dL    A-G Ratio 1.0 (L) 1.1 - 2.2     CBC WITH AUTOMATED DIFF    Collection Time: 07/30/21  3:15 PM   Result Value Ref Range    WBC 9.2 4.1 - 11.1 K/uL    RBC 4.89 4. 10 - 5.70 M/uL HGB 14.5 12.1 - 17.0 g/dL    HCT 43.5 36.6 - 50.3 %    MCV 89.0 80.0 - 99.0 FL    MCH 29.7 26.0 - 34.0 PG    MCHC 33.3 30.0 - 36.5 g/dL    RDW 13.9 11.5 - 14.5 %    PLATELET 256 003 - 353 K/uL    MPV 10.4 8.9 - 12.9 FL    NRBC 0.0 0.0  WBC    ABSOLUTE NRBC 0.00 0.00 - 0.01 K/uL    NEUTROPHILS 58 32 - 75 %    LYMPHOCYTES 33 12 - 49 %    MONOCYTES 7 5 - 13 %    EOSINOPHILS 1 0 - 7 %    BASOPHILS 1 0 - 1 %    IMMATURE GRANULOCYTES 0 0 - 0.5 %    ABS. NEUTROPHILS 5.3 1.8 - 8.0 K/UL    ABS. LYMPHOCYTES 3.0 0.8 - 3.5 K/UL    ABS. MONOCYTES 0.7 0.0 - 1.0 K/UL    ABS. EOSINOPHILS 0.1 0.0 - 0.4 K/UL    ABS. BASOPHILS 0.1 0.0 - 0.1 K/UL    ABS. IMM. GRANS. 0.0 0.00 - 0.04 K/UL    DF AUTOMATED         Radiologic Studies -   CXR Results  (Last 48 hours)               07/30/21 1527  XR CHEST PORT Final result    Narrative:  Chest single view. A single view of the chest is obtained. Lung volumes are within normal limits. The peripheral lungs are clear. Cardiac and mediastinal structures are unchanged   compared to single view chest 2/10/2019. There is no pneumothorax or pleural   effusion. Medical Decision Making   - I am the first provider for this patient. - I reviewed the vital signs, available nursing notes, past medical history, past surgical history, family history and social history. - Initial assessment performed. The patients presenting problems have been discussed, and they are in agreement with the care plan formulated and outlined with them. I have encouraged them to ask questions as they arise throughout their visit. Vital Signs-Reviewed the patient's vital signs. Patient Vitals for the past 12 hrs:   Temp Pulse Resp BP SpO2   07/30/21 1642  72 16 135/77 99 %   07/30/21 1506 98.4 °F (36.9 °C) 70 18 139/79 98 %     EKG interpretation: (Preliminary) 1504  Rhythm: normal sinus rhythm; and regular .  Rate (approx.): 75; Axis: normal; P wave: normal; QRS interval: normal ; ST/T wave: nonspecific T wave abnormalityl; , QTc 415      Records Reviewed: Nursing Notes and Old Medical Records    The patient presents with chest pain with a differential diagnosis of ACS, CAD, GERD, costochondritis      ED Course:     ED Course as of Jul 30 1734 Fri Jul 30, 2021   1607 CONSULT NOTE:  Consultant: Dr. Kenan Lay  Specialty: Cardiology  Discussed pt's history, disposition, and available diagnostic and imaging results. Reviewed care plans. Consultant states cardiology will come see/evaluate pt in the ED. Wade Gosselin, PA    [NO]   0080 NP Carl Wu with cardiology has seen/evaluated patient in the ED. Recommends admission for observation. [NO]   0111 CONSULT NOTE:  Consultant: Dr. Alex Almanzar  Specialty: Hospitalist  Discussed pt's history, disposition, and available diagnostic and imaging results. Reviewed care plans. Patient will be admitted to the hospital for further management. Wade Gosselin, PA      [NO]      ED Course User Index  [NO] TEDDY Adams       Provider Notes (Medical Decision Making):     MDM  Number of Diagnoses or Management Options  Chest pain, unspecified type  History of coronary artery disease  Diagnosis management comments:     66-year-old male with extensive cardiac history with chest pain. Work-up including basic labs, troponin, chest x-ray unremarkable. EKG negative for acute STEMI. Patient has a heart score of 6. Cardiology saw and evaluated patient in the ED and recommended admission for observation. Pain treated with morphine. Do not give aspirin as patient has allergy to ibuprofen. Discussed with hospitalist who will see admit the patient.        Amount and/or Complexity of Data Reviewed  Clinical lab tests: ordered and reviewed  Tests in the radiology section of CPT®: ordered and reviewed  Review and summarize past medical records: yes  Discuss the patient with other providers: yes  Independent visualization of images, tracings, or specimens: yes    Patient Progress  Patient progress: stable           Disposition   Disposition: Admit to hospital for observation    Admitted      Diagnosis     Clinical Impression:   1. Chest pain, unspecified type    2. History of coronary artery disease        Attestations:    TEDDY Morillo    Please note that this dictation was completed with [employer], the computer voice recognition software. Quite often unanticipated grammatical, syntax, homophones, and other interpretive errors are inadvertently transcribed by the computer software. Please disregard these errors. Please excuse any errors that have escaped final proofreading. Thank you.

## 2021-07-30 NOTE — ROUTINE PROCESS
TRANSFER - OUT REPORT:    Verbal report given to Nneka Santiago on Perfecto Joy  being transferred to [facility]  for routine progression of care       Report consisted of patients Situation, Background, Assessment and   Recommendations(SBAR). Information from the following report(s) SBAR was reviewed with the receiving nurse. Lines:   Peripheral IV 07/30/21 Posterior;Right Forearm (Active)        Opportunity for questions and clarification was provided.       Patient transported with:   [id number]

## 2021-07-30 NOTE — H&P
History & Physical    Primary Care Provider: Ana Ulloa DO  Source of Information: Patient     History of Presenting Illness:   Patrick Zelaya is a 77 y.o. male with history of coronary artery disease status post 3 stents about 3 years ago, paroxysmal atrial fibrillation on apixaban and hyperlipidemia who presents to the ED with complaints of substernal chest pain. Onset of symptoms was about 2 AM.  Pain described as dull without radiation or associated shortness of breath. Pain has been steady all day. Twelve-lead EKG nonischemic and first set of cardiac enzymes unremarkable. Patient is scheduled for cardiac catheterization outpatient August 10. Will be monitored overnight in the hospital       Review of Systems:  A comprehensive review of systems was negative except for that written in the History of Present Illness. Past Medical History:   Diagnosis Date    CAD (coronary artery disease) 06/15/2018    Coronary arteriosclerosis 8/7/2020    GERD (gastroesophageal reflux disease)     H/O seasonal allergies     High cholesterol     Hx of cardiac arrest     VT arrest     Hyperlipidemia 8/7/2020    Hypertension     Ill-defined condition     Urinary urgency    MI (myocardial infarction) ([facility] [address]     ROQUE on CPAP     2/2/21 pt reports does not use cpap each night, only a few times per week    Vitamin D deficiency 8/7/2020      Past Surgical History:   Procedure Laterality Date    COLONOSCOPY N/A 11/12/2018    COLONOSCOPY performed by Korin Artis MD at [address] [facility] HX COLONOSCOPY  2018    HX COLONOSCOPY      HX ENDOSCOPY      HX HEART CATHETERIZATION  2018    Cardiac cath Stents x2    HX HEART CATHETERIZATION  02/2020    \"patent stent obtuse marginal 1. significant disease noted in branch artery of RCA.   THis artery appears to be borderline for any kind of intervention\"    HX LAP CHOLECYSTECTOMY  2005    HX LUMBAR DISKECTOMY      L4-L4    HX ORTHOPAEDIC  2006    Lumbar laminectomy    HX PACEMAKER      linq monitor     Prior to Admission medications    Medication Sig Start Date End Date Taking? Authorizing Provider   amiodarone (CORDARONE) 200 mg tablet Take 200 mg by mouth daily. Provider, Historical   apixaban (Eliquis) 5 mg tablet Take 5 mg by mouth two (2) times a day. Provider, Historical   tolterodine (DETROL) 2 mg tablet Take 2 mg by mouth two (2) times a day. Provider, Historical   diazePAM (VALIUM) 5 mg tablet Take 5 mg by mouth daily as needed for Anxiety. Provider, Historical   ondansetron (ZOFRAN ODT) 4 mg disintegrating tablet Take 4 mg by mouth every eight (8) hours as needed for Nausea or Vomiting. Provider, Historical   traMADoL (ULTRAM) 50 mg tablet Take 50 mg by mouth every six (6) hours as needed for Pain. Provider, Historical   cholecalciferol, vitamin D3, (Vitamin D3) 50 mcg (2,000 unit) tab Two tablets daily 11/19/20   Dayana Haley, DO   lancets (BD Ultra-Fine II Lancets) 30 gauge misc by Does Not Apply route. Provider, Historical   glucose blood VI test strips (FreeStyle Lite Strips) strip by Does Not Apply route See Admin Instructions. Provider, Historical   evolocumab (Repatha SureClick) pen injection 111 mg by SubCUTAneous route every fourteen (14) days. Provider, Historical   LORazepam (ATIVAN) 0.5 mg tablet Take 1 Tab by mouth every eight (8) hours as needed for Anxiety. Max Daily Amount: 1.5 mg. 8/7/20   Dayana Haley DO   amLODIPine (NORVASC) 2.5 mg tablet Take 2.5 mg by mouth nightly. Provider, Historical   butalbital-acetaminophen-caff (FIORICET) -40 mg per capsule Take 1 Cap by mouth every four (4) hours as needed. Provider, Historical   fluticasone propionate (FLONASE) 50 mcg/actuation nasal spray fluticasone propionate 50 mcg/actuation nasal spray,suspension   2 spray in each nostril daily 9/8/17   Provider, Historical   lidocaine (LIDODERM) 5 % by TransDERmal route as needed.  Apply patch to the affected area for 12 hours a day and remove for 12 hours a day. Provider, Historical   sildenafil citrate (VIAGRA) 50 mg tablet Take 50 mg by mouth as needed for Erectile Dysfunction. Patient not taking: Reported on 5/19/2021    Provider, Historical   amLODIPine (NORVASC) 5 mg tablet Take 5 mg by mouth daily. Every am    Provider, Historical   pantoprazole (PROTONIX) 40 mg tablet Take 40 mg by mouth daily. Provider, Historical   ticagrelor (BRILINTA) 90 mg tablet Take 90 mg by mouth two (2) times a day. Indications: Myocardial Reinfarction Prevention    Provider, Historical   Bifidobacterium Infantis (ALIGN) 4 mg cap Take 1 Capsule by mouth every evening. Provider, Historical     Allergies   Allergen Reactions    Cefuroxime Unknown (comments)     abd pain    Ibuprofen Other (comments)     Patient states NSAIDS/ ibuprofen upsets his stomach      Family History   Problem Relation Age of Onset    Diabetes Mother     Stroke Father     Hypertension Father         SOCIAL HISTORY:  Patient resides:  Independently    Assisted Living    SNF    With family care x      Smoking history:   None x   Former    Chronic      Alcohol history:   None x   Social    Chronic      Ambulates:   Independently x   w/cane    w/walker    w/wc    CODE STATUS:  DNR    Full x   Other      Objective:     Physical Exam:     Visit Vitals  /77   Pulse 72   Temp 98.4 °F (36.9 °C)   Resp 16   Ht 5' 9\" (1.753 m)   Wt 93 kg (205 lb)   SpO2 99%   BMI 30.27 kg/m²           General:  Alert, cooperative, no distress, appears stated age. Head:  Normocephalic, without obvious abnormality, atraumatic. Eyes:  Conjunctivae/corneas clear. PERRL, EOMs intact. Nose: Nares normal. Septum midline. Mucosa normal. No drainage or sinus tenderness.    Throat: Lips, mucosa, and tongue normal. Teeth and gums normal.   Neck: Supple, symmetrical, trachea midline, no adenopathy, thyroid: no enlargement/tenderness/nodules, no carotid bruit and no JVD. Back:   Symmetric, no curvature. ROM normal. No CVA tenderness. Lungs:   Clear to auscultation bilaterally. Chest wall:  No tenderness or deformity. Heart:  Regular rate and rhythm, S1, S2 normal, no murmur, click, rub or gallop. Abdomen:   Soft, non-tender. Bowel sounds normal. No masses,  No organomegaly. Extremities: Extremities normal, atraumatic, no cyanosis or edema. Pulses: 2+ and symmetric all extremities. Skin: Skin color, texture, turgor normal. No rashes or lesions   Neurologic: CNII-XII intact. No motor or sensory deficits. EKG:  nonspecific ST and T waves changes. Data Review:     Recent Days:  Recent Labs     07/30/21  1515   WBC 9.2   HGB 14.5   HCT 43.5        Recent Labs     07/30/21  1515      K 4.0   *   CO2 25   GLU 95   BUN 12   CREA 1.47*   CA 8.8   ALB 3.9   TBILI 0.3   ALT 32     No results for input(s): PH, PCO2, PO2, HCO3, FIO2 in the last 72 hours.     24 Hour Results:  Recent Results (from the past 24 hour(s))   EKG, 12 LEAD, INITIAL    Collection Time: 07/30/21  3:04 PM   Result Value Ref Range    Ventricular Rate 75 BPM    Atrial Rate 75 BPM    P-R Interval 142 ms    QRS Duration 84 ms    Q-T Interval 372 ms    QTC Calculation (Bezet) 415 ms    Calculated P Axis 63 degrees    Calculated R Axis 24 degrees    Calculated T Axis -44 degrees    Diagnosis       Normal sinus rhythm  Nonspecific T wave abnormality  Abnormal ECG  No previous ECGs available  Confirmed by [place] [facility] ([id number]) on 7/30/2021 3:20:23 PM     TROPONIN I    Collection Time: 07/30/21  3:15 PM   Result Value Ref Range    Troponin-I, Qt. <0.05 <4.89 ng/mL   METABOLIC PANEL, COMPREHENSIVE    Collection Time: 07/30/21  3:15 PM   Result Value Ref Range    Sodium 140 136 - 145 mmol/L    Potassium 4.0 3.5 - 5.1 mmol/L    Chloride 110 (H) 97 - 108 mmol/L    CO2 25 21 - 32 mmol/L    Anion gap 5 5 - 15 mmol/L    Glucose 95 65 - 100 mg/dL    BUN 12 6 - 20 mg/dL Creatinine 1.47 (H) 0.70 - 1.30 mg/dL    BUN/Creatinine ratio 8 (L) 12 - 20      GFR est AA 58 (L) >60 ml/min/1.73m2    GFR est non-AA 48 (L) >60 ml/min/1.73m2    Calcium 8.8 8.5 - 10.1 mg/dL    Bilirubin, total 0.3 0.2 - 1.0 mg/dL    AST (SGOT) 17 15 - 37 U/L    ALT (SGPT) 32 12 - 78 U/L    Alk. phosphatase 62 45 - 117 U/L    Protein, total 7.8 6.4 - 8.2 g/dL    Albumin 3.9 3.5 - 5.0 g/dL    Globulin 3.9 2.0 - 4.0 g/dL    A-G Ratio 1.0 (L) 1.1 - 2.2     CBC WITH AUTOMATED DIFF    Collection Time: 07/30/21  3:15 PM   Result Value Ref Range    WBC 9.2 4.1 - 11.1 K/uL    RBC 4.89 4. 10 - 5.70 M/uL    HGB 14.5 12.1 - 17.0 g/dL    HCT 43.5 36.6 - 50.3 %    MCV 89.0 80.0 - 99.0 FL    MCH 29.7 26.0 - 34.0 PG    MCHC 33.3 30.0 - 36.5 g/dL    RDW 13.9 11.5 - 14.5 %    PLATELET 841 731 - 703 K/uL    MPV 10.4 8.9 - 12.9 FL    NRBC 0.0 0.0  WBC    ABSOLUTE NRBC 0.00 0.00 - 0.01 K/uL    NEUTROPHILS 58 32 - 75 %    LYMPHOCYTES 33 12 - 49 %    MONOCYTES 7 5 - 13 %    EOSINOPHILS 1 0 - 7 %    BASOPHILS 1 0 - 1 %    IMMATURE GRANULOCYTES 0 0 - 0.5 %    ABS. NEUTROPHILS 5.3 1.8 - 8.0 K/UL    ABS. LYMPHOCYTES 3.0 0.8 - 3.5 K/UL    ABS. MONOCYTES 0.7 0.0 - 1.0 K/UL    ABS. EOSINOPHILS 0.1 0.0 - 0.4 K/UL    ABS. BASOPHILS 0.1 0.0 - 0.1 K/UL    ABS. IMM. GRANS. 0.0 0.00 - 0.04 K/UL    DF AUTOMATED           Imaging:   XR CHEST PORT   Final Result            Assessment:     Atypical chest pain.   Rule out acute coronary syndrome  Coronary artery disease status post stents  History of V. tach cardiac arrest  Benign essential hypertension  Paroxysmal atrial fibrillation      Plan:     Admit to medical telemetry floor observation  Recycle troponins every 3 hours x 2 set  Resume home meds including oral amiodarone  Consult cardiologist  Further testing will depend on clinical progress  GI prophylaxis  Patient is full code    Signed By: Laury Juarez MD     July 30, 2021

## 2021-07-30 NOTE — CONSULTS
[facility] CARDIOLOGY  CARDIOLOGY CONSULTATION    REASON FOR CONSULT: chest pain    REQUESTING PROVIDER:  TEDDY Warner    CHIEF COMPLAINT:  Chest pain    HISTORY OF PRESENT ILLNESS:  Josemanuel Romero is a 77year-old male with past medical history significant for coronary artery disease, arrhythmias, and hypertension who presents today for evaluation of chest pain. The patient reports that his chest pain started around 2:00 AM this morning and has been ongoing all day. He complains of substernal chest pain that is non-radiating and has no associated signs or symptoms. On examination, patient is lying in the bed in no acute distress. He states that he has chest discomfort but feels as if this is musculoskeletal in origin. The pain is reproducible with palpation. He offers no other complaints. Significant labs include: Troponin negative x 1, EKG: normal sinus rhythm; heart rate 75; nonspecific T wave abnormality. Chest x-ray with no acute cardio-pulmonary processes. Records from hospital admission course thus far reviewed. Since admission, patient has had routine labs and testing. PAST MEDICAL HISTORY:  Notes above. Relevant cardiac issues noted above. HOME MEDICATIONS:  Home medications reviewed, no side effects. ALLERGIES:  Allergies reviewed per patient chart and include: Cefuroxime and Ibuprofen     SOCIAL HISTORY:  Notable for no tobacco use, no heavy alcohol or illicit drug use. REVIEW OF SYSTEMS:  Complete review of systems performed, pertinents noted above, all other systems are negative. PHYSICAL EXAMINATION:  Vital sign assessment reveal a blood pressure of 139/79  and pulse rate of 70. Body mass index is calculated at 30.27. Cardiovascular exam has a heart with a regular rate and rhythm, normal S1 and S2. No murmurs present. There are no rubs or gallops. Good peripheral pulses. No jugular venous distension. No carotid bruits are present. Respiratory exam reveals clear lung fields, no rales or rhonchi. Gastrointestinal exam has soft, nontender abdomen with normal bowel sounds. No pedal edema. No notable skin changes. Neurologic exam is nonfocal.  Musculoskeletal exam is notable for a normal gait. [facility] .No current facility-administered medications for this encounter. Current Outpatient Medications:     amiodarone (CORDARONE) 200 mg tablet, Take 200 mg by mouth daily. , Disp: , Rfl:     apixaban (Eliquis) 5 mg tablet, Take 5 mg by mouth two (2) times a day., Disp: , Rfl:     tolterodine (DETROL) 2 mg tablet, Take 2 mg by mouth two (2) times a day., Disp: , Rfl:     diazePAM (VALIUM) 5 mg tablet, Take 5 mg by mouth daily as needed for Anxiety. , Disp: , Rfl:     ondansetron (ZOFRAN ODT) 4 mg disintegrating tablet, Take 4 mg by mouth every eight (8) hours as needed for Nausea or Vomiting., Disp: , Rfl:     traMADoL (ULTRAM) 50 mg tablet, Take 50 mg by mouth every six (6) hours as needed for Pain., Disp: , Rfl:     cholecalciferol, vitamin D3, (Vitamin D3) 50 mcg (2,000 unit) tab, Two tablets daily, Disp: 180 Tab, Rfl: 3    lancets (BD Ultra-Fine II Lancets) 30 gauge misc, by Does Not Apply route., Disp: , Rfl:     glucose blood VI test strips (FreeStyle Lite Strips) strip, by Does Not Apply route See Admin Instructions. , Disp: , Rfl:     evolocumab (Repatha SureClick) pen injection, 140 mg by SubCUTAneous route every fourteen (14) days. , Disp: , Rfl:     LORazepam (ATIVAN) 0.5 mg tablet, Take 1 Tab by mouth every eight (8) hours as needed for Anxiety. Max Daily Amount: 1.5 mg., Disp: 21 Tab, Rfl: 1    amLODIPine (NORVASC) 2.5 mg tablet, Take 2.5 mg by mouth nightly., Disp: , Rfl:     butalbital-acetaminophen-caff (FIORICET) -40 mg per capsule, Take 1 Cap by mouth every four (4) hours as needed. , Disp: , Rfl:     fluticasone propionate (FLONASE) 50 mcg/actuation nasal spray, fluticasone propionate 50 mcg/actuation nasal spray,suspension  2 spray in each nostril daily, Disp: , Rfl:     lidocaine (LIDODERM) 5 %, by TransDERmal route as needed. Apply patch to the affected area for 12 hours a day and remove for 12 hours a day., Disp: , Rfl:     sildenafil citrate (VIAGRA) 50 mg tablet, Take 50 mg by mouth as needed for Erectile Dysfunction. (Patient not taking: Reported on 5/19/2021), Disp: , Rfl:     amLODIPine (NORVASC) 5 mg tablet, Take 5 mg by mouth daily. Every am, Disp: , Rfl:     pantoprazole (PROTONIX) 40 mg tablet, Take 40 mg by mouth daily. , Disp: , Rfl:     ticagrelor (BRILINTA) 90 mg tablet, Take 90 mg by mouth two (2) times a day. Indications: Myocardial Reinfarction Prevention, Disp: , Rfl:     Bifidobacterium Infantis (ALIGN) 4 mg cap, Take 1 Capsule by mouth every evening., Disp: , Rfl:       Case was discussed with Dr. Shine Darling and our impression and recommendations are as follows:     1. Chest pain: Troponin is negative x 1, will continue to trend. EKG is nonischemic. ACS ruled out. Last echo on 6/21 showed EF 50-55% with mild LVH, no regional wall motion abnormalities. Probable calcification vs artifact vs mass on aortic valve. Continue telemetry monitoring. Patient was given morphine in the ED. 2. Atrial fibrillation: Atrial fibrillation: Rate is controlled. Continue amiodarone 200 mg daily. His CHADS2-VASc score is 5; continue Eliquis 5 mg daily. Continue telemetry monitoring. Keep serum potassium between 4-5 and serum magnesium > 2. Patient follow with Dr. Adan Reese, consider possible Watchman device. 3. Coronary artery disease: Last echo was normal  with no wall motion issues and preserved EF. Recent stress 1/2020 abnormal,  cath with small vessel disease. Scheduled for OP cath. Continue Brilinta monotherapy    4. Hyperlipidemia: He did not tolerate Crestor, atorvastatin, or zetia due to myalgia and weakness. Most recent lipid panel showing , . Continue Repatha.      5. Hypertension: Blood pressure at goal with the present regimen. We will continue amlodipine 5mg in the morning and 2.5mg in the evening. Thank you for involving us in the care of this patient. Please do not hesitate to call if additional questions arise.     Seen with Elliott Kennedy NP, agree with above findings    Valeri Rao MD, Lois Watkins, [address]  Structural Heart Disease  Endovascular and Vascular Medicine  Interventional Cardiology  [facility] Cardiology  [phone]

## 2021-07-31 VITALS
RESPIRATION RATE: 12 BRPM | TEMPERATURE: 97.6 F | DIASTOLIC BLOOD PRESSURE: 72 MMHG | HEIGHT: 69 IN | BODY MASS INDEX: 30.36 KG/M2 | WEIGHT: 205 LBS | OXYGEN SATURATION: 100 % | SYSTOLIC BLOOD PRESSURE: 117 MMHG | HEART RATE: 61 BPM

## 2021-07-31 LAB
ANION GAP SERPL CALC-SCNC: 4 MMOL/L (ref 5–15)
BUN SERPL-MCNC: 13 MG/DL (ref 6–20)
BUN/CREAT SERPL: 10 (ref 12–20)
CA-I BLD-MCNC: 8.2 MG/DL (ref 8.5–10.1)
CHLORIDE SERPL-SCNC: 110 MMOL/L (ref 97–108)
CO2 SERPL-SCNC: 26 MMOL/L (ref 21–32)
CREAT SERPL-MCNC: 1.36 MG/DL (ref 0.7–1.3)
GLUCOSE SERPL-MCNC: 97 MG/DL (ref 65–100)
POTASSIUM SERPL-SCNC: 4 MMOL/L (ref 3.5–5.1)
SODIUM SERPL-SCNC: 140 MMOL/L (ref 136–145)
TROPONIN I SERPL-MCNC: <0.05 NG/ML

## 2021-07-31 PROCEDURE — 99218 HC RM OBSERVATION: CPT

## 2021-07-31 PROCEDURE — 36415 COLL VENOUS BLD VENIPUNCTURE: CPT

## 2021-07-31 PROCEDURE — 74011250637 HC RX REV CODE- 250/637: Performed by: INTERNAL MEDICINE

## 2021-07-31 PROCEDURE — 84484 ASSAY OF TROPONIN QUANT: CPT

## 2021-07-31 PROCEDURE — 80048 BASIC METABOLIC PNL TOTAL CA: CPT

## 2021-07-31 RX ORDER — AMLODIPINE BESYLATE 2.5 MG/1
2.5 TABLET ORAL
Qty: 30 TABLET | Refills: 2 | Status: SHIPPED | OUTPATIENT
Start: 2021-07-31

## 2021-07-31 RX ORDER — ISOSORBIDE MONONITRATE 30 MG/1
30 TABLET, EXTENDED RELEASE ORAL DAILY
Status: DISCONTINUED | OUTPATIENT
Start: 2021-08-01 | End: 2021-07-31 | Stop reason: HOSPADM

## 2021-07-31 RX ORDER — ISOSORBIDE MONONITRATE 30 MG/1
30 TABLET, EXTENDED RELEASE ORAL DAILY
Qty: 90 TABLET | Refills: 1 | Status: SHIPPED | OUTPATIENT
Start: 2021-08-01 | End: 2021-09-23 | Stop reason: ALTCHOICE

## 2021-07-31 RX ORDER — ACETAMINOPHEN 325 MG/1
650 TABLET ORAL
Qty: 60 TABLET | Refills: 0 | Status: SHIPPED | OUTPATIENT
Start: 2021-07-31

## 2021-07-31 RX ADMIN — APIXABAN 5 MG: 5 TABLET, FILM COATED ORAL at 08:59

## 2021-07-31 RX ADMIN — TICAGRELOR 90 MG: 90 TABLET ORAL at 08:59

## 2021-07-31 RX ADMIN — Medication 10 ML: at 05:50

## 2021-07-31 RX ADMIN — AMLODIPINE BESYLATE 5 MG: 5 TABLET ORAL at 08:58

## 2021-07-31 RX ADMIN — Medication 2000 UNITS: at 08:59

## 2021-07-31 RX ADMIN — AMIODARONE HYDROCHLORIDE 200 MG: 200 TABLET ORAL at 09:00

## 2021-07-31 NOTE — PROGRESS NOTES
Problem: Falls - Risk of  Goal: *Absence of Falls  Description: Document Yuliya Jefe Fall Risk and appropriate interventions in the flowsheet.   Outcome: Progressing Towards Goal  Note: Fall Risk Interventions:                                Problem: Patient Education: Go to Patient Education Activity  Goal: Patient/Family Education  Outcome: Progressing Towards Goal

## 2021-07-31 NOTE — PROGRESS NOTES
Patient resting watching tv at the bedside VS within defined parameters; no pain expressed at this time; patient refused Ranolazine \"states I want to stick to my regular meds. \" no further interventions required at this time.

## 2021-07-31 NOTE — ROUTINE PROCESS
Primary Nurse Mathew Singh RN and Rodrigo Whalen RN performed a dual skin assessment on this patient No impairment noted  Ole score is 23

## 2021-07-31 NOTE — PROGRESS NOTES
Pt has been discharged home. Pt denies pain or discomfort, pt in no acute distress. Pt verbalized understanding of discharge teaching.

## 2021-07-31 NOTE — DISCHARGE SUMMARY
Hospitalist Discharge Summary     Patient ID:  Patrick Zelaya  [id number]  36 y.o.  1955 7/30/2021    PCP on record: Ana Ulloa DO    Admit date: 7/30/2021  Discharge date and time: 7/31/2021    DISCHARGE DIAGNOSIS:    Chest pain/history of coronary artery disease/history of Vtach cardiac arrest/hypertension/atrial fibrillation    CONSULTATIONS:  IP CONSULT TO CARDIOLOGY    Excerpted HPI from H&P of Eduin Gilman MD:  Patrick Zelaya is a 77 y.o. male with history of coronary artery disease status post 3 stents about 3 years ago, paroxysmal atrial fibrillation on apixaban and hyperlipidemia who presents to the ED with complaints of substernal chest pain. Onset of symptoms was about 2 AM.  Pain described as dull without radiation or associated shortness of breath. Pain has been steady all day. Twelve-lead EKG nonischemic and first set of cardiac enzymes unremarkable. Patient is scheduled for cardiac catheterization outpatient August 10. Will be monitored overnight in the hospital    ______________________________________________________________________  DISCHARGE SUMMARY/HOSPITAL COURSE:  for full details see H&P, daily progress notes, labs, consult notes. Patient was subsequently admitted to [facility], patient underwent cardiac monitoring, patient's cardiac enzymes were trended, patient was evaluate by cardiology, patient symptoms resolved, after cardiology clearance patient was deemed stable for discharge and close outpatient follow-up with primary care physician as well as cardiology, patient to have outpatient cardiac catheterization, plan was explained to the patient in detail who is agreeable to current plan.        _______________________________________________________________________  Patient seen and examined by me on discharge day.   Pertinent Findings:  Gen:    Not in distress  Chest: Clear lungs  CVS:   Regular rhythm, s1/s2 no m/r/g  No edema  Abd: Soft, not distended, not tender  Neuro:  Alert, Oriented x 4  _______________________________________________________________________  DISCHARGE MEDICATIONS:   Current Discharge Medication List      START taking these medications    Details   acetaminophen (TYLENOL) 325 mg tablet Take 2 Tablets by mouth every six (6) hours as needed for Pain. Qty: 60 Tablet, Refills: 0  Start date: 7/31/2021      ticagrelor (BRILINTA) 90 mg tablet Take 1 Tablet by mouth every twelve (12) hours every twelve (12) hours. Qty: 60 Tablet, Refills: 0  Start date: 7/31/2021         CONTINUE these medications which have NOT CHANGED    Details   Vitamin D3 25 mcg (1,000 unit) tablet Take 2 Tablets by mouth daily. Ranexa 500 mg SR tablet Take 1 Tablet by mouth two (2) times a day. amiodarone (CORDARONE) 200 mg tablet Take 200 mg by mouth daily. apixaban (Eliquis) 5 mg tablet Take 5 mg by mouth two (2) times a day. tolterodine (DETROL) 2 mg tablet Take 2 mg by mouth two (2) times a day. evolocumab (Repatha SureClick) pen injection 719 mg by SubCUTAneous route every fourteen (14) days. lidocaine (LIDODERM) 5 % by TransDERmal route as needed. Apply patch to the affected area for 12 hours a day and remove for 12 hours a day. amLODIPine (NORVASC) 5 mg tablet Take 5 mg by mouth daily. Every am         STOP taking these medications       traMADoL (ULTRAM) 50 mg tablet Comments:   Reason for Stopping:         fluticasone propionate (FLONASE) 50 mcg/actuation nasal spray Comments:   Reason for Stopping:                 Patient Follow Up Instructions: Activity: Activity as tolerated  Diet: Cardiac Diet  Wound Care: As directed    Follow-up with PCP/Cardiology in 2 weeks.   Follow-up tests/labs As per above physicians  Follow-up Information     Follow up With Specialties Details Why Contact Info    Tracie Marcus, [address] In 1 week  [address] [address] [phone] Raiza Melendez MD Cardiology In 1 week  [address]  [phone]          ________________________________________________________________    Risk of deterioration: Low    Condition at Discharge:  Stable  __________________________________________________________________    Disposition  Home    ____________________________________________________________________    Code Status: Full Code  ___________________________________________________________________      Total time in minutes spent coordinating this discharge (includes going over instructions, follow-up, prescriptions, and preparing report for sign off to her PCP) :  45 minutes    Signed:   Parris Curling, MD

## 2021-07-31 NOTE — PROGRESS NOTES
CARDIOLOGY PROGRESS NOTE     Patient seen and examined. This is a patient who is followed for chest pain No other complaints reported. Telemetry reviewed, there were no events noted in the past 24 hours. Chest pain free this am. Troponins negative x3    Pertinent review of systems items noted above, all other systems are negative. Current medications reviewed. Physical Examination  Vital signs are stable. Blood pressure 118/72, Pulse 61  No apparent distress. Heart is regular, rate and rhythm. Normal S1, S2, no murmurs are appreciated. Lungs are clear bilaterally. Abdomen is soft, nontender, normal bowel sounds. Extremities have no edema. Labs reviewed:   Troponins negative x3, ECG unremarkable  Cr 1.36      My impression and recommendations are as follows:  1. Chest pain: Troponin is negative x 3. EKG is nonischemic. ACS ruled out. Last echo on 6/21 showed EF 50-55% with mild LVH, no regional wall motion abnormalities. Scheduled for outpatient cath 8/10. Continue Amlodipine. HR 61 so beta blocker c/i. Please add long acting nitrate Imdur 30 mg daily for dual antianginal therapy. Discontinue ticagrelor given no e/o ACS, increasing bleeding risk while on Elliquis     2. Atrial fibrillation: Atrial fibrillation: Rate is controlled. Continue amiodarone 200 mg daily. His CHADS2-VASc score is 5; continue Eliquis 5 mg daily. Continue telemetry monitoring. Keep serum potassium between 4-5 and serum magnesium > 2. Please refer to my clinic regarding discussion for Watchman device.       3. Coronary artery disease: Last echo was normal  with no wall motion issues and preserved EF. Recent stress 1/2020 abnormal,  cath with small vessel disease. Scheduled for OP cath. Continue Brilinta monotherapy     4. Hyperlipidemia: He did not tolerate Crestor, atorvastatin, or zetia due to myalgia and weakness. Most recent lipid panel showing , . Continue Repatha.      5.  Hypertension: Blood pressure at goal with the present regimen. We will continue amlodipine 5mg in the morning and 2.5mg in the evening. Please do not hesitate to call me if additional questions arise.     Federico Quezada MD, Vigauri Headings, VI  Structural Heart Disease  Endovascular and Vascular Medicine  Interventional Cardiology  [facility] Cardiology  [phone]

## 2021-08-03 ENCOUNTER — OFFICE VISIT (OUTPATIENT)
Dept: FAMILY MEDICINE CLINIC | Age: 66
End: 2021-08-03
Payer: MEDICARE

## 2021-08-03 VITALS
BODY MASS INDEX: 30.51 KG/M2 | OXYGEN SATURATION: 98 % | RESPIRATION RATE: 18 BRPM | DIASTOLIC BLOOD PRESSURE: 70 MMHG | SYSTOLIC BLOOD PRESSURE: 124 MMHG | HEIGHT: 69 IN | TEMPERATURE: 98 F | HEART RATE: 63 BPM | WEIGHT: 206 LBS

## 2021-08-03 DIAGNOSIS — Z09 HOSPITAL DISCHARGE FOLLOW-UP: Primary | ICD-10-CM

## 2021-08-03 DIAGNOSIS — R07.9 CHEST PAIN, UNSPECIFIED TYPE: ICD-10-CM

## 2021-08-03 DIAGNOSIS — I25.10 CORONARY ARTERIOSCLEROSIS: ICD-10-CM

## 2021-08-03 DIAGNOSIS — F41.9 ANXIETY: ICD-10-CM

## 2021-08-03 DIAGNOSIS — E78.2 MIXED HYPERLIPIDEMIA: ICD-10-CM

## 2021-08-03 PROCEDURE — 1111F DSCHRG MED/CURRENT MED MERGE: CPT | Performed by: NURSE PRACTITIONER

## 2021-08-03 PROCEDURE — 99496 TRANSJ CARE MGMT HIGH F2F 7D: CPT | Performed by: NURSE PRACTITIONER

## 2021-08-03 PROCEDURE — G8427 DOCREV CUR MEDS BY ELIG CLIN: HCPCS | Performed by: NURSE PRACTITIONER

## 2021-08-03 RX ORDER — LORAZEPAM 0.5 MG/1
0.5 TABLET ORAL
Qty: 12 TABLET | Refills: 5 | Status: SHIPPED | OUTPATIENT
Start: 2021-08-03 | End: 2021-08-06

## 2021-08-03 NOTE — PROGRESS NOTES
Transitional Care Management Progress Note    Patient: Juvenal Zelaya  : 1955  PCP: Sapphire Kothari DO    Date of admission:   Date of discharge:     Patient was contacted by Transitional Care Management services within two days after his discharge: Yes. This encounter and supporting documentation was reviewed if available. Medication reconciliation was performed today (8/3/2021). Assessment/Plan:     Diagnoses and all orders for this visit:    1. Hospital discharge follow-up  -     LA DISCHARGE MEDS RECONCILED W/ CURRENT OUTPATIENT MED LIST    2. Anxiety  -     LORazepam (ATIVAN) 0.5 mg tablet; Take 1 Tablet by mouth every six (6) hours as needed for Anxiety for up to 3 days. Max Daily Amount: 2 mg. Indications: anxious    3. Coronary arteriosclerosis    4. Mixed hyperlipidemia    5. Chest pain, unspecified type    Will continue with lorazepam as needed  Follow-up with cardiology as previously scheduled  Follow-up with Dr. Pk Aguirre as previously scheduled or sooner as needed  The complexity of medical decision making for this patient's transitional care is high   Follow-up and Dispositions    · Return in about 6 months (around 2/3/2022) for Anxiety, Med Refill. Subjective:   Juvenal Zelaya is a 77 y.o. male presenting today for follow-up after being discharged from [facility].  The discharge summary was reviewed or requested. The main problem requiring admission was chest pain. Complications during admission: none    Interval history/Current status: Dr. Pk Aguirre pateint-Follow up anxiety with NP. Went to [facility] due to chest discomfort and observation 2021 charge 2021. Reports that he started having chest pain while outside on his lawn and he took a lorazepam to see if he was anxiety/panic attack.   Reports that the lorazepam did not help with the symptoms which prompted him to go to the hospital.  At the hospital he received full cardiac work-up which was normal and will follow up with [facility] 08/04/2021 and is scheduled for a heart cath on 08/10/2021 due to irregular heart beats, about 1 month ago and will have an ablation 09/09/2021. takes lorazepam PRN (had a script for 15 in 2020 and only took 2)     Admitting symptoms have: improved      Medications marked \"taking\" at this time:  Home Medications    Medication Sig Start Date End Date Taking? Authorizing Provider   LORazepam (ATIVAN) 0.5 mg tablet Take 1 Tablet by mouth every six (6) hours as needed for Anxiety for up to 3 days. Max Daily Amount: 2 mg. Indications: anxious 8/3/21 6/9/79 Yes Meredith Almodovar NP   acetaminophen (TYLENOL) 325 mg tablet Take 2 Tablets by mouth every six (6) hours as needed for Pain. 7/31/21  Yes Suzanne Bojorquez MD   isosorbide mononitrate ER (IMDUR) 30 mg tablet Take 1 Tablet by mouth daily. 8/1/21  Yes Eli Brower MD   amLODIPine (Norvasc) 2.5 mg tablet Take 1 Tablet by mouth nightly. 7/31/21  Yes Eli Brower MD   Vitamin D3 25 mcg (1,000 unit) tablet Take 2 Tablets by mouth daily. 4/30/21  Yes Provider, Historical   amiodarone (CORDARONE) 200 mg tablet Take 200 mg by mouth daily. Yes Provider, Historical   apixaban (Eliquis) 5 mg tablet Take 5 mg by mouth two (2) times a day. Yes Provider, Historical   tolterodine (DETROL) 2 mg tablet Take 2 mg by mouth two (2) times a day. Yes Provider, Historical   evolocumab (Repatha SureClick) pen injection 259 mg by SubCUTAneous route every fourteen (14) days. Yes Provider, Historical   lidocaine (LIDODERM) 5 % by TransDERmal route as needed. Apply patch to the affected area for 12 hours a day and remove for 12 hours a day. Yes Provider, Historical   amLODIPine (NORVASC) 5 mg tablet Take 5 mg by mouth daily.  Every am   Yes Provider, Historical        Review of Systems:  Negative except as noted      Patient Active Problem List   Diagnosis Code    Coronary arteriosclerosis I25.10    Hyperlipidemia E78.5    Vitamin D deficiency E55.9    Anxiety F41.9    Chronic fatigue R53.82    GERD (gastroesophageal reflux disease) K21.9    Trigger finger, left middle finger M65.332    Chest pain R07.9     Patient Active Problem List    Diagnosis Date Noted    Chest pain 07/30/2021    Trigger finger, left middle finger 02/10/2021    GERD (gastroesophageal reflux disease)     Coronary arteriosclerosis 08/07/2020    Hyperlipidemia 08/07/2020    Vitamin D deficiency 08/07/2020    Anxiety 08/07/2020    Chronic fatigue 08/07/2020     Current Outpatient Medications   Medication Sig Dispense Refill    LORazepam (ATIVAN) 0.5 mg tablet Take 1 Tablet by mouth every six (6) hours as needed for Anxiety for up to 3 days. Max Daily Amount: 2 mg. Indications: anxious 12 Tablet 5    acetaminophen (TYLENOL) 325 mg tablet Take 2 Tablets by mouth every six (6) hours as needed for Pain. 60 Tablet 0    isosorbide mononitrate ER (IMDUR) 30 mg tablet Take 1 Tablet by mouth daily. 90 Tablet 1    amLODIPine (Norvasc) 2.5 mg tablet Take 1 Tablet by mouth nightly. 30 Tablet 2    Vitamin D3 25 mcg (1,000 unit) tablet Take 2 Tablets by mouth daily.  amiodarone (CORDARONE) 200 mg tablet Take 200 mg by mouth daily.  apixaban (Eliquis) 5 mg tablet Take 5 mg by mouth two (2) times a day.  tolterodine (DETROL) 2 mg tablet Take 2 mg by mouth two (2) times a day.  evolocumab (Repatha SureClick) pen injection 098 mg by SubCUTAneous route every fourteen (14) days.  lidocaine (LIDODERM) 5 % by TransDERmal route as needed. Apply patch to the affected area for 12 hours a day and remove for 12 hours a day.  amLODIPine (NORVASC) 5 mg tablet Take 5 mg by mouth daily.  Every am       Allergies   Allergen Reactions    Cefuroxime Unknown (comments)     abd pain    Ibuprofen Other (comments)     Patient states NSAIDS/ ibuprofen upsets his stomach     Past Medical History:   Diagnosis Date    A-fib ([facility] [address]  CAD (coronary artery disease) 06/15/2018    Coronary arteriosclerosis 8/7/2020    GERD (gastroesophageal reflux disease)     H/O seasonal allergies     High cholesterol     Hx of cardiac arrest     VT arrest     Hyperlipidemia 8/7/2020    Hypertension     Ill-defined condition     Urinary urgency    MI (myocardial infarction) ([facility] [address]     ROQUE on CPAP     2/2/21 pt reports does not use cpap each night, only a few times per week    Vitamin D deficiency 8/7/2020     Past Surgical History:   Procedure Laterality Date    COLONOSCOPY N/A 11/12/2018    COLONOSCOPY performed by Indigo Caceres MD at [address] [place] [address] HX COLONOSCOPY  2018    HX COLONOSCOPY      HX ENDOSCOPY      HX HEART CATHETERIZATION  2018    Cardiac cath Stents x2    HX HEART CATHETERIZATION  02/2020    \"patent stent obtuse marginal 1. significant disease noted in branch artery of RCA.   THis artery appears to be borderline for any kind of intervention\"    HX LAP CHOLECYSTECTOMY  2005    HX LUMBAR DISKECTOMY      L4-L4    HX ORTHOPAEDIC  2006    Lumbar laminectomy    HX PACEMAKER      linq monitor     Family History   Problem Relation Age of Onset    Diabetes Mother     Stroke Father     Hypertension Father      Social History     Tobacco Use    Smoking status: Never Smoker    Smokeless tobacco: Never Used   Substance Use Topics    Alcohol use: No          Objective:   /70 (BP 1 Location: Left arm, BP Patient Position: Sitting, BP Cuff Size: Adult)   Pulse 63   Temp 98 °F (36.7 °C) (Temporal)   Resp 18   Ht 5' 9\" (1.753 m)   Wt 206 lb (93.4 kg)   SpO2 98%   BMI 30.42 kg/m²      Physical Examination: General appearance - alert, well appearing, and in no distress  Mental status - alert, oriented to person, place, and time  Eyes - pupils equal and reactive, extraocular eye movements intact  Ears - bilateral TM's and external ear canals normal  Nose - normal and patent, no erythema, discharge or polyps  Mouth - mucous membranes moist, pharynx normal without lesions  Neck - supple, no significant adenopathy  Lymphatics - no palpable lymphadenopathy, no hepatosplenomegaly  Chest - clear to auscultation, no wheezes, rales or rhonchi, symmetric air entry  Heart - normal rate, regular rhythm, normal S1, S2, no murmurs, rubs, clicks or gallops  Abdomen - soft, nontender, nondistended, no masses or organomegaly  Back exam - full range of motion, no tenderness, palpable spasm or pain on motion  Neurological - alert, oriented, normal speech, no focal findings or movement disorder noted  Musculoskeletal - no joint tenderness, deformity or swelling  Extremities - peripheral pulses normal, no pedal edema, no clubbing or cyanosis  Skin - normal coloration and turgor, no rashes, no suspicious skin lesions noted      We discussed the expected course, resolution and complications of the diagnosis(es) in detail. Medication risks, benefits, costs, interactions, and alternatives were discussed as indicated. I advised him to contact the office if his condition worsens, changes or fails to improve as anticipated. He expressed understanding with the diagnosis(es) and plan.      Sherice Pham NP

## 2021-08-03 NOTE — PROGRESS NOTES
Chief Complaint   Patient presents with   Soniya Castro patsushant-Follow up anxiety with NP.   [facility] Medication Management     takes lorazepam PRN (had a script for 15 in 2020 and only took 2)    [facility] Follow Up     chest discomfort and observation 07/30/2021 and will [facility] 08/04/2021 and is scheduled for a heart cath on 08/10/2021    Irregular Heart Beat     about 1 month ago and will have an ablation 09/09/2021     1. Have you been to the ER, urgent care clinic since your last visit? Hospitalized since your last visit? Yes Reason for visit: See chief complaint     2. Have you seen or consulted any other health care providers outside of the [address] [place] [address] since your last visit? Include any pap smears or colon screening.  Yes Reason for visit: See Chief complaint; Dr. Eileen Avitia  /70 (BP 1 Location: Left arm, BP Patient Position: Sitting, BP Cuff Size: Adult)   Pulse 63   Temp 98 °F (36.7 °C) (Temporal)   Resp 18   Ht 5' 9\" (1.753 m)   Wt 206 lb (93.4 kg)   SpO2 98%   BMI 30.42 kg/m²

## 2021-08-10 ENCOUNTER — PREP FOR PROCEDURE (OUTPATIENT)
Dept: CARDIOLOGY CLINIC | Age: 66
End: 2021-08-10

## 2021-08-10 RX ORDER — SODIUM CHLORIDE 0.9 % (FLUSH) 0.9 %
5-40 SYRINGE (ML) INJECTION EVERY 8 HOURS
Status: CANCELLED | OUTPATIENT
Start: 2021-08-10

## 2021-08-10 RX ORDER — SODIUM CHLORIDE 0.9 % (FLUSH) 0.9 %
5-40 SYRINGE (ML) INJECTION AS NEEDED
Status: CANCELLED | OUTPATIENT
Start: 2021-08-10

## 2021-08-13 ENCOUNTER — TELEPHONE (OUTPATIENT)
Dept: CARDIOLOGY CLINIC | Age: 66
End: 2021-08-13

## 2021-08-13 NOTE — TELEPHONE ENCOUNTER
BRIAN Flores RN  Caller: Unspecified (Today,  9:10 AM)  No reason to delay. Attempted to reach patient by telephone. HIPAA compliant message was left letting patient know that there is no reason to delay his scheduled ablation on 9/9/21 due to recent catheterization. Asked for a return call if patient has any further questions or concerns.

## 2021-08-13 NOTE — TELEPHONE ENCOUNTER
Patient states he would like to know if Dr. Jamie Mcdaniel would like to push date for ablation. States he had a cath with another cardiologist on 08/10.     Phone: [phone]

## 2021-08-16 ENCOUNTER — APPOINTMENT (OUTPATIENT)
Dept: CT IMAGING | Age: 66
End: 2021-08-16
Attending: FAMILY MEDICINE
Payer: MEDICARE

## 2021-08-16 ENCOUNTER — HOSPITAL ENCOUNTER (EMERGENCY)
Age: 66
Discharge: HOME | End: 2021-08-16
Attending: FAMILY MEDICINE
Payer: MEDICARE

## 2021-08-16 ENCOUNTER — TELEPHONE (OUTPATIENT)
Dept: FAMILY MEDICINE CLINIC | Age: 66
End: 2021-08-16

## 2021-08-16 VITALS
SYSTOLIC BLOOD PRESSURE: 144 MMHG | WEIGHT: 205 LBS | RESPIRATION RATE: 18 BRPM | BODY MASS INDEX: 30.36 KG/M2 | TEMPERATURE: 98.3 F | DIASTOLIC BLOOD PRESSURE: 86 MMHG | HEART RATE: 62 BPM | HEIGHT: 69 IN | OXYGEN SATURATION: 98 %

## 2021-08-16 DIAGNOSIS — R79.89 ELEVATED SERUM CREATININE: ICD-10-CM

## 2021-08-16 DIAGNOSIS — R10.9 RIGHT FLANK PAIN: Primary | ICD-10-CM

## 2021-08-16 LAB
ALBUMIN SERPL-MCNC: 3.5 G/DL (ref 3.5–5)
ALBUMIN/GLOB SERPL: 0.9 {RATIO} (ref 1.1–2.2)
ALP SERPL-CCNC: 64 U/L (ref 45–117)
ALT SERPL-CCNC: 23 U/L (ref 12–78)
ANION GAP SERPL CALC-SCNC: 8 MMOL/L (ref 5–15)
APPEARANCE UR: CLEAR
AST SERPL W P-5'-P-CCNC: 15 U/L (ref 15–37)
BACTERIA URNS QL MICRO: NEGATIVE /HPF
BASOPHILS # BLD: 0 K/UL (ref 0–0.1)
BASOPHILS NFR BLD: 1 % (ref 0–1)
BILIRUB SERPL-MCNC: 0.3 MG/DL (ref 0.2–1)
BILIRUB UR QL: NEGATIVE
BUN SERPL-MCNC: 13 MG/DL (ref 6–20)
BUN/CREAT SERPL: 9 (ref 12–20)
CA-I BLD-MCNC: 8.7 MG/DL (ref 8.5–10.1)
CHLORIDE SERPL-SCNC: 106 MMOL/L (ref 97–108)
CO2 SERPL-SCNC: 28 MMOL/L (ref 21–32)
COLOR UR: ABNORMAL
CREAT SERPL-MCNC: 1.49 MG/DL (ref 0.7–1.3)
DIFFERENTIAL METHOD BLD: NORMAL
EOSINOPHIL # BLD: 0.1 K/UL (ref 0–0.4)
EOSINOPHIL NFR BLD: 1 % (ref 0–7)
ERYTHROCYTE [DISTWIDTH] IN BLOOD BY AUTOMATED COUNT: 13.8 % (ref 11.5–14.5)
GLOBULIN SER CALC-MCNC: 3.7 G/DL (ref 2–4)
GLUCOSE SERPL-MCNC: 103 MG/DL (ref 65–100)
GLUCOSE UR STRIP.AUTO-MCNC: NEGATIVE MG/DL
HCT VFR BLD AUTO: 42.5 % (ref 36.6–50.3)
HGB BLD-MCNC: 13.9 G/DL (ref 12.1–17)
HGB UR QL STRIP: NEGATIVE
IMM GRANULOCYTES # BLD AUTO: 0 K/UL (ref 0–0.04)
IMM GRANULOCYTES NFR BLD AUTO: 0 % (ref 0–0.5)
KETONES UR QL STRIP.AUTO: NEGATIVE MG/DL
LEUKOCYTE ESTERASE UR QL STRIP.AUTO: NEGATIVE
LIPASE SERPL-CCNC: 123 U/L (ref 73–393)
LYMPHOCYTES # BLD: 3.5 K/UL (ref 0.8–3.5)
LYMPHOCYTES NFR BLD: 41 % (ref 12–49)
MCH RBC QN AUTO: 29.6 PG (ref 26–34)
MCHC RBC AUTO-ENTMCNC: 32.7 G/DL (ref 30–36.5)
MCV RBC AUTO: 90.4 FL (ref 80–99)
MONOCYTES # BLD: 0.8 K/UL (ref 0–1)
MONOCYTES NFR BLD: 9 % (ref 5–13)
NEUTS SEG # BLD: 4.2 K/UL (ref 1.8–8)
NEUTS SEG NFR BLD: 48 % (ref 32–75)
NITRITE UR QL STRIP.AUTO: NEGATIVE
PH UR STRIP: 5 [PH] (ref 5–8)
PLATELET # BLD AUTO: 221 K/UL (ref 150–400)
PMV BLD AUTO: 10.2 FL (ref 8.9–12.9)
POTASSIUM SERPL-SCNC: 4.1 MMOL/L (ref 3.5–5.1)
PROT SERPL-MCNC: 7.2 G/DL (ref 6.4–8.2)
PROT UR STRIP-MCNC: NEGATIVE MG/DL
RBC # BLD AUTO: 4.7 M/UL (ref 4.1–5.7)
RBC #/AREA URNS HPF: ABNORMAL /HPF (ref 0–5)
SODIUM SERPL-SCNC: 142 MMOL/L (ref 136–145)
SP GR UR REFRACTOMETRY: 1.02 (ref 1–1.03)
UA: UC IF INDICATED,UAUC: ABNORMAL
UROBILINOGEN UR QL STRIP.AUTO: 0.1 EU/DL (ref 0.2–1)
WBC # BLD AUTO: 8.6 K/UL (ref 4.1–11.1)
WBC URNS QL MICRO: ABNORMAL /HPF (ref 0–4)

## 2021-08-16 PROCEDURE — 80053 COMPREHEN METABOLIC PANEL: CPT

## 2021-08-16 PROCEDURE — 83690 ASSAY OF LIPASE: CPT

## 2021-08-16 PROCEDURE — 99283 EMERGENCY DEPT VISIT LOW MDM: CPT

## 2021-08-16 PROCEDURE — 85025 COMPLETE CBC W/AUTO DIFF WBC: CPT

## 2021-08-16 PROCEDURE — 74176 CT ABD & PELVIS W/O CONTRAST: CPT

## 2021-08-16 PROCEDURE — 81001 URINALYSIS AUTO W/SCOPE: CPT

## 2021-08-16 RX ORDER — BUTALBITAL, ASPIRIN, AND CAFFEINE 325; 50; 40 MG/1; MG/1; MG/1
1 CAPSULE ORAL
COMMUNITY

## 2021-08-16 RX ORDER — GUAIFENESIN 100 MG/5ML
81 LIQUID (ML) ORAL DAILY
COMMUNITY
End: 2021-09-27

## 2021-08-16 RX ORDER — POLYETHYLENE GLYCOL 3350 17 G/17G
17 POWDER, FOR SOLUTION ORAL DAILY
COMMUNITY

## 2021-08-16 RX ORDER — LORAZEPAM 0.5 MG/1
0.5 TABLET ORAL
COMMUNITY
End: 2022-06-01 | Stop reason: SDUPTHER

## 2021-08-16 RX ORDER — CLOPIDOGREL BISULFATE 75 MG/1
75 TABLET ORAL
COMMUNITY

## 2021-08-16 NOTE — DISCHARGE INSTRUCTIONS
Thank you! Thank you for allowing me to care for you in the emergency department. I sincerely hope that you are satisfied with your visit today. It is my goal to provide you with excellent care. Below you will find a list of your labs and imaging from your visit today. Should you have any questions regarding these results please do not hesitate to call the emergency department. Labs -     Recent Results (from the past 12 hour(s))   URINALYSIS W/ REFLEX CULTURE    Collection Time: 08/16/21  2:46 AM    Specimen: Urine   Result Value Ref Range    Color Yellow/Straw      Appearance Clear Clear      Specific gravity 1.020 1.003 - 1.030      pH (UA) 5.0 5.0 - 8.0      Protein Negative Negative mg/dL    Glucose Negative Negative mg/dL    Ketone Negative Negative mg/dL    Bilirubin Negative Negative      Blood Negative Negative      Urobilinogen 0.1 (L) 0.2 - 1.0 EU/dL    Nitrites Negative Negative      Leukocyte Esterase Negative Negative      WBC 0-4 0 - 4 /hpf    RBC 0-5 0 - 5 /hpf    Bacteria Negative Negative /hpf    UA:UC IF INDICATED Culture not indicated by UA result Culture not indicated by UA result     CBC WITH AUTOMATED DIFF    Collection Time: 08/16/21  3:00 AM   Result Value Ref Range    WBC 8.6 4.1 - 11.1 K/uL    RBC 4.70 4. 10 - 5.70 M/uL    HGB 13.9 12.1 - 17.0 g/dL    HCT 42.5 36.6 - 50.3 %    MCV 90.4 80.0 - 99.0 FL    MCH 29.6 26.0 - 34.0 PG    MCHC 32.7 30.0 - 36.5 g/dL    RDW 13.8 11.5 - 14.5 %    PLATELET 747 772 - 051 K/uL    MPV 10.2 8.9 - 12.9 FL    NEUTROPHILS 48 32 - 75 %    LYMPHOCYTES 41 12 - 49 %    MONOCYTES 9 5 - 13 %    EOSINOPHILS 1 0 - 7 %    BASOPHILS 1 0 - 1 %    IMMATURE GRANULOCYTES 0 0.0 - 0.5 %    ABS. NEUTROPHILS 4.2 1.8 - 8.0 K/UL    ABS. LYMPHOCYTES 3.5 0.8 - 3.5 K/UL    ABS. MONOCYTES 0.8 0.0 - 1.0 K/UL    ABS. EOSINOPHILS 0.1 0.0 - 0.4 K/UL    ABS. BASOPHILS 0.0 0.0 - 0.1 K/UL    ABS. IMM.  GRANS. 0.0 0.00 - 0.04 K/UL    DF AUTOMATED     METABOLIC PANEL, COMPREHENSIVE Collection Time: 08/16/21  3:00 AM   Result Value Ref Range    Sodium 142 136 - 145 mmol/L    Potassium 4.1 3.5 - 5.1 mmol/L    Chloride 106 97 - 108 mmol/L    CO2 28 21 - 32 mmol/L    Anion gap 8 5 - 15 mmol/L    Glucose 103 (H) 65 - 100 mg/dL    BUN 13 6 - 20 mg/dL    Creatinine 1.49 (H) 0.70 - 1.30 mg/dL    BUN/Creatinine ratio 9 (L) 12 - 20      GFR est AA 57 (L) >60 ml/min/1.73m2    GFR est non-AA 47 (L) >60 ml/min/1.73m2    Calcium 8.7 8.5 - 10.1 mg/dL    Bilirubin, total 0.3 0.2 - 1.0 mg/dL    AST (SGOT) 15 15 - 37 U/L    ALT (SGPT) 23 12 - 78 U/L    Alk. phosphatase 64 45 - 117 U/L    Protein, total 7.2 6.4 - 8.2 g/dL    Albumin 3.5 3.5 - 5.0 g/dL    Globulin 3.7 2.0 - 4.0 g/dL    A-G Ratio 0.9 (L) 1.1 - 2.2     LIPASE    Collection Time: 08/16/21  3:00 AM   Result Value Ref Range    Lipase 123 73 - 393 U/L       Radiologic Studies -   CT ABD PELV WO CONT   Final Result   No specific acute or active process. CT Results  (Last 48 hours)                 08/16/21 0308  CT ABD PELV WO CONT Final result    Impression:  No specific acute or active process. Narrative:  PROCEDURE: CT ABD PELV WO CONT       HISTORY:Leg pain. History of kidney stones       COMPARISON:Similar exam 28 January 2020       Department policy stipulates all CT scans at this facility are performed using   dose reduction optimization techniques as appropriate to the performed exam,   including the following: Automated exposure control, adjustments of the mA   and/or KVP according to the patient size, and the use of iterative   reconstruction technique. LIMITATIONS: None       TECHNIQUE: Axial images with multiplanar reconstruction. No IV contrast.       CHEST: No acute airspace process or pleural effusion seen at the lung bases.        LIVER: Normal   GALLBLADDER: Removed   BILIARY TREE: Normal   PANCREAS: Normal   SPLEEN: Normal   ADRENAL GLANDS: Normal   KIDNEYS/URETERS: Kidneys and renal collecting systems are normal. Bladder is   collapsed and not well evaluated. No apparent abnormality. RETROPERITONEUM/AORTA: Mild atherosclerotic changes in the aorta and its   branches. No aneurysm or periaortic pathology. BOWEL/MESENTERY: Normal   APPENDIX: Identified and normal   PERITONEAL CAVITY: No free intraperitoneal air or fluid   REPRODUCTIVE: Normal   BONE/TISSUES: No acute abnormality. OTHER: None                 CXR Results  (Last 48 hours)      None               If you feel that you have not received excellent quality care or timely care, please ask to speak to the nurse manager. Please choose us in the future for your continued health care needs. ------------------------------------------------------------------------------------------------------------  The exam and treatment you received in the Emergency Department were for an urgent problem and are not intended as complete care. It is important that you follow-up with a doctor, nurse practitioner, or physician assistant to:  (1) confirm your diagnosis,  (2) re-evaluation of changes in your illness and treatment, and  (3) for ongoing care. If your symptoms become worse or you do not improve as expected and you are unable to reach your usual health care provider, you should return to the Emergency Department. We are available 24 hours a day. Please take your discharge instructions with you when you go to your follow-up appointment. If you have any problem arranging a follow-up appointment, contact the Emergency Department immediately. If a prescription has been provided, please have it filled as soon as possible to prevent a delay in treatment. Read the entire medication instruction sheet provided to you by the pharmacy.  If you have any questions or reservations about taking the medication due to side effects or interactions with other medications, please call your primary care physician or contact the ER to speak with the charge nurse.     Make an appointment with your family doctor or the physician you were referred to for follow-up of this visit as instructed on your discharge paperwork, as this is a mandatory follow-up. Return to the ER if you are unable to be seen or if you are unable to be seen in a timely manner. If you have any problem arranging the follow-up visit, contact the Emergency Department immediately.

## 2021-08-18 NOTE — ED PROVIDER NOTES
EMERGENCY DEPARTMENT HISTORY AND PHYSICAL EXAM      Date: 8/16/2021  Patient Name: Carol Busby    History of Presenting Illness     Chief Complaint   Patient presents with    Flank Pain     right       History Provided By:     HPI: Carol Busby, is an extremely pleasant 77 y.o. male presenting to the ED with a chief complaint of right flank pain. Symptoms started yesterday afternoon. He endorses a history of kidney stone in the past that passed on its own. He states this feels slightly similar. No abdominal pain. No nausea, vomiting or diarrhea. No dysuria, hematuria nor frequency. No alleviating or aggravating factors. There are no other complaints, changes, or physical findings at this time. PCP: Anibal Pritchard, DO    No current facility-administered medications on file prior to encounter. Current Outpatient Medications on File Prior to Encounter   Medication Sig Dispense Refill    aspirin 81 mg chewable tablet Take 81 mg by mouth daily.  butalbital-aspirin-caffeine (FIORINAL) capsule Take 1 Capsule by mouth every four (4) hours as needed for Headache.  clopidogreL (Plavix) 75 mg tab Take 75 mg by mouth.  LORazepam (ATIVAN) 0.5 mg tablet Take 0.5 mg by mouth every four (4) hours as needed for Anxiety.  polyethylene glycol (Miralax) 17 gram/dose powder Take 17 g by mouth daily.  acetaminophen (TYLENOL) 325 mg tablet Take 2 Tablets by mouth every six (6) hours as needed for Pain. 60 Tablet 0    isosorbide mononitrate ER (IMDUR) 30 mg tablet Take 1 Tablet by mouth daily. 90 Tablet 1    amLODIPine (Norvasc) 2.5 mg tablet Take 1 Tablet by mouth nightly. 30 Tablet 2    Vitamin D3 25 mcg (1,000 unit) tablet Take 2 Tablets by mouth daily.  amiodarone (CORDARONE) 200 mg tablet Take 200 mg by mouth daily.  apixaban (Eliquis) 5 mg tablet Take 5 mg by mouth two (2) times a day.  tolterodine (DETROL) 2 mg tablet Take 2 mg by mouth two (2) times a day.  evolocumab (Repatha SureClick) pen injection 655 mg by SubCUTAneous route every fourteen (14) days.  lidocaine (LIDODERM) 5 % by TransDERmal route as needed. Apply patch to the affected area for 12 hours a day and remove for 12 hours a day.  amLODIPine (NORVASC) 5 mg tablet Take 5 mg by mouth daily. Every am         Past History     Past Medical History:  Past Medical History:   Diagnosis Date    [facility]     CAD (coronary artery disease) 06/15/2018    Coronary arteriosclerosis 8/7/2020    GERD (gastroesophageal reflux disease)     H/O seasonal allergies     High cholesterol     Hx of cardiac arrest     VT arrest     Hyperlipidemia 8/7/2020    Hypertension     Ill-defined condition     Urinary urgency    Kidney stone     MI (myocardial infarction) ([facility]     ROQUE on CPAP     2/2/21 pt reports does not use cpap each night, only a few times per week    Vitamin D deficiency 8/7/2020       Past Surgical History:  Past Surgical History:   Procedure Laterality Date    COLONOSCOPY N/A 11/12/2018    COLONOSCOPY performed by Omari Leroy MD at [facility] HX COLONOSCOPY  2018    HX COLONOSCOPY      HX ENDOSCOPY      HX HEART CATHETERIZATION  2018    Cardiac cath Stents x2    HX HEART CATHETERIZATION  02/2020    \"patent stent obtuse marginal 1. significant disease noted in branch artery of RCA. THis artery appears to be borderline for any kind of intervention\"    HX LAP CHOLECYSTECTOMY  2005    HX LUMBAR DISKECTOMY      L4-L4    HX ORTHOPAEDIC  2006    Lumbar laminectomy    HX PACEMAKER      linq monitor       Family History:  Family History   Problem Relation Age of Onset    Diabetes Mother    [place] Stroke Father     Hypertension Father        Social History:  Social History     Tobacco Use    Smoking status: Never Smoker    Smokeless tobacco: Never Used   Vaping Use    Vaping Use: Never used   Substance Use Topics    Alcohol use: No    Drug use: No       Allergies:   Allergies Allergen Reactions    Cefuroxime Unknown (comments)     abd pain    Ibuprofen Other (comments)     Patient states NSAIDS/ ibuprofen upsets his stomach    Statins-Hmg-Coa Reductase Inhibitors Other (comments)     Extremity weakness         Review of Systems     Review of Systems   Constitutional: Negative for activity change, appetite change, chills, fatigue and fever. HENT: Negative for congestion and sore throat. Eyes: Negative for photophobia and visual disturbance. Respiratory: Negative for cough, shortness of breath and wheezing. Cardiovascular: Negative for chest pain, palpitations and leg swelling. Gastrointestinal: Negative for abdominal pain, diarrhea, nausea and vomiting. Endocrine: Negative for cold intolerance and heat intolerance. Musculoskeletal: Negative for gait problem and joint swelling. Right flank pain   Skin: Negative for color change and rash. Neurological: Negative for dizziness and headaches. Physical Exam     Physical Exam  Constitutional:       Appearance: Normal appearance. HENT:      Head: Normocephalic and atraumatic. Mouth/Throat:      Mouth: Mucous membranes are moist.      Pharynx: Oropharynx is clear. Eyes:      Extraocular Movements: Extraocular movements intact. Conjunctiva/sclera: Conjunctivae normal.   Cardiovascular:      Rate and Rhythm: Normal rate and regular rhythm. Heart sounds: Normal heart sounds. Pulmonary:      Effort: Pulmonary effort is normal. No respiratory distress. Breath sounds: Normal breath sounds. No wheezing, rhonchi or rales. Abdominal:      General: There is no distension. Palpations: Abdomen is soft. Tenderness: There is no abdominal tenderness. There is no right CVA tenderness, left CVA tenderness or guarding. Musculoskeletal:         General: No deformity. Cervical back: Normal range of motion and neck supple. Right lower leg: No edema. Left lower leg: No edema. Comments: Mild tenderness to palpation along the thoracic paraspinal muscles bilaterally   Skin:     Findings: No erythema or rash. Neurological:      General: No focal deficit present. Mental Status: He is alert and oriented to person, place, and time. Gait: Gait normal.   Psychiatric:         Mood and Affect: Mood normal.         Behavior: Behavior normal.         Lab and Diagnostic Study Results     Labs -   No results found for this or any previous visit (from the past 12 hour(s)). Radiologic Studies -   @lastxrresult@  CT Results  (Last 48 hours)    None        CXR Results  (Last 48 hours)    None            Medical Decision Making   - I am the first provider for this patient. - I reviewed the vital signs, available nursing notes, past medical history, past surgical history, family history and social history. - Initial assessment performed. The patients presenting problems have been discussed, and they are in agreement with the care plan formulated and outlined with them. I have encouraged them to ask questions as they arise throughout their visit. Vital Signs-Reviewed the patient's vital signs. No data found. ED Course/ Provider Notes (Medical Decision Making):     Patient presented to the emergency department with a chief complaint of right flank pain  On examination the patient is nontoxic and well-appearing. Vitals were reviewed per above. Concern for ureteral calculi. Armando Rosario was given a thorough list of signs and symptoms that would warrant an immediate return to the emergency department. Otherwise Armando Rosario will follow up with PCP. Laboratory work is without marked abnormality. Creatinine 1.49 which is near his baseline. Urinalysis unremarkable. I recommended he follow-up with his PCP given his persistently elevated serum creatinine.   Of note he states he only drinks 1 glass of water a day, I explained this could absolutely be contributing to his renal function. CT abdomen pelvis stone protocol demonstrates no acute process. Suspect musculoskeletal pain vs possible passed renal calculi. Patient was given a thorough list of signs and symptoms that would warrant immediate return to the emergency department. Otherwise he will follow up with his PCP. Procedures   Medical Decision Makingedical Decision Making  Performed by: Talya Bush DO  Procedures  None       Disposition   Disposition:     Home     All of the diagnostic tests were reviewed and questions answered. Diagnosis, care plan and treatment options were discussed. The patient understands the instructions and will follow up as directed. The patients results have been reviewed with them. They have been counseled regarding their diagnosis. The patient verbally convey understanding and agreement of the signs, symptoms, diagnosis, treatment and prognosis and additionally agrees to follow up as recommended with their PCP in 24 - 48 hours. They also agree with the care-plan and convey that all of their questions have been answered. I have also put together some discharge instructions for them that include: 1) educational information regarding their diagnosis, 2) how to care for their diagnosis at home, as well a 3) list of reasons why they would want to return to the ED prior to their follow-up appointment, should their condition change. DISCHARGE PLAN:    1. Cannot display discharge medications since this patient is not currently admitted. 2.   Follow-up Information     Follow up With Specialties Details Why Contact Info    Jadon Dickey DO Family Medicine Schedule an appointment as soon as possible for a visit   [place] [id number] [address] [facility]  [phone]              3.  Return to ED if worse       4. Discharge Medication List as of 8/16/2021  4:08 AM            Diagnosis     Clinical Impression:    1. Right flank pain    2.  Elevated serum creatinine        Attestations:    Ladi Barber, DO    Please note that this dictation was completed with [employer]-cluster, the computer voice recognition software. Quite often unanticipated grammatical, syntax, homophones, and other interpretive errors are inadvertently transcribed by the computer software. Please disregard these errors. Please excuse any errors that have escaped final proofreading. Thank you.

## 2021-08-23 ENCOUNTER — OFFICE VISIT (OUTPATIENT)
Dept: FAMILY MEDICINE CLINIC | Age: 66
End: 2021-08-23
Payer: MEDICARE

## 2021-08-23 VITALS
OXYGEN SATURATION: 99 % | BODY MASS INDEX: 30.21 KG/M2 | HEIGHT: 69 IN | WEIGHT: 204 LBS | DIASTOLIC BLOOD PRESSURE: 71 MMHG | HEART RATE: 67 BPM | TEMPERATURE: 97.7 F | SYSTOLIC BLOOD PRESSURE: 131 MMHG

## 2021-08-23 DIAGNOSIS — E78.2 MIXED HYPERLIPIDEMIA: ICD-10-CM

## 2021-08-23 DIAGNOSIS — I25.10 CORONARY ARTERIOSCLEROSIS: Primary | ICD-10-CM

## 2021-08-23 PROCEDURE — G8536 NO DOC ELDER MAL SCRN: HCPCS | Performed by: FAMILY MEDICINE

## 2021-08-23 PROCEDURE — 99213 OFFICE O/P EST LOW 20 MIN: CPT | Performed by: FAMILY MEDICINE

## 2021-08-23 PROCEDURE — G8427 DOCREV CUR MEDS BY ELIG CLIN: HCPCS | Performed by: FAMILY MEDICINE

## 2021-08-23 PROCEDURE — 3017F COLORECTAL CA SCREEN DOC REV: CPT | Performed by: FAMILY MEDICINE

## 2021-08-23 PROCEDURE — G8417 CALC BMI ABV UP PARAM F/U: HCPCS | Performed by: FAMILY MEDICINE

## 2021-08-23 PROCEDURE — 1101F PT FALLS ASSESS-DOCD LE1/YR: CPT | Performed by: FAMILY MEDICINE

## 2021-08-23 PROCEDURE — G8510 SCR DEP NEG, NO PLAN REQD: HCPCS | Performed by: FAMILY MEDICINE

## 2021-08-23 NOTE — PATIENT INSTRUCTIONS
General Health and concerns:  HEART HEALTHY DIET:  A heart healthy diet is one that is low in cholesterol (less than 300 mg daily), fat (less than 80 g daily) . You should also minimize carbohydrates / sugars (less amounts of breads, pastas, potato and potato products and sugary foods/snacks, cookies, cakes, etc) . Try to eat whole wheat/multigrain breads and pastas and eat more vegetables. Cook with olive oil (or no oil) and grill, bake, broil or boil foods. Less red meat and more chicken , fish and lean cuts of beef (limited). 9414-4876 calories per day is sufficient [id number] is acceptable for weight loss. EXERCISE:  You should do exercise 3-5 days per week (minimum) to include increasing your heart rate for 30 to 45 minutes. At least a pace of a brisk walk should do that. This build up your heart and lung endurance and muscles and helps many function of the body. OTHER:    IF your condition(s) do not improve, get worse and/or if any concerns arise, please call or come by the office. Routine Health maintenance: You need to get a yearly follow up/physical exam to review, discuss age and gender appropriate exams, labs, vaccines and screening tests. This includes cardiovascular health risk, cancer screens and other bernie related topics. Medications-Take all medications as directed. Please do not stop unless you talk to your doctor or health care provider first. Report any problems immediately. Referrals: if you have been given a referral, please call the office if you do not hear from provider in one week. You may make the appointment yourself. Please keep all appointments with specialists and ask them to send their notes, thoughts, recommendations to us , as your PCP. KEEP all upcoming appointments with our office UNLESS otherwise and specifically told not to.     CHECK your diagnosis/problem list for today and that orders and prescriptions are what we discussed as well as MAKE sure all information is accurate and has been discussed to your satisfaction. PLEASE make sure all your questions have been answered and feel free to call or come back should any concerns arise. Imaging/Labs:  Be sure to get these images in a timely manner. IF your test must be scheduled, let us know if you need help getting this done and if you do not hear from that provider in a week , call us or them. BE SURE to call the office if you do not hear regarding the results in one week after the test is performed Image or lab). It is our intention to inform you of the results ALWAYS, even if normal you should get a notification (Call, portal message). PLEASE melecio if you do not get the results. PLEASE follow all recommendations and call/come in /ask questions if you do not understand of if problems develop after or in between visits. Failure to comply with recommended health care advise could result in serious health consequences. Thank you for choosing our practice and please let us know how we can help you feel better and stay well!

## 2021-08-23 NOTE — PROGRESS NOTES
1. Have you been to the ER, urgent care clinic since your last visit? Hospitalized since your last visit? Seen at [facility] ER for right side pain. 2. Have you seen or consulted any other health care providers outside of the [address] [place] [address] since your last visit? Include any pap smears or colon screening. No    Chief Complaint   Patient presents with    Cholesterol Problem    Labs     had labs gone over with Cardiac rehad nurse his labs and thyroid numbers were off.        Visit Vitals  /71 (BP 1 Location: Left upper arm, BP Patient Position: Sitting)   Pulse 67   Temp 97.7 °F (36.5 °C) (Temporal)   Ht 5' 9\" (1.753 m)   Wt 204 lb (92.5 kg)   SpO2 99%   BMI 30.13 kg/m²

## 2021-08-24 ENCOUNTER — OFFICE VISIT (OUTPATIENT)
Dept: FAMILY MEDICINE CLINIC | Age: 66
End: 2021-08-24
Payer: MEDICARE

## 2021-08-24 ENCOUNTER — TELEPHONE (OUTPATIENT)
Dept: CARDIOLOGY CLINIC | Age: 66
End: 2021-08-24

## 2021-08-24 VITALS
SYSTOLIC BLOOD PRESSURE: 132 MMHG | DIASTOLIC BLOOD PRESSURE: 73 MMHG | TEMPERATURE: 97.8 F | BODY MASS INDEX: 30.36 KG/M2 | OXYGEN SATURATION: 98 % | WEIGHT: 205 LBS | HEIGHT: 69 IN | HEART RATE: 70 BPM

## 2021-08-24 DIAGNOSIS — R07.9 CHEST PAIN, UNSPECIFIED TYPE: ICD-10-CM

## 2021-08-24 DIAGNOSIS — I48.0 PAROXYSMAL ATRIAL FIBRILLATION (HCC): ICD-10-CM

## 2021-08-24 DIAGNOSIS — I48.91 ATRIAL FIBRILLATION, UNSPECIFIED TYPE (HCC): Primary | ICD-10-CM

## 2021-08-24 DIAGNOSIS — Z01.812 PRE-PROCEDURE LAB EXAM: ICD-10-CM

## 2021-08-24 DIAGNOSIS — E78.2 MIXED HYPERLIPIDEMIA: ICD-10-CM

## 2021-08-24 DIAGNOSIS — I25.10 CORONARY ARTERIOSCLEROSIS: Primary | ICD-10-CM

## 2021-08-24 PROCEDURE — G8427 DOCREV CUR MEDS BY ELIG CLIN: HCPCS | Performed by: FAMILY MEDICINE

## 2021-08-24 PROCEDURE — 1101F PT FALLS ASSESS-DOCD LE1/YR: CPT | Performed by: FAMILY MEDICINE

## 2021-08-24 PROCEDURE — 99213 OFFICE O/P EST LOW 20 MIN: CPT | Performed by: FAMILY MEDICINE

## 2021-08-24 PROCEDURE — G8417 CALC BMI ABV UP PARAM F/U: HCPCS | Performed by: FAMILY MEDICINE

## 2021-08-24 PROCEDURE — G8536 NO DOC ELDER MAL SCRN: HCPCS | Performed by: FAMILY MEDICINE

## 2021-08-24 PROCEDURE — G8510 SCR DEP NEG, NO PLAN REQD: HCPCS | Performed by: FAMILY MEDICINE

## 2021-08-24 PROCEDURE — 3017F COLORECTAL CA SCREEN DOC REV: CPT | Performed by: FAMILY MEDICINE

## 2021-08-24 NOTE — PROGRESS NOTES
IDENTIFYING INFORMATION:      Caroline Beckford , 77 y.o., male  [address]  [address] [facility]     Medical Record Number: [id number]          CHIEF COMPLAINT:     Chief Complaint   Patient presents with    Other     discuss [facility] paperwork. HISTORY OF PRESENT ILLNESS:    Caroline Beckford is a 77 y.o. male  has a past medical history of A-fib ([facility] [address] CAD (coronary artery disease) (06/15/2018), Coronary arteriosclerosis (8/7/2020), GERD (gastroesophageal reflux disease), H/O seasonal allergies, High cholesterol, cardiac arrest, Hyperlipidemia (8/7/2020), Hypertension, Ill-defined condition, Kidney stone, MI (myocardial infarction) ([facility] ROQUE on CPAP, and Vitamin D deficiency (8/7/2020). .  he comes in for discussion regarding coronary artery disease. Has been having for many years since before 2010. Had a cardiac calcium score of 152 units which is high for age at the time wi=hich was 47. Moderate to hig risk of future events . Had a heart attack in 2015. Has been under care of cardiologist since then. This year he has had a third stent  2 weeks prior (August 10, 2021). Was having discomfort. He never smoked or used alcohol. Has always been active. Has had hyperlipidemia and hypertension since his service days. Has been told prior to his heart attacks that this could be anxiety and that it was not guaranteed, it  Still cold be his heart. So he is still having some issues regarding his heart, chest pain and no dyspnea, orthopnea, no pnd, is on CPAP for ROQUE. Cna walk a flight of steps without dyspnea or chest pain . Tries to stay active. He is on medication now.    He himself thought it may be stress and this was in his service     PAST MEDICAL HISTORY:     Past Medical History:   Diagnosis Date    [facility]fib [facility])     CAD (coronary artery disease) 06/15/2018    Coronary arteriosclerosis 8/7/2020    GERD (gastroesophageal reflux disease)     H/O seasonal allergies     High cholesterol  Hx of cardiac arrest     VT arrest     Hyperlipidemia 8/7/2020    Hypertension     Ill-defined condition     Urinary urgency    Kidney stone     MI (myocardial infarction) ([facility] [address]     ROQUE on CPAP     2/2/21 pt reports does not use cpap each night, only a few times per week    Vitamin D deficiency 8/7/2020       MEDICATIONS:     Current Outpatient Medications on File Prior to Visit   Medication Sig Dispense Refill    aspirin 81 mg chewable tablet Take 81 mg by mouth daily.  butalbital-aspirin-caffeine (FIORINAL) capsule Take 1 Capsule by mouth every four (4) hours as needed for Headache.  clopidogreL (Plavix) 75 mg tab Take 75 mg by mouth.  LORazepam (ATIVAN) 0.5 mg tablet Take 0.5 mg by mouth every four (4) hours as needed for Anxiety.  polyethylene glycol (Miralax) 17 gram/dose powder Take 17 g by mouth daily.  acetaminophen (TYLENOL) 325 mg tablet Take 2 Tablets by mouth every six (6) hours as needed for Pain. 60 Tablet 0    isosorbide mononitrate ER (IMDUR) 30 mg tablet Take 1 Tablet by mouth daily. 90 Tablet 1    amLODIPine (Norvasc) 2.5 mg tablet Take 1 Tablet by mouth nightly. 30 Tablet 2    Vitamin D3 25 mcg (1,000 unit) tablet Take 2 Tablets by mouth daily.  amiodarone (CORDARONE) 200 mg tablet Take 200 mg by mouth daily.  apixaban (Eliquis) 5 mg tablet Take 5 mg by mouth two (2) times a day.  tolterodine (DETROL) 2 mg tablet Take 2 mg by mouth two (2) times a day.  evolocumab (Repatha SureClick) pen injection 777 mg by SubCUTAneous route every fourteen (14) days.  lidocaine (LIDODERM) 5 % by TransDERmal route as needed. Apply patch to the affected area for 12 hours a day and remove for 12 hours a day.  amLODIPine (NORVASC) 5 mg tablet Take 5 mg by mouth daily. Every am       No current facility-administered medications on file prior to visit.        ALLERGIES:    Allergies   Allergen Reactions    Cefuroxime Unknown (comments)     abd pain  Ibuprofen Other (comments)     Patient states NSAIDS/ ibuprofen upsets his stomach    Statins-Hmg-Coa Reductase Inhibitors Other (comments)     Extremity weakness         SOCIAL HISTORY:     Social History     Tobacco Use    Smoking status: Never Smoker    Smokeless tobacco: Never Used   Vaping Use    Vaping Use: Never used   Substance Use Topics    Alcohol use: No    Drug use: No       SURGICAL HISTORY:    Past Surgical History:   Procedure Laterality Date    COLONOSCOPY N/A 11/12/2018    COLONOSCOPY performed by Romayne Ferretti, MD at [address] [facility] HX COLONOSCOPY  2018    HX COLONOSCOPY      HX ENDOSCOPY      HX HEART CATHETERIZATION  2018    Cardiac cath Stents x2    HX HEART CATHETERIZATION  02/2020    \"patent stent obtuse marginal 1. significant disease noted in branch artery of RCA. THis artery appears to be borderline for any kind of intervention\"    HX LAP CHOLECYSTECTOMY  2005    HX LUMBAR DISKECTOMY      L4-L4    HX ORTHOPAEDIC  2006    Lumbar laminectomy    HX PACEMAKER      linq monitor        FAMILY HISTORY:    Family History   Problem Relation Age of Onset    Diabetes Mother     Stroke Father     Hypertension Father          REVIEW OF SYSTEMS:    I personally collected this information from all available source present (patient/others in room and records available) -[email or web address]    SEE HPI      PHYSICAL EXAMINATION:    Vital Signs:    Visit Vitals  /73 (BP 1 Location: Right upper arm, BP Patient Position: Sitting)   Pulse 70   Temp 97.8 °F (36.6 °C) (Temporal)   Ht 5' 9\" (1.753 m)   Wt 205 lb (93 kg)   SpO2 98%   BMI 30.27 kg/m²         Wt Readings from Last 3 Encounters:   08/24/21 205 lb (93 kg)   08/23/21 204 lb (92.5 kg)   08/16/21 205 lb (93 kg)     BP Readings from Last 3 Encounters:   08/24/21 132/73   08/23/21 131/71   08/16/21 (!) 144/86         Physical Exam  Constitutional:       General: He is not in acute distress. Appearance: Normal appearance.  He is not ill-appearing. Cardiovascular:      Rate and Rhythm: Normal rate and regular rhythm. Heart sounds: Normal heart sounds. No murmur heard. No friction rub. No gallop. Pulmonary:      Effort: Pulmonary effort is normal. No respiratory distress. Breath sounds: Normal breath sounds. No wheezing or rhonchi. Musculoskeletal:      Right lower leg: No edema. Left lower leg: No edema. Neurological:      General: No focal deficit present. Mental Status: He is oriented to person, place, and time. Mental status is at baseline. Psychiatric:         Mood and Affect: Mood normal.         Behavior: Behavior normal.         Thought Content: Thought content normal.         Judgment: Judgment normal.           ASSESSMENT/PLAN:      Discussion (regarding today's visit with Claude Knife);  Brief review of the records indicates a possibility of service connection   I will review and opine in a letter to patient    ICD-10-CM ICD-9-CM    1. Coronary arteriosclerosis  I25.10 414.00    2. Mixed hyperlipidemia  E78.2 272.2    3. Chest pain, unspecified type  R07.9 786.50         WE reviewed medications, treatment, testing such as labs, imagine, referrals and when to call regarding results and appointments.  Reminded patient to keep any and all appointments with specialists, labs, imaging.  Reminded patient to make sure we get copies of any specialists care, labs and imaging.  Reminded patient to call of come by the office if there are any concerns, questions , comments or problems.  The patient verbalized understanding of the care plan and all questions were answered to the patient's satisfaction prior to leaving the office.  The patient was told that failure to comply with recommended testing could result in abnormal health consequences.      The patient was instructed to have yearly routine health maintenance including but not limited to age appropriate vaccines, testing, screening exams.   ALL questions were answered to his satisfaction before leaving the office. The patient actively participated in medical decision making. FOLLOW UP:     Patient knows to keep any and all future visits scheduled unless told otherwise. Patient knows to call, come back if any concerns, questions, comments or problems arise. Follow-up and Dispositions    · Return if symptoms worsen or fail to improve. Shelli Phoenix DO    This visit was reviewed and signed electronically. It was been completed with voice recognition software and hand typing. It may have syntax and spelling errors despite editing.

## 2021-08-24 NOTE — PROGRESS NOTES
1. Have you been to the ER, urgent care clinic since your last visit? Hospitalized since your last visit? No    2. Have you seen or consulted any other health care providers outside of the [address] [place] [address] since your last visit? Include any pap smears or colon screening. No    Chief Complaint   Patient presents with   [employer] Other     discuss [place] paperwork.       Visit Vitals  /73 (BP 1 Location: Right upper arm, BP Patient Position: Sitting)   Pulse 70   Temp 97.8 °F (36.6 °C) (Temporal)   Ht 5' 9\" (1.753 m)   Wt 205 lb (93 kg)   SpO2 98%   BMI 30.27 kg/m²

## 2021-08-24 NOTE — TELEPHONE ENCOUNTER
Covid test is required on 9-5-2021 for procedure on 9-9-2021. Placed a call to patient, he has been advised of the above. Patient verbalized understanding.

## 2021-08-24 NOTE — PATIENT INSTRUCTIONS
General Health and concerns:  HEART HEALTHY DIET:  A heart healthy diet is one that is low in cholesterol (less than 300 mg daily), fat (less than 80 g daily) . You should also minimize carbohydrates / sugars (less amounts of breads, pastas, potato and potato products and sugary foods/snacks, cookies, cakes, etc) . Try to eat whole wheat/multigrain breads and pastas and eat more vegetables. Cook with olive oil (or no oil) and grill, bake, broil or boil foods. Less red meat and more chicken , fish and lean cuts of beef (limited). 3960-8237 calories per day is sufficient 3202-7202 is acceptable for weight loss. EXERCISE:  You should do exercise 3-5 days per week (minimum) to include increasing your heart rate for 30 to 45 minutes. At least a pace of a brisk walk should do that. This build up your heart and lung endurance and muscles and helps many function of the body. OTHER:    IF your condition(s) do not improve, get worse and/or if any concerns arise, please call or come by the office. Routine Health maintenance: You need to get a yearly follow up/physical exam to review, discuss age and gender appropriate exams, labs, vaccines and screening tests. This includes cardiovascular health risk, cancer screens and other bernie related topics. Medications-Take all medications as directed. Please do not stop unless you talk to your doctor or health care provider first. Report any problems immediately. Referrals: if you have been given a referral, please call the office if you do not hear from provider in one week. You may make the appointment yourself. Please keep all appointments with specialists and ask them to send their notes, thoughts, recommendations to us , as your PCP. KEEP all upcoming appointments with our office UNLESS otherwise and specifically told not to.     CHECK your diagnosis/problem list for today and that orders and prescriptions are what we discussed as well as MAKE sure all information is accurate and has been discussed to your satisfaction. PLEASE make sure all your questions have been answered and feel free to call or come back should any concerns arise. Imaging/Labs:  Be sure to get these images in a timely manner. IF your test must be scheduled, let us know if you need help getting this done and if you do not hear from that provider in a week , call us or them. BE SURE to call the office if you do not hear regarding the results in one week after the test is performed Image or lab). It is our intention to inform you of the results ALWAYS, even if normal you should get a notification (Call, portal message). PLEASE melecio if you do not get the results. PLEASE follow all recommendations and call/come in /ask questions if you do not understand of if problems develop after or in between visits. Failure to comply with recommended health care advise could result in serious health consequences. Thank you for choosing our practice and please let us know how we can help you feel better and stay well!

## 2021-08-25 LAB
ALBUMIN SERPL-MCNC: 4.3 G/DL (ref 3.8–4.8)
ALBUMIN/GLOB SERPL: 1.7 {RATIO} (ref 1.2–2.2)
ALP SERPL-CCNC: 57 IU/L (ref 48–121)
ALT SERPL-CCNC: 18 IU/L (ref 0–44)
AST SERPL-CCNC: 17 IU/L (ref 0–40)
BILIRUB SERPL-MCNC: <0.2 MG/DL (ref 0–1.2)
BUN SERPL-MCNC: 10 MG/DL (ref 8–27)
BUN/CREAT SERPL: 7 (ref 10–24)
CALCIUM SERPL-MCNC: 9.2 MG/DL (ref 8.6–10.2)
CHLORIDE SERPL-SCNC: 105 MMOL/L (ref 96–106)
CO2 SERPL-SCNC: 25 MMOL/L (ref 20–29)
CREAT SERPL-MCNC: 1.39 MG/DL (ref 0.76–1.27)
GLOBULIN SER CALC-MCNC: 2.6 G/DL (ref 1.5–4.5)
GLUCOSE SERPL-MCNC: 90 MG/DL (ref 65–99)
POTASSIUM SERPL-SCNC: 4.4 MMOL/L (ref 3.5–5.2)
PROT SERPL-MCNC: 6.9 G/DL (ref 6–8.5)
SODIUM SERPL-SCNC: 141 MMOL/L (ref 134–144)
TSH SERPL DL<=0.005 MIU/L-ACNC: 2.42 UIU/ML (ref 0.45–4.5)

## 2021-08-25 NOTE — LETTER
8/25/2021 4:25 PM    Mr. Daryle Cruise  [address] [id number]      To whom it may concern;    Sincere Umanzor is a patient of this practice. He has coronary artery disease (I 25.10). He was assessed in the cardiology clinic of [facility] in 2010 for ischemic heart disease. It was a stress echo report. It revealed maximal exercise test without symptoms electrocardiographically nondiagnostic normal echocardiographic images at rest and stress however the basal anteroseptal and inferolateral walls were not adequately assessed at stress. Lower notes from [facility] cardiology range from 2008 to 2010. Also have some outpatient notes a emergency room discharge from 26 March 2016. He has continued to have atypical chest pain. He is diagnosed arteriosclerosis and 2010 he had a cardiac calcium score of her 80 which put him at increased risk for coronary events. In 2015 he did have a myocardial infarction. He has had 2 stents since then. Per review of the note these conditions did seem to develop during his [facility] service therefore seem to be service-connected.   Thank you for your attention to this matter      Respectfully submitted,        Sol Boyd DO

## 2021-08-30 ENCOUNTER — HOSPITAL ENCOUNTER (OUTPATIENT)
Dept: MRI IMAGING | Age: 66
Discharge: HOME | End: 2021-08-30
Attending: INTERNAL MEDICINE
Payer: MEDICARE

## 2021-08-30 ENCOUNTER — TELEPHONE (OUTPATIENT)
Dept: CARDIOLOGY CLINIC | Age: 66
End: 2021-08-30

## 2021-08-30 VITALS — WEIGHT: 205 LBS | BODY MASS INDEX: 30.27 KG/M2

## 2021-08-30 DIAGNOSIS — I48.0 PAROXYSMAL ATRIAL FIBRILLATION (HCC): ICD-10-CM

## 2021-08-30 PROCEDURE — 75561 CARDIAC MRI FOR MORPH W/DYE: CPT

## 2021-08-30 PROCEDURE — 77030021566

## 2021-08-30 PROCEDURE — 75561 CARDIAC MRI FOR MORPH W/DYE: CPT | Performed by: INTERNAL MEDICINE

## 2021-08-30 PROCEDURE — 74011250636 HC RX REV CODE- 250/636: Performed by: INTERNAL MEDICINE

## 2021-08-30 PROCEDURE — A9576 INJ PROHANCE MULTIPACK: HCPCS | Performed by: INTERNAL MEDICINE

## 2021-08-30 RX ADMIN — GADOTERIDOL 27 ML: 279.3 INJECTION, SOLUTION INTRAVENOUS at 15:07

## 2021-08-30 NOTE — TELEPHONE ENCOUNTER
Patient has an ablation scheduled on 09/09. Stent was placed on 08/10 at [facility]. Patient is inquiring if he should continue to take the additional meds or discontinue before the ablation procedure.         PHONE: [phone]

## 2021-08-31 NOTE — TELEPHONE ENCOUNTER
Returned patient call, ID verified using two patient identifiers. Patient calling because he had a cardiac catheterization done at [facility] on 8/10/21. He was started on 81mg ASA and 75mg Plavix daily. He wants to make sure that he can proceed with his ablation scheduled for 9/9/21 with Dr. Lillian Guillory and to see if he needs to hold any medications prior to the procedure. Advised patient that he should continue to take the aspirin and Plavix and there is no need to hold them prior to his ablation. He should be able to proceed with the ablation as planned but advised him that I will notify Dr. Lillian Guillory. He [facility] did decrease his amiodarone to 100mg daily due to fatigue. Medication list reviewed and updated. Patient verbalized understanding and will call with any other questions.

## 2021-08-31 NOTE — LETTER
8/31/2021 1:15 PM    Mr. Liu Copevgeny  [address] [id number]      To whom it may concern;    Grabiel Merlos is a patient of this practice. He has coronary artery disease (I 25.10). He was assessed in the cardiology clinic of [facility] in 2010 for ischemic heart disease. It was a stress echo report. It revealed maximal exercise test without symptoms electrocardiographically nondiagnostic normal echocardiographic images at rest and stress however the basal anteroseptal and inferolateral walls were not adequately assessed at stress. Notes from [facility] cardiology range from 2008 to 2010 were also reviewed. Also have reviewed outpatient notes from an emergency room discharge from 26 March 2016. He has continued to have atypical chest pain. He is diagnosed arteriosclerosis and 2010 he had a cardiac calcium score of 191 which put him at increased risk for coronary events. In 2015 he did have a myocardial infarction. He has had 2 stents since then. Per review of the note these conditions did seem to develop during his [employer] service therefore seem to be service-connected.   Thank you for your attention to this matter      Respectfully submitted,          Sincerely,      Mannie Sarmiento,

## 2021-08-31 NOTE — PROGRESS NOTES
The liver is normal.  The thyroid is actually normal.  I would not do anything about that. The kidney function is actually stable but slightly diminished I would see a nephrologist.  I imagine this can be resolved. 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

## 2021-09-01 ENCOUNTER — TELEPHONE (OUTPATIENT)
Dept: CARDIOLOGY CLINIC | Age: 66
End: 2021-09-01

## 2021-09-01 NOTE — TELEPHONE ENCOUNTER
Patient called because his heart rate is dropping down to the 50s and he would like to see if he can changed the medication that he is on amiodarone 100 mg. He was on 200 mg of amiodarone but they reduced to 100 mg. The medicine is also making him tired and fatigue. Patient is schedule for an ablation 9/9 and he would like a call back to discuss.     Phone [phone]

## 2021-09-02 NOTE — TELEPHONE ENCOUNTER
Received call from patient, ID verified using two patient identifiers. Patient states he is concerned because he is feeling more fatigued lately. He states he has noticed his heart rate in the 50's at rest and he is not sleeping well. His amiodarone was recently decreased to 100mg daily. He also states that he has not been wearing his cpap lately because he was re-evaluated by sleep medicine and he is supposed to be having a follow up to adjust his settings on his device. He states he can't wear it because the pressures are set too high and it causes chest discomfort. Patient is asking if he should stop his amiodarone prior to his ablation on 9/9/21. Advised patient that Dr. Nurys Menezes and Sreekanth Lagunas NP are currently at [facility] today in procedure but I will notify them and call him back with their recommendations. Patient verbalized understanding and will call with any other questions.

## 2021-09-02 NOTE — TELEPHONE ENCOUNTER
Jelena Fernandez NP  You 1 hour ago (12:35 PM)   LP  He can stop        Called patient, ID verified using two patient identifiers. Notified patient that per Tawanna Cardoso NP he can stop his amiodarone. Patient verbalized understanding and will call with any other questions.

## 2021-09-04 LAB
BUN SERPL-MCNC: 11 MG/DL (ref 8–27)
BUN/CREAT SERPL: 8 (ref 10–24)
CALCIUM SERPL-MCNC: 9 MG/DL (ref 8.6–10.2)
CHLORIDE SERPL-SCNC: 103 MMOL/L (ref 96–106)
CO2 SERPL-SCNC: 26 MMOL/L (ref 20–29)
CREAT SERPL-MCNC: 1.31 MG/DL (ref 0.76–1.27)
ERYTHROCYTE [DISTWIDTH] IN BLOOD BY AUTOMATED COUNT: 14.5 % (ref 11.6–15.4)
GLUCOSE SERPL-MCNC: 81 MG/DL (ref 65–99)
HCT VFR BLD AUTO: 43 % (ref 37.5–51)
HGB BLD-MCNC: 13.7 G/DL (ref 13–17.7)
INR PPP: 1 (ref 0.9–1.2)
INTERPRETATION: NORMAL
MCH RBC QN AUTO: 29.5 PG (ref 26.6–33)
MCHC RBC AUTO-ENTMCNC: 31.9 G/DL (ref 31.5–35.7)
MCV RBC AUTO: 93 FL (ref 79–97)
PLATELET # BLD AUTO: 219 X10E3/UL (ref 150–450)
POTASSIUM SERPL-SCNC: 4.5 MMOL/L (ref 3.5–5.2)
PROTHROMBIN TIME: 10.3 SEC (ref 9.1–12)
RBC # BLD AUTO: 4.64 X10E6/UL (ref 4.14–5.8)
SODIUM SERPL-SCNC: 139 MMOL/L (ref 134–144)
WBC # BLD AUTO: 7.3 X10E3/UL (ref 3.4–10.8)

## 2021-09-07 ENCOUNTER — HOSPITAL ENCOUNTER (OUTPATIENT)
Dept: PREADMISSION TESTING | Age: 66
Discharge: HOME | End: 2021-09-07
Payer: MEDICARE

## 2021-09-07 ENCOUNTER — TRANSCRIBE ORDER (OUTPATIENT)
Dept: REGISTRATION | Age: 66
End: 2021-09-07

## 2021-09-07 DIAGNOSIS — Z01.812 PRE-PROCEDURE LAB EXAM: Primary | ICD-10-CM

## 2021-09-07 DIAGNOSIS — Z01.812 PRE-PROCEDURE LAB EXAM: ICD-10-CM

## 2021-09-07 LAB
SARS-COV-2, XPLCVT: NOT DETECTED
SOURCE, COVRS: NORMAL

## 2021-09-07 PROCEDURE — U0005 INFEC AGEN DETEC AMPLI PROBE: HCPCS

## 2021-09-09 ENCOUNTER — ANESTHESIA (OUTPATIENT)
Dept: CARDIAC CATH/INVASIVE PROCEDURES | Age: 66
End: 2021-09-09
Payer: MEDICARE

## 2021-09-09 ENCOUNTER — APPOINTMENT (OUTPATIENT)
Dept: CARDIAC CATH/INVASIVE PROCEDURES | Age: 66
End: 2021-09-09
Attending: INTERNAL MEDICINE
Payer: MEDICARE

## 2021-09-09 ENCOUNTER — ANESTHESIA EVENT (OUTPATIENT)
Dept: CARDIAC CATH/INVASIVE PROCEDURES | Age: 66
End: 2021-09-09
Payer: MEDICARE

## 2021-09-09 ENCOUNTER — HOSPITAL ENCOUNTER (OUTPATIENT)
Age: 66
Setting detail: OUTPATIENT SURGERY
Discharge: HOME | End: 2021-09-09
Attending: INTERNAL MEDICINE | Admitting: INTERNAL MEDICINE
Payer: MEDICARE

## 2021-09-09 VITALS
WEIGHT: 201 LBS | HEIGHT: 69 IN | DIASTOLIC BLOOD PRESSURE: 72 MMHG | SYSTOLIC BLOOD PRESSURE: 124 MMHG | TEMPERATURE: 97.8 F | RESPIRATION RATE: 21 BRPM | HEART RATE: 86 BPM | OXYGEN SATURATION: 98 % | BODY MASS INDEX: 29.77 KG/M2

## 2021-09-09 DIAGNOSIS — I48.91 ATRIAL FIBRILLATION, UNSPECIFIED TYPE (HCC): ICD-10-CM

## 2021-09-09 LAB
ABO + RH BLD: NORMAL
ACT BLD: 235 SECS (ref 79–138)
ACT BLD: 268 SECS (ref 79–138)
BLOOD GROUP ANTIBODIES SERPL: NORMAL
SPECIMEN EXP DATE BLD: NORMAL

## 2021-09-09 PROCEDURE — 93312 ECHO TRANSESOPHAGEAL: CPT

## 2021-09-09 PROCEDURE — 74011250636 HC RX REV CODE- 250/636: Performed by: NURSE ANESTHETIST, CERTIFIED REGISTERED

## 2021-09-09 PROCEDURE — 77030029359 HC PRB ESOPH TEMP CATH ANTM -F: Performed by: INTERNAL MEDICINE

## 2021-09-09 PROCEDURE — 77030013797 HC KT TRNSDUC PRSSR EDWD -A: Performed by: INTERNAL MEDICINE

## 2021-09-09 PROCEDURE — 77030029065 HC DRSG HEMO QCLOT ZMED -B: Performed by: INTERNAL MEDICINE

## 2021-09-09 PROCEDURE — 77030018729 HC ELECTRD DEFIB PAD CARD -B: Performed by: INTERNAL MEDICINE

## 2021-09-09 PROCEDURE — C1894 INTRO/SHEATH, NON-LASER: HCPCS | Performed by: INTERNAL MEDICINE

## 2021-09-09 PROCEDURE — 74011250637 HC RX REV CODE- 250/637: Performed by: INTERNAL MEDICINE

## 2021-09-09 PROCEDURE — 85347 COAGULATION TIME ACTIVATED: CPT

## 2021-09-09 PROCEDURE — C1732 CATH, EP, DIAG/ABL, 3D/VECT: HCPCS | Performed by: INTERNAL MEDICINE

## 2021-09-09 PROCEDURE — 36415 COLL VENOUS BLD VENIPUNCTURE: CPT

## 2021-09-09 PROCEDURE — 93656 COMPRE EP EVAL ABLTJ ATR FIB: CPT | Performed by: INTERNAL MEDICINE

## 2021-09-09 PROCEDURE — C1760 CLOSURE DEV, VASC: HCPCS | Performed by: INTERNAL MEDICINE

## 2021-09-09 PROCEDURE — 77030035291 HC TBNG PMP SMARTABLATE J&J -B: Performed by: INTERNAL MEDICINE

## 2021-09-09 PROCEDURE — 77030020506 HC NDL TRNSPTL NRG BAYL -F: Performed by: INTERNAL MEDICINE

## 2021-09-09 PROCEDURE — C1893 INTRO/SHEATH, FIXED,NON-PEEL: HCPCS | Performed by: INTERNAL MEDICINE

## 2021-09-09 PROCEDURE — C1769 GUIDE WIRE: HCPCS | Performed by: INTERNAL MEDICINE

## 2021-09-09 PROCEDURE — 74011000250 HC RX REV CODE- 250: Performed by: NURSE ANESTHETIST, CERTIFIED REGISTERED

## 2021-09-09 PROCEDURE — 76060000034 HC ANESTHESIA 1.5 TO 2 HR: Performed by: INTERNAL MEDICINE

## 2021-09-09 PROCEDURE — 86901 BLOOD TYPING SEROLOGIC RH(D): CPT

## 2021-09-09 PROCEDURE — 77030027107 HC PTCH EXT REF CARTO3 J&J -F: Performed by: INTERNAL MEDICINE

## 2021-09-09 PROCEDURE — 93662 INTRACARDIAC ECG (ICE): CPT | Performed by: INTERNAL MEDICINE

## 2021-09-09 PROCEDURE — 93613 INTRACARDIAC EPHYS 3D MAPG: CPT | Performed by: INTERNAL MEDICINE

## 2021-09-09 PROCEDURE — 77030008684 HC TU ET CUF COVD -B: Performed by: ANESTHESIOLOGY

## 2021-09-09 PROCEDURE — 2709999900 HC NON-CHARGEABLE SUPPLY: Performed by: INTERNAL MEDICINE

## 2021-09-09 PROCEDURE — C1759 CATH, INTRA ECHOCARDIOGRAPHY: HCPCS | Performed by: INTERNAL MEDICINE

## 2021-09-09 PROCEDURE — 77030026438 HC STYL ET INTUB CARD -A: Performed by: ANESTHESIOLOGY

## 2021-09-09 PROCEDURE — 77030041242 HC CBL CONN CARTO 3 J&J -D: Performed by: INTERNAL MEDICINE

## 2021-09-09 RX ORDER — SODIUM CHLORIDE 9 MG/ML
INJECTION, SOLUTION INTRAVENOUS
Status: DISCONTINUED | OUTPATIENT
Start: 2021-09-09 | End: 2021-09-09 | Stop reason: HOSPADM

## 2021-09-09 RX ORDER — DEXAMETHASONE SODIUM PHOSPHATE 4 MG/ML
INJECTION, SOLUTION INTRA-ARTICULAR; INTRALESIONAL; INTRAMUSCULAR; INTRAVENOUS; SOFT TISSUE AS NEEDED
Status: DISCONTINUED | OUTPATIENT
Start: 2021-09-09 | End: 2021-09-09 | Stop reason: HOSPADM

## 2021-09-09 RX ORDER — ONDANSETRON 2 MG/ML
INJECTION INTRAMUSCULAR; INTRAVENOUS AS NEEDED
Status: DISCONTINUED | OUTPATIENT
Start: 2021-09-09 | End: 2021-09-09 | Stop reason: HOSPADM

## 2021-09-09 RX ORDER — SODIUM CHLORIDE 0.9 % (FLUSH) 0.9 %
5-40 SYRINGE (ML) INJECTION AS NEEDED
Status: DISCONTINUED | OUTPATIENT
Start: 2021-09-09 | End: 2021-09-09 | Stop reason: HOSPADM

## 2021-09-09 RX ORDER — PROTAMINE SULFATE 10 MG/ML
INJECTION, SOLUTION INTRAVENOUS AS NEEDED
Status: DISCONTINUED | OUTPATIENT
Start: 2021-09-09 | End: 2021-09-09 | Stop reason: HOSPADM

## 2021-09-09 RX ORDER — HYDROCODONE BITARTRATE AND ACETAMINOPHEN 5; 325 MG/1; MG/1
1 TABLET ORAL
Status: DISCONTINUED | OUTPATIENT
Start: 2021-09-09 | End: 2021-09-09 | Stop reason: HOSPADM

## 2021-09-09 RX ORDER — ACETAMINOPHEN 325 MG/1
650 TABLET ORAL
Status: DISCONTINUED | OUTPATIENT
Start: 2021-09-09 | End: 2021-09-09 | Stop reason: HOSPADM

## 2021-09-09 RX ORDER — SODIUM CHLORIDE 0.9 % (FLUSH) 0.9 %
5-40 SYRINGE (ML) INJECTION EVERY 8 HOURS
Status: DISCONTINUED | OUTPATIENT
Start: 2021-09-09 | End: 2021-09-09 | Stop reason: HOSPADM

## 2021-09-09 RX ORDER — PANTOPRAZOLE SODIUM 40 MG/1
TABLET, DELAYED RELEASE ORAL
Qty: 90 TABLET | OUTPATIENT
Start: 2021-09-09

## 2021-09-09 RX ORDER — ROCURONIUM BROMIDE 10 MG/ML
INJECTION, SOLUTION INTRAVENOUS AS NEEDED
Status: DISCONTINUED | OUTPATIENT
Start: 2021-09-09 | End: 2021-09-09 | Stop reason: HOSPADM

## 2021-09-09 RX ORDER — FENTANYL CITRATE 50 UG/ML
INJECTION, SOLUTION INTRAMUSCULAR; INTRAVENOUS AS NEEDED
Status: DISCONTINUED | OUTPATIENT
Start: 2021-09-09 | End: 2021-09-09 | Stop reason: HOSPADM

## 2021-09-09 RX ORDER — SUCCINYLCHOLINE CHLORIDE 20 MG/ML
INJECTION INTRAMUSCULAR; INTRAVENOUS AS NEEDED
Status: DISCONTINUED | OUTPATIENT
Start: 2021-09-09 | End: 2021-09-09 | Stop reason: HOSPADM

## 2021-09-09 RX ORDER — LIDOCAINE HYDROCHLORIDE 20 MG/ML
INJECTION, SOLUTION EPIDURAL; INFILTRATION; INTRACAUDAL; PERINEURAL AS NEEDED
Status: DISCONTINUED | OUTPATIENT
Start: 2021-09-09 | End: 2021-09-09 | Stop reason: HOSPADM

## 2021-09-09 RX ORDER — HEPARIN SODIUM 1000 [USP'U]/ML
INJECTION, SOLUTION INTRAVENOUS; SUBCUTANEOUS AS NEEDED
Status: DISCONTINUED | OUTPATIENT
Start: 2021-09-09 | End: 2021-09-09 | Stop reason: HOSPADM

## 2021-09-09 RX ORDER — ONDANSETRON 2 MG/ML
4 INJECTION INTRAMUSCULAR; INTRAVENOUS
Status: DISCONTINUED | OUTPATIENT
Start: 2021-09-09 | End: 2021-09-09 | Stop reason: HOSPADM

## 2021-09-09 RX ORDER — PANTOPRAZOLE SODIUM 40 MG/1
40 TABLET, DELAYED RELEASE ORAL DAILY
Qty: 30 TABLET | Refills: 0 | Status: SHIPPED | OUTPATIENT
Start: 2021-09-09 | End: 2021-10-09

## 2021-09-09 RX ORDER — PROPOFOL 10 MG/ML
INJECTION, EMULSION INTRAVENOUS AS NEEDED
Status: DISCONTINUED | OUTPATIENT
Start: 2021-09-09 | End: 2021-09-09 | Stop reason: HOSPADM

## 2021-09-09 RX ORDER — MIDAZOLAM HYDROCHLORIDE 1 MG/ML
INJECTION, SOLUTION INTRAMUSCULAR; INTRAVENOUS AS NEEDED
Status: DISCONTINUED | OUTPATIENT
Start: 2021-09-09 | End: 2021-09-09 | Stop reason: HOSPADM

## 2021-09-09 RX ADMIN — HEPARIN SODIUM 2000 UNITS: 1000 INJECTION, SOLUTION INTRAVENOUS; SUBCUTANEOUS at 09:16

## 2021-09-09 RX ADMIN — PROPOFOL 150 MG: 10 INJECTION, EMULSION INTRAVENOUS at 08:28

## 2021-09-09 RX ADMIN — SUCCINYLCHOLINE CHLORIDE 140 MG: 20 INJECTION, SOLUTION INTRAMUSCULAR; INTRAVENOUS at 08:28

## 2021-09-09 RX ADMIN — APIXABAN 5 MG: 5 TABLET, FILM COATED ORAL at 13:19

## 2021-09-09 RX ADMIN — HEPARIN SODIUM 2000 UNITS: 1000 INJECTION, SOLUTION INTRAVENOUS; SUBCUTANEOUS at 09:37

## 2021-09-09 RX ADMIN — PROPOFOL 50 MG: 10 INJECTION, EMULSION INTRAVENOUS at 08:35

## 2021-09-09 RX ADMIN — HEPARIN SODIUM 9000 UNITS: 1000 INJECTION, SOLUTION INTRAVENOUS; SUBCUTANEOUS at 08:58

## 2021-09-09 RX ADMIN — LIDOCAINE HYDROCHLORIDE 100 MG: 20 INJECTION, SOLUTION EPIDURAL; INFILTRATION; INTRACAUDAL; PERINEURAL at 08:28

## 2021-09-09 RX ADMIN — FENTANYL CITRATE 100 MCG: 50 INJECTION, SOLUTION INTRAMUSCULAR; INTRAVENOUS at 08:28

## 2021-09-09 RX ADMIN — ROCURONIUM BROMIDE 10 MG: 10 SOLUTION INTRAVENOUS at 08:28

## 2021-09-09 RX ADMIN — PHENYLEPHRINE HYDROCHLORIDE 40 MCG/MIN: 10 INJECTION INTRAVENOUS at 08:40

## 2021-09-09 RX ADMIN — MIDAZOLAM 2 MG: 1 INJECTION INTRAMUSCULAR; INTRAVENOUS at 08:24

## 2021-09-09 RX ADMIN — SODIUM CHLORIDE: 900 INJECTION, SOLUTION INTRAVENOUS at 08:24

## 2021-09-09 RX ADMIN — DEXAMETHASONE SODIUM PHOSPHATE 4 MG: 4 INJECTION, SOLUTION INTRAMUSCULAR; INTRAVENOUS at 08:38

## 2021-09-09 RX ADMIN — SODIUM CHLORIDE: 9 INJECTION, SOLUTION INTRAVENOUS at 08:03

## 2021-09-09 RX ADMIN — ONDANSETRON HYDROCHLORIDE 4 MG: 2 INJECTION, SOLUTION INTRAMUSCULAR; INTRAVENOUS at 09:57

## 2021-09-09 RX ADMIN — PROTAMINE SULFATE 30 MG: 10 INJECTION, SOLUTION INTRAVENOUS at 09:49

## 2021-09-09 NOTE — Clinical Note
Sheath #1: Sheath: inserted. Sheath inserted/placed in the left femoral  vein. Hemostasis achieved. Upon evaluation of the common femoral artery stick using fluoroscopy, the access site puncture was within the safe zone.

## 2021-09-09 NOTE — Clinical Note
TRANSFER - OUT REPORT:     Verbal report given to: bedside RN. Report consisted of patient's Situation, Background, Assessment and   Recommendations(SBAR). Opportunity for questions and clarification was provided.

## 2021-09-09 NOTE — PROGRESS NOTES
Pt with complaints of slight chest mid sternal aching. Dr Jitendra Bassett updated and discussed pain with Pt. Pt verbalized understanding.

## 2021-09-09 NOTE — Clinical Note
TRANSFER - IN REPORT:     Verbal report received from: bedside rn. Report consisted of patient's Situation, Background, Assessment and   Recommendations(SBAR). Opportunity for questions and clarification was provided. Assessment completed upon patient's arrival to unit and care assumed.

## 2021-09-09 NOTE — PROCEDURES
Successful AF ablation performed with 3d mapping, LA pacing/recording, ICE imaging          Plan:  DC amiodarone  Continue danny Eddy MD

## 2021-09-09 NOTE — H&P
HISTORY OF PRESENTING ILLNESS      Elvin Garcia is a 77 y.o. male  with CAD, GERD, ROQUE who experienced chest pain in June 2018 and underwent coronary revascularization for obstructive CAD. Just prior to undergoing PCI he was noted to have cardiac arrest.  He has not experienced recurrent episodes of VT since undergoing PCI. He underwent repeat nuclear imaging and heart catheterization which demonstrated stable CAD. He has been noted to have ventricular bigeminy and PVCs on monitoring. He underwent a stress test with observation of ventricular bigeminy during the exercise portion of the stress test.  He was highly intolerant of beta-blockers in the past. He was recently hospitalized with palpitations and was found to have AF with RVR that required amiodarone for control. PAST MEDICAL HISTORY     Past Medical History:   Diagnosis Date    A-fib [facility])     CAD (coronary artery disease) 06/15/2018    Coronary arteriosclerosis 8/7/2020    GERD (gastroesophageal reflux disease)     H/O seasonal allergies     High cholesterol     Hx of cardiac arrest     VT arrest     Hyperlipidemia 8/7/2020    Hypertension     Ill-defined condition     Urinary urgency    Kidney stone     MI [facility]ocardial infarction) ([facility] [address]     ROQUE on CPAP     2/2/21 pt reports does not use cpap each night, only a few times per week    Vitamin D deficiency 8/7/2020           PAST SURGICAL HISTORY     Past Surgical History:   Procedure Laterality Date    COLONOSCOPY N/A 11/12/2018    COLONOSCOPY performed by Marco Antonio Michael MD at [address] [facility] HX COLONOSCOPY  2018    HX COLONOSCOPY      HX ENDOSCOPY      HX HEART CATHETERIZATION  2018    Cardiac cath Stents x2    HX HEART CATHETERIZATION  02/2020    \"patent stent obtuse marginal 1. significant disease noted in branch artery of RCA.   THis artery appears to be borderline for any kind of intervention\"    HX LAP CHOLECYSTECTOMY  2005    HX LUMBAR DISKECTOMY L4-L4    HX ORTHOPAEDIC  2006    Lumbar laminectomy    HX PACEMAKER      linq monitor          ALLERGIES     Allergies   Allergen Reactions    Cefuroxime Unknown (comments)     abd pain    Ibuprofen Other (comments)     Patient states NSAIDS/ ibuprofen upsets his stomach    Statins-Hmg-Coa Reductase Inhibitors Other (comments)     Extremity weakness          FAMILY HISTORY     Family History   Problem Relation Age of Onset    Diabetes Mother    Manuel Galla Stroke Father     Hypertension Father     negative for cardiac disease       SOCIAL HISTORY     Social History     Socioeconomic History    Marital status:      Spouse name: Not on file    Number of children: Not on file    Years of education: Not on file    Highest education level: Not on file   Tobacco Use    Smoking status: Never Smoker    Smokeless tobacco: Never Used   Vaping Use    Vaping Use: Never used   Substance and Sexual Activity    Alcohol use: No    Drug use: No    Sexual activity: Yes     Partners: Female     Social Determinants of Health     Financial Resource Strain:     Difficulty of Paying Living Expenses:    Food Insecurity:     Worried About Running Out of Food in the Last Year:     Ran Out of Food in the Last Year:    Transportation Needs:     Lack of Transportation (Medical):  Lack of Transportation (Non-Medical):    Physical Activity:     Days of Exercise per Week:     Minutes of Exercise per Session:    Stress:     Feeling of Stress :    Social Connections:     Frequency of Communication with Friends and Family:     Frequency of Social Gatherings with Friends and Family:     Attends Confucianism Services:     Active Member of Clubs or Organizations:     Attends Club or Organization Meetings:     Marital Status:          MEDICATIONS     No current facility-administered medications for this encounter.        I have reviewed the nurses notes, vitals, problem list, allergy list, medical history, family, social history and medications. REVIEW OF SYMPTOMS      General: Pt denies excessive weight gain or loss. Pt is able to conduct ADL's  HEENT: Denies blurred vision, headaches, hearing loss, epistaxis and difficulty swallowing. Respiratory: Denies cough, congestion, shortness of breath, RDZ, wheezing or stridor. Cardiovascular: Denies precordial pain, palpitations, edema or PND  Gastrointestinal: Denies poor appetite, indigestion, abdominal pain or blood in stool  Genitourinary: Denies hematuria, dysuria, increased urinary frequency  Musculoskeletal: Denies joint pain or swelling from muscles or joints  Neurologic: Denies tremor, paresthesias, headache, or sensory motor disturbance  Psychiatric: Denies confusion, insomnia, depression  Integumentray: Denies rash, itching or ulcers. Hematologic: Denies easy bruising, bleeding       PHYSICAL EXAMINATION      Vitals: see vitals section  General: Well developed, in no acute distress. HEENT: No jaundice, oral mucosa moist, no oral ulcers  Neck: Supple, no stiffness, no lymphadenopathy, supple  Heart:  Normal S1/S2 negative S3 or S4. Regular, no murmur, gallop or rub, no jugular venous distention  Respiratory: Clear bilaterally x 4, no wheezing or rales  Abdomen:   Soft, non-tender, bowel sounds are active. Extremities:  No edema, normal cap refill, no cyanosis. Musculoskeletal: No clubbing, no deformities  Neuro: A&Ox3, speech clear, gait stable, cooperative, no focal neurologic deficits  Skin: Skin color is normal. No rashes or lesions.  Non diaphoretic, moist.  Vascular: 2+ pulses symmetric in all extremities       DIAGNOSTIC DATA      EKG: Sinus rhythm    Visit Vitals  BP (!) 140/79 (BP 1 Location: Left upper arm, BP Patient Position: At rest)   Pulse 74   Temp 98.1 °F (36.7 °C)   Resp 18   Ht 5' 9\" (1.753 m)   Wt 201 lb (91.2 kg)   SpO2 98%   BMI 29.68 kg/m²        LABORATORY DATA      Lab Results   Component Value Date/Time    WBC 7.3 09/03/2021 11:43 AM    HGB 13.7 09/03/2021 11:43 AM    HCT 43.0 09/03/2021 11:43 AM    PLATELET 924 14/50/3861 11:43 AM    MCV 93 09/03/2021 11:43 AM      Lab Results   Component Value Date/Time    Sodium 139 09/03/2021 11:43 AM    Potassium 4.5 09/03/2021 11:43 AM    Chloride 103 09/03/2021 11:43 AM    CO2 26 09/03/2021 11:43 AM    Anion gap 8 08/16/2021 03:00 AM    Glucose 81 09/03/2021 11:43 AM    BUN 11 09/03/2021 11:43 AM    Creatinine 1.31 (H) 09/03/2021 11:43 AM    BUN/Creatinine ratio 8 (L) 09/03/2021 11:43 AM    GFR est AA 65 09/03/2021 11:43 AM    GFR est non-AA 56 (L) 09/03/2021 11:43 AM    Calcium 9.0 09/03/2021 11:43 AM    Bilirubin, total <0.2 08/24/2021 10:30 AM    Alk. phosphatase 57 08/24/2021 10:30 AM    Protein, total 6.9 08/24/2021 10:30 AM    Albumin 4.3 08/24/2021 10:30 AM    Globulin 3.7 08/16/2021 03:00 AM    A-G Ratio 1.7 08/24/2021 10:30 AM    ALT (SGPT) 18 08/24/2021 10:30 AM           ASSESSMENT      1. Atrial fibrillation   A. Symptomatic   B. Paroxysmal   C. ILR  2. Cardiac arrest Hx  3. CAD, native  4. Percutaneous coronary transluminal angioplasty  5. ROQUE on CPAP  6.   Dizziness/lightheadedness       PLAN      AF ablation      Dariel Meehan MD  Cardiac Electrophysiology / Cardiology    [address]  [address] [facility], [facility], [address] [place], [place] [address], [address]    [facility]  [phone] / [phone] Fax   [phone] / [phone] Fax

## 2021-09-09 NOTE — PROGRESS NOTES
Pt ambulated to bathroom with steady gait;no complaints of discomfort. Pt's right groin procedure site with no bleeding or hematoma.

## 2021-09-09 NOTE — PROGRESS NOTES
Cardiac Cath Lab Procedure Area Arrival Note:    Amor Peterson arrived to Cardiac Cath Lab, Procedure Area. Patient identifiers verified with NAME and DATE OF BIRTH. Procedure verified with patient. Consent forms verified. Allergies verified. Patient informed of procedure and plan of care. Questions answered with review. Patient voiced understanding of procedure and plan of care. Patient on cardiac monitor, non-invasive blood pressure, SPO2 monitor. On ra. Anesthesia here for sedation and airway management. .  IV of ns on pump at 25 ml/hr. Patient status doing well without problems. Patient is A&Ox 4. Patient reports no pain. Patient medicated during procedure with orders obtained and verified by Dr. Anusha Fenton. Refer to patients Cardiac Cath Lab PROCEDURE REPORT for vital signs, assessment, status, and response during procedure, printed at end of case. Printed report on chart or scanned into chart.

## 2021-09-09 NOTE — Clinical Note
under hemodynamic and ICE and Fluoro, utilizing a standard needle, Ministerio 98cm via a guiding sheath. Needle inserted.

## 2021-09-09 NOTE — PROGRESS NOTES
Pt sitting up tolerating food tray well; no complaints. Right groin site with slight bloody ooze. Manual pressure held until hemostasis obtained and quick clot placed to site. Pt maintaining bed rest will monitor. Dr Duong Sesay updated.

## 2021-09-09 NOTE — PROGRESS NOTES
Pt verbalized understanding of discharge instructions. PIV removed and guaze with tape to site; no redness or swelling/bleeding. Pt escorted to car via wheelchair with belongings. Pt's daughter is driving.

## 2021-09-09 NOTE — ANESTHESIA POSTPROCEDURE EVALUATION
Procedure(s):  ABLATION A-FIB  W COMPLETE EP STUDY  Ep 3d Mapping  Intracardiac Echocardiogram.    general    Anesthesia Post Evaluation      Multimodal analgesia: multimodal analgesia used between 6 hours prior to anesthesia start to PACU discharge  Patient location during evaluation: PACU  Patient participation: complete - patient participated  Level of consciousness: awake and alert  Pain management: adequate  Airway patency: patent  Anesthetic complications: no  Cardiovascular status: acceptable  Respiratory status: acceptable  Hydration status: acceptable  Post anesthesia nausea and vomiting:  none  Final Post Anesthesia Temperature Assessment:  Normothermia (36.0-37.5 degrees C)      INITIAL Post-op Vital signs:   Vitals Value Taken Time   /73 09/09/21 1200   Temp 36.6 °C (97.8 °F) 09/09/21 1011   Pulse 82 09/09/21 1212   Resp 22 09/09/21 1212   SpO2 95 % 09/09/21 1212   Vitals shown include unvalidated device data.

## 2021-09-09 NOTE — DISCHARGE INSTRUCTIONS
Electrophysiology Study (EPS)/Cardiac Ablation   Discharge Instructions      You have just had a Cardiac Ablation for: There were catheters temporarily placed in your heart through a puncture in the Veins and/or Arteries in your groin. WHAT TO EXPECT      If you have had a Cardiac Ablation please be aware that you may experience mild chest pain that will resolve within 24-48 hours. If the Chest Pain begins radiating down your shoulders or arms, becomes severe or breathing becomes difficult, call 911 or go to the closest emergency room.  Mild to moderate, non-painful, bruising or mild swelling at the puncture site is not un-common, and will resolve in 7 - 14 days, and may extend down your thigh as it heals.  If a closure device was used to seal your artery or vein, a separate pamphlet will be given to you with these discharge instructions. It is very important that you review the information in the pamphlet for different restrictions and precautions.  You have a small gauze dressing applied to the puncture site in your groin. You may remove this the following morning.  You MAY receive a 30 day heart monitor in the mail to wear after your procedure. This is to evaluate your heart rhythm post ablation. MEDICATIONS      Take only the medications prescribed to you at discharge. ACTIVITY      A responsible adult must take you home. Do not drive a car for 83-92 hours.  Rest quietly for the remainder of the day.  Do not lift anything greater than 10 pounds for 3 days.  Limit bending at the puncture site and use of stairs for at least 3 days.  You may remove the bandage and shower the morning after the procedure. Do not take a bath for 3 days.  You may resume normal activities after 1 week. SYMPTOMS THAT NEED TO BE REPORTED IMMEDIATELY      Bleeding at the puncture site.   If there is bleeding, lie down and hold firm direct pressure for at least 5 minutes. If the bleeding does not stop, go to the closest emergency room, or call 911.  Temperature more than 100.5 F.   Redness or warmth at the puncture site.  Increasing pain, numbness, coolness or blue discoloration of the extremity where the puncture is located.  Pulsating mass at the puncture site.  A new lump at the puncture site, or increasing swelling at the site. Please be aware that a pea or marble sized lump is normal, anything larger or increasing in size should be reported.  Bruising at the puncture site that enlarges or becomes painful (some bruising at the site is common and will go away in 7-14 days).  Rapid heart rate or palpitations.  Dizziness, lightheadedness, fainting.  REMEMBER: If you feel something is an emergency or cannot be handled over the phone, call 911 or go to the closest emergency room.       FOLLOW UP CARE     Omkar Crockett NP in 2 weeks  Dr. Claudette Gloria in 3 months        Amado Ventura MD  Cardiac Electrophysiology / Cardiology    [place] [address].  [address] [place] [address]  [address]elley Day[address] Anderson StreetHugh dominguez  [phone] / [phone] Fax   [phone] / [phone] Fax

## 2021-09-09 NOTE — ANESTHESIA PREPROCEDURE EVALUATION
Anesthetic History   No history of anesthetic complications            Review of Systems / Medical History  Patient summary reviewed, nursing notes reviewed and pertinent labs reviewed    Pulmonary        Sleep apnea: CPAP           Neuro/Psych             Comments: Anxiety  Chronic fatigue   Cardiovascular    Hypertension        Dysrhythmias : atrial fibrillation  Pacemaker (PM), past MI, CAD, cardiac stents and hyperlipidemia    Exercise tolerance: >4 METS     GI/Hepatic/Renal     GERD: well controlled           Endo/Other  Within defined limits           Other Findings              Physical Exam    Airway  Mallampati: III  TM Distance: > 6 cm  Neck ROM: normal range of motion   Mouth opening: Normal     Cardiovascular  Regular rate and rhythm,  S1 and S2 normal,  no murmur, click, rub, or gallop  Rhythm: regular  Rate: normal         Dental    Dentition: Caps/crowns     Pulmonary  Breath sounds clear to auscultation               Abdominal  GI exam deferred       Other Findings            Anesthetic Plan    ASA: 3  Anesthesia type: general          Induction: Intravenous  Anesthetic plan and risks discussed with: Patient

## 2021-09-09 NOTE — PROGRESS NOTES
TRANSFER - OUT REPORT:    Verbal report given to Frank on [facility] being transferred to recovery for routine progression of care       Report consisted of patients Situation, Background, Assessment and   Recommendations(SBAR). Information from the following report(s) SBAR, Procedure Summary and MAR was reviewed with the receiving nurse. Opportunity for questions and clarification was provided.

## 2021-09-09 NOTE — PROGRESS NOTES
TRANSFER - IN REPORT:    Verbal report received from [facility] and [address] [place] [address] on Ulysses Saavedra  being received from procedure area for routine progression of care. Report consisted of patients Situation, Background, Assessment and Recommendations(SBAR). Information from the following report(s) SBAR, Procedure Summary, MAR, Recent Results and Cardiac Rhythm NSR was reviewed with the receiving clinician. Opportunity for questions and clarification was provided. Assessment completed upon patients arrival to [address] [place] [address] and care assumed. Cardiac Cath Lab Recovery Arrival Note:    Ulysses Saavedra arrived to [facility] recovery area. Patient procedure= ELIDA/Afib Ablation. Patient on cardiac monitor, non-invasive blood pressure, SPO2 monitor. On  O2 @ 2 lpm via NC.  IV  of NS on pump at 25 ml/hr. Patient status doing well without problems. Patient is A&Ox 4. Patient reports No Pain. PROCEDURE SITE CHECK:    Procedure site:without any bleeding and No Hematoma, No pain/discomfort reported at procedure site. No change in patient status. Continue to monitor patient and status.

## 2021-09-09 NOTE — PROGRESS NOTES
Cardiac Cath Lab Recovery Arrival Note:      Charlene Davis arrived to Cardiac Cath Lab, Recovery Area. Staff introduced to patient. Patient identifiers verified with NAME and DATE OF BIRTH. Procedure verified with patient. Consent forms reviewed and signed by patient or authorized representative and verified. Allergies verified. Patient and family oriented to department. Patient and family informed of procedure and plan of care. Questions answered with review. Patient prepped for procedure, per orders from physician, prior to arrival.    Patient on cardiac monitor, non-invasive blood pressure, SPO2 monitor. On Room Air. Patient is A&Ox 4. Patient reports No Pain. Patient in stretcher, in low position, with side rails up, call bell within reach, patient instructed to call if assistance as needed. Patient prep in: [address], [place] [address]. Family in: Waiting room.    Prep by: Marcos Sanchez Kenney calling stating there is already an approved PA for 3/14/19 with Auth #[id number].    Please call with any questions.

## 2021-09-09 NOTE — PROGRESS NOTES
Right groin site with slight bloody ooze. Dr Prudence Walden aware. Right groin site assessed by [facility]. Dr Prudence Walden aware. Pt to maintain bedrest for 1 hour.

## 2021-09-09 NOTE — TELEPHONE ENCOUNTER
Requested Prescriptions     Refused Prescriptions Disp Refills    pantoprazole (PROTONIX) 40 mg tablet [Pharmacy Med Name: PANTOPRAZOLE 40MG TABLETS] 90 Tablet      Sig: TAKE 1 TABLET BY MOUTH DAILY     Refused By: Olivia Roberts     Reason for Refusal: Refill not appropriate     Protonix to be taken for 30 days post ablation on 9/9/21.

## 2021-09-13 ENCOUNTER — TELEPHONE (OUTPATIENT)
Dept: CARDIOLOGY CLINIC | Age: 66
End: 2021-09-13

## 2021-09-13 NOTE — TELEPHONE ENCOUNTER
Returned patient call, ID verified using two patient identifiers. Patient calling because he states he has been feeling great after his ablation but has noticed his heart rates have been elevated at 60's-70's bpm.  He is asking if that will be his new normal.  He states he woke up this morning and was feeling a little bit different with low energy. He took his heart rate and it was 80-90 bpm.  He started to get concerned and realized that he might just be feeling anxious. He took a PRN lorazepam and now feels better. He states his heart rate is currently 73bpm.  He confirmed this with his Kardia and fitbit. Advised patient that a normal heart rate is  bpm.  Asked if he could send the ekg's taken with his device for review we can let him know if it showed any irregular beats. Patient states he will try to see if he can send the ekgs through a [employer] message. Advised patient that any time he is concerned he can send the EKG for review. Patient verbalized understanding and will call with any other questions.

## 2021-09-13 NOTE — TELEPHONE ENCOUNTER
Patient, patient had an ablation 09/09/2021, patient is inquiring whether his heart rate should be temporarily elevated.       Please advise        [facility] [place]:[phone]

## 2021-09-16 ENCOUNTER — TELEPHONE (OUTPATIENT)
Dept: CARDIOLOGY CLINIC | Age: 66
End: 2021-09-16

## 2021-09-16 ENCOUNTER — ANCILLARY PROCEDURE (OUTPATIENT)
Dept: CARDIOLOGY CLINIC | Age: 66
End: 2021-09-16
Payer: MEDICARE

## 2021-09-16 VITALS
DIASTOLIC BLOOD PRESSURE: 70 MMHG | HEIGHT: 69 IN | WEIGHT: 201 LBS | SYSTOLIC BLOOD PRESSURE: 122 MMHG | BODY MASS INDEX: 29.77 KG/M2

## 2021-09-16 DIAGNOSIS — R07.9 CHEST PAIN, UNSPECIFIED TYPE: Primary | ICD-10-CM

## 2021-09-16 DIAGNOSIS — Z98.890 S/P ABLATION OF ATRIAL FIBRILLATION: ICD-10-CM

## 2021-09-16 DIAGNOSIS — R07.9 CHEST PAIN, UNSPECIFIED TYPE: ICD-10-CM

## 2021-09-16 DIAGNOSIS — Z86.79 S/P ABLATION OF ATRIAL FIBRILLATION: ICD-10-CM

## 2021-09-16 LAB
ECHO AO ROOT DIAM: 3.59 CM
ECHO LA AREA 4C: 15.74 CM2
ECHO LA MAJOR AXIS: 3.07 CM
ECHO LA MINOR AXIS: 1.48 CM
ECHO LA VOL 2C: 47.18 ML (ref 18–58)
ECHO LA VOL 4C: 38.83 ML (ref 18–58)
ECHO LA VOL BP: 47.84 ML (ref 18–58)
ECHO LA VOL/BSA BIPLANE: 23.11 ML/M2 (ref 16–28)
ECHO LA VOLUME INDEX A2C: 22.79 ML/M2 (ref 16–28)
ECHO LA VOLUME INDEX A4C: 18.76 ML/M2 (ref 16–28)
ECHO LV E' LATERAL VELOCITY: 9.33 CM/S
ECHO LV E' SEPTAL VELOCITY: 7.19 CM/S
ECHO LV INTERNAL DIMENSION DIASTOLIC: 4.61 CM (ref 4.2–5.9)
ECHO LV INTERNAL DIMENSION SYSTOLIC: 3.26 CM
ECHO LV IVSD: 1.09 CM (ref 0.6–1)
ECHO LV MASS 2D: 173.8 G (ref 88–224)
ECHO LV MASS INDEX 2D: 84 G/M2 (ref 49–115)
ECHO LV POSTERIOR WALL DIASTOLIC: 1.04 CM (ref 0.6–1)
ECHO MV A VELOCITY: 79.38 CM/S
ECHO MV AREA PHT: 3.55 CM2
ECHO MV E DECELERATION TIME (DT): 213.5 MS
ECHO MV E VELOCITY: 89.71 CM/S
ECHO MV E/A RATIO: 1.13
ECHO MV E/E' LATERAL: 9.62
ECHO MV E/E' RATIO (AVERAGED): 11.05
ECHO MV E/E' SEPTAL: 12.48
ECHO MV PRESSURE HALF TIME (PHT): 61.92 MS
LA VOL DISK BP: 43.67 ML (ref 18–58)

## 2021-09-16 PROCEDURE — 93308 TTE F-UP OR LMTD: CPT | Performed by: SPECIALIST

## 2021-09-16 PROCEDURE — 93321 DOPPLER ECHO F-UP/LMTD STD: CPT | Performed by: SPECIALIST

## 2021-09-16 PROCEDURE — 93325 DOPPLER ECHO COLOR FLOW MAPG: CPT | Performed by: SPECIALIST

## 2021-09-16 NOTE — TELEPHONE ENCOUNTER
Mariana Carmona NP  You 11 minutes ago (10:42 AM)   LP  Please arrange limited echo to rule out effusion and pericarditis. Can resume rehab as tolerated   Thanks      Called patient, ID verified using two patient identifiers. Advised patient that per LOI Chawla NP she recommends he have a limited Echo to evaluate for any pericardial effusion or pericarditis that may be causing his CP. Patient agreeable to this plan. Scheduled for Echo @ 2pm today. Patient aware of time and location of test. Also advised him that per LOI Chawla NP he can resume cardiac rehab as tolerated.

## 2021-09-16 NOTE — TELEPHONE ENCOUNTER
Returned call, ID verified using two patient identifiers. S/p AFib ablation on 9/9/21. Patient c/o constant burning and pain in chest since his ablation. He has tried Tylenol and warm compresses with slight relief. Denies any SOB,fatigue,  dizziness, palpitations or nausea. No BP readings given HR- in the 70's @ rest. He is taking his Protonix daily as prescribed. He also needs recommendations on whether to resume his cardiac rehab on Monday. His rehab consists of using the treadmill, elliptical and bide with some upper body/arm exercises. Advise patient that I would relay all information to Dr. Lore Denton. Lucina Degroot NP for review. Patient agreeable to this plan.

## 2021-09-16 NOTE — TELEPHONE ENCOUNTER
Patient said that he had an ablation and he is having some mild burning sensation and some slight pain in his chest.He also would like to know if he should resume his cardiac rehab on Monday. He would like to know if these symptoms are normal to be experiencing. Please give a call back.       Phone [phone]

## 2021-09-21 NOTE — TELEPHONE ENCOUNTER
Called patient , ID verified using two patient identifiers. Informed patient that per LOI Nieves NP his Echo was normal. He states he has resumed his cardiac rehab without any difficulty. He is having some intermittent chest discomfort but feels that it is part of the healing process.  States he feels fine this am.     Future Appointments   Date Time Provider Latrice Peters   9/27/2021  3:00 PM BRIAN Flynn BS AMB   2/3/2022  9:45 AM Farida Johnson [facility] BS AMB

## 2021-09-23 ENCOUNTER — OFFICE VISIT (OUTPATIENT)
Dept: FAMILY MEDICINE CLINIC | Age: 66
End: 2021-09-23
Payer: MEDICARE

## 2021-09-23 VITALS
SYSTOLIC BLOOD PRESSURE: 126 MMHG | OXYGEN SATURATION: 98 % | BODY MASS INDEX: 30.21 KG/M2 | HEART RATE: 73 BPM | WEIGHT: 204 LBS | DIASTOLIC BLOOD PRESSURE: 75 MMHG | HEIGHT: 69 IN | TEMPERATURE: 97.7 F

## 2021-09-23 DIAGNOSIS — I48.91 ATRIAL FIBRILLATION, UNSPECIFIED TYPE (HCC): ICD-10-CM

## 2021-09-23 DIAGNOSIS — I25.10 CORONARY ARTERIOSCLEROSIS: Primary | ICD-10-CM

## 2021-09-23 DIAGNOSIS — E78.2 MIXED HYPERLIPIDEMIA: ICD-10-CM

## 2021-09-23 PROCEDURE — G8427 DOCREV CUR MEDS BY ELIG CLIN: HCPCS | Performed by: FAMILY MEDICINE

## 2021-09-23 PROCEDURE — 3017F COLORECTAL CA SCREEN DOC REV: CPT | Performed by: FAMILY MEDICINE

## 2021-09-23 PROCEDURE — 1101F PT FALLS ASSESS-DOCD LE1/YR: CPT | Performed by: FAMILY MEDICINE

## 2021-09-23 PROCEDURE — G8510 SCR DEP NEG, NO PLAN REQD: HCPCS | Performed by: FAMILY MEDICINE

## 2021-09-23 PROCEDURE — 99213 OFFICE O/P EST LOW 20 MIN: CPT | Performed by: FAMILY MEDICINE

## 2021-09-23 PROCEDURE — G8417 CALC BMI ABV UP PARAM F/U: HCPCS | Performed by: FAMILY MEDICINE

## 2021-09-23 PROCEDURE — G8536 NO DOC ELDER MAL SCRN: HCPCS | Performed by: FAMILY MEDICINE

## 2021-09-23 NOTE — PROGRESS NOTES
1. Have you been to the ER, urgent care clinic since your last visit? Hospitalized since your last visit? No    2. Have you seen or consulted any other health care providers outside of the [address] [place] [address] since your last visit? Include any pap smears or colon screening. No    Chief Complaint   Patient presents with    Coronary Artery Disease     fu from cardiac ablation on 9/9/21. Has cardio fu next week.       Visit Vitals  /75 (BP 1 Location: Left upper arm, BP Patient Position: Sitting)   Pulse 73   Temp 97.7 °F (36.5 °C) (Temporal)   Ht 5' 9\" (1.753 m)   Wt 204 lb (92.5 kg)   SpO2 98%   BMI 30.13 kg/m²

## 2021-09-23 NOTE — PATIENT INSTRUCTIONS
General Health and concerns:  HEART HEALTHY DIET:  A heart healthy diet is one that is low in cholesterol (less than 300 mg daily), fat (less than 80 g daily) . You should also minimize carbohydrates / sugars (less amounts of breads, pastas, potato and potato products and sugary foods/snacks, cookies, cakes, etc) . Try to eat whole wheat/multigrain breads and pastas and eat more vegetables. Cook with olive oil (or no oil) and grill, bake, broil or boil foods. Less red meat and more chicken , fish and lean cuts of beef (limited). 5950-3704 calories per day is sufficient 2646-0852 is acceptable for weight loss. EXERCISE:  You should do exercise 3-5 days per week (minimum) to include increasing your heart rate for 30 to 45 minutes. At least a pace of a brisk walk should do that. This build up your heart and lung endurance and muscles and helps many function of the body. OTHER:    IF your condition(s) do not improve, get worse and/or if any concerns arise, please call or come by the office. Routine Health maintenance: You need to get a yearly follow up/physical exam to review, discuss age and gender appropriate exams, labs, vaccines and screening tests. This includes cardiovascular health risk, cancer screens and other bernie related topics. Medications-Take all medications as directed. Please do not stop unless you talk to your doctor or health care provider first. Report any problems immediately. PLEASE CHECK THE LIST FROM TODAY's VISIT and make SURE IT IS ACCURATE-Please bring any issues to our concern IMMEDIATELY!! Referrals: if you have been given a referral, please call the office if you do not hear from provider in one week. You may make the appointment yourself. Please keep all appointments with specialists and ask them to send their notes, thoughts, recommendations to us , as your PCP.     KEEP all upcoming appointments with our office UNLESS otherwise and specifically told not to. CHECK your diagnosis/problem list for today and that orders and prescriptions are what we discussed as well as MAKE sure all information is accurate and has been discussed to your satisfaction. PLEASE make sure all your questions have been answered and feel free to call or come back should any concerns arise. Imaging/Labs:  Be sure to get these images in a timely manner. IF your test must be scheduled, let us know if you need help getting this done and if you do not hear from that provider in a week , call us or them. BE SURE to call the office if you do not hear regarding the results in one week after the test is performed Image or lab). It is our intention to inform you of the results ALWAYS, even if normal you should get a notification (Call, portal message). PLEASE melecio if you do not get the results. PLEASE follow all recommendations and call/come in /ask questions if you do not understand of if problems develop after or in between visits. Failure to comply with recommended health care advise could result in serious health consequences. Thank you for choosing our practice and please let us know how we can help you feel better and stay well!

## 2021-09-23 NOTE — PROGRESS NOTES
IDENTIFYING INFORMATION:      Claude Knife , 77 y.o., male  [address] [facility] [address] [facility],  [address]  [address] [facility]     Medical Record Number: [id number]          CHIEF COMPLAINT:     Chief Complaint   Patient presents with    Coronary Artery Disease     fu from cardiac ablation on 9/9/21. Has cardio fu next week. HISTORY OF PRESENT ILLNESS:    Claude Knife is a 77 y.o. male  has a past medical history of A-fib ([facility] [address] [facility] CAD (coronary artery disease) (06/15/2018), Coronary arteriosclerosis (8/7/2020), GERD (gastroesophageal reflux disease), H/O seasonal allergies, High cholesterol, cardiac arrest, Hyperlipidemia (8/7/2020), Hypertension, Ill-defined condition, Kidney stone, [facility] ROQUE on CPAP, and Vitamin D deficiency (8/7/2020). .  he comes in for follow up form ablation on 9-9-21. He has some heart burn feeling but not chest pain proper. NO orthopnea, no pnd, no leg pain, no edema. Feels much better and going to cardiac rehab. His  Energy is going up. He feels better overall. PAST MEDICAL HISTORY:     Past Medical History:   Diagnosis Date    A-fib [facility])     CAD (coronary artery disease) 06/15/2018    Coronary arteriosclerosis 8/7/2020    GERD (gastroesophageal reflux disease)     H/O seasonal allergies     High cholesterol     Hx of cardiac arrest     VT arrest     Hyperlipidemia 8/7/2020    Hypertension     Ill-defined condition     Urinary urgency    Kidney stone     MI (myocardial infarction) ([facility]     ROQUE on CPAP     2/2/21 pt reports does not use cpap each night, only a few times per week    Vitamin D deficiency 8/7/2020       MEDICATIONS:     Current Outpatient Medications on File Prior to Visit   Medication Sig Dispense Refill    pantoprazole (PROTONIX) 40 mg tablet Take 1 Tablet by mouth daily for 30 days. 30 Tablet 0    aspirin 81 mg chewable tablet Take 81 mg by mouth daily.       butalbital-aspirin-caffeine (FIORINAL) capsule Take 1 Capsule by mouth every four (4) hours as needed for Headache.  clopidogreL (Plavix) 75 mg tab Take 75 mg by mouth.  LORazepam (ATIVAN) 0.5 mg tablet Take 0.5 mg by mouth every four (4) hours as needed for Anxiety.  polyethylene glycol (Miralax) 17 gram/dose powder Take 17 g by mouth daily.  acetaminophen (TYLENOL) 325 mg tablet Take 2 Tablets by mouth every six (6) hours as needed for Pain. 60 Tablet 0    amLODIPine (Norvasc) 2.5 mg tablet Take 1 Tablet by mouth nightly. 30 Tablet 2    Vitamin D3 25 mcg (1,000 unit) tablet Take 2 Tablets by mouth daily.  apixaban (Eliquis) 5 mg tablet Take 5 mg by mouth two (2) times a day.  tolterodine (DETROL) 2 mg tablet Take 2 mg by mouth two (2) times a day.  evolocumab (Repatha SureClick) pen injection 269 mg by SubCUTAneous route every fourteen (14) days.  lidocaine (LIDODERM) 5 % by TransDERmal route as needed. Apply patch to the affected area for 12 hours a day and remove for 12 hours a day.  amLODIPine (NORVASC) 5 mg tablet Take 5 mg by mouth daily. Every am      [DISCONTINUED] isosorbide mononitrate ER (IMDUR) 30 mg tablet Take 1 Tablet by mouth daily. (Patient not taking: Reported on 9/9/2021) 90 Tablet 1     No current facility-administered medications on file prior to visit.        ALLERGIES:    Allergies   Allergen Reactions    Cefuroxime Unknown (comments)     abd pain    Ibuprofen Other (comments)     Patient states NSAIDS/ ibuprofen upsets his stomach    Statins-Hmg-Coa Reductase Inhibitors Other (comments)     Extremity weakness         SOCIAL HISTORY:     Social History     Tobacco Use    Smoking status: Never Smoker    Smokeless tobacco: Never Used   Vaping Use    Vaping Use: Never used   Substance Use Topics    Alcohol use: No    Drug use: No       SURGICAL HISTORY:    Past Surgical History:   Procedure Laterality Date    COLONOSCOPY N/A 11/12/2018    COLONOSCOPY performed by Ash Rosales MD at [place] [address] HX COLONOSCOPY  2018    HX COLONOSCOPY      HX ENDOSCOPY      HX HEART CATHETERIZATION  2018    Cardiac cath Stents x2    HX HEART CATHETERIZATION  02/2020    \"patent stent obtuse marginal 1. significant disease noted in branch artery of RCA. THis artery appears to be borderline for any kind of intervention\"    HX LAP CHOLECYSTECTOMY  2005    HX LUMBAR DISKECTOMY      L4-L4    HX ORTHOPAEDIC  2006    Lumbar laminectomy    HX PACEMAKER      linq monitor    VT CARDIAC SURG PROCEDURE UNLIST  09/09/2021    cardiac ablation        FAMILY HISTORY:    Family History   Problem Relation Age of Onset    Diabetes Mother     Stroke Father     Hypertension Father          REVIEW OF SYSTEMS:    I personally collected this information from all available source present (patient/others in room and records available) -[email or web address]    Review of Systems   Constitutional: Negative for chills, diaphoresis and fever. Respiratory: Negative for cough, shortness of breath and wheezing. Cardiovascular: Negative for chest pain, orthopnea, leg swelling and PND. Gastrointestinal: Positive for heartburn. Negative for abdominal pain, constipation, diarrhea, nausea and vomiting. Genitourinary: Positive for frequency (his normal, has BPH). Negative for dysuria and urgency. Musculoskeletal: Negative for back pain, joint pain, myalgias and neck pain. Neurological: Negative for dizziness and headaches.            PHYSICAL EXAMINATION:    Vital Signs:    Visit Vitals  /75 (BP 1 Location: Left upper arm, BP Patient Position: Sitting)   Pulse 73   Temp 97.7 °F (36.5 °C) (Temporal)   Ht 5' 9\" (1.753 m)   Wt 204 lb (92.5 kg)   SpO2 98%   BMI 30.13 kg/m²         Wt Readings from Last 3 Encounters:   09/23/21 204 lb (92.5 kg)   09/16/21 201 lb (91.2 kg)   09/09/21 201 lb (91.2 kg)     BP Readings from Last 3 Encounters:   09/23/21 126/75   09/16/21 122/70   09/09/21 124/72         Physical Exam  Constitutional:       Appearance: He is not ill-appearing or toxic-appearing. Eyes:      General: No scleral icterus. Extraocular Movements: Extraocular movements intact. Conjunctiva/sclera: Conjunctivae normal.   Cardiovascular:      Rate and Rhythm: Normal rate and regular rhythm. Heart sounds: No murmur heard. No friction rub. No gallop. Pulmonary:      Effort: Pulmonary effort is normal.      Breath sounds: Normal breath sounds. No wheezing, rhonchi or rales. Musculoskeletal:         General: No deformity. Cervical back: No tenderness. Right lower leg: No edema. Left lower leg: No edema. Lymphadenopathy:      Cervical: No cervical adenopathy. Skin:     Coloration: Skin is not jaundiced. Findings: No erythema or rash. Neurological:      General: No focal deficit present. Mental Status: He is alert and oriented to person, place, and time. Mental status is at baseline. Psychiatric:         Mood and Affect: Mood normal.         Behavior: Behavior normal.         Thought Content: Thought content normal.         Judgment: Judgment normal.           ASSESSMENT/PLAN:       Continue medications as reviewed   Continue cardiology care    ICD-10-CM ICD-9-CM    1. Coronary arteriosclerosis  I25.10 414.00    2. Mixed hyperlipidemia  E78.2 272.2    3. Atrial fibrillation, unspecified type [facility])  I48.91 427.31      Discussion (regarding today's visit with Sarahi Alexandre);   WE reviewed medications, treatment, testing such as labs, imagine, referrals and when to call regarding results and appointments.  Reminded patient to keep any and all appointments with specialists, labs, imaging.  Reminded patient to make sure we get copies of any specialists care, labs and imaging.  Reminded patient to call of come by the office if there are any concerns, questions , comments or problems.      The patient verbalized understanding of the care plan and all questions were answered to the patient's satisfaction prior to leaving the office.  The patient was told that failure to comply with recommended testing could result in abnormal health consequences.  The patient was instructed to have yearly routine health maintenance including but not limited to age appropriate vaccines, testing, screening exams.  ALL questions were answered to his satisfaction before leaving the office. The patient actively participated in medical decision making. FOLLOW UP:     Patient knows to keep any and all future visits scheduled unless told otherwise. Patient knows to call, come back if any concerns, questions, comments or problems arise. Follow-up and Dispositions    · Return if symptoms worsen or fail to improve. Sue Watts DO    This visit was reviewed and signed electronically. It was been completed with voice recognition software and hand typing. It may have syntax and spelling errors despite editing.

## 2021-09-27 ENCOUNTER — OFFICE VISIT (OUTPATIENT)
Dept: CARDIOLOGY CLINIC | Age: 66
End: 2021-09-27

## 2021-09-27 ENCOUNTER — OFFICE VISIT (OUTPATIENT)
Dept: CARDIOLOGY CLINIC | Age: 66
End: 2021-09-27
Payer: MEDICARE

## 2021-09-27 VITALS
RESPIRATION RATE: 18 BRPM | SYSTOLIC BLOOD PRESSURE: 112 MMHG | OXYGEN SATURATION: 98 % | HEART RATE: 74 BPM | WEIGHT: 204 LBS | HEIGHT: 69 IN | BODY MASS INDEX: 30.21 KG/M2 | DIASTOLIC BLOOD PRESSURE: 74 MMHG

## 2021-09-27 DIAGNOSIS — Z98.890 S/P ABLATION OF ATRIAL FIBRILLATION: Primary | ICD-10-CM

## 2021-09-27 DIAGNOSIS — Z86.79 S/P ABLATION OF ATRIAL FIBRILLATION: Primary | ICD-10-CM

## 2021-09-27 DIAGNOSIS — Z95.818 STATUS POST PLACEMENT OF IMPLANTABLE LOOP RECORDER: Primary | ICD-10-CM

## 2021-09-27 DIAGNOSIS — I48.91 ATRIAL FIBRILLATION, UNSPECIFIED TYPE (HCC): ICD-10-CM

## 2021-09-27 DIAGNOSIS — I48.0 PAROXYSMAL ATRIAL FIBRILLATION (HCC): ICD-10-CM

## 2021-09-27 PROCEDURE — G0463 HOSPITAL OUTPT CLINIC VISIT: HCPCS | Performed by: NURSE PRACTITIONER

## 2021-09-27 PROCEDURE — G8427 DOCREV CUR MEDS BY ELIG CLIN: HCPCS | Performed by: NURSE PRACTITIONER

## 2021-09-27 PROCEDURE — 3017F COLORECTAL CA SCREEN DOC REV: CPT | Performed by: NURSE PRACTITIONER

## 2021-09-27 PROCEDURE — G8536 NO DOC ELDER MAL SCRN: HCPCS | Performed by: NURSE PRACTITIONER

## 2021-09-27 PROCEDURE — G8432 DEP SCR NOT DOC, RNG: HCPCS | Performed by: NURSE PRACTITIONER

## 2021-09-27 PROCEDURE — G8417 CALC BMI ABV UP PARAM F/U: HCPCS | Performed by: NURSE PRACTITIONER

## 2021-09-27 PROCEDURE — 93010 ELECTROCARDIOGRAM REPORT: CPT | Performed by: NURSE PRACTITIONER

## 2021-09-27 PROCEDURE — 1101F PT FALLS ASSESS-DOCD LE1/YR: CPT | Performed by: NURSE PRACTITIONER

## 2021-09-27 PROCEDURE — 99215 OFFICE O/P EST HI 40 MIN: CPT | Performed by: NURSE PRACTITIONER

## 2021-09-27 PROCEDURE — 93005 ELECTROCARDIOGRAM TRACING: CPT | Performed by: NURSE PRACTITIONER

## 2021-09-27 RX ORDER — BISMUTH SUBSALICYLATE 262 MG
1 TABLET,CHEWABLE ORAL DAILY
COMMUNITY

## 2021-09-27 NOTE — PROGRESS NOTES
Room #: 5    Having some chest discomfort. Worse during cardiac rehab. He brought report from [facility]. Visit Vitals  /74 (BP 1 Location: Left upper arm, BP Patient Position: Sitting, BP Cuff Size: Adult)   Pulse 74   Resp 18   Ht 5' 9\" (1.753 m)   Wt 204 lb (92.5 kg)   SpO2 98%   BMI 30.13 kg/m²         Chest pain:  YES  Shortness of breath:  NO  Edema: NO  Palpitations, skipped beats, rapid heartbeat:  NO  Dizziness:  NO    1. Have you been to the ER, urgent care clinic since your last visit? Hospitalized since your last visit? No    2. Have you seen or consulted any other health care providers outside of the [address] [place] [address] since your last visit? Include any pap smears or colon screening.  No      Refills:  NO

## 2021-09-27 NOTE — PROGRESS NOTES
HISTORY OF PRESENTING ILLNESS      Patrick Zelaya is a 77 y.o. male  with CAD, GERD, ROQUE who experienced chest pain in June 2018 and underwent coronary revascularization for obstructive CAD. Just prior to undergoing PCI he was noted to have cardiac arrest.  He has not experienced recurrent episodes of VT since undergoing PCI. He underwent repeat nuclear imaging and heart catheterization which demonstrated stable CAD. He has been noted to have ventricular bigeminy and PVCs on monitoring. He underwent a stress test with observation of ventricular bigeminy during the exercise portion of the stress test.  He was highly intolerant of beta-blockers in the past. He was recently hospitalized with palpitations and was found to have AF with RVR that required amiodarone for control. He underwent AF ablation on 9/9/2021 and has continued Eliquis. His Amiodarone was discontinued. He experienced chest pain post procedure. Echocardiogram showed normal functioning without effusion. He has improved progressively however continues to have transient chest pain, exacerbated with increased activity/lifting. He also experienced some breakthrough AF with RVR during cardiac rehab this week while trying to increase his activity level.         PAST MEDICAL HISTORY     Past Medical History:   Diagnosis Date    A-fib [facility])     CAD (coronary artery disease) 06/15/2018    Coronary arteriosclerosis 8/7/2020    GERD (gastroesophageal reflux disease)     H/O seasonal allergies     High cholesterol     Hx of cardiac arrest     VT arrest     Hyperlipidemia 8/7/2020    Hypertension     Ill-defined condition     Urinary urgency    Kidney stone     MI [facility]myocardial infarction) ([facility] [address]     ROQUE on CPAP     2/2/21 pt reports does not use cpap each night, only a few times per week    Vitamin D deficiency 8/7/2020           PAST SURGICAL HISTORY     Past Surgical History:   Procedure Laterality Date    COLONOSCOPY N/A 11/12/2018    COLONOSCOPY performed by Hudson Landrum MD at [facility] HX COLONOSCOPY  2018    HX COLONOSCOPY      HX ENDOSCOPY      HX HEART CATHETERIZATION  2018    Cardiac cath Stents x2    HX HEART CATHETERIZATION  02/2020    \"patent stent obtuse marginal 1. significant disease noted in branch artery of RCA. THis artery appears to be borderline for any kind of intervention\"    HX LAP CHOLECYSTECTOMY  2005    HX LUMBAR DISKECTOMY      L4-L4    HX ORTHOPAEDIC  2006    Lumbar laminectomy    HX PACEMAKER      linq monitor    [place] CARDIAC SURG PROCEDURE UNLIST  09/09/2021    cardiac ablation          ALLERGIES     Allergies   Allergen Reactions    Cefuroxime Unknown (comments)     abd pain    Ibuprofen Other (comments)     Patient states NSAIDS/ ibuprofen upsets his stomach    Statins-Hmg-Coa Reductase Inhibitors Other (comments)     Extremity weakness          FAMILY HISTORY     Family History   Problem Relation Age of Onset    Diabetes Mother    Larance Rylie Stroke Father     Hypertension Father     negative for cardiac disease       SOCIAL HISTORY     Social History     Socioeconomic History    Marital status:      Spouse name: Not on file    Number of children: Not on file    Years of education: Not on file    Highest education level: Not on file   Tobacco Use    Smoking status: Never Smoker    Smokeless tobacco: Never Used   Vaping Use    Vaping Use: Never used   Substance and Sexual Activity    Alcohol use: No    Drug use: No    Sexual activity: Yes     Partners: Female     Social Determinants of Health     Financial Resource Strain:     Difficulty of Paying Living Expenses:    Food Insecurity:     Worried About Running Out of Food in the Last Year:     Ran Out of Food in the Last Year:    Transportation Needs:     Lack of Transportation (Medical):      Lack of Transportation (Non-Medical):    Physical Activity:     Days of Exercise per Week:     Minutes of Exercise per Session: Stress:     Feeling of Stress :    Social Connections:     Frequency of Communication with Friends and Family:     Frequency of Social Gatherings with Friends and Family:     Attends Buddhist Services:     Active Member of Clubs or Organizations:     Attends Club or Organization Meetings:     Marital Status:          MEDICATIONS     Current Outpatient Medications   Medication Sig    multivitamin (ONE A DAY) tablet Take 1 Tablet by mouth daily.  pantoprazole (PROTONIX) 40 mg tablet Take 1 Tablet by mouth daily for 30 days.  butalbital-aspirin-caffeine (FIORINAL) capsule Take 1 Capsule by mouth every four (4) hours as needed for Headache.  clopidogreL (Plavix) 75 mg tab Take 75 mg by mouth.  LORazepam (ATIVAN) 0.5 mg tablet Take 0.5 mg by mouth every four (4) hours as needed for Anxiety.  polyethylene glycol (Miralax) 17 gram/dose powder Take 17 g by mouth daily.  acetaminophen (TYLENOL) 325 mg tablet Take 2 Tablets by mouth every six (6) hours as needed for Pain.  amLODIPine (Norvasc) 2.5 mg tablet Take 1 Tablet by mouth nightly.  Vitamin D3 25 mcg (1,000 unit) tablet Take 2 Tablets by mouth daily.  apixaban (Eliquis) 5 mg tablet Take 5 mg by mouth two (2) times a day.  evolocumab (Repatha SureClick) pen injection 848 mg by SubCUTAneous route every fourteen (14) days.  lidocaine (LIDODERM) 5 % by TransDERmal route as needed. Apply patch to the affected area for 12 hours a day and remove for 12 hours a day.  amLODIPine (NORVASC) 5 mg tablet Take 5 mg by mouth daily. Every am    aspirin 81 mg chewable tablet Take 81 mg by mouth daily. (Patient not taking: Reported on 9/27/2021)    tolterodine (DETROL) 2 mg tablet Take 2 mg by mouth two (2) times a day. (Patient not taking: Reported on 9/27/2021)     No current facility-administered medications for this visit.        I have reviewed the nurses notes, vitals, problem list, allergy list, medical history, family, social history and medications. REVIEW OF SYMPTOMS      General: Pt denies excessive weight gain or loss. Pt is able to conduct ADL's  HEENT: Denies blurred vision, headaches, hearing loss, epistaxis and difficulty swallowing. Respiratory: Denies cough, congestion, shortness of breath, RDZ, wheezing or stridor. Cardiovascular: +chest pain, Denies precordial pain, palpitations, edema or PND  Gastrointestinal: Denies poor appetite, indigestion, abdominal pain or blood in stool  Genitourinary: Denies hematuria, dysuria, increased urinary frequency  Musculoskeletal: Denies joint pain or swelling from muscles or joints  Neurologic: Denies tremor, paresthesias, headache, or sensory motor disturbance  Psychiatric: Denies confusion, insomnia, depression  Integumentray: Denies rash, itching or ulcers. Hematologic: Denies easy bruising, bleeding       PHYSICAL EXAMINATION      Vitals: see vitals section  General: Well developed, in no acute distress. HEENT: No jaundice, oral mucosa moist, no oral ulcers  Neck: Supple, no stiffness, no lymphadenopathy, supple  Heart:  Normal S1/S2 negative S3 or S4. Regular, no murmur, gallop or rub, no jugular venous distention  Respiratory: Clear bilaterally x 4, no wheezing or rales  Abdomen:   Soft, non-tender, bowel sounds are active. Extremities:  No edema, normal cap refill, no cyanosis. Musculoskeletal: No clubbing, no deformities  Neuro: A&Ox3, speech clear, gait stable, cooperative, no focal neurologic deficits  Skin: Skin color is normal. No rashes or lesions.  Non diaphoretic, moist.  Vascular: 2+ pulses symmetric in all extremities       DIAGNOSTIC DATA      EKG: Sinus rhythm    Visit Vitals  /74 (BP 1 Location: Left upper arm, BP Patient Position: Sitting, BP Cuff Size: Adult)   Pulse 74   Resp 18   Ht 5' 9\" (1.753 m)   Wt 204 lb (92.5 kg)   SpO2 98%   BMI 30.13 kg/m²        LABORATORY DATA      Lab Results   Component Value Date/Time    WBC 7.3 09/03/2021 11:43 AM HGB 13.7 09/03/2021 11:43 AM    HCT 43.0 09/03/2021 11:43 AM    PLATELET 098 75/48/0769 11:43 AM    MCV 93 09/03/2021 11:43 AM      Lab Results   Component Value Date/Time    Sodium 139 09/03/2021 11:43 AM    Potassium 4.5 09/03/2021 11:43 AM    Chloride 103 09/03/2021 11:43 AM    CO2 26 09/03/2021 11:43 AM    Anion gap 8 08/16/2021 03:00 AM    Glucose 81 09/03/2021 11:43 AM    BUN 11 09/03/2021 11:43 AM    Creatinine 1.31 (H) 09/03/2021 11:43 AM    BUN/Creatinine ratio 8 (L) 09/03/2021 11:43 AM    GFR est AA 65 09/03/2021 11:43 AM    GFR est non-AA 56 (L) 09/03/2021 11:43 AM    Calcium 9.0 09/03/2021 11:43 AM    Bilirubin, total <0.2 08/24/2021 10:30 AM    Alk. phosphatase 57 08/24/2021 10:30 AM    Protein, total 6.9 08/24/2021 10:30 AM    Albumin 4.3 08/24/2021 10:30 AM    Globulin 3.7 08/16/2021 03:00 AM    A-G Ratio 1.7 08/24/2021 10:30 AM    ALT (SGPT) 18 08/24/2021 10:30 AM           ASSESSMENT      1. Atrial fibrillation   A. Symptomatic   B. Paroxysmal   C. ILR  2. Cardiac arrest Hx  3. CAD, native  4. Percutaneous coronary transluminal angioplasty  5. ROQUE on CPAP  6. Dizziness/lightheadedness       PLAN     His ILR was interrogated today and failed to show any AF. Suspect that his chest pain is not AF related, seems to be improving since his procedure with a normal echocardiogram. He admits to drinking little to no water; discussed the importance of hydration for overall health and specifically for AF health/prevention of recurrence and advised him to adequately hydrate- especially prior to cardiac rehab. Advised him to follow up with Dr. Fredi Budrick for continued chest discomfort. Follow up in 3 months with Dr. Nanci Cartagena.         BRIAN [facility] [address].  [address] [facility], [address] [place] [address], [address] [place], [place] [address]ad[facility]ane[facility]  [phone] / [phone] Fax   [phone] / [phone] Fax

## 2021-09-27 NOTE — LETTER
9/27/2021    Patient: Jesus Jiménez   YOB: 1955   Date of Visit: 9/27/2021     Trino Uriostegui DO  [address]  [address] [facility] [address]    Dear Trino Uriostegui DO,      Thank you for referring Mr. Edilson Horne to [facility] for evaluation. My notes for this consultation are attached. If you have questions, please do not hesitate to call me. I look forward to following your patient along with you.       Sincerely,    Christine Quintana NP

## 2021-09-28 NOTE — PROGRESS NOTES
Progress note, EKG and ILR check faxed to [facility], confirmation received.

## 2021-09-30 LAB — SARS-COV-2, NAA: NEGATIVE

## 2021-11-03 ENCOUNTER — TELEPHONE (OUTPATIENT)
Dept: FAMILY MEDICINE CLINIC | Age: 66
End: 2021-11-03

## 2021-11-03 NOTE — TELEPHONE ENCOUNTER
----- Message from [facility] sent at 11/3/2021 11:40 AM EDT -----  Subject: Message to Provider    QUESTIONS  Information for Provider? Pt was returning call from office to schedule   appt. I tried office several times it would ring then go busy. Please call   pt back  ---------------------------------------------------------------------------  --------------  CALL BACK INFO  What is the best way for the office to contact you? OK to leave message on   voicemail  Preferred Call Back Phone Number?  [phone]  ---------------------------------------------------------------------------  --------------  SCRIPT ANSWERS  undefined

## 2021-11-10 ENCOUNTER — OFFICE VISIT (OUTPATIENT)
Dept: FAMILY MEDICINE CLINIC | Age: 66
End: 2021-11-10
Payer: MEDICARE

## 2021-11-10 VITALS
HEIGHT: 69 IN | OXYGEN SATURATION: 97 % | RESPIRATION RATE: 18 BRPM | HEART RATE: 69 BPM | SYSTOLIC BLOOD PRESSURE: 118 MMHG | BODY MASS INDEX: 30.07 KG/M2 | TEMPERATURE: 98.2 F | WEIGHT: 203 LBS | DIASTOLIC BLOOD PRESSURE: 70 MMHG

## 2021-11-10 DIAGNOSIS — G89.29 CHRONIC LEFT-SIDED LOW BACK PAIN WITH LEFT-SIDED SCIATICA: Primary | ICD-10-CM

## 2021-11-10 DIAGNOSIS — M54.42 CHRONIC LEFT-SIDED LOW BACK PAIN WITH LEFT-SIDED SCIATICA: Primary | ICD-10-CM

## 2021-11-10 PROCEDURE — G8432 DEP SCR NOT DOC, RNG: HCPCS | Performed by: NURSE PRACTITIONER

## 2021-11-10 PROCEDURE — G8752 SYS BP LESS 140: HCPCS | Performed by: NURSE PRACTITIONER

## 2021-11-10 PROCEDURE — 99213 OFFICE O/P EST LOW 20 MIN: CPT | Performed by: NURSE PRACTITIONER

## 2021-11-10 PROCEDURE — G8427 DOCREV CUR MEDS BY ELIG CLIN: HCPCS | Performed by: NURSE PRACTITIONER

## 2021-11-10 PROCEDURE — G8536 NO DOC ELDER MAL SCRN: HCPCS | Performed by: NURSE PRACTITIONER

## 2021-11-10 PROCEDURE — G8754 DIAS BP LESS 90: HCPCS | Performed by: NURSE PRACTITIONER

## 2021-11-10 PROCEDURE — 3017F COLORECTAL CA SCREEN DOC REV: CPT | Performed by: NURSE PRACTITIONER

## 2021-11-10 PROCEDURE — G8417 CALC BMI ABV UP PARAM F/U: HCPCS | Performed by: NURSE PRACTITIONER

## 2021-11-10 PROCEDURE — 1101F PT FALLS ASSESS-DOCD LE1/YR: CPT | Performed by: NURSE PRACTITIONER

## 2021-11-10 NOTE — PROGRESS NOTES
Chief Complaint   Patient presents with    Back Pain     going on for a while     Visit Vitals  /70 (BP 1 Location: Left upper arm, BP Patient Position: Sitting, BP Cuff Size: Adult)   Pulse 69   Temp 98.2 °F (36.8 °C) (Temporal)   Resp 18   Ht 5' 9\" (1.753 m)   Wt 203 lb (92.1 kg)   SpO2 97%   BMI 29.98 kg/m²     Hayden Luna is a 77 y.o. male. HPI   Patient presented with complaints of a flareup of back pain which he says has been going on for some time. He is new to me. Review of his record indicated that he has an extensive history of cardiac disease. He stated he cannot take prednisone or NSAIDs due to his chronic cardiac disease. He stated that he has had a heart attack and he has 3 stents and after the third stent he did cardiac rehab. He also had an ablation for atrial fibrillation by Dr. Cintia Kimball. He stated he had back surgery in 2005 and every once in a while his back will flareup. He stated that the pain is worse in the a.m. and if he bends over he has difficulty getting up. He also has dull pain in his outer left leg since his back is acting up.       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    Stress and adjustment reaction F43.29    Testosterone deficiency E34.9     Past Medical History:   Diagnosis Date    A-fib [facility])     Acute coronary syndrome ([facility] [address] 11/19/2021    Atypical chest pain 11/19/2021    CAD (coronary artery disease) 06/15/2018    Coronary arteriosclerosis 8/7/2020    Gastric ulcer 12/14/2018    GERD (gastroesophageal reflux disease)     H/O seasonal allergies     High cholesterol     Hx of cardiac arrest     VT arrest     Hyperlipidemia 8/7/2020    Hypertension     Ill-defined condition     Urinary urgency    Kidney stone     MI (myocardial infarction) ([facility] [address]     ROQUE on CPAP     2/2/21 pt reports does not use cpap each night, only a few times per week    Torsades de pointes ([address] 11/19/2021    Ventricular fibrillation ([address] 11/19/2021    Ventricular premature beats 11/19/2021    Ventricular tachycardia ([facility] [address] 11/19/2021    Vitamin D deficiency 8/7/2020     Past Surgical History:   Procedure Laterality Date    COLONOSCOPY N/A 11/12/2018    COLONOSCOPY performed by Simran Willis MD at [facility] HX COLONOSCOPY  2018    HX COLONOSCOPY      HX ENDOSCOPY      HX HEART CATHETERIZATION  2018    Cardiac cath Stents x2    HX HEART CATHETERIZATION  02/2020    \"patent stent obtuse marginal 1. significant disease noted in branch artery of RCA. THis artery appears to be borderline for any kind of intervention\"    HX LAP CHOLECYSTECTOMY  2005    HX LUMBAR DISKECTOMY      L4-L4    HX ORTHOPAEDIC  2006    Lumbar laminectomy    HX PACEMAKER      linq monitor    [place] CARDIAC SURG PROCEDURE UNLIST  09/09/2021    cardiac ablation     Current Outpatient Medications   Medication Instructions    acetaminophen (TYLENOL) 650 mg, Oral, EVERY 6 HOURS AS NEEDED    amLODIPine (NORVASC) 5 mg, Oral, DAILY, Every am    amLODIPine (NORVASC) 2.5 mg, Oral, EVERY BEDTIME    apixaban (ELIQUIS) 5 mg, Oral, 2 TIMES DAILY    butalbital-aspirin-caffeine (FIORINAL) capsule 1 Capsule, Oral, EVERY 4 HOURS AS NEEDED    clopidogreL (PLAVIX) 75 mg, Oral    lidocaine (LIDODERM) 5 % TransDERmal, AS NEEDED, Apply patch to the affected area for 12 hours a day and remove for 12 hours a day.      LORazepam (ATIVAN) 0.5 mg, Oral, EVERY 4 HOURS AS NEEDED    multivitamin (ONE A DAY) tablet 1 Tablet, Oral, DAILY    polyethylene glycol (MIRALAX) 17 g, Oral, DAILY    Repatha SureClick 044 mg, SubCUTAneous, EVERY 14 DAYS    Vitamin D3 25 mcg (1,000 unit) tablet 2 Tablets, Oral, DAILY     Allergies   Allergen Reactions    Cefuroxime Unknown (comments)     abd pain    Ibuprofen Other (comments)     Patient states NSAIDS/ ibuprofen upsets his stomach    Statins-Hmg-Coa Reductase Inhibitors Other (comments)     Extremity weakness     Social History     Tobacco Use    Smoking status: Never Smoker    Smokeless tobacco: Never Used   Vaping Use    Vaping Use: Never used   Substance Use Topics    Alcohol use: No    Drug use: No     Family History   Problem Relation Age of Onset    Diabetes Mother     Stroke Father     Hypertension Father        Review of Systems   Constitutional: Negative for chills, fever and malaise/fatigue. HENT: Negative for congestion, ear pain and sore throat. Respiratory: Negative for cough, shortness of breath and wheezing. Cardiovascular: Negative for chest pain, palpitations and leg swelling. Gastrointestinal: Negative. Negative for abdominal pain, diarrhea, heartburn, nausea and vomiting. Genitourinary: Negative. Musculoskeletal: Positive for back pain. Negative for falls, joint pain, myalgias and neck pain. Neurological: Positive for sensory change. Negative for dizziness, tingling, weakness and headaches. Psychiatric/Behavioral: Negative for depression. The patient is not nervous/anxious and does not have insomnia. Objective  Physical Exam  Constitutional:       General: He is not in acute distress. Appearance: Normal appearance. HENT:      Head: Normocephalic. Right Ear: External ear normal.      Left Ear: External ear normal.      Nose: Nose normal.   Eyes:      Conjunctiva/sclera: Conjunctivae normal.   Neck:      Vascular: No carotid bruit.    Cardiovascular:      Rate and Rhythm: Normal rate and regular rhythm. Heart sounds: Normal heart sounds. No murmur heard. Pulmonary:      Effort: Pulmonary effort is normal. No respiratory distress. Breath sounds: Normal breath sounds. Musculoskeletal:         General: Tenderness present. No swelling. Cervical back: Neck supple. Right lower leg: No edema. Left lower leg: No edema. Comments: Lumbar area   Skin:     General: Skin is warm and dry. Coloration: Skin is not jaundiced. Findings: No lesion or rash. Neurological:      Mental Status: He is alert and oriented to person, place, and time. Motor: No weakness. Gait: Gait normal.   Psychiatric:         Mood and Affect: Mood normal.         Behavior: Behavior normal.         Thought Content: Thought content normal.         Judgment: Judgment normal.       Assessment & Plan      ICD-10-CM ICD-9-CM    1. Chronic left-sided low back pain with left-sided sciatica  M54.42 724.2 REFERRAL TO PHYSICAL THERAPY    G89.29 724.3      338.29        1. Chronic left-sided low back pain with left-sided sciatica  Unfortunately, options are quite limited for patient since he cannot take NSAIDs or prednisone. I advised him to try alternating heat and cold to his back and take Tylenol extra strength routinely. Advised to call for an appointment with PT when he is feeling better. Can also refer to Ortho if does not improve in a timely manner.     - REFERRAL TO PHYSICAL THERAPY    I have discussed the diagnosis with the patient and the intended plan as seen in the above orders. Pt/caretaker has expressed understanding. Questions were answered concerning future plans. I have discussed medication side effects and warnings as indicated with the patient as well.     Farida Johnson NP

## 2021-11-10 NOTE — PROGRESS NOTES
1. Have you been to the ER, urgent care clinic since your last visit? Hospitalized since your last visit? No    2. Have you seen or consulted any other health care providers outside of the [address] [place] [address] since your last visit? Include any pap smears or colon screening.  No   Chief Complaint   Patient presents with    Back Pain     going on for a while       Visit Vitals  /70 (BP 1 Location: Left upper arm, BP Patient Position: Sitting, BP Cuff Size: Adult)   Pulse 69   Temp 98.2 °F (36.8 °C) (Temporal)   Resp 18   Ht 5' 9\" (1.753 m)   Wt 203 lb (92.1 kg)   SpO2 97%   BMI 29.98 kg/m²

## 2021-11-16 ENCOUNTER — HOSPITAL ENCOUNTER (EMERGENCY)
Age: 66
Discharge: HOME | End: 2021-11-16
Attending: EMERGENCY MEDICINE
Payer: MEDICARE

## 2021-11-16 VITALS
HEART RATE: 90 BPM | TEMPERATURE: 98.3 F | WEIGHT: 202 LBS | OXYGEN SATURATION: 99 % | HEIGHT: 69 IN | BODY MASS INDEX: 29.92 KG/M2 | RESPIRATION RATE: 21 BRPM | SYSTOLIC BLOOD PRESSURE: 149 MMHG | DIASTOLIC BLOOD PRESSURE: 78 MMHG

## 2021-11-16 DIAGNOSIS — R00.2 PALPITATIONS: Primary | ICD-10-CM

## 2021-11-16 LAB
ALBUMIN SERPL-MCNC: 3.6 G/DL (ref 3.5–5)
ALBUMIN/GLOB SERPL: 0.9 {RATIO} (ref 1.1–2.2)
ALP SERPL-CCNC: 69 U/L (ref 45–117)
ALT SERPL-CCNC: 36 U/L (ref 12–78)
ANION GAP SERPL CALC-SCNC: 4 MMOL/L (ref 5–15)
AST SERPL W P-5'-P-CCNC: 28 U/L (ref 15–37)
ATRIAL RATE: 90 BPM
BASOPHILS # BLD: 0.1 K/UL (ref 0–0.1)
BASOPHILS NFR BLD: 1 % (ref 0–1)
BILIRUB SERPL-MCNC: 0.3 MG/DL (ref 0.2–1)
BUN SERPL-MCNC: 11 MG/DL (ref 6–20)
BUN/CREAT SERPL: 8 (ref 12–20)
CA-I BLD-MCNC: 9.1 MG/DL (ref 8.5–10.1)
CALCULATED P AXIS, ECG09: 63 DEGREES
CALCULATED R AXIS, ECG10: 3 DEGREES
CALCULATED T AXIS, ECG11: 70 DEGREES
CHLORIDE SERPL-SCNC: 112 MMOL/L (ref 97–108)
CO2 SERPL-SCNC: 26 MMOL/L (ref 21–32)
CREAT SERPL-MCNC: 1.33 MG/DL (ref 0.7–1.3)
DIAGNOSIS, 93000: NORMAL
DIFFERENTIAL METHOD BLD: NORMAL
EOSINOPHIL # BLD: 0.1 K/UL (ref 0–0.4)
EOSINOPHIL NFR BLD: 1 % (ref 0–7)
ERYTHROCYTE [DISTWIDTH] IN BLOOD BY AUTOMATED COUNT: 13.4 % (ref 11.5–14.5)
GLOBULIN SER CALC-MCNC: 3.8 G/DL (ref 2–4)
GLUCOSE SERPL-MCNC: 107 MG/DL (ref 65–100)
HCT VFR BLD AUTO: 44.8 % (ref 36.6–50.3)
HGB BLD-MCNC: 14.4 G/DL (ref 12.1–17)
IMM GRANULOCYTES # BLD AUTO: 0 K/UL (ref 0–0.04)
IMM GRANULOCYTES NFR BLD AUTO: 0 % (ref 0–0.5)
LYMPHOCYTES # BLD: 2.7 K/UL (ref 0.8–3.5)
LYMPHOCYTES NFR BLD: 32 % (ref 12–49)
MAGNESIUM SERPL-MCNC: 2.1 MG/DL (ref 1.6–2.4)
MCH RBC QN AUTO: 28.8 PG (ref 26–34)
MCHC RBC AUTO-ENTMCNC: 32.1 G/DL (ref 30–36.5)
MCV RBC AUTO: 89.6 FL (ref 80–99)
MONOCYTES # BLD: 0.6 K/UL (ref 0–1)
MONOCYTES NFR BLD: 7 % (ref 5–13)
NEUTS SEG # BLD: 5 K/UL (ref 1.8–8)
NEUTS SEG NFR BLD: 59 % (ref 32–75)
NRBC # BLD: 0 K/UL (ref 0–0.01)
NRBC BLD-RTO: 0 PER 100 WBC
P-R INTERVAL, ECG05: 140 MS
PLATELET # BLD AUTO: 218 K/UL (ref 150–400)
PMV BLD AUTO: 10.5 FL (ref 8.9–12.9)
POTASSIUM SERPL-SCNC: 4.5 MMOL/L (ref 3.5–5.1)
PROT SERPL-MCNC: 7.4 G/DL (ref 6.4–8.2)
Q-T INTERVAL, ECG07: 396 MS
QRS DURATION, ECG06: 84 MS
QTC CALCULATION (BEZET), ECG08: 484 MS
RBC # BLD AUTO: 5 M/UL (ref 4.1–5.7)
SODIUM SERPL-SCNC: 142 MMOL/L (ref 136–145)
TROPONIN-HIGH SENSITIVITY: 7 NG/L (ref 0–76)
TSH SERPL DL<=0.05 MIU/L-ACNC: 2.3 UIU/ML (ref 0.36–3.74)
VENTRICULAR RATE, ECG03: 90 BPM
WBC # BLD AUTO: 8.4 K/UL (ref 4.1–11.1)

## 2021-11-16 PROCEDURE — 99283 EMERGENCY DEPT VISIT LOW MDM: CPT

## 2021-11-16 PROCEDURE — 36415 COLL VENOUS BLD VENIPUNCTURE: CPT

## 2021-11-16 PROCEDURE — 85025 COMPLETE CBC W/AUTO DIFF WBC: CPT

## 2021-11-16 PROCEDURE — 93005 ELECTROCARDIOGRAM TRACING: CPT

## 2021-11-16 PROCEDURE — 84443 ASSAY THYROID STIM HORMONE: CPT

## 2021-11-16 PROCEDURE — 84484 ASSAY OF TROPONIN QUANT: CPT

## 2021-11-16 PROCEDURE — 83735 ASSAY OF MAGNESIUM: CPT

## 2021-11-16 PROCEDURE — 80053 COMPREHEN METABOLIC PANEL: CPT

## 2021-11-16 NOTE — DISCHARGE INSTRUCTIONS
Thank you! Thank you for allowing me to care for you in the emergency department. I sincerely hope that you are satisfied with your visit today. It is my goal to provide you with excellent care. Below you will find a list of your labs and imaging from your visit today. Should you have any questions regarding these results please do not hesitate to call the emergency department. Labs -     Recent Results (from the past 12 hour(s))   EKG, 12 LEAD, INITIAL    Collection Time: 11/16/21 10:54 AM   Result Value Ref Range    Ventricular Rate 90 BPM    Atrial Rate 90 BPM    P-R Interval 140 ms    QRS Duration 84 ms    Q-T Interval 396 ms    QTC Calculation (Bezet) 484 ms    Calculated P Axis 63 degrees    Calculated R Axis 3 degrees    Calculated T Axis 70 degrees    Diagnosis       Normal sinus rhythm  Nonspecific T wave abnormality  Abnormal ECG  When compared with ECG of 30-JUL-2021 15:04,  Nonspecific T wave abnormality no longer evident in Inferior leads  QT has lengthened  Confirmed by JESSY EMERSON, Lita Mathias ([id number]) on 11/16/2021 11:51:49 AM     CBC WITH AUTOMATED DIFF    Collection Time: 11/16/21 11:00 AM   Result Value Ref Range    WBC 8.4 4.1 - 11.1 K/uL    RBC 5.00 4. 10 - 5.70 M/uL    HGB 14.4 12.1 - 17.0 g/dL    HCT 44.8 36.6 - 50.3 %    MCV 89.6 80.0 - 99.0 FL    MCH 28.8 26.0 - 34.0 PG    MCHC 32.1 30.0 - 36.5 g/dL    RDW 13.4 11.5 - 14.5 %    PLATELET 031 311 - 493 K/uL    MPV 10.5 8.9 - 12.9 FL    NRBC 0.0 0.0  WBC    ABSOLUTE NRBC 0.00 0.00 - 0.01 K/uL    NEUTROPHILS 59 32 - 75 %    LYMPHOCYTES 32 12 - 49 %    MONOCYTES 7 5 - 13 %    EOSINOPHILS 1 0 - 7 %    BASOPHILS 1 0 - 1 %    IMMATURE GRANULOCYTES 0 0 - 0.5 %    ABS. NEUTROPHILS 5.0 1.8 - 8.0 K/UL    ABS. LYMPHOCYTES 2.7 0.8 - 3.5 K/UL    ABS. MONOCYTES 0.6 0.0 - 1.0 K/UL    ABS. EOSINOPHILS 0.1 0.0 - 0.4 K/UL    ABS. BASOPHILS 0.1 0.0 - 0.1 K/UL    ABS. IMM.  GRANS. 0.0 0.00 - 0.04 K/UL    DF AUTOMATED     METABOLIC PANEL, COMPREHENSIVE Collection Time: 11/16/21 11:00 AM   Result Value Ref Range    Sodium 142 136 - 145 mmol/L    Potassium 4.5 3.5 - 5.1 mmol/L    Chloride 112 (H) 97 - 108 mmol/L    CO2 26 21 - 32 mmol/L    Anion gap 4 (L) 5 - 15 mmol/L    Glucose 107 (H) 65 - 100 mg/dL    BUN 11 6 - 20 mg/dL    Creatinine 1.33 (H) 0.70 - 1.30 mg/dL    BUN/Creatinine ratio 8 (L) 12 - 20      GFR est AA >60 >60 ml/min/1.73m2    GFR est non-AA 54 (L) >60 ml/min/1.73m2    Calcium 9.1 8.5 - 10.1 mg/dL    Bilirubin, total 0.3 0.2 - 1.0 mg/dL    AST (SGOT) 28 15 - 37 U/L    ALT (SGPT) 36 12 - 78 U/L    Alk. phosphatase 69 45 - 117 U/L    Protein, total 7.4 6.4 - 8.2 g/dL    Albumin 3.6 3.5 - 5.0 g/dL    Globulin 3.8 2.0 - 4.0 g/dL    A-G Ratio 0.9 (L) 1.1 - 2.2     MAGNESIUM    Collection Time: 11/16/21 11:00 AM   Result Value Ref Range    Magnesium 2.1 1.6 - 2.4 mg/dL   TSH 3RD GENERATION    Collection Time: 11/16/21 11:00 AM   Result Value Ref Range    TSH 2.30 0.36 - 3.74 uIU/mL   TROPONIN-HIGH SENSITIVITY    Collection Time: 11/16/21 11:00 AM   Result Value Ref Range    Troponin-High Sensitivity 7 0 - 76 ng/L       Radiologic Studies -   No orders to display     CT Results  (Last 48 hours)      None          CXR Results  (Last 48 hours)      None               If you feel that you have not received excellent quality care or timely care, please ask to speak to the nurse manager. Please choose us in the future for your continued health care needs. ------------------------------------------------------------------------------------------------------------  The exam and treatment you received in the Emergency Department were for an urgent problem and are not intended as complete care. It is important that you follow-up with a doctor, nurse practitioner, or physician assistant to:  (1) confirm your diagnosis,  (2) re-evaluation of changes in your illness and treatment, and  (3) for ongoing care.   If your symptoms become worse or you do not improve as expected and you are unable to reach your usual health care provider, you should return to the Emergency Department. We are available 24 hours a day. Please take your discharge instructions with you when you go to your follow-up appointment. If you have any problem arranging a follow-up appointment, contact the Emergency Department immediately. If a prescription has been provided, please have it filled as soon as possible to prevent a delay in treatment. Read the entire medication instruction sheet provided to you by the pharmacy. If you have any questions or reservations about taking the medication due to side effects or interactions with other medications, please call your primary care physician or contact the ER to speak with the charge nurse. Make an appointment with your family doctor or the physician you were referred to for follow-up of this visit as instructed on your discharge paperwork, as this is a mandatory follow-up. Return to the ER if you are unable to be seen or if you are unable to be seen in a timely manner. If you have any problem arranging the follow-up visit, contact the Emergency Department immediately.

## 2021-11-16 NOTE — ED PROVIDER NOTES
EMERGENCY DEPARTMENT HISTORY AND PHYSICAL EXAM      Date: 11/16/2021  Patient Name: Hector Mao    History of Presenting Illness     Chief Complaint   Patient presents with    Palpitations       History Provided By: Patient    HPI: Hector Mao, 77 y.o. male with a past medical history significant No significant past medical history presents to the ED with chief complaint of Palpitations  . 70-year-old with a history of atrial fibrillation did have an ablation with  in September. He had been doing great since. Today he thought he may have been having some irregular heartbeat. Also including last night. Presents feeling like he might have A. fib. No chest pain shortness of breath. Compliant with his medications. Also follows with [facility] cardiology for cardiac stents but never had any chest pain. There are no other complaints, changes, or physical findings at this time. PCP: Nidhi Larsen, DO    Current Outpatient Medications   Medication Sig Dispense Refill    multivitamin (ONE A DAY) tablet Take 1 Tablet by mouth daily.  butalbital-aspirin-caffeine (FIORINAL) capsule Take 1 Capsule by mouth every four (4) hours as needed for Headache.  clopidogreL (Plavix) 75 mg tab Take 75 mg by mouth.  LORazepam (ATIVAN) 0.5 mg tablet Take 0.5 mg by mouth every four (4) hours as needed for Anxiety.  polyethylene glycol (Miralax) 17 gram/dose powder Take 17 g by mouth daily.  acetaminophen (TYLENOL) 325 mg tablet Take 2 Tablets by mouth every six (6) hours as needed for Pain. 60 Tablet 0    amLODIPine (Norvasc) 2.5 mg tablet Take 1 Tablet by mouth nightly. 30 Tablet 2    Vitamin D3 25 mcg (1,000 unit) tablet Take 2 Tablets by mouth daily.  apixaban (Eliquis) 5 mg tablet Take 5 mg by mouth two (2) times a day.  evolocumab (Repatha SureClick) pen injection 542 mg by SubCUTAneous route every fourteen (14) days.       lidocaine (LIDODERM) 5 % by TransDERmal route as needed. Apply patch to the affected area for 12 hours a day and remove for 12 hours a day.  amLODIPine (NORVASC) 5 mg tablet Take 5 mg by mouth daily. Every am         Past History     Past Medical History:  Past Medical History:   Diagnosis Date    A-fib ([facility]     CAD (coronary artery disease) 06/15/2018    Coronary arteriosclerosis 8/7/2020    GERD (gastroesophageal reflux disease)     H/O seasonal allergies     High cholesterol     Hx of cardiac arrest     VT arrest     Hyperlipidemia 8/7/2020    Hypertension     Ill-defined condition     Urinary urgency    Kidney stone     MI (myocardial infarction) ([facility]     ROQUE on CPAP     2/2/21 pt reports does not use cpap each night, only a few times per week    Vitamin D deficiency 8/7/2020       Past Surgical History:  Past Surgical History:   Procedure Laterality Date    COLONOSCOPY N/A 11/12/2018    COLONOSCOPY performed by Tiffany Diehl MD at [address] [facility] HX COLONOSCOPY  2018    HX COLONOSCOPY      HX ENDOSCOPY      HX HEART CATHETERIZATION  2018    Cardiac cath Stents x2    HX HEART CATHETERIZATION  02/2020    \"patent stent obtuse marginal 1. significant disease noted in branch artery of RCA. THis artery appears to be borderline for any kind of intervention\"    HX LAP CHOLECYSTECTOMY  2005    HX LUMBAR DISKECTOMY      L4-L4    HX ORTHOPAEDIC  2006    Lumbar laminectomy    HX PACEMAKER      linq monitor    [place] CARDIAC SURG PROCEDURE UNLIST  09/09/2021    cardiac ablation       Family History:  Family History   Problem Relation Age of Onset    Diabetes Mother    Genevieveleila Khalil Stroke Father     Hypertension Father        Social History:  Social History     Tobacco Use    Smoking status: Never Smoker    Smokeless tobacco: Never Used   Vaping Use    Vaping Use: Never used   Substance Use Topics    Alcohol use: No    Drug use: No       Allergies:   Allergies   Allergen Reactions    Cefuroxime Unknown (comments)     abd pain  Ibuprofen Other (comments)     Patient states NSAIDS/ ibuprofen upsets his stomach    Statins-Hmg-Coa Reductase Inhibitors Other (comments)     Extremity weakness         Review of Systems   Review of Systems   Constitutional: Negative. Negative for chills, fatigue and fever. HENT: Negative. Negative for congestion, ear pain, nosebleeds and sore throat. Eyes: Negative. Negative for pain, discharge and visual disturbance. Respiratory: Negative. Negative for cough, chest tightness and shortness of breath. Cardiovascular: Positive for palpitations. Negative for chest pain and leg swelling. Gastrointestinal: Negative. Negative for abdominal pain, blood in stool, constipation, diarrhea, nausea and vomiting. Endocrine: Negative. Genitourinary: Negative. Negative for difficulty urinating, dysuria and flank pain. Musculoskeletal: Negative. Negative for back pain and myalgias. Skin: Negative. Negative for rash and wound. Allergic/Immunologic: Negative. Neurological: Negative. Negative for dizziness, syncope, weakness, numbness and headaches. Hematological: Negative. Does not bruise/bleed easily. Psychiatric/Behavioral: Negative. Negative for agitation, confusion, hallucinations and suicidal ideas. All other systems reviewed and are negative. Physical Exam   Physical Exam  Vitals and nursing note reviewed. Constitutional:       General: He is not in acute distress. Appearance: He is normal weight. He is not ill-appearing. HENT:      Head: Normocephalic and atraumatic. Right Ear: External ear normal.      Left Ear: External ear normal.      Nose: Nose normal. No rhinorrhea. Mouth/Throat:      Mouth: Mucous membranes are moist.      Pharynx: Oropharynx is clear. Eyes:      Extraocular Movements: Extraocular movements intact. Conjunctiva/sclera: Conjunctivae normal.      Pupils: Pupils are equal, round, and reactive to light.    Cardiovascular: Rate and Rhythm: Normal rate and regular rhythm. Pulses: Normal pulses. Heart sounds: Normal heart sounds. Pulmonary:      Effort: Pulmonary effort is normal. No respiratory distress. Breath sounds: Normal breath sounds. Abdominal:      General: Abdomen is flat. Bowel sounds are normal.      Palpations: Abdomen is soft. Musculoskeletal:         General: No tenderness or deformity. Normal range of motion. Cervical back: Normal range of motion and neck supple. Skin:     General: Skin is warm and dry. Capillary Refill: Capillary refill takes less than 2 seconds. Findings: No bruising, lesion or rash. Neurological:      General: No focal deficit present. Mental Status: He is alert and oriented to person, place, and time. Mental status is at baseline. Psychiatric:         Mood and Affect: Mood normal.         Behavior: Behavior normal.         Thought Content: Thought content normal.         Judgment: Judgment normal.         Diagnostic Study Results     Labs -     Recent Results (from the past 12 hour(s))   EKG, 12 LEAD, INITIAL    Collection Time: 11/16/21 10:54 AM   Result Value Ref Range    Ventricular Rate 90 BPM    Atrial Rate 90 BPM    P-R Interval 140 ms    QRS Duration 84 ms    Q-T Interval 396 ms    QTC Calculation (Bezet) 484 ms    Calculated P Axis 63 degrees    Calculated R Axis 3 degrees    Calculated T Axis 70 degrees    Diagnosis       Normal sinus rhythm  Nonspecific T wave abnormality  Abnormal ECG  When compared with ECG of 30-JUL-2021 15:04,  Nonspecific T wave abnormality no longer evident in Inferior leads  QT has lengthened  Confirmed by JESSY EMERSON, Ibeth Gray ([id number]) on 11/16/2021 11:51:49 AM     CBC WITH AUTOMATED DIFF    Collection Time: 11/16/21 11:00 AM   Result Value Ref Range    WBC 8.4 4.1 - 11.1 K/uL    RBC 5.00 4. 10 - 5.70 M/uL    HGB 14.4 12.1 - 17.0 g/dL    HCT 44.8 36.6 - 50.3 %    MCV 89.6 80.0 - 99.0 FL    MCH 28.8 26.0 - 34.0 PG    MCHC 32.1 30.0 - 36.5 g/dL    RDW 13.4 11.5 - 14.5 %    PLATELET 471 092 - 459 K/uL    MPV 10.5 8.9 - 12.9 FL    NRBC 0.0 0.0  WBC    ABSOLUTE NRBC 0.00 0.00 - 0.01 K/uL    NEUTROPHILS 59 32 - 75 %    LYMPHOCYTES 32 12 - 49 %    MONOCYTES 7 5 - 13 %    EOSINOPHILS 1 0 - 7 %    BASOPHILS 1 0 - 1 %    IMMATURE GRANULOCYTES 0 0 - 0.5 %    ABS. NEUTROPHILS 5.0 1.8 - 8.0 K/UL    ABS. LYMPHOCYTES 2.7 0.8 - 3.5 K/UL    ABS. MONOCYTES 0.6 0.0 - 1.0 K/UL    ABS. EOSINOPHILS 0.1 0.0 - 0.4 K/UL    ABS. BASOPHILS 0.1 0.0 - 0.1 K/UL    ABS. IMM. GRANS. 0.0 0.00 - 0.04 K/UL    DF AUTOMATED     METABOLIC PANEL, COMPREHENSIVE    Collection Time: 11/16/21 11:00 AM   Result Value Ref Range    Sodium 142 136 - 145 mmol/L    Potassium 4.5 3.5 - 5.1 mmol/L    Chloride 112 (H) 97 - 108 mmol/L    CO2 26 21 - 32 mmol/L    Anion gap 4 (L) 5 - 15 mmol/L    Glucose 107 (H) 65 - 100 mg/dL    BUN 11 6 - 20 mg/dL    Creatinine 1.33 (H) 0.70 - 1.30 mg/dL    BUN/Creatinine ratio 8 (L) 12 - 20      GFR est AA >60 >60 ml/min/1.73m2    GFR est non-AA 54 (L) >60 ml/min/1.73m2    Calcium 9.1 8.5 - 10.1 mg/dL    Bilirubin, total 0.3 0.2 - 1.0 mg/dL    AST (SGOT) 28 15 - 37 U/L    ALT (SGPT) 36 12 - 78 U/L    Alk. phosphatase 69 45 - 117 U/L    Protein, total 7.4 6.4 - 8.2 g/dL    Albumin 3.6 3.5 - 5.0 g/dL    Globulin 3.8 2.0 - 4.0 g/dL    A-G Ratio 0.9 (L) 1.1 - 2.2     MAGNESIUM    Collection Time: 11/16/21 11:00 AM   Result Value Ref Range    Magnesium 2.1 1.6 - 2.4 mg/dL   TSH 3RD GENERATION    Collection Time: 11/16/21 11:00 AM   Result Value Ref Range    TSH 2.30 0.36 - 3.74 uIU/mL   TROPONIN-HIGH SENSITIVITY    Collection Time: 11/16/21 11:00 AM   Result Value Ref Range    Troponin-High Sensitivity 7 0 - 76 ng/L         Radiologic Studies -   No orders to display     CT Results  (Last 48 hours)    None        CXR Results  (Last 48 hours)    None          Medical Decision Making and ED Course   I am the first provider for this patient.     I reviewed the vital signs, available nursing notes, past medical history, past surgical history, family history and social history. Vital Signs-Reviewed the patient's vital signs. Patient Vitals for the past 12 hrs:   Temp Pulse Resp BP SpO2   11/16/21 1051 98.3 °F (36.8 °C) 90 21 (!) 149/78 99 %       EKG interpretation:   EKG at 1054. Normal sinus rhythm rate of 90. No ST changes. No T wave inversions. Normal intervals. Reason rule out dysrhythmia. Interpreted by ER physician. Records Reviewed: Previous Hospital chart. EMS run report      ED Course:   Initial assessment performed. The patients presenting problems have been discussed, and they are in agreement with the care plan formulated and outlined with them. I have encouraged them to ask questions as they arise throughout their visit. Orders Placed This Encounter    CBC WITH AUTOMATED DIFF     Standing Status:   Standing     Number of Occurrences:   1    COMPREHENSIVE METABOLIC PANEL     Standing Status:   Standing     Number of Occurrences:   1    MAGNESIUM     Standing Status:   Standing     Number of Occurrences:   1    TSH 3RD GENERATION     Standing Status:   Standing     Number of Occurrences:   1    TROPONIN-HIGH SENSITIVITY     Standing Status:   Standing     Number of Occurrences:   1    EKG, 12 LEAD, INITIAL     Standing Status:   Standing     Number of Occurrences:   1     Order Specific Question:   Reason for Exam:     Answer:   [email or web address]                 Provider Notes (Medical Decision Making):   C10year-old male with a history of paroxysmal atrial fibrillation presents with palpitations. No evidence of A. fib today. Normal electrolytes normal cardiac enzymes. Patient was reassured and he will follow-up closely with his cardiology group. Consults                   Procedures                       Disposition       Emergency Department Disposition:  dc      Diagnosis     Clinical Impression:   1.  Palpitations Attestations:    Jazz Vaz MD    Please note that this dictation was completed with [employer], the computer voice recognition software. Quite often unanticipated grammatical, syntax, homophones, and other interpretive errors are inadvertently transcribed by the computer software. Please disregard these errors. Please excuse any errors that have escaped final proofreading. Thank you.

## 2021-11-19 PROBLEM — I49.01 VENTRICULAR FIBRILLATION (HCC): Status: ACTIVE | Noted: 2021-11-19

## 2021-11-19 PROBLEM — I24.9 ACUTE CORONARY SYNDROME (HCC): Status: ACTIVE | Noted: 2021-11-19

## 2021-11-19 PROBLEM — N40.1 BENIGN PROSTATIC HYPERPLASIA WITH URINARY OBSTRUCTION: Status: ACTIVE | Noted: 2021-11-19

## 2021-11-19 PROBLEM — G47.33 OBSTRUCTIVE SLEEP APNEA SYNDROME: Status: ACTIVE | Noted: 2018-02-28

## 2021-11-19 PROBLEM — I47.21 TORSADES DE POINTES: Status: ACTIVE | Noted: 2021-11-19

## 2021-11-19 PROBLEM — R07.89 ATYPICAL CHEST PAIN: Status: ACTIVE | Noted: 2021-11-19

## 2021-11-19 PROBLEM — I49.3 VENTRICULAR PREMATURE BEATS: Status: RESOLVED | Noted: 2021-11-19 | Resolved: 2021-11-19

## 2021-11-19 PROBLEM — F43.29 STRESS AND ADJUSTMENT REACTION: Status: ACTIVE | Noted: 2021-11-19

## 2021-11-19 PROBLEM — I49.3 VENTRICULAR PREMATURE BEATS: Status: ACTIVE | Noted: 2021-11-19

## 2021-11-19 PROBLEM — R00.2 PALPITATIONS: Status: ACTIVE | Noted: 2021-11-19

## 2021-11-19 PROBLEM — K25.9 GASTRIC ULCER: Status: RESOLVED | Noted: 2018-12-14 | Resolved: 2021-11-19

## 2021-11-19 PROBLEM — I10 HYPERTENSION: Status: ACTIVE | Noted: 2018-02-28

## 2021-11-19 PROBLEM — I47.20 VENTRICULAR TACHYCARDIA (HCC): Status: ACTIVE | Noted: 2021-11-19

## 2021-11-19 PROBLEM — Z86.79 HISTORY OF CORONARY ARTERY DISEASE: Status: ACTIVE | Noted: 2021-11-19

## 2021-11-19 PROBLEM — K25.9 GASTRIC ULCER: Status: ACTIVE | Noted: 2018-12-14

## 2021-11-19 PROBLEM — I24.9 ACUTE CORONARY SYNDROME (HCC): Status: RESOLVED | Noted: 2021-11-19 | Resolved: 2021-11-19

## 2021-11-19 PROBLEM — I49.01 VENTRICULAR FIBRILLATION (HCC): Status: RESOLVED | Noted: 2021-11-19 | Resolved: 2021-11-19

## 2021-11-19 PROBLEM — M54.9 CHRONIC BACK PAIN: Status: ACTIVE | Noted: 2018-12-14

## 2021-11-19 PROBLEM — N13.8 BENIGN PROSTATIC HYPERPLASIA WITH URINARY OBSTRUCTION: Status: ACTIVE | Noted: 2021-11-19

## 2021-11-19 PROBLEM — G43.909 MIGRAINE: Status: ACTIVE | Noted: 2018-02-28

## 2021-11-19 PROBLEM — R07.89 ATYPICAL CHEST PAIN: Status: RESOLVED | Noted: 2021-11-19 | Resolved: 2021-11-19

## 2021-11-19 PROBLEM — I47.20 VENTRICULAR TACHYCARDIA: Status: RESOLVED | Noted: 2021-11-19 | Resolved: 2021-11-19

## 2021-11-19 PROBLEM — I47.21 TORSADES DE POINTES: Status: RESOLVED | Noted: 2021-11-19 | Resolved: 2021-11-19

## 2021-11-19 PROBLEM — G89.29 CHRONIC BACK PAIN: Status: ACTIVE | Noted: 2018-12-14

## 2021-11-19 RX ORDER — BLOOD-GLUCOSE METER
KIT MISCELLANEOUS
COMMUNITY
End: 2021-11-19 | Stop reason: ALTCHOICE

## 2021-11-19 RX ORDER — BLOOD-GLUCOSE CONTROL, NORMAL
EACH MISCELLANEOUS
COMMUNITY
End: 2021-11-19 | Stop reason: ALTCHOICE

## 2021-11-22 ENCOUNTER — TELEPHONE (OUTPATIENT)
Dept: FAMILY MEDICINE CLINIC | Age: 66
End: 2021-11-22

## 2021-11-22 DIAGNOSIS — Z91.09 ENVIRONMENTAL ALLERGIES: Primary | ICD-10-CM

## 2021-11-23 RX ORDER — FLUTICASONE PROPIONATE 50 MCG
SPRAY, SUSPENSION (ML) NASAL
Qty: 1 EACH | Refills: 3 | Status: SHIPPED | OUTPATIENT
Start: 2021-11-23 | End: 2021-12-07 | Stop reason: SDUPTHER

## 2021-11-23 NOTE — TELEPHONE ENCOUNTER
Identifying Data:  77 y.o. , Adrienne Valentin , male     HISTORICAL DATA (REVIEWED TODAY):  ALLERGIES-    Allergies   Allergen Reactions    Cefuroxime Unknown (comments)     abd pain    Ibuprofen Other (comments)     Patient states NSAIDS/ ibuprofen upsets his stomach    Statins-Hmg-Coa Reductase Inhibitors Other (comments)     Extremity weakness       MEDICATION AS OF LAST RECONCILIATION (NOT INCLUDING CHANGES MADE TODAY):    Current Outpatient Medications on File Prior to Visit   Medication Sig Dispense Refill    multivitamin (ONE A DAY) tablet Take 1 Tablet by mouth daily.  butalbital-aspirin-caffeine (FIORINAL) capsule Take 1 Capsule by mouth every four (4) hours as needed for Headache.  clopidogreL (Plavix) 75 mg tab Take 75 mg by mouth.  LORazepam (ATIVAN) 0.5 mg tablet Take 0.5 mg by mouth every four (4) hours as needed for Anxiety.  polyethylene glycol (Miralax) 17 gram/dose powder Take 17 g by mouth daily.  acetaminophen (TYLENOL) 325 mg tablet Take 2 Tablets by mouth every six (6) hours as needed for Pain. 60 Tablet 0    amLODIPine (Norvasc) 2.5 mg tablet Take 1 Tablet by mouth nightly. 30 Tablet 2    Vitamin D3 25 mcg (1,000 unit) tablet Take 2 Tablets by mouth daily.  apixaban (Eliquis) 5 mg tablet Take 5 mg by mouth two (2) times a day.  evolocumab (Repatha SureClick) pen injection 763 mg by SubCUTAneous route every fourteen (14) days.  lidocaine (LIDODERM) 5 % by TransDERmal route as needed. Apply patch to the affected area for 12 hours a day and remove for 12 hours a day.  amLODIPine (NORVASC) 5 mg tablet Take 5 mg by mouth daily. Every am       No current facility-administered medications on file prior to visit.        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  Environmental allergies  Z91.09 V15.09 fluticasone propionate (FLONASE) 50 mcg/actuation nasal spray             Alex Austin DO

## 2021-12-01 ENCOUNTER — OFFICE VISIT (OUTPATIENT)
Dept: FAMILY MEDICINE CLINIC | Age: 66
End: 2021-12-01
Payer: MEDICARE

## 2021-12-01 VITALS
HEART RATE: 71 BPM | SYSTOLIC BLOOD PRESSURE: 121 MMHG | DIASTOLIC BLOOD PRESSURE: 74 MMHG | RESPIRATION RATE: 18 BRPM | WEIGHT: 206 LBS | OXYGEN SATURATION: 97 % | TEMPERATURE: 97.5 F | BODY MASS INDEX: 30.51 KG/M2 | HEIGHT: 69 IN

## 2021-12-01 DIAGNOSIS — R07.89 MUSCULAR CHEST PAIN: Primary | ICD-10-CM

## 2021-12-01 PROCEDURE — G8432 DEP SCR NOT DOC, RNG: HCPCS | Performed by: NURSE PRACTITIONER

## 2021-12-01 PROCEDURE — G8417 CALC BMI ABV UP PARAM F/U: HCPCS | Performed by: NURSE PRACTITIONER

## 2021-12-01 PROCEDURE — G8427 DOCREV CUR MEDS BY ELIG CLIN: HCPCS | Performed by: NURSE PRACTITIONER

## 2021-12-01 PROCEDURE — G8754 DIAS BP LESS 90: HCPCS | Performed by: NURSE PRACTITIONER

## 2021-12-01 PROCEDURE — G8752 SYS BP LESS 140: HCPCS | Performed by: NURSE PRACTITIONER

## 2021-12-01 PROCEDURE — 3017F COLORECTAL CA SCREEN DOC REV: CPT | Performed by: NURSE PRACTITIONER

## 2021-12-01 PROCEDURE — 99214 OFFICE O/P EST MOD 30 MIN: CPT | Performed by: NURSE PRACTITIONER

## 2021-12-01 PROCEDURE — 1101F PT FALLS ASSESS-DOCD LE1/YR: CPT | Performed by: NURSE PRACTITIONER

## 2021-12-01 PROCEDURE — G8536 NO DOC ELDER MAL SCRN: HCPCS | Performed by: NURSE PRACTITIONER

## 2021-12-01 RX ORDER — LIDOCAINE 50 MG/G
PATCH TOPICAL
Qty: 25 PATCH | Refills: 3 | Status: SHIPPED | OUTPATIENT
Start: 2021-12-01

## 2021-12-01 NOTE — PROGRESS NOTES
Chief Complaint   Patient presents with    Muscle Pain     going on for 2 months now but it comes and goes    Medication Refill     Pt need a refill of Flonse      Visit Vitals  /74 (BP 1 Location: Right arm, BP Patient Position: Sitting, BP Cuff Size: Adult)   Pulse 71   Temp 97.5 °F (36.4 °C) (Temporal)   Resp 18   Ht 5' 9\" (1.753 m)   Wt 206 lb (93.4 kg)   SpO2 97%   BMI 30.42 kg/m²     Hayden Saab is a 77 y.o. male. HPI:  Patient presented with complaints of chest tension in the breast area on both sides. Not assocated w/ any particular activity. Has been happening off and on for about 2 months. The symptoms come up on him in middle of night. Denies shortness of breath, cough, or wheezing. Review of his record indicated he had an ER visit at [facility] on 11/16/2021 with feeling of palpitations. History of atrial fibrillation and had an ablation with Dr. Stephanie Rosa, electrophysiologist, in September 2021. No evidence in the ER of atrial fibrillation and on his lab work was within normal limits. He also has history of cardiac stents and is seen at [facility] for follow-up. He is currently taking Plavix and Eliquis daily. B/P good in office at 121/74. Continue on amlodipine 2.5 mg at hs and 5 mg in am.   Pt has dx of anxiety and is ordered lorazepam as needed. He uses sparingly when ordered. Currently he is quite concerned about his wife who is also seen at our office. She is having strange movements but will not discuss w/ pt. Pt has chronic low back pain and is currently going to PT which was ordered at [facility] w/ me on 11/10/2021. He is finding the PT to be helpful.      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    Stress and adjustment reaction F43.29    Testosterone deficiency E34.9     Past Medical History:   Diagnosis Date    A-fib ([address]     Acute coronary syndrome ([address] 11/19/2021    Atypical chest pain 11/19/2021    CAD (coronary artery disease) 06/15/2018    Coronary arteriosclerosis 8/7/2020    Gastric ulcer 12/14/2018    GERD (gastroesophageal reflux disease)     H/O seasonal allergies     High cholesterol     Hx of cardiac arrest     VT arrest     Hyperlipidemia 8/7/2020    Hypertension     Ill-defined condition     Urinary urgency    Kidney stone     MI (myocardial infarction) ([address]     ROQUE on CPAP     2/2/21 pt reports does not use cpap each night, only a few times per week    Torsades de pointes ([address] 11/19/2021    Ventricular fibrillation ([address] 11/19/2021    Ventricular premature beats 11/19/2021    Ventricular tachycardia ([address] 11/19/2021    Vitamin D deficiency 8/7/2020     Past Surgical History:   Procedure Laterality Date    COLONOSCOPY N/A 11/12/2018    COLONOSCOPY performed by Shereen Mitchell MD at [facility] HX COLONOSCOPY  2018    HX COLONOSCOPY      HX ENDOSCOPY      HX HEART CATHETERIZATION  2018    Cardiac cath Stents x2    HX HEART CATHETERIZATION  02/2020    \"patent stent obtuse marginal 1. significant disease noted in branch artery of RCA.   THis artery appears to be borderline for any kind of intervention\"    HX LAP CHOLECYSTECTOMY  2005    HX LUMBAR DISKECTOMY      L4-L4    HX ORTHOPAEDIC  2006    Lumbar laminectomy    HX PACEMAKER      linq monitor    ME CARDIAC SURG PROCEDURE UNLIST  09/09/2021    cardiac ablation     Current Outpatient Medications   Medication Instructions    acetaminophen (TYLENOL) 650 mg, Oral, EVERY 6 HOURS AS NEEDED    amLODIPine (NORVASC) 5 mg, Oral, DAILY, Every am    amLODIPine (NORVASC) 2.5 mg, Oral, EVERY BEDTIME    apixaban (ELIQUIS) 5 mg, Oral, 2 TIMES DAILY    butalbital-aspirin-caffeine (FIORINAL) capsule 1 Capsule, Oral, EVERY 4 HOURS AS NEEDED    clopidogreL (PLAVIX) 75 mg, Oral    fluticasone propionate (FLONASE) 50 mcg/actuation nasal spray 2 sprays to each nostril once daily    lidocaine (LIDODERM) 5 % Apply patch to the affected area for 12 hours a day and remove for 12 hours a day.  LORazepam (ATIVAN) 0.5 mg, Oral, EVERY 4 HOURS AS NEEDED    multivitamin (ONE A DAY) tablet 1 Tablet, Oral, DAILY    polyethylene glycol (MIRALAX) 17 g, Oral, DAILY    Repatha SureClick 876 mg, SubCUTAneous, EVERY 14 DAYS    Vitamin D3 25 mcg (1,000 unit) tablet 2 Tablets, Oral, DAILY     Allergies   Allergen Reactions    Cefuroxime Unknown (comments)     abd pain    Ibuprofen Other (comments)     Patient states NSAIDS/ ibuprofen upsets his stomach    Statins-Hmg-Coa Reductase Inhibitors Other (comments)     Extremity weakness     Social History     Tobacco Use    Smoking status: Never Smoker    Smokeless tobacco: Never Used   Vaping Use    Vaping Use: Never used   Substance Use Topics    Alcohol use: No    Drug use: No     Family History   Problem Relation Age of Onset    Diabetes Mother     Stroke Father     Hypertension Father        Review of Systems   Constitutional: Negative for chills and fever. HENT: Negative for ear pain and sore throat. Respiratory: Positive for cough. Negative for shortness of breath and wheezing. Minimal coughing   Cardiovascular: Positive for chest pain. Negative for palpitations. Gastrointestinal: Positive for heartburn. Negative for abdominal pain, constipation, diarrhea, nausea and vomiting. Genitourinary: Negative for dysuria. Musculoskeletal: Positive for myalgias.    Neurological: Negative for dizziness, sensory change and headaches. Psychiatric/Behavioral: Negative for depression. The patient is nervous/anxious and has insomnia. Ordered lorazapam.     Objective  Physical Exam  Constitutional:       General: He is not in acute distress. Appearance: Normal appearance. HENT:      Head: Normocephalic. Right Ear: External ear normal.      Left Ear: External ear normal.      Nose: Nose normal.   Eyes:      Conjunctiva/sclera: Conjunctivae normal.   Neck:      Vascular: No carotid bruit. Cardiovascular:      Rate and Rhythm: Normal rate and regular rhythm. Heart sounds: Normal heart sounds. No murmur heard. Pulmonary:      Effort: Pulmonary effort is normal. No respiratory distress. Breath sounds: Normal breath sounds. Musculoskeletal:         General: No swelling or tenderness. Normal range of motion. Cervical back: Neck supple. Right lower leg: No edema. Left lower leg: No edema. Skin:     General: Skin is warm and dry. Coloration: Skin is not jaundiced. Findings: No lesion or rash. Neurological:      Mental Status: He is alert and oriented to person, place, and time. Motor: No weakness. Gait: Gait normal.   Psychiatric:         Mood and Affect: Mood normal.         Behavior: Behavior normal.         Thought Content: Thought content normal.         Judgment: Judgment normal.       Assessment & Plan      ICD-10-CM ICD-9-CM    1. Muscular chest pain  R07.89 786.59 lidocaine (LIDODERM) 5 %     1. Muscular chest pain  Pain unlikely to be cardiac. Pt will try the lidocaine patches and will let me know if effective. - lidocaine (LIDODERM) 5 %; Apply patch to the affected area for 12 hours a day and remove for 12 hours a day. Dispense: 25 Patch; Refill: 3     I have discussed the diagnosis with the patient and the intended plan as seen in the above orders. Pt/caretaker has expressed understanding.   Questions were answered concerning future plans. I have discussed medication side effects and warnings as indicated with the patient as well.     Amy Hong, NP

## 2021-12-01 NOTE — PROGRESS NOTES
1. Have you been to the ER, urgent care clinic since your last visit? Hospitalized since your last visit? No    2. Have you seen or consulted any other health care providers outside of the [address] [place] [address] since your last visit? Include any pap smears or colon screening.  No   Chief Complaint   Patient presents with    Muscle Pain     going on for 2 months now but it comes and goes    Medication Refill     Pt need a refill of Flonse      Visit Vitals  /74 (BP 1 Location: Right arm, BP Patient Position: Sitting, BP Cuff Size: Adult)   Pulse 71   Temp 97.5 °F (36.4 °C) (Temporal)   Resp 18   Ht 5' 9\" (1.753 m)   Wt 206 lb (93.4 kg)   SpO2 97%   BMI 30.42 kg/m²

## 2021-12-07 ENCOUNTER — PATIENT MESSAGE (OUTPATIENT)
Dept: FAMILY MEDICINE CLINIC | Age: 66
End: 2021-12-07

## 2021-12-07 DIAGNOSIS — Z91.09 ENVIRONMENTAL ALLERGIES: ICD-10-CM

## 2021-12-07 RX ORDER — FLUTICASONE PROPIONATE 50 MCG
SPRAY, SUSPENSION (ML) NASAL
Qty: 1 EACH | Refills: 3 | Status: SHIPPED | OUTPATIENT
Start: 2021-12-07 | End: 2022-05-13 | Stop reason: SDUPTHER

## 2021-12-07 NOTE — TELEPHONE ENCOUNTER
Shannan Jurado, [facility] 2021 9:49 AM EST      ----- Message -----  From: Linh Ghosh  Sent: 2021 8:08 AM EST  To: LAMBERTO Nurse  Subject: Prescription Refill Request     Dr. Cyrus Sanderson,     I need a prescription refill for the following item:    *Fluticasone 50MCG Nasal Spray 16 GM    The last fill of these items was Oct 20 from my PCM at [facility]. Ariana Jasso prior to transitioning to you as my PCM. Standing by to answer any questions you may have. Please advise when this prescription fill has been sent to the [facility]. [employer].   Ekaterina Paez  : 1955

## 2021-12-07 NOTE — TELEPHONE ENCOUNTER
Identifying Data:  77 y.o. , Johanne Brock , male     HISTORICAL DATA (REVIEWED TODAY):  ALLERGIES-    Allergies   Allergen Reactions    Cefuroxime Unknown (comments)     abd pain    Ibuprofen Other (comments)     Patient states NSAIDS/ ibuprofen upsets his stomach    Statins-Hmg-Coa Reductase Inhibitors Other (comments)     Extremity weakness       MEDICATION AS OF LAST RECONCILIATION (NOT INCLUDING CHANGES MADE TODAY):    Current Outpatient Medications on File Prior to Visit   Medication Sig Dispense Refill    glucose blood VI test strips (FreeStyle Lite Strips) strip Finger stick blood sugars Daily DX E11.9 100 Strip 3    lidocaine (LIDODERM) 5 % Apply patch to the affected area for 12 hours a day and remove for 12 hours a day. 25 Patch 3    fluticasone propionate (FLONASE) 50 mcg/actuation nasal spray 2 sprays to each nostril once daily 1 Each 3    multivitamin (ONE A DAY) tablet Take 1 Tablet by mouth daily.  butalbital-aspirin-caffeine (FIORINAL) capsule Take 1 Capsule by mouth every four (4) hours as needed for Headache.  clopidogreL (Plavix) 75 mg tab Take 75 mg by mouth.  LORazepam (ATIVAN) 0.5 mg tablet Take 0.5 mg by mouth every four (4) hours as needed for Anxiety.  polyethylene glycol (Miralax) 17 gram/dose powder Take 17 g by mouth daily.  acetaminophen (TYLENOL) 325 mg tablet Take 2 Tablets by mouth every six (6) hours as needed for Pain. 60 Tablet 0    amLODIPine (Norvasc) 2.5 mg tablet Take 1 Tablet by mouth nightly. 30 Tablet 2    Vitamin D3 25 mcg (1,000 unit) tablet Take 2 Tablets by mouth daily.  apixaban (Eliquis) 5 mg tablet Take 5 mg by mouth two (2) times a day.  evolocumab (Repatha SureClick) pen injection 451 mg by SubCUTAneous route every fourteen (14) days.  amLODIPine (NORVASC) 5 mg tablet Take 5 mg by mouth daily. Every am       No current facility-administered medications on file prior to visit.        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  Environmental allergies  Z91.09 V15.09 fluticasone propionate (FLONASE) 50 mcg/actuation nasal spray             Jo Ann Cast, DO

## 2021-12-08 ENCOUNTER — HOSPITAL ENCOUNTER (OUTPATIENT)
Dept: PHYSICAL THERAPY | Age: 66
Discharge: HOME | End: 2021-12-08
Payer: MEDICARE

## 2021-12-08 PROCEDURE — 97161 PT EVAL LOW COMPLEX 20 MIN: CPT

## 2021-12-08 NOTE — PROGRESS NOTES
W . [address] [place] [address], [address] [facility]  Phone: [phone]   Fax: [phone]    PT INITIAL EVALUATION NOTE - [facility] 2-15  Plan of Care/Statement of Necessity for Physical Therapy Services  2-15    Patient Name: Conner Muñoz  Date:2021  : 1955  [x]  Patient  Verified  Provider#: [id number]  Payor: Ralf Ramirez / Plan: [place] MED[place] PART [place] & B / Product Type: Medicare /    Referral source: Shirley Uriostegui NP   In time:129  Out time:229  Total Treatment Time (min): 60  Total Timed Codes (min): 0  1:1 Treatment Time ( [address] only): 60   Visit #: 1      Start of Care: 21      Onset Date: FLARE UP FOR 2 MONTHS. I NEED A COURSE OF PT ABOUT EVERY YEAR. HAD BACK SURGERY . GOT A CUSTOM BACK BRACE I WEAR IN THE MORNING UNTIL I GET WARMED UP. Treatment Area/Diagnosis: Other low back pain [M54.59]    SUBJECTIVE  Pain Level (0-10 scale): 3                Best: 3           Worst:  9  Description: BACK WILL LOCK UP, ESPECIALLY IF I BEND WAY FORWARD IN THE MORNING. Any medication changes, allergies to medications, adverse drug reactions, diagnosis change, or new procedure performed?: [] No    [x] Yes (see summary sheet for update)  PLOF: WITH PT I WILL BE IMPROVED FOR MONTHS  Mechanism of Injury: DISCECTOMY L4L5 . Previous Treatment/Compliance: UNKNOWN  PMHx: HTN, VERTIGO, DDD NECK AND BACK  Surgical Hx: L4L5 DISCECTOMY  Prior Hospitalization: see medical history   Medications: Verified on Patient Summary List  Work Hx: RETIRED FROM  AND THEN [employer]  Living Situation: WIFE  Pt Goals: PAIN CONTROL  Barriers: NONE  Motivation: WNL  Substance use: NONE  FABQ Score: AMPAC=28%  Cognition: A & O x WNL        OBJECTIVE/EXAMINATION  POSTURE ; VERY STRAIGHT. UNABLE TO DO STANDING PELVIC TILT/CRUNCH. AROM BACK LIMITED; FLEX 2/3 WNL, EXT'N 1/2 WNL. SLOW AND PAINFUL BED MOBILITY. WALK ON TOES/HEELS WNL. STAIRS WNL. SUPINE; NO LLD.  SLR AND ARMS OVERHEAD WNL  KNEE ALIGNMENT WNL  PRONE; PROM HIPS AND KNEES WNL EXCEPT RIGHT PIRIFORMIS TIGHT. With   [x] TE   [] TA   [] neuro   [] other: Patient Education: [x] Review HEP  STANDING PELVIC TILT  [] Progressed/Changed HEP based on:   [] positioning   [] body mechanics   [] transfers   [] heat/ice application    [] other:      TUG: WNL    Pain Level (0-10 scale) post treatment: 3    ASSESSMENT/Key Information/Changes in Function:   CHRONIC BACK PAIN WITH FLARE UP. KNOWN DDD NECK AND BACK. GOES IN FOR PT EVERY YEAR. Problem List: pain affecting function, decrease ROM, decrease ADL/ functional abilitiies, decrease activity tolerance and decrease flexibility/ joint mobility    Short Term Goals: To be accomplished in 4 weeks:   INDEPENDENT IN HEP CORE STABILIZATION EXERS. Long Term Goals: To be accomplished in 8 weeks:   RESUME ALL ADLs AS BEFORE RECENT FLARE UP  Patient Goal (s): PAIN CONTROL    Evaluation Complexity History LOW Complexity : Zero comorbidities / personal factors that will impact the outcome / POC; Examination LOW Complexity : 1-2 Standardized tests and measures addressing body structure, function, activity limitation and / or participation in recreation  ;Presentation LOW Complexity : Stable, uncomplicated  ;Clinical Decision Making MEDIUM Complexity : FOTO score of 26-74  Overall Complexity Rating: LOW      Patient / Family readiness to learn indicated by: asking questions  Persons(s) to be included in education: patient (P)  Barriers to Learning/Limitations: None  Patient Self Reported Health Status: fair  Rehabilitation Potential: good  Patient/ Caregiver education and instruction: exercises      PLAN:  Treatment Plan may include any combination of the following: Therapeutic exercise and Manual therapy  HEP Issued: STANDING PELVIC TILTS/CRUNCHES    Frequency / Duration: Patient to be seen 2 times per week for 8 weeks.     [x]  Plan of care has been reviewed with PTA    Certification Period: 12/8/21  to  3/7/22    Lance Millard, PT 12/8/2021     ________________________________________________________________________    I certify that the above Therapy Services are being furnished while the patient is under my care. I agree with the treatment plan and certify that this therapy is necessary.     Physician's Signature:____________________  Date:____________Time: _________            Vernon Garcias NP

## 2021-12-10 ENCOUNTER — HOSPITAL ENCOUNTER (OUTPATIENT)
Dept: PHYSICAL THERAPY | Age: 66
Discharge: HOME | End: 2021-12-10
Payer: MEDICARE

## 2021-12-10 PROCEDURE — 97110 THERAPEUTIC EXERCISES: CPT

## 2021-12-10 NOTE — PROGRESS NOTES
PT DAILY TREATMENT NOTE - [facility] 2-15    Patient Name: Berry Merlin  Date:12/10/2021  : 1955  [x]  Patient  Verified  Payor: Monica Hazard / Plan: [place] MEDICARE PART A & B / Product Type: Medicare /    In time: 1115 am  Out time: 1205 pm  Total Treatment Time (min): 50  Total Timed Codes (min): 44  1:1 Treatment Time ([facility] only): 39  Visit #:  2    Treatment Area: Other low back pain [M54.59]    SUBJECTIVE  Pain Level (0-10 scale): 4  Any medication changes, allergies to medications, adverse drug reactions, diagnosis change, or new procedure performed?: [x] No    [] Yes (see summary sheet for update)  Subjective functional status/changes:   [] No changes reported  My back was so stiff and painful when I awoke this morning that I had to put on my brace. It's eased quite a bit since then. OBJECTIVE  44 min Therapeutic Exercise:  [] See flow sheet :   Rationale: increase ROM and increase strength to improve the patients ability to control pain with activity            With   [] TE   [] TA   [] Neuro   [] SC   [] other: Patient Education: [] Review HEP    [] Progressed/Changed HEP based on:   [] positioning   [] body mechanics   [] transfers   [] heat/ice application    [x] other: Instructed to add 10 reps each of lateral trunk rotations and pull-overs before getting out of the bed in the AM to get his circulation going and to loosen up tight muscles prior to his morning ADLs. Other Objective/Functional Measures: He moves about the clinic well but bed mobility is slow. He was unable to perform crunches concurrent with TKE     Pain Level (0-10 scale) post treatment: 0 \"I feel really well, don't want to jinx anything but these exercises really helped. \"    ASSESSMENT/Changes in Function:   Exercises were done well. Both quads are tight but he tolerated RFS well reporting decreased discomfort and tightness with improved movement at the conclusion of exercises. Short Term Goals:  To be accomplished in 4 weeks: INDEPENDENT IN HEP CORE STABILIZATION EXERS. Long Term Goals: To be accomplished in 8 weeks:              RESUME ALL ADLs AS BEFORE RECENT FLARE UP  Patient Goal (s): PAIN CONTROL  Patient will continue to benefit from skilled PT services to address functional mobility deficits, address ROM deficits and analyze and modify body mechanics/ergonomics to attain remaining goals. []  See Plan of Care  []  See progress note/recertification  []  See Discharge Summary         Progress towards goals / Updated goals: Only second visit, no changes yet.     PLAN  [x]  Upgrade activities as tolerated     [x]  Continue plan of care  []  Update interventions per flow sheet       []  Discharge due to:_  []  Other:_      Vasyl Sharma, PTA 12/10/2021

## 2021-12-13 ENCOUNTER — HOSPITAL ENCOUNTER (OUTPATIENT)
Dept: PHYSICAL THERAPY | Age: 66
Discharge: HOME | End: 2021-12-13
Payer: MEDICARE

## 2021-12-13 ENCOUNTER — PATIENT MESSAGE (OUTPATIENT)
Dept: FAMILY MEDICINE CLINIC | Age: 66
End: 2021-12-13

## 2021-12-13 DIAGNOSIS — E11.9 TYPE 2 DIABETES MELLITUS WITHOUT COMPLICATION, WITHOUT LONG-TERM CURRENT USE OF INSULIN (HCC): ICD-10-CM

## 2021-12-13 PROCEDURE — 97110 THERAPEUTIC EXERCISES: CPT

## 2021-12-13 NOTE — PROGRESS NOTES
PT DAILY TREATMENT NOTE - [facility] 2-15    Patient Name: Bree Best  Date:2021  : 1955  [x]  Patient  Verified  Payor: Antonella Ames / Plan: [place] MEDICARE PART A & B / Product Type: Medicare /    In time: 0320 pm  Out time:  0405 pm  Total Treatment Time (min): 45  Total Timed Codes (min): 45  1:1 Treatment Time ( [address] only): 39  Visit #:  3    Treatment Area: Other low back pain [M54.59]    SUBJECTIVE  Pain Level (0-10 scale): 4  Any medication changes, allergies to medications, adverse drug reactions, diagnosis change, or new procedure performed?: [x] No    [] Yes (see summary sheet for update)  Subjective functional status/changes:   [] No changes reported  I felt much better at the conclusion of my visit on Friday but later in the evening I has some pain. I'm still stiff and sore in the morning and I sometimes feel like the center of my low back is giving out. OBJECTIVE  39 min Therapeutic Exercise:  [] See flow sheet :   Rationale: increase ROM and increase strength to improve the patients ability to control pain with activity            With   [] TE   [] TA   [] Neuro   [] SC   [] other: Patient Education: [] Review HEP    [] Progressed/Changed HEP based on:   [] positioning   [] body mechanics   [] transfers   [] heat/ice application    [x] other: Instructed to add 10 reps each of lateral trunk rotations and pull-overs before getting out of the bed in the AM to get his circulation going and to loosen up tight muscles prior to his morning ADLs. Other Objective/Functional Measures: He moves about the clinic well but bed mobility is slow. Pain Level (0-10 scale) post treatment: 0 \"I feel really well, don't want to jinx anything but these exercises really helped. \"    ASSESSMENT/Changes in Function:   Exercises were done well. Both quads are tight but he tolerated RFS well reporting decreased discomfort and tightness with improved movement at the conclusion of exercises.     Short Term Goals: To be accomplished in 4 weeks:              INDEPENDENT IN HEP CORE STABILIZATION EXERS. Long Term Goals: To be accomplished in 8 weeks:              RESUME ALL ADLs AS BEFORE RECENT FLARE UP  Patient Goal (s): PAIN CONTROL  Patient will continue to benefit from skilled PT services to address functional mobility deficits, address ROM deficits and analyze and modify body mechanics/ergonomics to attain remaining goals. []  See Plan of Care  []  See progress note/recertification  []  See Discharge Summary         Progress towards goals / Updated goals: Only second visit, no changes yet.     PLAN  [x]  Upgrade activities as tolerated     [x]  Continue plan of care  []  Update interventions per flow sheet       []  Discharge due to:_  []  Other:_      Kasia Harper, PTA 12/13/2021

## 2021-12-14 PROBLEM — R07.89 MUSCULAR CHEST PAIN: Status: ACTIVE | Noted: 2021-12-14

## 2021-12-14 RX ORDER — BLOOD-GLUCOSE METER
KIT MISCELLANEOUS
Qty: 100 STRIP | Refills: 3 | Status: SHIPPED | OUTPATIENT
Start: 2021-12-14

## 2021-12-14 RX ORDER — LANCETS
EACH MISCELLANEOUS
Qty: 100 EACH | Refills: 3 | Status: SHIPPED
Start: 2021-12-14 | End: 2022-01-13

## 2021-12-14 NOTE — TELEPHONE ENCOUNTER
Giorgio Fontanez LPN 65/57/7545 89:93 AM EST      ----- Message -----  From: Pastor Milton  Sent: 12/14/2021 8:47 AM EST  To: ALYSHA Nurse  Subject: Prescription Refill Request     I check my blood sugar two to three times a week.

## 2021-12-14 NOTE — TELEPHONE ENCOUNTER
Identifying Data:  77 y.o. , Vasile Cuadra , male     HISTORICAL DATA (REVIEWED TODAY):  ALLERGIES-    Allergies   Allergen Reactions    Cefuroxime Unknown (comments)     abd pain    Ibuprofen Other (comments)     Patient states NSAIDS/ ibuprofen upsets his stomach    Statins-Hmg-Coa Reductase Inhibitors Other (comments)     Extremity weakness       MEDICATION AS OF LAST RECONCILIATION (NOT INCLUDING CHANGES MADE TODAY):    Current Outpatient Medications on File Prior to Visit   Medication Sig Dispense Refill    fluticasone propionate (FLONASE) 50 mcg/actuation nasal spray 2 sprays to each nostril once daily 1 Each 3    [DISCONTINUED] glucose blood VI test strips (FreeStyle Lite Strips) strip Finger stick blood sugars Daily DX E11.9 100 Strip 3    lidocaine (LIDODERM) 5 % Apply patch to the affected area for 12 hours a day and remove for 12 hours a day. 25 Patch 3    multivitamin (ONE A DAY) tablet Take 1 Tablet by mouth daily.  butalbital-aspirin-caffeine (FIORINAL) capsule Take 1 Capsule by mouth every four (4) hours as needed for Headache.  clopidogreL (Plavix) 75 mg tab Take 75 mg by mouth.  LORazepam (ATIVAN) 0.5 mg tablet Take 0.5 mg by mouth every four (4) hours as needed for Anxiety.  polyethylene glycol (Miralax) 17 gram/dose powder Take 17 g by mouth daily.  acetaminophen (TYLENOL) 325 mg tablet Take 2 Tablets by mouth every six (6) hours as needed for Pain. 60 Tablet 0    amLODIPine (Norvasc) 2.5 mg tablet Take 1 Tablet by mouth nightly. 30 Tablet 2    Vitamin D3 25 mcg (1,000 unit) tablet Take 2 Tablets by mouth daily.  apixaban (Eliquis) 5 mg tablet Take 5 mg by mouth two (2) times a day.  evolocumab (Repatha SureClick) pen injection 244 mg by SubCUTAneous route every fourteen (14) days.  amLODIPine (NORVASC) 5 mg tablet Take 5 mg by mouth daily. Every am       No current facility-administered medications on file prior to visit.        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  Type 2 diabetes mellitus without complication, without long-term current use of insulin ([facility])  E11.9 250.00 glucose blood VI test strips (FreeStyle Lite Strips) strip      lancets misc             Yuriy Levy, DO

## 2021-12-15 ENCOUNTER — HOSPITAL ENCOUNTER (OUTPATIENT)
Dept: PHYSICAL THERAPY | Age: 66
Discharge: HOME | End: 2021-12-15
Payer: MEDICARE

## 2021-12-15 PROCEDURE — 97110 THERAPEUTIC EXERCISES: CPT

## 2021-12-15 NOTE — PROGRESS NOTES
PT DAILY TREATMENT NOTE - [facility] 2-15    Patient Name: John Acuna  Date:12/15/2021  : 1955  [x]  Patient  Verified  Payor: Stephanie Members / Plan: [place] / Product Type: Medicare /    In time: 400 pm  Out time:  445 pm  Total Treatment Time (min): 45  Total Timed Codes (min): 45  1:1 Treatment Time ( [address] only): 39  Visit #:  4    Treatment Area: Other low back pain [M54.59]    SUBJECTIVE  Pain Level (0-10 scale): 3  Any medication changes, allergies to medications, adverse drug reactions, diagnosis change, or new procedure performed?: [x] No    [] Yes (see summary sheet for update)  Subjective functional status/changes:   [] No changes reported  Significant stiffness first thing in the morning but it is better as the day goes on. Pain is in the center of his low back. OBJECTIVE  39 min Therapeutic Exercise:  [] See flow sheet :   Rationale: increase ROM and increase strength to improve the patients ability to control pain with activity            With   [] TE   [] TA   [] Neuro   [] SC   [] other: Patient Education: [] Review HEP    [] Progressed/Changed HEP based on:   [] positioning   [] body mechanics   [] transfers   [] heat/ice application    [x] other: Instructed to add 10 reps each of lateral trunk rotations and pull-overs before getting out of the bed in the AM to get his circulation going and to loosen up tight muscles prior to his morning ADLs. Other Objective/Functional Measures: He moves about the clinic well but bed mobility is slow. Pain Level (0-10 scale) post treatment: 3      ASSESSMENT/Changes in Function:   Exercises were done well. Still with discomfort in central lumbar spine at the end of treatment session. Progress towards goals / Updated goals:  Short Term Goals: To be accomplished in 4 weeks:              INDEPENDENT IN HEP CORE STABILIZATION EXERS. Met  Long Term Goals:  To be accomplished in 8 weeks:              RESUME ALL ADLs AS BEFORE RECENT FLARE UP  Patient Goal (s): PAIN CONTROL  Patient will continue to benefit from skilled PT services to address functional mobility deficits, address ROM deficits and analyze and modify body mechanics/ergonomics to attain remaining goals.      [x]  See Plan of Care  []  See progress note/recertification  []  See Discharge Summary         PLAN  [x]  Upgrade activities as tolerated     [x]  Continue plan of care  []  Update interventions per flow sheet       []  Discharge due to:_  []  Other:_      Betsy Amaya, PT 12/15/2021

## 2021-12-20 ENCOUNTER — HOSPITAL ENCOUNTER (OUTPATIENT)
Dept: PHYSICAL THERAPY | Age: 66
Discharge: HOME | End: 2021-12-20
Payer: MEDICARE

## 2021-12-20 PROCEDURE — 97110 THERAPEUTIC EXERCISES: CPT

## 2021-12-20 NOTE — PROGRESS NOTES
PT DAILY TREATMENT NOTE - [facility] 2-15    Patient Name: Raymond Macdonald  Date:2021  : 1955  [x]  Patient  Verified  Payor: Virginiajennie Haney / Plan: [place] MED[place]ARE PART A & B / Product Type: Medicare /    In time: 0230 pm  Out time:  0315 pm  Total Treatment Time (min): 45  Total Timed Codes (min): 41  1:1 Treatment Time ( [address] only): 41  Visit #:  5    Treatment Area: Other low back pain [M54.59]    SUBJECTIVE  Pain Level (0-10 scale): 4  Any medication changes, allergies to medications, adverse drug reactions, diagnosis change, or new procedure performed?: [x] No    [] Yes (see summary sheet for update)  Subjective functional status/changes:   [] No changes reported  Patient reports that he is really tight across his very low back/top of buttocks today    OBJECTIVE  41 min Therapeutic Exercise:  [] See flow sheet :   Rationale: increase ROM and increase strength to improve the patients ability to control pain with activity            With   [] TE   [] TA   [] Neuro   [] SC   [] other: Patient Education: [] Review HEP    [] Progressed/Changed HEP based on:   [] positioning   [] body mechanics   [] transfers   [] heat/ice application    [] other:      Other Objective/Functional Measures: Independent gait and bed mobility. Reports muscle tightness in low back/buttocks      Pain Level (0-10 scale) post treatment: 2      ASSESSMENT/Changes in Function:   Exercises were done well. He reports continued, but decreased muscle tightness/discomfort at the conclusion of his visit. Progress towards goals / Updated goals:  Short Term Goals: To be accomplished in 4 weeks:              INDEPENDENT IN HEP CORE STABILIZATION EXERS. Met  Long Term Goals:  To be accomplished in 8 weeks:              RESUME ALL ADLs AS BEFORE RECENT FLARE UP  Patient Goal (s): PAIN CONTROL  Patient will continue to benefit from skilled PT services to address functional mobility deficits, address ROM deficits and analyze and modify body mechanics/ergonomics to attain remaining goals.      [x]  See Plan of Care  []  See progress note/recertification  []  See Discharge Summary         PLAN  [x]  Upgrade activities as tolerated     [x]  Continue plan of care  []  Update interventions per flow sheet       []  Discharge due to:_  []  Other:_      Kulwinder Little, PTA 12/20/2021

## 2021-12-22 ENCOUNTER — OFFICE VISIT (OUTPATIENT)
Dept: CARDIOLOGY CLINIC | Age: 66
End: 2021-12-22
Payer: MEDICARE

## 2021-12-22 ENCOUNTER — HOSPITAL ENCOUNTER (OUTPATIENT)
Dept: PHYSICAL THERAPY | Age: 66
Discharge: HOME | End: 2021-12-22
Payer: MEDICARE

## 2021-12-22 VITALS
RESPIRATION RATE: 20 BRPM | HEIGHT: 69 IN | DIASTOLIC BLOOD PRESSURE: 68 MMHG | HEART RATE: 72 BPM | BODY MASS INDEX: 30.81 KG/M2 | SYSTOLIC BLOOD PRESSURE: 124 MMHG | OXYGEN SATURATION: 97 % | WEIGHT: 208 LBS

## 2021-12-22 DIAGNOSIS — Z86.79 S/P ABLATION OF ATRIAL FIBRILLATION: Primary | ICD-10-CM

## 2021-12-22 DIAGNOSIS — Z98.890 S/P ABLATION OF ATRIAL FIBRILLATION: Primary | ICD-10-CM

## 2021-12-22 DIAGNOSIS — I48.91 ATRIAL FIBRILLATION, UNSPECIFIED TYPE (HCC): ICD-10-CM

## 2021-12-22 DIAGNOSIS — Z95.818 STATUS POST PLACEMENT OF IMPLANTABLE LOOP RECORDER: Primary | ICD-10-CM

## 2021-12-22 PROBLEM — E11.9 TYPE 2 DIABETES MELLITUS (HCC): Status: ACTIVE | Noted: 2021-12-22

## 2021-12-22 PROCEDURE — 97110 THERAPEUTIC EXERCISES: CPT

## 2021-12-22 PROCEDURE — G8427 DOCREV CUR MEDS BY ELIG CLIN: HCPCS | Performed by: INTERNAL MEDICINE

## 2021-12-22 PROCEDURE — 3017F COLORECTAL CA SCREEN DOC REV: CPT | Performed by: INTERNAL MEDICINE

## 2021-12-22 PROCEDURE — 93298 REM INTERROG DEV EVAL SCRMS: CPT | Performed by: NURSE PRACTITIONER

## 2021-12-22 PROCEDURE — G8536 NO DOC ELDER MAL SCRN: HCPCS | Performed by: INTERNAL MEDICINE

## 2021-12-22 PROCEDURE — 97140 MANUAL THERAPY 1/> REGIONS: CPT

## 2021-12-22 PROCEDURE — G8752 SYS BP LESS 140: HCPCS | Performed by: INTERNAL MEDICINE

## 2021-12-22 PROCEDURE — 1101F PT FALLS ASSESS-DOCD LE1/YR: CPT | Performed by: INTERNAL MEDICINE

## 2021-12-22 PROCEDURE — G8754 DIAS BP LESS 90: HCPCS | Performed by: INTERNAL MEDICINE

## 2021-12-22 PROCEDURE — G8510 SCR DEP NEG, NO PLAN REQD: HCPCS | Performed by: INTERNAL MEDICINE

## 2021-12-22 PROCEDURE — G8417 CALC BMI ABV UP PARAM F/U: HCPCS | Performed by: INTERNAL MEDICINE

## 2021-12-22 PROCEDURE — 99215 OFFICE O/P EST HI 40 MIN: CPT | Performed by: INTERNAL MEDICINE

## 2021-12-22 PROCEDURE — G0463 HOSPITAL OUTPT CLINIC VISIT: HCPCS | Performed by: INTERNAL MEDICINE

## 2021-12-22 RX ORDER — PANTOPRAZOLE SODIUM 40 MG/1
40 TABLET, DELAYED RELEASE ORAL DAILY
COMMUNITY
End: 2022-02-14 | Stop reason: ALTCHOICE

## 2021-12-22 NOTE — PROGRESS NOTES
PT DAILY TREATMENT NOTE - [facility] 2-15    Patient Name: Marylu Batch  Date:2021  : 1955  [x]  Patient  Verified  Payor: [place] MEDICARE / Plan: [place] MEDICARE PART A & B / Product Type: Medicare /    In time:150  Out time:245  Total Treatment Time (min): 55  Total Timed Codes (min): 55  1:1 Treatment Time ( [address] only): 55   Visit #:  6    Treatment Area: Other low back pain [M54.59]    SUBJECTIVE  Pain Level (0-10 scale): 6  Any medication changes, allergies to medications, adverse drug reactions, diagnosis change, or new procedure performed?: [x] No    [] Yes (see summary sheet for update)  Subjective functional status/changes:   [] No changes reported  I AM DOING THE HEP. NOW DO I GET BETTER? EXERCISES TODAY WERE FINE. OBJECTIVE        40 min Therapeutic Exercise:  [x] See flow sheet :   Rationale: increase strength to improve the patients ability to TOLERATE ADLs        15 min Manual Therapy:     Rationale: decrease pain and increase ROM to improve the patients ability to TOLERATE ADLs              With   [x] TE   [] TA   [] neuro   [] other: Patient Education: [x] Review HEP  P/O, LTR, KTC, HC , TKE, SLOW STAIRS  [] Progressed/Changed HEP based on:   [] positioning   [] body mechanics   [] transfers   [] heat/ice application    [] other:      Other Objective/Functional Measures: STILL WITH SOME TROUBLE DOING STANDING PELVIC TILT     Pain Level (0-10 scale) post treatment: 6    ASSESSMENT/Changes in Function:   \"MAKING A LITTLE PROGRESS. \"  Patient will continue to benefit from skilled PT services to modify and progress therapeutic interventions to attain remaining goals. Progress towards goals / Updated goals:  Short Term Goals: To be accomplished in 4 weeks:              INDEPENDENT IN HEP CORE STABILIZATION EXERS.   Met  Long Term Goals: To be accomplished in 8 weeks:              RESUME ALL ADLs AS BEFORE RECENT FLARE UP  Patient Goal (s): PAIN CONTROL    PLAN  []  Upgrade activities as tolerated     [x]  Continue plan of care  []  Update interventions per flow sheet       []  Discharge due to:_  []  Other:_      Boogie Nixon, PT 12/22/2021

## 2021-12-22 NOTE — PROGRESS NOTES
HISTORY OF PRESENTING ILLNESS      Linh Ghosh is a 77 y.o. male s/p AF ablation whose ILR fails to reveal recurrent AF. Doing well. PAST MEDICAL HISTORY     Past Medical History:   Diagnosis Date    A-fib [facility])     Acute coronary syndrome ([address] 11/19/2021    Atypical chest pain 11/19/2021    CAD (coronary artery disease) 06/15/2018    Coronary arteriosclerosis 8/7/2020    Gastric ulcer 12/14/2018    GERD (gastroesophageal reflux disease)     H/O seasonal allergies     High cholesterol     Hx of cardiac arrest     VT arrest     Hyperlipidemia 8/7/2020    Hypertension     Ill-defined condition     Urinary urgency    Kidney stone     MI (myocardial infarction) ([address]     ROQUE on CPAP     2/2/21 pt reports does not use cpap each night, only a few times per week    Torsades de pointes ([address] 11/19/2021    Ventricular fibrillation ([address] 11/19/2021    Ventricular premature beats 11/19/2021    Ventricular tachycardia ([address] 11/19/2021    Vitamin D deficiency 8/7/2020           PAST SURGICAL HISTORY     Past Surgical History:   Procedure Laterality Date    COLONOSCOPY N/A 11/12/2018    COLONOSCOPY performed by Wyatt Sevilla MD at [address] HX COLONOSCOPY  2018    HX COLONOSCOPY      HX ENDOSCOPY      HX HEART CATHETERIZATION  2018    Cardiac cath Stents x2    HX HEART CATHETERIZATION  02/2020    \"patent stent obtuse marginal 1. significant disease noted in branch artery of RCA.   THis artery appears to be borderline for any kind of intervention\"    HX LAP CHOLECYSTECTOMY  2005    HX LUMBAR DISKECTOMY      L4-L4    HX ORTHOPAEDIC  2006    Lumbar laminectomy    HX PACEMAKER      linq monitor    SC CARDIAC SURG PROCEDURE UNLIST  09/09/2021    cardiac ablation          ALLERGIES     Allergies   Allergen Reactions    Cefuroxime Unknown (comments)     abd pain    Ibuprofen Other (comments)     Patient states NSAIDS/ ibuprofen upsets his stomach    Statins-Hmg-Coa Reductase Inhibitors Other (comments)     Extremity weakness          FAMILY HISTORY     Family History   Problem Relation Age of Onset    Diabetes Mother    Stepan Emerson Stroke Father     Hypertension Father     negative for cardiac disease       SOCIAL HISTORY     Social History     Socioeconomic History    Marital status:    Tobacco Use    Smoking status: Never Smoker    Smokeless tobacco: Never Used   Vaping Use    Vaping Use: Never used   Substance and Sexual Activity    Alcohol use: No    Drug use: No    Sexual activity: Yes     Partners: Female         MEDICATIONS     Current Outpatient Medications   Medication Sig    pantoprazole (PROTONIX) 40 mg tablet Take 40 mg by mouth daily.  glucose blood VI test strips (FreeStyle Lite Strips) strip Finger stick blood sugars Daily DX E11.9    lancets misc Use to check finger stick blood sugars once daily DX E11.9    fluticasone propionate (FLONASE) 50 mcg/actuation nasal spray 2 sprays to each nostril once daily    lidocaine (LIDODERM) 5 % Apply patch to the affected area for 12 hours a day and remove for 12 hours a day.  multivitamin (ONE A DAY) tablet Take 1 Tablet by mouth daily.  butalbital-aspirin-caffeine (FIORINAL) capsule Take 1 Capsule by mouth every four (4) hours as needed for Headache.  clopidogreL (Plavix) 75 mg tab Take 75 mg by mouth.  LORazepam (ATIVAN) 0.5 mg tablet Take 0.5 mg by mouth every four (4) hours as needed for Anxiety.  polyethylene glycol (Miralax) 17 gram/dose powder Take 17 g by mouth daily.  acetaminophen (TYLENOL) 325 mg tablet Take 2 Tablets by mouth every six (6) hours as needed for Pain.  amLODIPine (Norvasc) 2.5 mg tablet Take 1 Tablet by mouth nightly.  Vitamin D3 25 mcg (1,000 unit) tablet Take 2 Tablets by mouth daily.  apixaban (Eliquis) 5 mg tablet Take 5 mg by mouth two (2) times a day.  evolocumab (Repatha SureClick) pen injection 280 mg by SubCUTAneous route every fourteen (14) days.  amLODIPine (NORVASC) 5 mg tablet Take 5 mg by mouth daily. Every am     No current facility-administered medications for this visit. I have reviewed the nurses notes, vitals, problem list, allergy list, medical history, family, social history and medications. REVIEW OF SYMPTOMS      General: Pt denies excessive weight gain or loss. Pt is able to conduct ADL's  HEENT: Denies blurred vision, headaches, hearing loss, epistaxis and difficulty swallowing. Respiratory: Denies cough, congestion, shortness of breath, RDZ, wheezing or stridor. Cardiovascular: Denies precordial pain, palpitations, edema or PND  Gastrointestinal: Denies poor appetite, indigestion, abdominal pain or blood in stool  Genitourinary: Denies hematuria, dysuria, increased urinary frequency  Musculoskeletal: Denies joint pain or swelling from muscles or joints  Neurologic: Denies tremor, paresthesias, headache, or sensory motor disturbance  Psychiatric: Denies confusion, insomnia, depression  Integumentray: Denies rash, itching or ulcers. Hematologic: Denies easy bruising, bleeding       PHYSICAL EXAMINATION      Vitals: see vitals section  General: Well developed, in no acute distress. HEENT: No jaundice, oral mucosa moist, no oral ulcers  Neck: Supple, no stiffness, no lymphadenopathy, supple  Heart:  Normal S1/S2 negative S3 or S4. Regular, no murmur, gallop or rub, no jugular venous distention  Respiratory: Clear bilaterally x 4, no wheezing or rales  Abdomen:   Soft, non-tender, bowel sounds are active. Extremities:  No edema, normal cap refill, no cyanosis. Musculoskeletal: No clubbing, no deformities  Neuro: A&Ox3, speech clear, gait stable, cooperative, no focal neurologic deficits  Skin: Skin color is normal. No rashes or lesions.  Non diaphoretic, moist.  Vascular: 2+ pulses symmetric in all extremities       DIAGNOSTIC DATA      EKG:   Visit Vitals  /68   Pulse 72   Resp 20   Ht 5' 9\" (1.753 m)   Wt 208 lb (94.3 kg)   SpO2 97%   BMI 30.72 kg/m²        LABORATORY DATA      Lab Results   Component Value Date/Time    WBC 8.4 11/16/2021 11:00 AM    HGB 14.4 11/16/2021 11:00 AM    HCT 44.8 11/16/2021 11:00 AM    PLATELET 865 04/70/6863 11:00 AM    MCV 89.6 11/16/2021 11:00 AM      Lab Results   Component Value Date/Time    Sodium 142 11/16/2021 11:00 AM    Potassium 4.5 11/16/2021 11:00 AM    Chloride 112 (H) 11/16/2021 11:00 AM    CO2 26 11/16/2021 11:00 AM    Anion gap 4 (L) 11/16/2021 11:00 AM    Glucose 107 (H) 11/16/2021 11:00 AM    BUN 11 11/16/2021 11:00 AM    Creatinine 1.33 (H) 11/16/2021 11:00 AM    BUN/Creatinine ratio 8 (L) 11/16/2021 11:00 AM    GFR est AA >60 11/16/2021 11:00 AM    GFR est non-AA 54 (L) 11/16/2021 11:00 AM    Calcium 9.1 11/16/2021 11:00 AM    Bilirubin, total 0.3 11/16/2021 11:00 AM    Alk. phosphatase 69 11/16/2021 11:00 AM    Protein, total 7.4 11/16/2021 11:00 AM    Albumin 3.6 11/16/2021 11:00 AM    Globulin 3.8 11/16/2021 11:00 AM    A-G Ratio 0.9 (L) 11/16/2021 11:00 AM    ALT (SGPT) 36 11/16/2021 11:00 AM           ASSESSMENT      1. Atrial fibrillation              A. Symptomatic              B. Paroxysmal              C. ILR   D. AF ablation  2. Cardiac arrest Hx  3. CAD, native  4.  Percutaneous coronary transluminal angioplasty  5.  ROQUE on CPAP  6.  Dizziness/lightheadedness       PLAN     Continue monitoring clinically        ICD-10-CM ICD-9-CM    1. S/P ablation of atrial fibrillation  Z98.890 V45.89     Z86.79     2. Atrial fibrillation, unspecified type (HCC)  I48.91 427.31      Orders Placed This Encounter    pantoprazole (PROTONIX) 40 mg tablet     Sig: Take 40 mg by mouth daily. FOLLOW-UP       Thank you, Ciro Balbuena DO for allowing me to participate in the care of this extraordinarily pleasant male. Please do not hesitate to contact me for further questions/concerns.          Zach Hylton MD  Cardiac Electrophysiology / Cardiology    [facility] [address] [place] [address], [address], [facility], [address] [place] [address] Drive  [phone] / [phone] Fax   [phone] / [phone] Fax

## 2021-12-22 NOTE — PROGRESS NOTES
Ayan Hernandez is a 77 y.o. male    Chief Complaint   Patient presents with    Irregular Heart Beat     ILR       Chest pain :no  SOB : no  Dizziness : no  Edema : no  Refills : no    Visit Vitals  /68   Pulse 72   Resp 20   Ht 5' 9\" (1.753 m)   Wt 208 lb (94.3 kg)   SpO2 97%   BMI 30.72 kg/m²       1. Have you been to the ER, urgent care clinic since your last visit? Hospitalized since your last visit? No, 16, 2021, [place]    2. Have you seen or consulted any other health care providers outside of the [address] [place] [address] since your last visit? Include any pap smears or colon screening.  No

## 2021-12-22 NOTE — PROGRESS NOTES
HISTORY OF PRESENTING ILLNESS      Minh Su is a 77 y.o. male  with CAD, GERD, ROQUE who experienced chest pain in June 2018 and underwent coronary revascularization for obstructive CAD. Just prior to undergoing PCI he was noted to have cardiac arrest.  He has not experienced recurrent episodes of VT since undergoing PCI. He underwent repeat nuclear imaging and heart catheterization which demonstrated stable CAD. He has been noted to have ventricular bigeminy and PVCs on monitoring. He underwent a stress test with observation of ventricular bigeminy during the exercise portion of the stress test.  He was highly intolerant of beta-blockers in the past. He was recently hospitalized with palpitations and was found to have AF with RVR that required amiodarone for control. He underwent AF ablation on 9/9/2021 and has continued Eliquis. His Amiodarone was discontinued. He experienced chest pain post procedure. Echocardiogram showed normal functioning without effusion. He has improved progressively however continues to have transient chest pain, exacerbated with increased activity/lifting. He also experienced some breakthrough AF with RVR during cardiac rehab this week while trying to increase his activity level.         PAST MEDICAL HISTORY     Past Medical History:   Diagnosis Date    A-fib [facility])     Acute coronary syndrome ([facility] 11/19/2021    Atypical chest pain 11/19/2021    CAD (coronary artery disease) 06/15/2018    Coronary arteriosclerosis 8/7/2020    Gastric ulcer 12/14/2018    GERD (gastroesophageal reflux disease)     H/O seasonal allergies     High cholesterol     Hx of cardiac arrest     VT arrest     Hyperlipidemia 8/7/2020    Hypertension     Ill-defined condition     Urinary urgency    Kidney stone     MI (myocardial infarction) ([facility]     ROQUE on CPAP     2/2/21 pt reports does not use cpap each night, only a few times per week    Torsades de pointes ([facility] 11/19/2021  Ventricular fibrillation ([facility] 11/19/2021    Ventricular premature beats 11/19/2021    Ventricular tachycardia ([facility] 11/19/2021    Vitamin D deficiency 8/7/2020           PAST SURGICAL HISTORY     Past Surgical History:   Procedure Laterality Date    COLONOSCOPY N/A 11/12/2018    COLONOSCOPY performed by Chris Gardner MD at [address] [facility] HX COLONOSCOPY  2018    HX COLONOSCOPY      HX ENDOSCOPY      HX HEART CATHETERIZATION  2018    Cardiac cath Stents x2    HX HEART CATHETERIZATION  02/2020    \"patent stent obtuse marginal 1. significant disease noted in branch artery of RCA. THis artery appears to be borderline for any kind of intervention\"    HX LAP CHOLECYSTECTOMY  2005    HX LUMBAR DISKECTOMY      L4-L4    HX ORTHOPAEDIC  2006    Lumbar laminectomy    HX PACEMAKER      linq monitor    ND CARDIAC SURG PROCEDURE UNLIST  09/09/2021    cardiac ablation          ALLERGIES     Allergies   Allergen Reactions    Cefuroxime Unknown (comments)     abd pain    Ibuprofen Other (comments)     Patient states NSAIDS/ ibuprofen upsets his stomach    Statins-Hmg-Coa Reductase Inhibitors Other (comments)     Extremity weakness          FAMILY HISTORY     Family History   Problem Relation Age of Onset    Diabetes Mother     Stroke Father     Hypertension Father     negative for cardiac disease       SOCIAL HISTORY     Social History     Socioeconomic History    Marital status:    Tobacco Use    Smoking status: Never Smoker    Smokeless tobacco: Never Used   Vaping Use    Vaping Use: Never used   Substance and Sexual Activity    Alcohol use: No    Drug use: No    Sexual activity: Yes     Partners: Female         MEDICATIONS     Current Outpatient Medications   Medication Sig    pantoprazole (PROTONIX) 40 mg tablet Take 40 mg by mouth daily.     glucose blood VI test strips (FreeStyle Lite Strips) strip Finger stick blood sugars Daily DX E11.9    lancets misc Use to check finger stick blood sugars once daily DX E11.9    fluticasone propionate (FLONASE) 50 mcg/actuation nasal spray 2 sprays to each nostril once daily    lidocaine (LIDODERM) 5 % Apply patch to the affected area for 12 hours a day and remove for 12 hours a day.  multivitamin (ONE A DAY) tablet Take 1 Tablet by mouth daily.  butalbital-aspirin-caffeine (FIORINAL) capsule Take 1 Capsule by mouth every four (4) hours as needed for Headache.  clopidogreL (Plavix) 75 mg tab Take 75 mg by mouth.  LORazepam (ATIVAN) 0.5 mg tablet Take 0.5 mg by mouth every four (4) hours as needed for Anxiety.  polyethylene glycol (Miralax) 17 gram/dose powder Take 17 g by mouth daily.  acetaminophen (TYLENOL) 325 mg tablet Take 2 Tablets by mouth every six (6) hours as needed for Pain.  amLODIPine (Norvasc) 2.5 mg tablet Take 1 Tablet by mouth nightly.  Vitamin D3 25 mcg (1,000 unit) tablet Take 2 Tablets by mouth daily.  apixaban (Eliquis) 5 mg tablet Take 5 mg by mouth two (2) times a day.  evolocumab (Repatha SureClick) pen injection 328 mg by SubCUTAneous route every fourteen (14) days.  amLODIPine (NORVASC) 5 mg tablet Take 5 mg by mouth daily. Every am     No current facility-administered medications for this visit. I have reviewed the nurses notes, vitals, problem list, allergy list, medical history, family, social history and medications. REVIEW OF SYMPTOMS      General: Pt denies excessive weight gain or loss. Pt is able to conduct ADL's  HEENT: Denies blurred vision, headaches, hearing loss, epistaxis and difficulty swallowing. Respiratory: Denies cough, congestion, shortness of breath, RDZ, wheezing or stridor.   Cardiovascular: +chest pain, Denies precordial pain, palpitations, edema or PND  Gastrointestinal: Denies poor appetite, indigestion, abdominal pain or blood in stool  Genitourinary: Denies hematuria, dysuria, increased urinary frequency  Musculoskeletal: Denies joint pain or swelling from muscles or joints  Neurologic: Denies tremor, paresthesias, headache, or sensory motor disturbance  Psychiatric: Denies confusion, insomnia, depression  Integumentray: Denies rash, itching or ulcers. Hematologic: Denies easy bruising, bleeding       PHYSICAL EXAMINATION      Vitals: see vitals section  General: Well developed, in no acute distress. HEENT: No jaundice, oral mucosa moist, no oral ulcers  Neck: Supple, no stiffness, no lymphadenopathy, supple  Heart:  Normal S1/S2 negative S3 or S4. Regular, no murmur, gallop or rub, no jugular venous distention  Respiratory: Clear bilaterally x 4, no wheezing or rales  Abdomen:   Soft, non-tender, bowel sounds are active. Extremities:  No edema, normal cap refill, no cyanosis. Musculoskeletal: No clubbing, no deformities  Neuro: A&Ox3, speech clear, gait stable, cooperative, no focal neurologic deficits  Skin: Skin color is normal. No rashes or lesions. Non diaphoretic, moist.  Vascular: 2+ pulses symmetric in all extremities       DIAGNOSTIC DATA      EKG: Sinus rhythm    Visit Vitals  /68   Pulse 72   Resp 20   Ht 5' 9\" (1.753 m)   Wt 208 lb (94.3 kg)   SpO2 97%   BMI 30.72 kg/m²        LABORATORY DATA      Lab Results   Component Value Date/Time    WBC 8.4 11/16/2021 11:00 AM    HGB 14.4 11/16/2021 11:00 AM    HCT 44.8 11/16/2021 11:00 AM    PLATELET 306 11/25/1423 11:00 AM    MCV 89.6 11/16/2021 11:00 AM      Lab Results   Component Value Date/Time    Sodium 142 11/16/2021 11:00 AM    Potassium 4.5 11/16/2021 11:00 AM    Chloride 112 (H) 11/16/2021 11:00 AM    CO2 26 11/16/2021 11:00 AM    Anion gap 4 (L) 11/16/2021 11:00 AM    Glucose 107 (H) 11/16/2021 11:00 AM    BUN 11 11/16/2021 11:00 AM    Creatinine 1.33 (H) 11/16/2021 11:00 AM    BUN/Creatinine ratio 8 (L) 11/16/2021 11:00 AM    GFR est AA >60 11/16/2021 11:00 AM    GFR est non-AA 54 (L) 11/16/2021 11:00 AM    Calcium 9.1 11/16/2021 11:00 AM    Bilirubin, total 0.3 11/16/2021 11:00 AM    Alk. phosphatase 69 11/16/2021 11:00 AM    Protein, total 7.4 11/16/2021 11:00 AM    Albumin 3.6 11/16/2021 11:00 AM    Globulin 3.8 11/16/2021 11:00 AM    A-G Ratio 0.9 (L) 11/16/2021 11:00 AM    ALT (SGPT) 36 11/16/2021 11:00 AM           ASSESSMENT      1. Atrial fibrillation   A. Symptomatic   B. Paroxysmal   C. ILR  2. Cardiac arrest Hx  3. CAD, native  4. Percutaneous coronary transluminal angioplasty  5. ROQUE on CPAP  6. Dizziness/lightheadedness       PLAN     His ILR was interrogated today and failed to show any AF. Suspect that his chest pain is not AF related, seems to be improving since his procedure with a normal echocardiogram. He admits to drinking little to no water; discussed the importance of hydration for overall health and specifically for AF health/prevention of recurrence and advised him to adequately hydrate- especially prior to cardiac rehab. Advised him to follow up with Dr. Venkat Alejandra for continued chest discomfort. Follow up in 3 months with Dr. Stephanie Rosa.         BRIAN Almodovar [facility].  [address] [place] [address] Drive    [address] [facility]  [phone] / [phone] Fax   [phone] / [phone] Fax

## 2021-12-25 ENCOUNTER — APPOINTMENT (OUTPATIENT)
Dept: GENERAL RADIOLOGY | Age: 66
End: 2021-12-25
Attending: EMERGENCY MEDICINE
Payer: MEDICARE

## 2021-12-25 ENCOUNTER — HOSPITAL ENCOUNTER (EMERGENCY)
Age: 66
Discharge: HOME | End: 2021-12-25
Attending: EMERGENCY MEDICINE
Payer: MEDICARE

## 2021-12-25 VITALS
HEIGHT: 69 IN | WEIGHT: 204 LBS | BODY MASS INDEX: 30.21 KG/M2 | HEART RATE: 81 BPM | SYSTOLIC BLOOD PRESSURE: 146 MMHG | RESPIRATION RATE: 18 BRPM | DIASTOLIC BLOOD PRESSURE: 93 MMHG | OXYGEN SATURATION: 99 % | TEMPERATURE: 98.8 F

## 2021-12-25 DIAGNOSIS — R05.9 COUGH: Primary | ICD-10-CM

## 2021-12-25 LAB — TROPONIN-HIGH SENSITIVITY: 10 NG/L (ref 0–76)

## 2021-12-25 PROCEDURE — 71045 X-RAY EXAM CHEST 1 VIEW: CPT

## 2021-12-25 PROCEDURE — 84484 ASSAY OF TROPONIN QUANT: CPT

## 2021-12-25 PROCEDURE — 36415 COLL VENOUS BLD VENIPUNCTURE: CPT

## 2021-12-25 PROCEDURE — 93005 ELECTROCARDIOGRAM TRACING: CPT

## 2021-12-25 PROCEDURE — 99283 EMERGENCY DEPT VISIT LOW MDM: CPT

## 2021-12-25 NOTE — ED TRIAGE NOTES
Pt c/o cough x1.5wks; seen at [facility] today, was told me \"might have pneumonia\"    Rapid Covid today was negative

## 2021-12-25 NOTE — DISCHARGE INSTRUCTIONS
You were seen in the ER for your cough and fatigue. Thankfully, your bloodwork was normal and did not show any signs of damage to your heart. Your chest x-ray didn't show any signs of pneumonia. You should follow up with your primary care doctor in the next 2 days to make sure your symptoms are getting better. Return to the ER for any new or worsening cough, difficulty breathing, fevers, vomiting, chest pain or any other new or concerning symptoms. Thank you! Thank you for allowing me to care for you in the emergency department. I sincerely hope that you are satisfied with your visit today. It is my goal to provide you with excellent care. Below you will find a list of your labs and imaging from your visit today. Should you have any questions regarding these results please do not hesitate to call the emergency department. Labs -     Recent Results (from the past 12 hour(s))   TROPONIN-HIGH SENSITIVITY    Collection Time: 12/25/21  4:30 PM   Result Value Ref Range    Troponin-High Sensitivity 10 0 - 76 ng/L       Radiologic Studies -   XR CHEST PORT   Final Result   No acute cardiopulmonary findings. CT Results  (Last 48 hours)      None          CXR Results  (Last 48 hours)                 12/25/21 1609  XR CHEST PORT Final result    Impression:  No acute cardiopulmonary findings. Narrative:  Study: Chest radiograph(s), 1 view       Clinical Indication: Cough. Comparison: Chest radiograph dated 7/30/2021. Findings: The cardiac silhouette is normal and unchanged in size. Loop recorder. Lung volumes are maintained. No focal consolidation, large pleural effusion, or   discernible pneumothorax. If you feel that you have not received excellent quality care or timely care, please ask to speak to the nurse manager. Please choose us in the future for your continued health care needs. ------------------------------------------------------------------------------------------------------------  The exam and treatment you received in the Emergency Department were for an urgent problem and are not intended as complete care. It is important that you follow-up with a doctor, nurse practitioner, or physician assistant to:  (1) confirm your diagnosis,  (2) re-evaluation of changes in your illness and treatment, and  (3) for ongoing care. If your symptoms become worse or you do not improve as expected and you are unable to reach your usual health care provider, you should return to the Emergency Department. We are available 24 hours a day. Please take your discharge instructions with you when you go to your follow-up appointment. If you have any problem arranging a follow-up appointment, contact the Emergency Department immediately. If a prescription has been provided, please have it filled as soon as possible to prevent a delay in treatment. Read the entire medication instruction sheet provided to you by the pharmacy. If you have any questions or reservations about taking the medication due to side effects or interactions with other medications, please call your primary care physician or contact the ER to speak with the charge nurse. Make an appointment with your family doctor or the physician you were referred to for follow-up of this visit as instructed on your discharge paperwork, as this is a mandatory follow-up. Return to the ER if you are unable to be seen or if you are unable to be seen in a timely manner. If you have any problem arranging the follow-up visit, contact the Emergency Department immediately.

## 2021-12-25 NOTE — ED PROVIDER NOTES
EMERGENCY DEPARTMENT HISTORY AND PHYSICAL EXAM      Date: 12/25/2021  Patient Name: Marylu Saab      History of Presenting Illness     Chief Complaint   Patient presents with    Cough       History Provided By: Patient    HPI: Marylu Saab, 77 y.o. male with a past medical history significant for CAD s/p PCI c/b VT arrest, ROQUE, GERD, Aifb s/p ablation on [facility] presents to the ED with cc of cough, fatigue. Fully vaccinated against COVID and received booster 1mo ago. Past 2d feeling fatigued. Has had dry cough x1.5wks. Denies F/C/N/V/D, SOB. Has had burning b/l nonradiating CP, which he says has been evaluated by his doctors in the past and told it was MSK and not cardiac. Has had same pain this AM since waking. Gets it every few days, not assoc. W/N/V/diaphoresis, exertion or any particular activity, comes and goes on its own. [facility] and [facility], CXR that was possible PNA and so given doxycycline which he took. Labwork today was unremarkable, however was told CXR wouldn't be read by a radiologist until tomorrow so came here for further evaluation. There are no other complaints, changes, or physical findings at this time. PCP: Garo Olson, DO    Current Outpatient Medications   Medication Sig Dispense Refill    pantoprazole (PROTONIX) 40 mg tablet Take 40 mg by mouth daily.  glucose blood VI test strips (FreeStyle Lite Strips) strip Finger stick blood sugars Daily DX E11.9 100 Strip 3    lancets misc Use to check finger stick blood sugars once daily DX E11.9 100 Each 3    fluticasone propionate (FLONASE) 50 mcg/actuation nasal spray 2 sprays to each nostril once daily 1 Each 3    lidocaine (LIDODERM) 5 % Apply patch to the affected area for 12 hours a day and remove for 12 hours a day. 25 Patch 3    multivitamin (ONE A DAY) tablet Take 1 Tablet by mouth daily.       butalbital-aspirin-caffeine (FIORINAL) capsule Take 1 Capsule by mouth every four (4) hours as needed for Headache.  clopidogreL (Plavix) 75 mg tab Take 75 mg by mouth.  LORazepam (ATIVAN) 0.5 mg tablet Take 0.5 mg by mouth every four (4) hours as needed for Anxiety.  polyethylene glycol (Miralax) 17 gram/dose powder Take 17 g by mouth daily.  acetaminophen (TYLENOL) 325 mg tablet Take 2 Tablets by mouth every six (6) hours as needed for Pain. 60 Tablet 0    amLODIPine (Norvasc) 2.5 mg tablet Take 1 Tablet by mouth nightly. 30 Tablet 2    Vitamin D3 25 mcg (1,000 unit) tablet Take 2 Tablets by mouth daily.  apixaban (Eliquis) 5 mg tablet Take 5 mg by mouth two (2) times a day.  evolocumab (Repatha SureClick) pen injection 142 mg by SubCUTAneous route every fourteen (14) days.  amLODIPine (NORVASC) 5 mg tablet Take 5 mg by mouth daily.  Every am         Past History     Past Medical History:  Past Medical History:   Diagnosis Date    A-fib [facility])     Acute coronary syndrome ([address] 11/19/2021    Atypical chest pain 11/19/2021    CAD (coronary artery disease) 06/15/2018    Coronary arteriosclerosis 8/7/2020    Gastric ulcer 12/14/2018    GERD (gastroesophageal reflux disease)     H/O seasonal allergies     High cholesterol     Hx of cardiac arrest     VT arrest     Hyperlipidemia 8/7/2020    Hypertension     Ill-defined condition     Urinary urgency    Kidney stone     MI (myocardial infarction) ([address]     ROQUE on CPAP     2/2/21 pt reports does not use cpap each night, only a few times per week    Torsades de pointes ([address] 11/19/2021    Ventricular fibrillation ([address] 11/19/2021    Ventricular premature beats 11/19/2021    Ventricular tachycardia ([address] 11/19/2021    Vitamin D deficiency 8/7/2020       Past Surgical History:  Past Surgical History:   Procedure Laterality Date    COLONOSCOPY N/A 11/12/2018    COLONOSCOPY performed by Evaristo Solorzano MD at [facility] HX COLONOSCOPY  2018    HX COLONOSCOPY      HX ENDOSCOPY      HX HEART CATHETERIZATION 2018    Cardiac cath Stents x2    HX HEART CATHETERIZATION  02/2020    \"patent stent obtuse marginal 1. significant disease noted in branch artery of RCA. THis artery appears to be borderline for any kind of intervention\"    HX LAP CHOLECYSTECTOMY  2005    HX LUMBAR DISKECTOMY      L4-L4    HX ORTHOPAEDIC  2006    Lumbar laminectomy    HX PACEMAKER      linq monitor    DC CARDIAC SURG PROCEDURE UNLIST  09/09/2021    cardiac ablation       Family History:  Family History   Problem Relation Age of Onset    Diabetes Mother    [facility] Stroke Father     Hypertension Father        Social History:  Social History     Tobacco Use    Smoking status: Never Smoker    Smokeless tobacco: Never Used   Vaping Use    Vaping Use: Never used   Substance Use Topics    Alcohol use: No    Drug use: No       Allergies: Allergies   Allergen Reactions    Cefuroxime Unknown (comments)     abd pain    Ibuprofen Other (comments)     Patient states NSAIDS/ ibuprofen upsets his stomach    Statins-Hmg-Coa Reductase Inhibitors Other (comments)     Extremity weakness         Review of Systems   Constitutional: Negative except as in HPI. Eyes: Negative except as in HPI.  ENT: Negative except as in HPI. Cardiovascular: Negative except as in HPI. Respiratory: Negative except as in HPI. Gastrointestinal: Negative except as in HPI. Genitourinary: Negative except as in HPI. Musculoskeletal: Negative except as in HPI. Integumentary: Negative except as in HPI. Neurological: Negative except as in HPI. Psychiatric: Negative except as in HPI. Endocrine: Negative except as in HPI. Hematologic/Lymphatic: Negative except as in HPI. Allergic/Immunologic: Negative except as in HPI.     Physical Exam   Constitutional: Awake and alert, interactive, NAD  Eyes: PERRL, no injection or scleral icterus, no discharge  HEENT: NCAT, neck supple, MMM, no oropharyngeal exudates  CV: RRR, no m/r/g  Respiratory: CTAB, no r/r/w  GI: Abd soft, nondistended, nontender  : Deferred  MSK: FROM, no joint effusions or edema  Skin: No rashes  Neuro: CN2-12 intact, symmetric facies, fluent speech. Psych: Well-groomed, normal speech, behavior, appropriate mood    Lab and Diagnostic Study Results     Labs -   No results found for this or any previous visit (from the past 12 hour(s)). FROM [facility] TODAY 12/25/21    Na 141  K 4.2  Cl 108  iCa 1.23  CO2 23  Glu 99  BUN 9  Cr 1.2      WBC 7.6  RBC 4.71  Hgb 14.0  Hct 43  MCV 91  MCH 29.8  MCHC 32.6  Plt 206  Seg 53.3%  Lymph 36.9%  Mono 7.1%  Eos 2.0%      Radiologic Studies -   [unfilled]  CT Results  (Last 48 hours)    None        CXR Results  (Last 48 hours)    None          Medical Decision Making and ED Course   - I am the first and primary provider for this patient AND AM THE PRIMARY PROVIDER OF RECORD. - I reviewed the vital signs, available nursing notes, past medical history, past surgical history, family history and social history. - Initial assessment performed. The patients presenting problems have been discussed, and the staff are in agreement with the care plan formulated and outlined with them. I have encouraged them to ask questions as they arise throughout their visit. Vital Signs-Reviewed the patient's vital signs. Patient Vitals for the past 12 hrs:   Temp Pulse Resp BP SpO2   12/25/21 1552 98.8 °F (37.1 °C) 81 18 (!) 146/93 99 %       EKG interpretation: [email or web address], nl QRS/QTc/axis, no REE/STD, diffuse TWI similar to prior        Provider Notes (Medical Decision Making):   61D w/fatigue, cough. CXR neg. Bloodwork unremarkable. Possibly viral syndrome, however given CAD, MI, VT arrest, will get trop, EKG to eval for ACS/ischemia. Dispo likely home pending workup. ED Course:       ED Course as of 12/25/21 1725   Sat Dec 25, 2021   1723 Troponin-High Sensitivity: 10 [YA]   1724 XR CHEST PORT    IMPRESSION  No acute cardiopulmonary findings.  [YA]      ED Course User Index  [YA] Sinai Swartz MD         Disposition     Disposition: DC- Adult Discharges: All of the diagnostic tests were reviewed and questions answered. Diagnosis, care plan and treatment options were discussed. The patient understands the instructions and will follow up as directed. The patients results have been reviewed with them. They have been counseled regarding their diagnosis. The patient verbally convey understanding and agreement of the signs, symptoms, diagnosis, treatment and prognosis and additionally agrees to follow up as recommended with their PCP in 24 - 48 hours. They also agree with the care-plan and convey that all of their questions have been answered. I have also put together some discharge instructions for them that include: 1) educational information regarding their diagnosis, 2) how to care for their diagnosis at home, as well a 3) list of reasons why they would want to return to the ED prior to their follow-up appointment, should their condition change. Discharged    Diagnosis     Clinical Impression:   1. Cough        Attestations:     Michelle Lombardo MD

## 2021-12-26 LAB
ATRIAL RATE: 72 BPM
CALCULATED P AXIS, ECG09: 36 DEGREES
CALCULATED R AXIS, ECG10: 2 DEGREES
CALCULATED T AXIS, ECG11: -17 DEGREES
DIAGNOSIS, 93000: NORMAL
P-R INTERVAL, ECG05: 144 MS
Q-T INTERVAL, ECG07: 374 MS
QRS DURATION, ECG06: 84 MS
QTC CALCULATION (BEZET), ECG08: 409 MS
VENTRICULAR RATE, ECG03: 72 BPM

## 2021-12-27 ENCOUNTER — HOSPITAL ENCOUNTER (OUTPATIENT)
Dept: PHYSICAL THERAPY | Age: 66
Discharge: HOME | End: 2021-12-27
Payer: MEDICARE

## 2021-12-27 PROCEDURE — 97110 THERAPEUTIC EXERCISES: CPT

## 2021-12-27 PROCEDURE — 97140 MANUAL THERAPY 1/> REGIONS: CPT

## 2021-12-27 NOTE — PROGRESS NOTES
PT DAILY TREATMENT NOTE - [facility] 2-15    Patient Name: Jazz King  Date:2021  : 1955  [x]  Patient  Verified  Payor: Enrike Uribe / Plan: [place] & [place] / Product Type: Medicare /    In time:1000 Out time:  1040  Total Treatment Time (min): 40  Total Timed Codes (min): 40  1:1 Treatment Time ( [address] only): 40  Visit #:  7    Treatment Area: Other low back pain [M54.59]    SUBJECTIVE  Pain Level (0-10 scale): 5  Any medication changes, allergies to medications, adverse drug reactions, diagnosis change, or new procedure performed?: [x] No    [] Yes (see summary sheet for update)  Subjective functional status/changes:   [] No changes reported  I AM DOING THE HEP. DOING OKAY. A FIB IS WELL CONTROLLED. NO MEDS NEEDED AFTER ABLATION. EXERCISES TODAY WERE FINE. OBJECTIVE        30 min Therapeutic Exercise:  [x] See flow sheet :   Rationale: increase strength to improve the patients ability to TOLERATE ADLs        10 min Manual Therapy:     Rationale: decrease pain and increase ROM to improve the patients ability to TOLERATE ADLs              With   [x] TE   [] TA   [] neuro   [] other: Patient Education: [x] Review HEP  P/O, LTR, KTC, HC , TKE, SLOW STAIRS  [] Progressed/Changed HEP based on:   [] positioning   [] body mechanics   [] transfers   [] heat/ice application    [] other:      Other Objective/Functional Measures: STILL WITH SOME TROUBLE DOING STANDING PELVIC TILT     Pain Level (0-10 scale) post treatment: 5    ASSESSMENT/Changes in Function:   \"MAKING A LITTLE PROGRESS. \"  Patient will continue to benefit from skilled PT services to modify and progress therapeutic interventions to attain remaining goals. Progress towards goals / Updated goals:  Short Term Goals: To be accomplished in 4 weeks:              INDEPENDENT IN HEP CORE STABILIZATION EXERS.   Met  Long Term Goals: To be accomplished in 8 weeks:              RESUME ALL ADLs AS BEFORE RECENT FLARE UP  Patient Goal (s): PAIN CONTROL    PLAN  []  Upgrade activities as tolerated     [x]  Continue plan of care  []  Update interventions per flow sheet       []  Discharge due to:_  []  Other:_      Erich Lipoma, PT 12/27/2021

## 2021-12-29 ENCOUNTER — APPOINTMENT (OUTPATIENT)
Dept: PHYSICAL THERAPY | Age: 66
End: 2021-12-29
Payer: MEDICARE

## 2022-01-03 ENCOUNTER — VIRTUAL VISIT (OUTPATIENT)
Dept: FAMILY MEDICINE CLINIC | Age: 67
End: 2022-01-03
Payer: MEDICARE

## 2022-01-03 DIAGNOSIS — E55.9 VITAMIN D DEFICIENCY: ICD-10-CM

## 2022-01-03 DIAGNOSIS — E11.9 TYPE 2 DIABETES MELLITUS WITHOUT COMPLICATION, WITHOUT LONG-TERM CURRENT USE OF INSULIN (HCC): ICD-10-CM

## 2022-01-03 DIAGNOSIS — E78.2 MIXED HYPERLIPIDEMIA: ICD-10-CM

## 2022-01-03 DIAGNOSIS — N64.4 BREAST PAIN: Primary | ICD-10-CM

## 2022-01-03 DIAGNOSIS — I48.91 ATRIAL FIBRILLATION, UNSPECIFIED TYPE (HCC): ICD-10-CM

## 2022-01-03 PROCEDURE — G8432 DEP SCR NOT DOC, RNG: HCPCS | Performed by: NURSE PRACTITIONER

## 2022-01-03 PROCEDURE — G8427 DOCREV CUR MEDS BY ELIG CLIN: HCPCS | Performed by: NURSE PRACTITIONER

## 2022-01-03 PROCEDURE — 2022F DILAT RTA XM EVC RTNOPTHY: CPT | Performed by: NURSE PRACTITIONER

## 2022-01-03 PROCEDURE — G8417 CALC BMI ABV UP PARAM F/U: HCPCS | Performed by: NURSE PRACTITIONER

## 2022-01-03 PROCEDURE — 1101F PT FALLS ASSESS-DOCD LE1/YR: CPT | Performed by: NURSE PRACTITIONER

## 2022-01-03 PROCEDURE — 99214 OFFICE O/P EST MOD 30 MIN: CPT | Performed by: NURSE PRACTITIONER

## 2022-01-03 PROCEDURE — G8756 NO BP MEASURE DOC: HCPCS | Performed by: NURSE PRACTITIONER

## 2022-01-03 PROCEDURE — 3046F HEMOGLOBIN A1C LEVEL >9.0%: CPT | Performed by: NURSE PRACTITIONER

## 2022-01-03 PROCEDURE — G8536 NO DOC ELDER MAL SCRN: HCPCS | Performed by: NURSE PRACTITIONER

## 2022-01-03 PROCEDURE — 3017F COLORECTAL CA SCREEN DOC REV: CPT | Performed by: NURSE PRACTITIONER

## 2022-01-03 NOTE — PROGRESS NOTES
Marika Chun (: 1955) is a 77 y.o. male, established patient, here for evaluation of the following chief complaint(s):   Breast pain       ASSESSMENT/PLAN:  Below is the assessment and plan developed based on review of pertinent history, labs, studies, and medications. 1. Breast pain  -     REFERRAL TO BREAST SURGERY  2. Atrial fibrillation, unspecified type [facility])  Assessment & Plan:   monitored by specialist. No acute findings meriting change in the plan  3. Type 2 diabetes mellitus without complication, without long-term current use of insulin ([address]  Assessment & Plan:   well controlled, continue current treatment plan  Orders:  -     METABOLIC PANEL, COMPREHENSIVE  -     HEMOGLOBIN A1C WITH EAG  -     MICROALBUMIN, UR, RAND W/ MICROALB/CREAT RATIO  4. Mixed hyperlipidemia  -     CBC WITH AUTOMATED DIFF  -     LIPID PANEL  5. Vitamin D deficiency  -     VITAMIN D, 25 HYDROXY      Return for Follow-up with PCP., Check consult notes. 1. Breast pain  Pt advised to call office for an appt asap. - REFERRAL TO BREAST SURGERY    2. Atrial fibrillation, unspecified type ([address]  Stable and continues under care of specialist.     3. Type 2 diabetes mellitus without complication, without long-term current use of insulin ([address]  Current status unknown. He has an upcoming appt in 2022 w/ PCP so advised to do fasting labs prior to the appt. - METABOLIC PANEL, COMPREHENSIVE  - HEMOGLOBIN A1C WITH EAG  - MICROALBUMIN, UR, RAND W/ MICROALB/CREAT RATIO    4. Mixed hyperlipidemia  Currently on Repatha. Current status unclear. - CBC WITH AUTOMATED DIFF  - LIPID PANEL    5. Vitamin D deficiency  Adjust current Vit d dose if indicated. SUBJECTIVE/OBJECTIVE:    HPI:    Pt presented w/ concerns about persistent bilateral breast pain and tenderness that has been going on for about 1-2 months. The pain is constant.  He denied any physical activity like pushups, lifting heavy weights, etc that might be a factor. Pt was seen at [facility] ER on 12/25/2022 for fatigue and a cough which started on 12/24/2022. He stated that he was tested for Covid which was negative. Prior to going to ER, he was seen at [facility] and had an xray and was told he \"might have pneumonia. \" He was Rxed doxycycline. He stated that ER did another chest xray and pt was told that ER provider did not see pneumonia. Pt not sure what to think but he will finish the doxy. Has a dry cough, no fever, chills, N/V, abdominal pain, diarrhea, loss of taste or smell. Pt has T2DM- not currently taking any medication. No recent labs in record so current status of control unknown. Review of Systems   Constitutional: Negative for appetite change, chills, fatigue, fever and unexpected weight change. HENT: Negative for congestion, ear pain, hearing loss, mouth sores, nosebleeds, postnasal drip, rhinorrhea, sinus pressure, sinus pain, sneezing, sore throat and trouble swallowing. Eyes: Negative. Respiratory: Positive for cough. Negative for chest tightness, shortness of breath and wheezing. Cardiovascular: Negative. Gastrointestinal: Negative. Endocrine: Negative. Genitourinary: Negative for difficulty urinating, dysuria, flank pain, frequency, hematuria and urgency. Musculoskeletal: Negative. Skin: Negative. Allergic/Immunologic: Negative for environmental allergies and food allergies. Neurological: Negative for dizziness, tremors, syncope, weakness, light-headedness, numbness and headaches. Hematological: Does not bruise/bleed easily. Psychiatric/Behavioral: Negative.          No data recorded     Physical Exam    [INSTRUCTIONS:  \"[x]\" Indicates a positive item  \"[]\" Indicates a negative item  -- DELETE ALL ITEMS NOT EXAMINED]    Constitutional: [x] Appears well-developed and well-nourished [x] No apparent distress      [] Abnormal -     Mental status: [x] Alert and awake  [x] Oriented to person/place/time [x] Able to follow commands    [] Abnormal -     Eyes:   EOM    [x]  Normal    [] Abnormal -   Sclera  [x]  Normal    [] Abnormal -          Discharge [x]  None visible   [] Abnormal -     HENT: [x] Normocephalic, atraumatic  [] Abnormal -   [] Mouth/Throat: Mucous membranes are moist    External Ears [x] Normal  [] Abnormal -    Neck: [x] No visualized mass [] Abnormal -     Pulmonary/Chest: [x] Respiratory effort normal   [x] No visualized signs of difficulty breathing or respiratory distress        [] Abnormal -      Musculoskeletal:   [] Normal gait with no signs of ataxia         [x] Normal range of motion of neck        [] Abnormal -     Neurological:        [x] No Facial Asymmetry (Cranial nerve 7 motor function) (limited exam due to video visit)          [x] No gaze palsy        [] Abnormal -          Skin:        [x] No significant exanthematous lesions or discoloration noted on facial skin         [] Abnormal -            Psychiatric:       [x] Normal Affect [] Abnormal -        [x] No Hallucinations      Ivan Rowland, was evaluated through a synchronous (real-time) audio-video encounter. The patient (or guardian if applicable) is aware that this is a billable service. Verbal consent to proceed has been obtained within the past 12 months. The visit was conducted pursuant to the emergency declaration under the [address] [place] [address] and the [employer] General Act. Patient identification was verified, and a caregiver was present when appropriate. The patient was located in a state where the provider was credentialed to provide care. An electronic signature was used to authenticate this note.   -- Katina Bingham NP

## 2022-01-04 RX ORDER — DOXYCYCLINE 100 MG/1
CAPSULE ORAL
COMMUNITY
Start: 2021-12-25 | End: 2022-01-13

## 2022-01-04 RX ORDER — ONDANSETRON 4 MG/1
TABLET, FILM COATED ORAL
COMMUNITY
Start: 2021-12-27

## 2022-01-04 RX ORDER — MECLIZINE HYDROCHLORIDE 25 MG/1
TABLET ORAL
COMMUNITY
Start: 2021-12-27

## 2022-01-04 RX ORDER — LANCETS 30 GAUGE
EACH MISCELLANEOUS
COMMUNITY
Start: 2021-12-14

## 2022-01-10 ENCOUNTER — HOSPITAL ENCOUNTER (OUTPATIENT)
Dept: PHYSICAL THERAPY | Age: 67
Discharge: HOME | End: 2022-01-10
Payer: MEDICARE

## 2022-01-10 PROCEDURE — 97110 THERAPEUTIC EXERCISES: CPT

## 2022-01-10 NOTE — PROGRESS NOTES
PT DAILY TREATMENT NOTE - [facility] 2-15    Patient Name: Leandro Verdin  Date:1/10/2022  : 1955  [x]  Patient  Verified  Payor: Sonali Cline / Plan: [place] PART [place] & B / Product Type: Medicare /    In time:320 Out time:  420  Total Treatment Time (min): 40  Total Timed Codes (min): 40  1:1 Treatment Time ( [address] only): 40  Visit #:  8    Treatment Area: Other low back pain [M54.59]    SUBJECTIVE  Pain Level (0-10 scale): 0  Any medication changes, allergies to medications, adverse drug reactions, diagnosis change, or new procedure performed?: [x] No    [] Yes (see summary sheet for update)  Subjective functional status/changes:   [] No changes reported  I AM DOING THE HEP. DOING OKAY. A FIB IS WELL CONTROLLED. NO MEDS NEEDED AFTER ABLATION. EXERCISES TODAY WERE FINE. HAVING A GOOD DAY. OBJECTIVE        35 min Therapeutic Exercise:  [x] See flow sheet :   Rationale: increase strength to improve the patients ability to TOLERATE ADLs        0 min Manual Therapy:     Rationale: decrease pain and increase ROM to improve the patients ability to TOLERATE ADLs              With   [x] TE   [] TA   [] neuro   [] other: Patient Education: [x] Review HEP  P/O, LTR, KTC, HC , TKE, SLOW STAIRS  [] Progressed/Changed HEP based on:   [] positioning   [] body mechanics   [] transfers   [] heat/ice application    [] other:      Other Objective/Functional Measures: STANDING PELVIC TILT WFL     Pain Level (0-10 scale) post treatment: 0    ASSESSMENT/Changes in Function:   \"FEELING GOOD. \"  Patient will continue to benefit from skilled PT services to modify and progress therapeutic interventions to attain remaining goals. Progress towards goals / Updated goals:  Short Term Goals: To be accomplished in 4 weeks:              INDEPENDENT IN HEP CORE STABILIZATION EXERS.   Met  Long Term Goals: To be accomplished in 8 weeks:              RESUME ALL ADLs AS BEFORE RECENT FLARE UP  Patient Goal (s): PAIN CONTROL    PLAN  []  Upgrade activities as tolerated     [x]  Continue plan of care  []  Update interventions per flow sheet       []  Discharge due to:_  []  Other:_      Maxwell Morris, PT 1/10/2022

## 2022-01-11 ENCOUNTER — HOSPITAL ENCOUNTER (EMERGENCY)
Age: 67
Discharge: HOME | End: 2022-01-12
Attending: EMERGENCY MEDICINE
Payer: MEDICARE

## 2022-01-11 DIAGNOSIS — R53.83 FATIGUE, UNSPECIFIED TYPE: Primary | ICD-10-CM

## 2022-01-11 LAB
ANION GAP SERPL CALC-SCNC: 10 MMOL/L (ref 5–15)
BASOPHILS # BLD: 0 K/UL (ref 0–0.1)
BASOPHILS NFR BLD: 0 % (ref 0–1)
BUN SERPL-MCNC: 12 MG/DL (ref 6–20)
BUN/CREAT SERPL: 10 (ref 12–20)
CA-I BLD-MCNC: 8.7 MG/DL (ref 8.5–10.1)
CHLORIDE SERPL-SCNC: 104 MMOL/L (ref 97–108)
CO2 SERPL-SCNC: 26 MMOL/L (ref 21–32)
CREAT SERPL-MCNC: 1.24 MG/DL (ref 0.7–1.3)
DIFFERENTIAL METHOD BLD: NORMAL
EOSINOPHIL # BLD: 0.2 K/UL (ref 0–0.4)
EOSINOPHIL NFR BLD: 2 % (ref 0–7)
ERYTHROCYTE [DISTWIDTH] IN BLOOD BY AUTOMATED COUNT: 12.9 % (ref 11.5–14.5)
GLUCOSE SERPL-MCNC: 109 MG/DL (ref 65–100)
HCT VFR BLD AUTO: 43 % (ref 36.6–50.3)
HGB BLD-MCNC: 14.3 G/DL (ref 12.1–17)
IMM GRANULOCYTES # BLD AUTO: 0 K/UL (ref 0–0.04)
IMM GRANULOCYTES NFR BLD AUTO: 0 % (ref 0–0.5)
LYMPHOCYTES # BLD: 3.1 K/UL (ref 0.8–3.5)
LYMPHOCYTES NFR BLD: 33 % (ref 12–49)
MCH RBC QN AUTO: 29.5 PG (ref 26–34)
MCHC RBC AUTO-ENTMCNC: 33.3 G/DL (ref 30–36.5)
MCV RBC AUTO: 88.8 FL (ref 80–99)
MONOCYTES # BLD: 0.9 K/UL (ref 0–1)
MONOCYTES NFR BLD: 10 % (ref 5–13)
NEUTS SEG # BLD: 5.1 K/UL (ref 1.8–8)
NEUTS SEG NFR BLD: 55 % (ref 32–75)
PLATELET # BLD AUTO: 228 K/UL (ref 150–400)
PMV BLD AUTO: 10 FL (ref 8.9–12.9)
POTASSIUM SERPL-SCNC: 3.7 MMOL/L (ref 3.5–5.1)
RBC # BLD AUTO: 4.84 M/UL (ref 4.1–5.7)
SODIUM SERPL-SCNC: 140 MMOL/L (ref 136–145)
TROPONIN-HIGH SENSITIVITY: 11 NG/L (ref 0–76)
WBC # BLD AUTO: 9.4 K/UL (ref 4.1–11.1)

## 2022-01-11 PROCEDURE — 84484 ASSAY OF TROPONIN QUANT: CPT

## 2022-01-11 PROCEDURE — 99283 EMERGENCY DEPT VISIT LOW MDM: CPT

## 2022-01-11 PROCEDURE — 93005 ELECTROCARDIOGRAM TRACING: CPT

## 2022-01-11 PROCEDURE — 80048 BASIC METABOLIC PNL TOTAL CA: CPT

## 2022-01-11 PROCEDURE — 36415 COLL VENOUS BLD VENIPUNCTURE: CPT

## 2022-01-11 PROCEDURE — 85025 COMPLETE CBC W/AUTO DIFF WBC: CPT

## 2022-01-12 ENCOUNTER — APPOINTMENT (OUTPATIENT)
Dept: PHYSICAL THERAPY | Age: 67
End: 2022-01-12
Payer: MEDICARE

## 2022-01-12 ENCOUNTER — APPOINTMENT (OUTPATIENT)
Dept: GENERAL RADIOLOGY | Age: 67
End: 2022-01-12
Attending: EMERGENCY MEDICINE
Payer: MEDICARE

## 2022-01-12 VITALS
RESPIRATION RATE: 20 BRPM | SYSTOLIC BLOOD PRESSURE: 147 MMHG | HEIGHT: 69 IN | HEART RATE: 83 BPM | WEIGHT: 203 LBS | BODY MASS INDEX: 30.07 KG/M2 | OXYGEN SATURATION: 98 % | DIASTOLIC BLOOD PRESSURE: 88 MMHG | TEMPERATURE: 98 F

## 2022-01-12 VITALS
SYSTOLIC BLOOD PRESSURE: 163 MMHG | TEMPERATURE: 98.5 F | HEIGHT: 69 IN | OXYGEN SATURATION: 97 % | WEIGHT: 203 LBS | DIASTOLIC BLOOD PRESSURE: 99 MMHG | RESPIRATION RATE: 18 BRPM | BODY MASS INDEX: 30.07 KG/M2 | HEART RATE: 97 BPM

## 2022-01-12 DIAGNOSIS — B34.9 VIRAL SYNDROME: Primary | ICD-10-CM

## 2022-01-12 LAB
ATRIAL RATE: 78 BPM
ATRIAL RATE: 80 BPM
CALCULATED P AXIS, ECG09: 35 DEGREES
CALCULATED P AXIS, ECG09: 47 DEGREES
CALCULATED R AXIS, ECG10: 4 DEGREES
CALCULATED R AXIS, ECG10: 4 DEGREES
CALCULATED T AXIS, ECG11: -25 DEGREES
CALCULATED T AXIS, ECG11: -6 DEGREES
DIAGNOSIS, 93000: NORMAL
DIAGNOSIS, 93000: NORMAL
FLUAV RNA SPEC QL NAA+PROBE: NOT DETECTED
FLUBV RNA SPEC QL NAA+PROBE: NOT DETECTED
P-R INTERVAL, ECG05: 140 MS
P-R INTERVAL, ECG05: 144 MS
Q-T INTERVAL, ECG07: 376 MS
Q-T INTERVAL, ECG07: 380 MS
QRS DURATION, ECG06: 84 MS
QRS DURATION, ECG06: 90 MS
QTC CALCULATION (BEZET), ECG08: 428 MS
QTC CALCULATION (BEZET), ECG08: 438 MS
SARS-COV-2, COV2: NORMAL
SARS-COV-2, COV2: NOT DETECTED
VENTRICULAR RATE, ECG03: 78 BPM
VENTRICULAR RATE, ECG03: 80 BPM

## 2022-01-12 PROCEDURE — 71045 X-RAY EXAM CHEST 1 VIEW: CPT

## 2022-01-12 PROCEDURE — 99282 EMERGENCY DEPT VISIT SF MDM: CPT

## 2022-01-12 PROCEDURE — 87636 SARSCOV2 & INF A&B AMP PRB: CPT

## 2022-01-12 PROCEDURE — 74011250637 HC RX REV CODE- 250/637: Performed by: EMERGENCY MEDICINE

## 2022-01-12 PROCEDURE — 93005 ELECTROCARDIOGRAM TRACING: CPT

## 2022-01-12 RX ORDER — ACETAMINOPHEN 500 MG
1000 TABLET ORAL ONCE
Status: COMPLETED | OUTPATIENT
Start: 2022-01-12 | End: 2022-01-12

## 2022-01-12 RX ADMIN — ACETAMINOPHEN 1000 MG: 500 TABLET ORAL at 07:52

## 2022-01-12 NOTE — ED TRIAGE NOTES
Patient reports that he feels \"bad\" for a few days. Patient reports that he just has no energy & feels very fatigued. Patient reports that he also had some nausea.

## 2022-01-12 NOTE — ED PROVIDER NOTES
EMERGENCY DEPARTMENT HISTORY AND PHYSICAL EXAM      Date: 1/11/2022  Patient Name: Errol Martinez    History of Presenting Illness     Chief Complaint   Patient presents with    Fatigue       History Provided By: Patient    HPI: Errol Martinez, 77 y.o. male   presents to the ED with cc of Jefe and generalized weakness. Patient complains of fatigue and generalized weakness after he had a physical therapy this afternoon. No chest pain or shortness of breath. No nausea or vomiting. No headache. Patient states that he feels dehydrated. No fever or chills. Patient has had recurrent intermittent episode of nonproductive cough for the last several weeks. Patient was tested negative for COVID-19. PCP: Lali Mcduffie DO    No current facility-administered medications on file prior to encounter. Current Outpatient Medications on File Prior to Encounter   Medication Sig Dispense Refill    doxycycline (VIBRAMYCIN) 100 mg capsule       meclizine (ANTIVERT) 25 mg tablet       ondansetron hcl (ZOFRAN) 4 mg tablet       BD Ultra-Fine II Lancets 30 gauge misc       pantoprazole (PROTONIX) 40 mg tablet Take 40 mg by mouth daily.  glucose blood VI test strips (FreeStyle Lite Strips) strip Finger stick blood sugars Daily DX E11.9 100 Strip 3    lancets misc Use to check finger stick blood sugars once daily DX E11.9 100 Each 3    fluticasone propionate (FLONASE) 50 mcg/actuation nasal spray 2 sprays to each nostril once daily 1 Each 3    lidocaine (LIDODERM) 5 % Apply patch to the affected area for 12 hours a day and remove for 12 hours a day. 25 Patch 3    multivitamin (ONE A DAY) tablet Take 1 Tablet by mouth daily.  butalbital-aspirin-caffeine (FIORINAL) capsule Take 1 Capsule by mouth every four (4) hours as needed for Headache.  clopidogreL (Plavix) 75 mg tab Take 75 mg by mouth.  LORazepam (ATIVAN) 0.5 mg tablet Take 0.5 mg by mouth every four (4) hours as needed for Anxiety.  polyethylene glycol (Miralax) 17 gram/dose powder Take 17 g by mouth daily.  acetaminophen (TYLENOL) 325 mg tablet Take 2 Tablets by mouth every six (6) hours as needed for Pain. 60 Tablet 0    amLODIPine (Norvasc) 2.5 mg tablet Take 1 Tablet by mouth nightly. 30 Tablet 2    Vitamin D3 25 mcg (1,000 unit) tablet Take 2 Tablets by mouth daily.  apixaban (Eliquis) 5 mg tablet Take 5 mg by mouth two (2) times a day.  evolocumab (Repatha SureClick) pen injection 104 mg by SubCUTAneous route every fourteen (14) days.  amLODIPine (NORVASC) 5 mg tablet Take 5 mg by mouth daily. Every am         Past History     Past Medical History:  Past Medical History:   Diagnosis Date    A-fib [facility])     Acute coronary syndrome ([address] 11/19/2021    Atypical chest pain 11/19/2021    CAD (coronary artery disease) 06/15/2018    Coronary arteriosclerosis 8/7/2020    Gastric ulcer 12/14/2018    GERD (gastroesophageal reflux disease)     H/O seasonal allergies     High cholesterol     Hx of cardiac arrest     VT arrest     Hyperlipidemia 8/7/2020    Hypertension     Ill-defined condition     Urinary urgency    Kidney stone     MI (myocardial infarction) ([address]     ROQUE on CPAP     2/2/21 pt reports does not use cpap each night, only a few times per week    Torsades de pointes ([address] 11/19/2021    Ventricular fibrillation ([address] 11/19/2021    Ventricular premature beats 11/19/2021    Ventricular tachycardia ([address] 11/19/2021    Vitamin D deficiency 8/7/2020       Past Surgical History:  Past Surgical History:   Procedure Laterality Date    COLONOSCOPY N/A 11/12/2018    COLONOSCOPY performed by Noreen Cabrales [facility] at [facility] HX COLONOSCOPY  2018    HX COLONOSCOPY      HX ENDOSCOPY      HX HEART CATHETERIZATION  2018    Cardiac cath Stents x2    HX HEART CATHETERIZATION  02/2020    \"patent stent obtuse marginal 1. significant disease noted in branch artery of RCA.   THis artery appears to be borderline for any kind of intervention\"    HX LAP CHOLECYSTECTOMY  2005    HX LUMBAR DISKECTOMY      L4-L4    HX ORTHOPAEDIC  2006    Lumbar laminectomy    HX PACEMAKER      linq monitor    [place] CARDIAC SURG PROCEDURE UNLIST  09/09/2021    cardiac ablation       Family History:  Family History   Problem Relation Age of Onset    Diabetes Mother    [facility] Stroke Father     Hypertension Father        Social History:  Social History     Tobacco Use    Smoking status: Never Smoker    Smokeless tobacco: Never Used   Vaping Use    Vaping Use: Never used   Substance Use Topics    Alcohol use: No    Drug use: No       Allergies: Allergies   Allergen Reactions    Cefuroxime Unknown (comments)     abd pain    Ibuprofen Other (comments)     Patient states NSAIDS/ ibuprofen upsets his stomach    Statins-Hmg-Coa Reductase Inhibitors Other (comments)     Extremity weakness         Review of Systems   Review of Systems   Constitutional: Negative for chills and fever. HENT: Negative for sore throat. Eyes: Negative for discharge. Respiratory: Negative for cough. Cardiovascular: Negative for chest pain. Gastrointestinal: Negative for abdominal pain. Genitourinary: Negative for difficulty urinating. Musculoskeletal: Negative for back pain and neck pain. Skin: Negative for rash. Neurological: Negative for headaches. Physical Exam   Physical Exam  Vitals and nursing note reviewed. Constitutional:       General: He is not in acute distress. Appearance: He is well-developed. He is not ill-appearing, toxic-appearing or diaphoretic. HENT:      Head: Normocephalic and atraumatic. Nose: No congestion. Mouth/Throat:      Mouth: Mucous membranes are moist.   Eyes:      Conjunctiva/sclera: Conjunctivae normal.   Cardiovascular:      Rate and Rhythm: Normal rate and regular rhythm. Pulmonary:      Effort: Pulmonary effort is normal.      Breath sounds: Normal breath sounds.    Abdominal: General: Bowel sounds are normal.      Palpations: Abdomen is soft. Musculoskeletal:      Cervical back: Neck supple. Skin:     General: Skin is warm and dry. Neurological:      General: No focal deficit present. Mental Status: He is alert. Cranial Nerves: No cranial nerve deficit. Motor: No weakness. Psychiatric:         Mood and Affect: Mood normal.         Diagnostic Study Results     Labs -     Recent Results (from the past 12 hour(s))   CBC WITH AUTOMATED DIFF    Collection Time: 01/11/22 11:10 PM   Result Value Ref Range    WBC 9.4 4.1 - 11.1 K/uL    RBC 4.84 4.10 - 5.70 M/uL    HGB 14.3 12.1 - 17.0 g/dL    HCT 43.0 36.6 - 50.3 %    MCV 88.8 80.0 - 99.0 FL    MCH 29.5 26.0 - 34.0 PG    MCHC 33.3 30.0 - 36.5 g/dL    RDW 12.9 11.5 - 14.5 %    PLATELET 762 423 - 183 K/uL    MPV 10.0 8.9 - 12.9 FL    NEUTROPHILS 55 32 - 75 %    LYMPHOCYTES 33 12 - 49 %    MONOCYTES 10 5 - 13 %    EOSINOPHILS 2 0 - 7 %    BASOPHILS 0 0 - 1 %    IMMATURE GRANULOCYTES 0 0.0 - 0.5 %    ABS. NEUTROPHILS 5.1 1.8 - 8.0 K/UL    ABS. LYMPHOCYTES 3.1 0.8 - 3.5 K/UL    ABS. MONOCYTES 0.9 0.0 - 1.0 K/UL    ABS. EOSINOPHILS 0.2 0.0 - 0.4 K/UL    ABS. BASOPHILS 0.0 0.0 - 0.1 K/UL    ABS. IMM.  GRANS. 0.0 0.00 - 0.04 K/UL    DF AUTOMATED     METABOLIC PANEL, BASIC    Collection Time: 01/11/22 11:10 PM   Result Value Ref Range    Sodium 140 136 - 145 mmol/L    Potassium 3.7 3.5 - 5.1 mmol/L    Chloride 104 97 - 108 mmol/L    CO2 26 21 - 32 mmol/L    Anion gap 10 5 - 15 mmol/L    Glucose 109 (H) 65 - 100 mg/dL    BUN 12 6 - 20 mg/dL    Creatinine 1.24 0.70 - 1.30 mg/dL    BUN/Creatinine ratio 10 (L) 12 - 20      GFR est AA >60 >60 ml/min/1.73m2    GFR est non-AA 58 (L) >60 ml/min/1.73m2    Calcium 8.7 8.5 - 10.1 mg/dL   TROPONIN-HIGH SENSITIVITY    Collection Time: 01/11/22 11:10 PM   Result Value Ref Range    Troponin-High Sensitivity 11 0 - 76 ng/L       Radiologic Studies -   No orders to display     CT Results  (Last 48 hours)    None        CXR Results  (Last 48 hours)    None            Medical Decision Making   I am the first provider for this patient. I reviewed the vital signs, available nursing notes, past medical history, past surgical history, family history and social history. Vital Signs-Reviewed the patient's vital signs. Patient Vitals for the past 12 hrs:   Temp Pulse Resp BP SpO2   01/11/22 2254 98.3 °F (36.8 °C) 83 16 (!) 147/88 98 %       Records Reviewed:     Provider Notes (Medical Decision Making):       ED Course:   Initial assessment performed. The patients presenting problems have been discussed, and they are in agreement with the care plan formulated and outlined with them. I have encouraged them to ask questions as they arise throughout their visit. PROCEDURES      Disposition: Condition stable   DC- Adult Discharges: All of the diagnostic tests were reviewed and questions answered. Diagnosis, care plan and treatment options were discussed. understand instructions and will follow up as directed. The patients results have been reviewed with them. They have been counseled regarding their diagnosis. The patient verbally convey understanding and agreement of the signs, symptoms, diagnosis, treatment and prognosis and additionally agrees to follow up as recommended. They also agree with the care-plan and convey that all of their questions have been answered. I have also put together some discharge instructions for them that include: 1) educational information regarding their diagnosis, 2) how to care for their diagnosis at home, as well a 3) list of reasons why they would want to return to the ED prior to their follow-up appointment, should their condition change. PLAN:  1. Current Discharge Medication List        2.    Follow-up Information     Follow up With Specialties Details Why Contact Info    Follow up with your primary care physician  Schedule an appointment as soon as possible for a visit in 3 days As needed         Return to ED if worse     Diagnosis     Clinical Impression:   1. Fatigue, unspecified type        Please note that this dictation was completed with [employer], the computer voice recognition software. Quite often unanticipated grammatical, syntax, homophones, and other interpretive errors are inadvertently transcribed by the computer software. Please disregard these errors. Please excuse any errors that have escaped final proofreading. Thank you.

## 2022-01-12 NOTE — ED TRIAGE NOTES
Pt believes he's dehydrated due to feeling weak, fatigued and no appetite that started earlier today. Pt states he felt like this the last time he was dehydrated.

## 2022-01-12 NOTE — ED PROVIDER NOTES
EMERGENCY DEPARTMENT HISTORY AND PHYSICAL EXAM      Date: 1/12/2022  Patient Name: Irma Cm      History of Presenting Illness     Chief Complaint   Patient presents with    Fatigue    Nausea       History Provided By: Patient and Patient's Wife    HPI: Irma Cm, 77 y.o. male with a past medical history significant for CAD s/p PCI c/b VT arrest, ROQUE, GERD, Aifb s/p ablation on [facility] presents to the ED with cc of fatigue, nausea. Onset yesterday, seen ~7hrs ago for same. Denies complaints, just feels worse. Denies F/C. Has had mild cough for months. This AM with some nausea no vomiting. Denies diarrhea, abd pain, CP, SOB. Fully vaccinated and boosted against COVID. There are no other complaints, changes, or physical findings at this time. PCP: Mirella Friday, DO    Current Outpatient Medications   Medication Sig Dispense Refill    doxycycline (VIBRAMYCIN) 100 mg capsule       meclizine (ANTIVERT) 25 mg tablet       ondansetron hcl (ZOFRAN) 4 mg tablet       BD Ultra-Fine II Lancets 30 gauge misc       pantoprazole (PROTONIX) 40 mg tablet Take 40 mg by mouth daily.  glucose blood VI test strips (FreeStyle Lite Strips) strip Finger stick blood sugars Daily DX E11.9 100 Strip 3    lancets misc Use to check finger stick blood sugars once daily DX E11.9 100 Each 3    fluticasone propionate (FLONASE) 50 mcg/actuation nasal spray 2 sprays to each nostril once daily 1 Each 3    lidocaine (LIDODERM) 5 % Apply patch to the affected area for 12 hours a day and remove for 12 hours a day. 25 Patch 3    multivitamin (ONE A DAY) tablet Take 1 Tablet by mouth daily.  butalbital-aspirin-caffeine (FIORINAL) capsule Take 1 Capsule by mouth every four (4) hours as needed for Headache.  clopidogreL (Plavix) 75 mg tab Take 75 mg by mouth.  LORazepam (ATIVAN) 0.5 mg tablet Take 0.5 mg by mouth every four (4) hours as needed for Anxiety.       polyethylene glycol (Miralax) 17 gram/dose powder Take 17 g by mouth daily.  acetaminophen (TYLENOL) 325 mg tablet Take 2 Tablets by mouth every six (6) hours as needed for Pain. 60 Tablet 0    amLODIPine (Norvasc) 2.5 mg tablet Take 1 Tablet by mouth nightly. 30 Tablet 2    Vitamin D3 25 mcg (1,000 unit) tablet Take 2 Tablets by mouth daily.  apixaban (Eliquis) 5 mg tablet Take 5 mg by mouth two (2) times a day.  evolocumab (Repatha SureClick) pen injection 107 mg by SubCUTAneous route every fourteen (14) days.  amLODIPine (NORVASC) 5 mg tablet Take 5 mg by mouth daily. Every am         Past History     Past Medical History:  Past Medical History:   Diagnosis Date    A-fib [facility])     Acute coronary syndrome ([facility] [address] 11/19/2021    Atypical chest pain 11/19/2021    CAD (coronary artery disease) 06/15/2018    Coronary arteriosclerosis 8/7/2020    Gastric ulcer 12/14/2018    GERD (gastroesophageal reflux disease)     H/O seasonal allergies     High cholesterol     Hx of cardiac arrest     VT arrest     Hyperlipidemia 8/7/2020    Hypertension     Ill-defined condition     Urinary urgency    Kidney stone     MI (myocardial infarction) ([address]     ROQUE on CPAP     2/2/21 pt reports does not use cpap each night, only a few times per week    Torsades de pointes ([address] 11/19/2021    Ventricular fibrillation ([address] 11/19/2021    Ventricular premature beats 11/19/2021    Ventricular tachycardia ([address] 11/19/2021    Vitamin D deficiency 8/7/2020       Past Surgical History:  Past Surgical History:   Procedure Laterality Date    COLONOSCOPY N/A 11/12/2018    COLONOSCOPY performed by Cathie Casas MD at [address] [facility] HX COLONOSCOPY  2018    HX COLONOSCOPY      HX ENDOSCOPY      HX HEART CATHETERIZATION  2018    Cardiac cath Stents x2    HX HEART CATHETERIZATION  02/2020    \"patent stent obtuse marginal 1. significant disease noted in branch artery of RCA.   THis artery appears to be borderline for any kind of intervention\"    HX LAP CHOLECYSTECTOMY  2005    HX LUMBAR DISKECTOMY      L4-L4    HX ORTHOPAEDIC  2006    Lumbar laminectomy    HX PACEMAKER      linq monitor    [facility] UNLIST  09/09/2021    cardiac ablation       Family History:  Family History   Problem Relation Age of Onset    Diabetes Mother    [facility] Stroke Father     Hypertension Father        Social History:  Social History     Tobacco Use    Smoking status: Never Smoker    Smokeless tobacco: Never Used   Vaping Use    Vaping Use: Never used   Substance Use Topics    Alcohol use: No    Drug use: No       Allergies: Allergies   Allergen Reactions    Cefuroxime Unknown (comments)     abd pain    Ibuprofen Other (comments)     Patient states NSAIDS/ ibuprofen upsets his stomach    Statins-Hmg-Coa Reductase Inhibitors Other (comments)     Extremity weakness         Review of Systems   Constitutional: Negative except as in HPI. Eyes: Negative except as in HPI.  ENT: Negative except as in HPI. Cardiovascular: Negative except as in HPI. Respiratory: Negative except as in HPI. Gastrointestinal: Negative except as in HPI. Genitourinary: Negative except as in HPI. Musculoskeletal: Negative except as in HPI. Integumentary: Negative except as in HPI. Neurological: Negative except as in HPI. Psychiatric: Negative except as in HPI. Endocrine: Negative except as in HPI. Hematologic/Lymphatic: Negative except as in HPI. Allergic/Immunologic: Negative except as in HPI. Physical Exam   Constitutional: Awake and alert, interactive, NAD  Eyes: PERRL, no injection or scleral icterus, no discharge  HEENT: NCAT, neck supple, MMM, no oropharyngeal exudates  CV: RRR, no m/r/g  Respiratory: CTAB, no r/r/w  GI: Abd soft, nondistended, nontender  : Deferred  MSK: FROM, no joint effusions or edema  Skin: No rashes  Neuro: CN2-12 intact, symmetric facies, fluent speech.   Psych: Well-groomed, normal speech, behavior, appropriate mood    Lab and Diagnostic Study Results     Labs -     Recent Results (from the past 12 hour(s))   CBC WITH AUTOMATED DIFF    Collection Time: 01/11/22 11:10 PM   Result Value Ref Range    WBC 9.4 4.1 - 11.1 K/uL    RBC 4.84 4.10 - 5.70 M/uL    HGB 14.3 12.1 - 17.0 g/dL    HCT 43.0 36.6 - 50.3 %    MCV 88.8 80.0 - 99.0 FL    MCH 29.5 26.0 - 34.0 PG    MCHC 33.3 30.0 - 36.5 g/dL    RDW 12.9 11.5 - 14.5 %    PLATELET 918 613 - 517 K/uL    MPV 10.0 8.9 - 12.9 FL    NEUTROPHILS 55 32 - 75 %    LYMPHOCYTES 33 12 - 49 %    MONOCYTES 10 5 - 13 %    EOSINOPHILS 2 0 - 7 %    BASOPHILS 0 0 - 1 %    IMMATURE GRANULOCYTES 0 0.0 - 0.5 %    ABS. NEUTROPHILS 5.1 1.8 - 8.0 K/UL    ABS. LYMPHOCYTES 3.1 0.8 - 3.5 K/UL    ABS. MONOCYTES 0.9 0.0 - 1.0 K/UL    ABS. EOSINOPHILS 0.2 0.0 - 0.4 K/UL    ABS. BASOPHILS 0.0 0.0 - 0.1 K/UL    ABS. IMM. GRANS. 0.0 0.00 - 0.04 K/UL    DF AUTOMATED     METABOLIC PANEL, BASIC    Collection Time: 01/11/22 11:10 PM   Result Value Ref Range    Sodium 140 136 - 145 mmol/L    Potassium 3.7 3.5 - 5.1 mmol/L    Chloride 104 97 - 108 mmol/L    CO2 26 21 - 32 mmol/L    Anion gap 10 5 - 15 mmol/L    Glucose 109 (H) 65 - 100 mg/dL    BUN 12 6 - 20 mg/dL    Creatinine 1.24 0.70 - 1.30 mg/dL    BUN/Creatinine ratio 10 (L) 12 - 20      GFR est AA >60 >60 ml/min/1.73m2    GFR est non-AA 58 (L) >60 ml/min/1.73m2    Calcium 8.7 8.5 - 10.1 mg/dL   TROPONIN-HIGH SENSITIVITY    Collection Time: 01/11/22 11:10 PM   Result Value Ref Range    Troponin-High Sensitivity 11 0 - 76 ng/L       Radiologic Studies -   [unfilled]  CT Results  (Last 48 hours)    None        CXR Results  (Last 48 hours)    None          Medical Decision Making and ED Course   - I am the first and primary provider for this patient AND AM THE PRIMARY PROVIDER OF RECORD. - I reviewed the vital signs, available nursing notes, past medical history, past surgical history, family history and social history. - Initial assessment performed.  The patients presenting problems have been discussed, and the staff are in agreement with the care plan formulated and outlined with them. I have encouraged them to ask questions as they arise throughout their visit. Vital Signs-Reviewed the patient's vital signs. Patient Vitals for the past 12 hrs:   Temp Pulse Resp BP SpO2   01/12/22 0743 98.5 °F (36.9 °C) 97 18 (!) 163/99 97 %       EKG interpretation: Unchanged from 7hrs ago last night        Provider Notes (Medical Decision Making):   44G w/fatigue, nausea c/w viral syndrome. Workup 7hrs ago reassuring, vitals stable, will get CXR to eval for focal PNA, EKG to eval for changes and will swab for Flu/COVID. Informed on emesis side effect of tamiflu, would not want if positive. Dispo home with return precautions pe    ED Course:       ED Course as of 01/12/22 0940   Wed Jan 12, 2022   0907 XR CHEST PORT    Unchanged appearance for the lungs; no interstitial or alveolar pulmonary edema. Tiny RUL granuloma. Cardiac and mediastinal structures unchanged. Implanted  left-sided cardiac device unchanged. No pneumothorax or sizable pleural  effusion.     IMPRESSION  No change compared to single view chest 12/25/2021. [YA]   [id number] SARS-CoV-2: Not Detected [YA]   9611 Influenza A by PCR: Not Detected [YA]   0023 Influenza B by PCR: Not Detected [YA]      ED Course User Index  [YA] Sascha Seth MD         Disposition     Disposition: DC- Adult Discharges: All of the diagnostic tests were reviewed and questions answered. Diagnosis, care plan and treatment options were discussed. The patient understands the instructions and will follow up as directed. The patients results have been reviewed with them. They have been counseled regarding their diagnosis. The patient verbally convey understanding and agreement of the signs, symptoms, diagnosis, treatment and prognosis and additionally agrees to follow up as recommended with their PCP in 24 - 48 hours.   They also agree with the care-plan and convey that all of their questions have been answered. I have also put together some discharge instructions for them that include: 1) educational information regarding their diagnosis, 2) how to care for their diagnosis at home, as well a 3) list of reasons why they would want to return to the ED prior to their follow-up appointment, should their condition change. Discharged      Diagnosis     Clinical Impression:   1. Viral syndrome        Attestations:     Tiffanie Carrillo MD

## 2022-01-12 NOTE — DISCHARGE INSTRUCTIONS
You were seen in the ER for your COVID-like symptoms. You should act as though you are positive even if your swab returns negative. Thankfully, your vital signs are all normal, including your oxygen and your heart rate. You should take tylenol or ibuprofen for fever, aches and pains for the next few days. You can alternate these every 3 hours so that you always have something on board. For instance, you can take tylenol at 9am, ibuprofen at noon, tylenol again at 3pm and ibuprofen again at 6pm. If the ibuprofen upsets your stomach, you can take it with food. Take in plenty of water to stay hydrated and follow up with your primary care doctor in the next few days. Return to the ER for any uncontrollable vomiting or diarrhea, difficulty breathing, or any other new or concerning symptoms. Thank you! Thank you for allowing me to care for you in the emergency department. I sincerely hope that you are satisfied with your visit today. It is my goal to provide you with excellent care. Below you will find a list of your labs and imaging from your visit today. Should you have any questions regarding these results please do not hesitate to call the emergency department. Labs -     Recent Results (from the past 12 hour(s))   CBC WITH AUTOMATED DIFF    Collection Time: 01/11/22 11:10 PM   Result Value Ref Range    WBC 9.4 4.1 - 11.1 K/uL    RBC 4.84 4.10 - 5.70 M/uL    HGB 14.3 12.1 - 17.0 g/dL    HCT 43.0 36.6 - 50.3 %    MCV 88.8 80.0 - 99.0 FL    MCH 29.5 26.0 - 34.0 PG    MCHC 33.3 30.0 - 36.5 g/dL    RDW 12.9 11.5 - 14.5 %    PLATELET 869 284 - 050 K/uL    MPV 10.0 8.9 - 12.9 FL    NEUTROPHILS 55 32 - 75 %    LYMPHOCYTES 33 12 - 49 %    MONOCYTES 10 5 - 13 %    EOSINOPHILS 2 0 - 7 %    BASOPHILS 0 0 - 1 %    IMMATURE GRANULOCYTES 0 0.0 - 0.5 %    ABS. NEUTROPHILS 5.1 1.8 - 8.0 K/UL    ABS. LYMPHOCYTES 3.1 0.8 - 3.5 K/UL    ABS. MONOCYTES 0.9 0.0 - 1.0 K/UL    ABS. EOSINOPHILS 0.2 0.0 - 0.4 K/UL    ABS. BASOPHILS 0.0 0.0 - 0.1 K/UL    ABS. IMM. GRANS. 0.0 0.00 - 0.04 K/UL    DF AUTOMATED     METABOLIC PANEL, BASIC    Collection Time: 01/11/22 11:10 PM   Result Value Ref Range    Sodium 140 136 - 145 mmol/L    Potassium 3.7 3.5 - 5.1 mmol/L    Chloride 104 97 - 108 mmol/L    CO2 26 21 - 32 mmol/L    Anion gap 10 5 - 15 mmol/L    Glucose 109 (H) 65 - 100 mg/dL    BUN 12 6 - 20 mg/dL    Creatinine 1.24 0.70 - 1.30 mg/dL    BUN/Creatinine ratio 10 (L) 12 - 20      GFR est AA >60 >60 ml/min/1.73m2    GFR est non-AA 58 (L) >60 ml/min/1.73m2    Calcium 8.7 8.5 - 10.1 mg/dL   TROPONIN-HIGH SENSITIVITY    Collection Time: 01/11/22 11:10 PM   Result Value Ref Range    Troponin-High Sensitivity 11 0 - 76 ng/L   COVID-19 WITH INFLUENZA A/B    Collection Time: 01/12/22  7:57 AM   Result Value Ref Range    SARS-CoV-2 Not Detected Not Detected      Influenza A by PCR Not Detected Not Detected      Influenza B by PCR Not Detected Not Detected     SARS-COV-2    Collection Time: 01/12/22  7:57 AM   Result Value Ref Range    SARS-CoV-2 Please find results under separate order         Radiologic Studies -   XR CHEST PORT   Final Result   No change compared to single view chest 12/25/2021. CT Results  (Last 48 hours)      None          CXR Results  (Last 48 hours)                 01/12/22 0820  XR CHEST PORT Final result    Impression:  No change compared to single view chest 12/25/2021. Narrative:  Chest single view. Comparison single view chest 12/25/2021. Unchanged appearance for the lungs; no interstitial or alveolar pulmonary edema. Tiny RUL granuloma. Cardiac and mediastinal structures unchanged. Implanted   left-sided cardiac device unchanged. No pneumothorax or sizable pleural   effusion. If you feel that you have not received excellent quality care or timely care, please ask to speak to the nurse manager. Please choose us in the future for your continued health care needs. ------------------------------------------------------------------------------------------------------------  The exam and treatment you received in the Emergency Department were for an urgent problem and are not intended as complete care. It is important that you follow-up with a doctor, nurse practitioner, or physician assistant to:  (1) confirm your diagnosis,  (2) re-evaluation of changes in your illness and treatment, and  (3) for ongoing care. If your symptoms become worse or you do not improve as expected and you are unable to reach your usual health care provider, you should return to the Emergency Department. We are available 24 hours a day. Please take your discharge instructions with you when you go to your follow-up appointment. If you have any problem arranging a follow-up appointment, contact the Emergency Department immediately. If a prescription has been provided, please have it filled as soon as possible to prevent a delay in treatment. Read the entire medication instruction sheet provided to you by the pharmacy. If you have any questions or reservations about taking the medication due to side effects or interactions with other medications, please call your primary care physician or contact the ER to speak with the charge nurse. Make an appointment with your family doctor or the physician you were referred to for follow-up of this visit as instructed on your discharge paperwork, as this is a mandatory follow-up. Return to the ER if you are unable to be seen or if you are unable to be seen in a timely manner. If you have any problem arranging the follow-up visit, contact the Emergency Department immediately.

## 2022-01-12 NOTE — ED NOTES
Bedside and Verbal shift change report given to NEDA Joshi (oncoming nurse) by Brandy Lyles RN (offgoing nurse). Report included the following information SBAR, Kardex, MAR and Recent Results.

## 2022-01-13 ENCOUNTER — OFFICE VISIT (OUTPATIENT)
Dept: FAMILY MEDICINE CLINIC | Age: 67
End: 2022-01-13
Payer: MEDICARE

## 2022-01-13 VITALS
WEIGHT: 204 LBS | TEMPERATURE: 97.5 F | HEART RATE: 88 BPM | BODY MASS INDEX: 30.21 KG/M2 | HEIGHT: 69 IN | SYSTOLIC BLOOD PRESSURE: 127 MMHG | RESPIRATION RATE: 12 BRPM | OXYGEN SATURATION: 98 % | DIASTOLIC BLOOD PRESSURE: 69 MMHG

## 2022-01-13 DIAGNOSIS — B34.9 ACUTE VIRAL SYNDROME: ICD-10-CM

## 2022-01-13 PROCEDURE — G8752 SYS BP LESS 140: HCPCS | Performed by: FAMILY MEDICINE

## 2022-01-13 PROCEDURE — G8754 DIAS BP LESS 90: HCPCS | Performed by: FAMILY MEDICINE

## 2022-01-13 PROCEDURE — G8427 DOCREV CUR MEDS BY ELIG CLIN: HCPCS | Performed by: FAMILY MEDICINE

## 2022-01-13 PROCEDURE — 99213 OFFICE O/P EST LOW 20 MIN: CPT | Performed by: FAMILY MEDICINE

## 2022-01-13 PROCEDURE — 3017F COLORECTAL CA SCREEN DOC REV: CPT | Performed by: FAMILY MEDICINE

## 2022-01-13 PROCEDURE — 1101F PT FALLS ASSESS-DOCD LE1/YR: CPT | Performed by: FAMILY MEDICINE

## 2022-01-13 PROCEDURE — G8510 SCR DEP NEG, NO PLAN REQD: HCPCS | Performed by: FAMILY MEDICINE

## 2022-01-13 PROCEDURE — G8417 CALC BMI ABV UP PARAM F/U: HCPCS | Performed by: FAMILY MEDICINE

## 2022-01-13 PROCEDURE — G8536 NO DOC ELDER MAL SCRN: HCPCS | Performed by: FAMILY MEDICINE

## 2022-01-13 NOTE — PROGRESS NOTES
Tamir Martin is a 77 y.o. male    Chief Complaint   Patient presents with   [facility] Follow Up     01/11/2022 for fatigue and weakness        Health Maintenance Due   Topic Date Due    Hepatitis C Screening  Never done    Foot Exam Q1  Never done    A1C test (Diabetic or Prediabetic)  Never done    MICROALBUMIN Q1  Never done    Eye Exam Retinal or Dilated  Never done    Lipid Screen  Never done    Shingrix Vaccine Age 50> (1 of 2) Never done    Medicare Yearly Exam  Never done    COVID-19 Vaccine (3 - Booster for Moderna series) 08/25/2021    Flu Vaccine (1) 09/01/2021       Visit Vitals  /69 (BP 1 Location: Right upper arm, BP Patient Position: Sitting)   Pulse 88   Temp 97.5 °F (36.4 °C) (Temporal)   Resp 12   Ht 5' 9\" (1.753 m)   Wt 204 lb (92.5 kg)   SpO2 98%   BMI 30.13 kg/m²       3 most recent PHQ Screens 1/13/2022   Little interest or pleasure in doing things Not at all   Feeling down, depressed, irritable, or hopeless Not at all   Total Score PHQ 2 0       Fall Risk Assessment, last 12 mths 1/13/2022   Able to walk? Yes   Fall in past 12 months? 0   Do you feel unsteady? 0   Are you worried about falling 0       Abuse Screening Questionnaire 12/1/2021   Do you ever feel afraid of your partner? N   Are you in a relationship with someone who physically or mentally threatens you? N   Is it safe for you to go home? Y         1. Have you been to the ER, urgent care clinic since your last visit? Hospitalized since your last visit? yes ER visit 01/11/2022 for fatigue    2. Have you seen or consulted any other health care    providers outside of the [address] [place] [address] since your last visit? Include any pap smears or colon screening. [facility]

## 2022-01-13 NOTE — PROGRESS NOTES
Patient: Enrique Hamm               Sex: male             MRN: [id number]     YOB: 1955      Age:  77 y.o. Subjective:     Enrique Hamm is a 77 y.o. male presents c/o fatigue. Started two days ago. He has been seen twice in local EDs over past two days. Notes/diagnostics reviewed. See A/P for additional detail. Today he denies any notable associated symptoms of acute onset apart from fatigue and decreased appetite. He has had intermittent cough recently but this is not changed. Past Medical History:   Diagnosis Date    A-fib [facility])     Acute coronary syndrome ([address] 11/19/2021    Atypical chest pain 11/19/2021    CAD (coronary artery disease) 06/15/2018    Coronary arteriosclerosis 8/7/2020    Gastric ulcer 12/14/2018    GERD (gastroesophageal reflux disease)     H/O seasonal allergies     High cholesterol     Hx of cardiac arrest     VT arrest     Hyperlipidemia 8/7/2020    Hypertension     Ill-defined condition     Urinary urgency    Kidney stone     MI (myocardial infarction) ([address]     ROQUE on CPAP     2/2/21 pt reports does not use cpap each night, only a few times per week    Torsades de pointes ([address] 11/19/2021    Ventricular fibrillation ([address] 11/19/2021    Ventricular premature beats 11/19/2021    Ventricular tachycardia ([address] 11/19/2021    Vitamin D deficiency 8/7/2020       Past Surgical History:   Procedure Laterality Date    COLONOSCOPY N/A 11/12/2018    COLONOSCOPY performed by Harmony Marte MD at [facility] HX COLONOSCOPY  2018    HX COLONOSCOPY      HX ENDOSCOPY      HX HEART CATHETERIZATION  2018    Cardiac cath Stents x2    HX HEART CATHETERIZATION  02/2020    \"patent stent obtuse marginal 1. significant disease noted in branch artery of RCA.   THis artery appears to be borderline for any kind of intervention\"    HX LAP CHOLECYSTECTOMY  2005    HX LUMBAR DISKECTOMY      L4-L4    HX ORTHOPAEDIC  2006    Lumbar laminectomy    HX PACEMAKER      linq monitor    [place] CARDIAC SURG PROCEDURE UNLIST  09/09/2021    cardiac ablation       Family History   Problem Relation Age of Onset    Diabetes Mother     Stroke Father     Hypertension Father        Social History     Socioeconomic History    Marital status:    Tobacco Use    Smoking status: Never Smoker    Smokeless tobacco: Never Used   Vaping Use    Vaping Use: Never used   Substance and Sexual Activity    Alcohol use: No    Drug use: No    Sexual activity: Yes     Partners: Female       Prior to Admission medications    Medication Sig Start Date End Date Taking? Authorizing Provider   meclizine (ANTIVERT) 25 mg tablet  12/27/21  Yes Provider, Historical   ondansetron hcl (ZOFRAN) 4 mg tablet  12/27/21  Yes Provider, Historical   BD Ultra-Fine II Lancets 30 gauge misc  12/14/21  Yes Provider, Historical   pantoprazole (PROTONIX) 40 mg tablet Take 40 mg by mouth daily. Yes Provider, Historical   glucose blood VI test strips (FreeStyle Lite Strips) strip Finger stick blood sugars Daily DX E11.9 12/14/21  Yes Lisa Garcia, DO   fluticasone propionate (FLONASE) 50 mcg/actuation nasal spray 2 sprays to each nostril once daily 12/7/21  Yes Lisa Garcia, DO   lidocaine (LIDODERM) 5 % Apply patch to the affected area for 12 hours a day and remove for 12 hours a day. 12/1/21  Yes Candido Goltz, NP   multivitamin (ONE A DAY) tablet Take 1 Tablet by mouth daily. Yes Provider, Historical   butalbital-aspirin-caffeine (FIORINAL) capsule Take 1 Capsule by mouth every four (4) hours as needed for Headache. Yes Lisandra, MD Sherita   clopidogreL (Plavix) 75 mg tab Take 75 mg by mouth. Yes Lisandra, MD Sherita   LORazepam (ATIVAN) 0.5 mg tablet Take 0.5 mg by mouth every four (4) hours as needed for Anxiety. Yes Lisandra, MD Sherita   polyethylene glycol (Miralax) 17 gram/dose powder Take 17 g by mouth daily.    Yes Sherita Fry MD   acetaminophen (TYLENOL) 325 mg tablet Take 2 Tablets by mouth every six (6) hours as needed for Pain. 7/31/21  Yes Rupal Crowley MD   amLODIPine (Norvasc) 2.5 mg tablet Take 1 Tablet by mouth nightly. 7/31/21  Yes Oc Cohen MD   Vitamin D3 25 mcg (1,000 unit) tablet Take 2 Tablets by mouth daily. 4/30/21  Yes Provider, Historical   apixaban (Eliquis) 5 mg tablet Take 5 mg by mouth two (2) times a day. Yes Provider, Historical   evolocumab (Repatha SureClick) pen injection 332 mg by SubCUTAneous route every fourteen (14) days. Yes Provider, Historical   amLODIPine (NORVASC) 5 mg tablet Take 5 mg by mouth daily. Every am   Yes Provider, Historical   doxycycline (VIBRAMYCIN) 100 mg capsule  12/25/21   Provider, Historical   lancets misc Use to check finger stick blood sugars once daily DX E11.9 12/14/21   Ralf End, DO       Allergies   Allergen Reactions    Cefuroxime Unknown (comments)     abd pain    Ibuprofen Other (comments)     Patient states NSAIDS/ ibuprofen upsets his stomach    Statins-Hmg-Coa Reductase Inhibitors Other (comments)     Extremity weakness         Review of Systems  Review of Systems   Constitutional: Positive for malaise/fatigue. Negative for chills and fever. HENT: Negative. Eyes: Negative for blurred vision. Respiratory: Positive for cough. Negative for sputum production, shortness of breath and wheezing. Cardiovascular: Negative for chest pain and palpitations. Gastrointestinal: Negative for abdominal pain, nausea and vomiting. Genitourinary: Negative. Musculoskeletal: Negative for joint pain. Skin: Negative for rash. Objective:     Vitals:    01/13/22 1032 01/13/22 1038   BP: (!) 142/74 127/69   Pulse: 88    Resp: 12    Temp: 97.5 °F (36.4 °C)    TempSrc: Temporal    SpO2: 98%    Weight: 204 lb (92.5 kg)    Height: 5' 9\" (1.753 m)          Physical Exam:  Physical Exam  Vitals reviewed. Constitutional:       Appearance: Normal appearance.    Neurological:      Mental Status: He is alert.   Psychiatric:         Mood and Affect: Mood normal.          Assessment/Plan     Diagnoses and all orders for this visit:    1. Acute viral syndrome  Assessment & Plan:  Mr. Dickie Canavan is a 76 yo M with med hx notable for CAD, Afib s/p ablation on AC, DM2, ROQUE, GERD presented c/o 2 days of generalized fatigue. Did not expressly endorse any other associated symptoms. Of important, over the past 2 days since onset he was seen twice in local EDs for same complaints. Reviewed those encounters and evaluations. Lab work, EKGs, CXRs did not demonstrate specific etiology. VS normal today. He is not ill appearing and converses normally. His primary complaint is fatigue. Denies f/c, cp, sob. Had in depth discussion about evaluation to this point and absence of any indicators of specific etiology and that likely cause is viral illness. Encouraged rest, hydration, tylenol prn for fever/myalgias. RTC or ED if symptoms acutely worsen.              Vilma Restrepo MD

## 2022-01-13 NOTE — ASSESSMENT & PLAN NOTE
Mr. Ashley Marie is a 76 yo M with med hx notable for CAD, Afib s/p ablation on AC, DM2, ROQUE, GERD presented c/o 2 days of generalized fatigue. Did not expressly endorse any other associated symptoms. Of important, over the past 2 days since onset he was seen twice in local EDs for same complaints. Reviewed those encounters and evaluations. Lab work, EKGs, CXRs did not demonstrate specific etiology. VS normal today. He is not ill appearing and converses normally. His primary complaint is fatigue. Denies f/c, cp, sob. Had in depth discussion about evaluation to this point and absence of any indicators of specific etiology and that likely cause is viral illness. Encouraged rest, hydration, tylenol prn for fever/myalgias. RTC or ED if symptoms acutely worsen.

## 2022-01-17 ENCOUNTER — APPOINTMENT (OUTPATIENT)
Dept: PHYSICAL THERAPY | Age: 67
End: 2022-01-17
Payer: MEDICARE

## 2022-01-19 ENCOUNTER — HOSPITAL ENCOUNTER (OUTPATIENT)
Dept: PHYSICAL THERAPY | Age: 67
Discharge: HOME | End: 2022-01-19
Payer: MEDICARE

## 2022-01-19 PROCEDURE — 97140 MANUAL THERAPY 1/> REGIONS: CPT

## 2022-01-19 PROCEDURE — 97110 THERAPEUTIC EXERCISES: CPT

## 2022-01-19 NOTE — PROGRESS NOTES
PT DAILY TREATMENT NOTE - [facility] 2-15    Patient Name: Laila Trujillo  Date:2022  : 1955  [x]  Patient  Verified  Payor: Catina Marcano / Plan: [place] / Product Type: Medicare /    In time:1040 Out time:  1140  Total Treatment Time (min): 60  Total Timed Codes (min): 45  1:1 Treatment Time ( [address] only): 45  Visit #:  9    Treatment Area: Other low back pain [M54.59]    SUBJECTIVE  Pain Level (0-10 scale): 0  Any medication changes, allergies to medications, adverse drug reactions, diagnosis change, or new procedure performed?: [x] No    [] Yes (see summary sheet for update)  Subjective functional status/changes:   [] No changes reported  I AM DOING THE HEP. DOING OKAY. A FIB IS WELL CONTROLLED. NO MEDS NEEDED AFTER ABLATION. EXERCISES TODAY WERE FINE. HAVING A GOOD DAY. OBJECTIVE        35 min Therapeutic Exercise:  [x] See flow sheet :   Rationale: increase strength to improve the patients ability to TOLERATE ADLs        10 min Manual Therapy:     Rationale: decrease pain and increase ROM to improve the patients ability to TOLERATE ADLs              With   [x] TE   [] TA   [] neuro   [] other: Patient Education: [x] Review HEP  P/O, LTR, KTC, HC , TKE, SLOW STAIRS  [] Progressed/Changed HEP based on:   [] positioning   [] body mechanics   [] transfers   [] heat/ice application    [] other:      Other Objective/Functional Measures: STANDING PELVIC TILT WFL     Pain Level (0-10 scale) post treatment: 0    ASSESSMENT/Changes in Function:   \"FEELING GOOD. \"  Patient will continue to benefit from skilled PT services to modify and progress therapeutic interventions to attain remaining goals. Progress towards goals / Updated goals:  Short Term Goals: To be accomplished in 4 weeks:              INDEPENDENT IN HEP CORE STABILIZATION EXERS.   Met  Long Term Goals: To be accomplished in 8 weeks:              RESUME ALL ADLs AS BEFORE RECENT FLARE UP  Patient Goal (s): PAIN CONTROL    PLAN  []  Upgrade activities as tolerated     [x]  Continue plan of care  []  Update interventions per flow sheet       []  Discharge due to:_  []  Other:_      Mary Ellen Lewis, PT 1/19/2022

## 2022-01-24 ENCOUNTER — HOSPITAL ENCOUNTER (OUTPATIENT)
Dept: PHYSICAL THERAPY | Age: 67
Discharge: HOME | End: 2022-01-24
Payer: MEDICARE

## 2022-01-24 PROCEDURE — 97140 MANUAL THERAPY 1/> REGIONS: CPT

## 2022-01-24 PROCEDURE — 97110 THERAPEUTIC EXERCISES: CPT

## 2022-01-24 NOTE — PROGRESS NOTES
PHYSICAL THERAPY PROGRESS REPORT AFTER 10 VISITS OF PT 2-15    Patient Name: Roxanne Palafox  Date:2022  : 1955    Treatment Area: Other low back pain [M54.59]    SUBJECTIVE    I AM DOING THE HOME EXERCISE PROGREAM.  A FIB IS WELL CONTROLLED. NO MEDS NEEDED AFTER ABLATION. HAD A COUPLE DAYS WITH NO SX THEN GOT SOME MILD RIGHT LBP YESTERDAY. OBJECTIVE  STRENGTH AND ROM WNL  TOLERATES EXERCISES WELL. ASSESSMENT/Changes in Function:   \"FEELING GOOD. \"  Patient will continue to benefit from skilled PT services to modify and progress therapeutic interventions to attain remaining goals. Progress towards goals / Updated goals:  FOTO/FUNCTIONALS SLIGHTLY IMPROVED. Short Term Goals: To be accomplished in 4 weeks:              INDEPENDENT IN HEP CORE STABILIZATION EXERS.   Met  Long Term Goals: To be accomplished in 8 weeks:              RESUME ALL ADLs AS BEFORE RECENT FLARE UP  Patient Goal (s): PAIN CONTROL    PLAN  []  Upgrade activities as tolerated     [x]  Continue plan of care    Ganga Rosa, PT 2022

## 2022-01-24 NOTE — PROGRESS NOTES
PT DAILY TREATMENT NOTE - [facility] 2-15    Patient Name: Rico Segovia  Date:2022  : 1955  [x]  Patient  Verified  Payor: [place] MEDICARE / Plan: [place] MEDICARE PART A & B / Product Type: Medicare /    In [id number] Out time:  1142  Total Treatment Time (min): 60  Total Timed Codes (min): 45  1:1 Treatment Time ([address] only): 45  Visit #:  10    Treatment Area: Other low back pain [M54.59]    SUBJECTIVE  Pain Level (0-10 scale): 3  Any medication changes, allergies to medications, adverse drug reactions, diagnosis change, or new procedure performed?: [x] No    [] Yes (see summary sheet for update)  Subjective functional status/changes:   [] No changes reported  I AM DOING THE HEP. DOING OKAY. A FIB IS WELL CONTROLLED. NO MEDS NEEDED AFTER ABLATION. EXERCISES TODAY WERE FINE. HAD A COUPLE DAYS WITH NO SX THEN GOT SOME MILD RIGHT LBP YESTERDAY. OBJECTIVE        35 min Therapeutic Exercise:  [x] See flow sheet :   Rationale: increase strength to improve the patients ability to TOLERATE ADLs        10 min Manual Therapy:     Rationale: decrease pain and increase ROM to improve the patients ability to TOLERATE ADLs              With   [x] TE   [] TA   [] neuro   [] other: Patient Education: [x] Review HEP  P/O, LTR, KTC, HC , TKE, SLOW STAIRS  [] Progressed/Changed HEP based on:   [] positioning   [] body mechanics   [] transfers   [] heat/ice application    [] other:      Other Objective/Functional Measures: STANDING PELVIC TILT WNL     Pain Level (0-10 scale) post treatment: 2    ASSESSMENT/Changes in Function:   \"FEELING GOOD. \"  Patient will continue to benefit from skilled PT services to modify and progress therapeutic interventions to attain remaining goals. Progress towards goals / Updated goals:  Short Term Goals: To be accomplished in 4 weeks:              INDEPENDENT IN HEP CORE STABILIZATION EXERS.   Met  Long Term Goals: To be accomplished in 8 weeks:              RESUME ALL ADLs AS BEFORE RECENT FLARE UP  Patient Goal (s): PAIN CONTROL    PLAN  []  Upgrade activities as tolerated     [x]  Continue plan of care  []  Update interventions per flow sheet       []  Discharge due to:_  []  Other:_      Bruna Ureña, PT 1/24/2022

## 2022-01-26 ENCOUNTER — HOSPITAL ENCOUNTER (OUTPATIENT)
Dept: PHYSICAL THERAPY | Age: 67
Discharge: HOME | End: 2022-01-26
Payer: MEDICARE

## 2022-01-26 PROCEDURE — 97140 MANUAL THERAPY 1/> REGIONS: CPT

## 2022-01-26 PROCEDURE — 97110 THERAPEUTIC EXERCISES: CPT

## 2022-01-26 NOTE — PROGRESS NOTES
PT DAILY TREATMENT NOTE - [facility] 2-15    Patient Name: Leandro Verdin  Date:2022  : 1955  [x]  Patient  Verified  Payor: Sonali Cline / Plan: [place] MEDICARE PART A & B / Product Type: Medicare /    In [id number] Out time:  1142  Total Treatment Time (min): 60  Total Timed Codes (min): 48  1:1 Treatment Time ([facility] only): 48  Visit #:  11    Treatment Area: Other low back pain [M54.59]    SUBJECTIVEPain Level (0-10 scale): 2  Any medication changes, allergies to medications, adverse drug reactions, diagnosis change, or new procedure performed?: [x] No    [] Yes (see summary sheet for update)  Subjective functional status/changes:   [] No changes reported  I AM DOING THE HEP. DOING OKAY. A FIB IS WELL CONTROLLED. NO MEDS NEEDED AFTER ABLATION. EXERCISES TODAY WERE FINE. WENT TO [facility]. FOR MAINTENANCE CARDIAC REHAB YESTERDAY    OBJECTIVE  PIRIFORMIS STRETCH ON LEFT WNL, MILDLY TIGHT RIGHT      38 min Therapeutic Exercise:  [x] See flow sheet :   Rationale: increase strength to improve the patients ability to TOLERATE ADLs        10 min Manual Therapy:     Rationale: decrease pain and increase ROM to improve the patients ability to TOLERATE ADLs              With   [x] TE   [] TA   [] neuro   [] other: Patient Education: [x] Review HEP  P/O, LTR, KTC, HC , TKE, SLOW STAIRS  [] Progressed/Changed HEP based on:   [] positioning   [] body mechanics   [] transfers   [] heat/ice application    [] other:      Other Objective/Functional Measures: STANDING PELVIC TILT WNL     Pain Level (0-10 scale) post treatment: 2    ASSESSMENT/Changes in Function:   \"FEELING GOOD. \"  Patient will continue to benefit from skilled PT services to modify and progress therapeutic interventions to attain remaining goals. Progress towards goals / Updated goals:  Short Term Goals: To be accomplished in 4 weeks:              INDEPENDENT IN HEP CORE STABILIZATION EXERS.   Met  Long Term Goals: To be accomplished in 8 weeks:              RESUME ALL ADLs AS BEFORE RECENT FLARE UP  Patient Goal (s): PAIN CONTROL    PLAN  []  Upgrade activities as tolerated     [x]  Continue plan of care  []  Update interventions per flow sheet       []  Discharge due to:_  []  Other:_      Regulo Olivier, PT 1/26/2022

## 2022-01-31 ENCOUNTER — HOSPITAL ENCOUNTER (OUTPATIENT)
Dept: PHYSICAL THERAPY | Age: 67
Discharge: HOME | End: 2022-01-31
Payer: MEDICARE

## 2022-01-31 PROCEDURE — 97140 MANUAL THERAPY 1/> REGIONS: CPT

## 2022-01-31 PROCEDURE — 97110 THERAPEUTIC EXERCISES: CPT

## 2022-01-31 NOTE — PROGRESS NOTES
PT DAILY TREATMENT NOTE - [facility] 2-15    Patient Name: Alisia Venegas  Date:2022  : 1955  [x]  Patient  Verified  Payor: Clari Cr / Plan: [place] MEDICARE PART A & B / Product Type: Medicare /    In [id number] Out time:  1133  Total Treatment Time (min): 50  Total Timed Codes (min): 50  1:1 Treatment Time ( [address] only): 50  Visit #:  12    Treatment Area: Other low back pain [M54.59]    SUBJECTIVEPain Level (0-10 scale): 2  Any medication changes, allergies to medications, adverse drug reactions, diagnosis change, or new procedure performed?: [x] No    [] Yes (see summary sheet for update)  Subjective functional status/changes:   [] No changes reported  I AM DOING THE HEP. DOING OKAY. A FIB IS WELL CONTROLLED. NO MEDS NEEDED AFTER ABLATION. EXERCISES TODAY WERE FINE. WENT TO [facility]. FOR MAINTENANCE CARDIAC REHAB YESTERDAY    OBJECTIVE  PIRIFORMIS STRETCH ON LEFT WNL, MILDLY TIGHT RIGHT      40 min Therapeutic Exercise:  [x] See flow sheet :   Rationale: increase strength to improve the patients ability to TOLERATE ADLs        10 min Manual Therapy:     Rationale: decrease pain and increase ROM to improve the patients ability to TOLERATE ADLs              With   [x] TE   [] TA   [] neuro   [] other: Patient Education: [x] Review HEP  P/O, LTR, KTC, HC , TKE, SLOW STAIRS  [] Progressed/Changed HEP based on:   [] positioning   [] body mechanics   [] transfers   [] heat/ice application    [] other:      Other Objective/Functional Measures: STANDING PELVIC TILT WNL     Pain Level (0-10 scale) post treatment: 2    ASSESSMENT/Changes in Function:   \"FEELING GOOD. \"  Patient will continue to benefit from skilled PT services to modify and progress therapeutic interventions to attain remaining goals. Progress towards goals / Updated goals:  Short Term Goals: To be accomplished in 4 weeks:              INDEPENDENT IN HEP CORE STABILIZATION EXERS.   Met  Long Term Goals: To be accomplished in 8 weeks:              RESUME ALL ADLs AS BEFORE RECENT FLARE UP  Patient Goal (s): PAIN CONTROL    PLAN  []  Upgrade activities as tolerated     [x]  Continue plan of care  []  Update interventions per flow sheet       []  Discharge due to:_  []  Other:_      Keeley Kennedy, PT 1/31/2022

## 2022-02-02 ENCOUNTER — HOSPITAL ENCOUNTER (OUTPATIENT)
Dept: PHYSICAL THERAPY | Age: 67
Discharge: HOME | End: 2022-02-02
Payer: MEDICARE

## 2022-02-02 PROCEDURE — 97110 THERAPEUTIC EXERCISES: CPT | Performed by: PHYSICAL THERAPIST

## 2022-02-02 PROCEDURE — 97140 MANUAL THERAPY 1/> REGIONS: CPT | Performed by: PHYSICAL THERAPIST

## 2022-02-02 NOTE — PROGRESS NOTES
PT DAILY TREATMENT NOTE - [facility] 2-15    Patient Name: Minor Rosas  Date:2022  : 1955  [x]  Patient  Verified  Payor: Christa Mukherjee / Plan: [place] MEDICARE PART A & B / Product Type: Medicare /    In time:10;34 am Out time: 11:15  Total Treatment Time (min): 61 minutes  Total Timed Codes (min): 52 minutes  1:1 Treatment Time ([facility] only): 52 minutes  Visit #:  13    Treatment Area: Other low back pain [M54.59]    SUBJECTIVE  Pain Level (0-10 scale): 0  Any medication changes, allergies to medications, adverse drug reactions, diagnosis change, or new procedure performed?: [x] No    [] Yes (see summary sheet for update)  Subjective functional status/changes:   [] No changes reported  Continues have stiffness left TL fascia    Objective:   Right post hip capsule continues to be limited. Pain Level (0-10 scale) post treatment: 0    ASSESSMENT/Changes in Function:   No changes      Patient will continue to benefit from skilled PT services to modify and progress therapeutic interventions, address functional mobility deficits and analyze and address soft tissue restrictions to attain remaining goals. [x]  See Plan of Care  []  See progress note/recertification  []  See Discharge Summary         Progress towards goals / Updated goals:Short Term Goals: To be accomplished in 4 weeks:              INDEPENDENT IN HEP CORE STABILIZATION EXERS.   Met  Long Term Goals: To be accomplished in 8 weeks:              RESUME ALL ADLs AS BEFORE RECENT FLARE UP  Patient Goal (s): PAIN CONTROL             PLAN  [x]  Upgrade activities as tolerated     [x]  Continue plan of care  []  Update interventions per flow sheet       []  Discharge due to:_  []  Other:_      April Dk-Vannesa, PT 2022

## 2022-02-07 ENCOUNTER — HOSPITAL ENCOUNTER (OUTPATIENT)
Dept: PHYSICAL THERAPY | Age: 67
Discharge: HOME | End: 2022-02-07
Payer: MEDICARE

## 2022-02-07 PROCEDURE — 97140 MANUAL THERAPY 1/> REGIONS: CPT

## 2022-02-07 PROCEDURE — 97110 THERAPEUTIC EXERCISES: CPT

## 2022-02-07 NOTE — PROGRESS NOTES
PT DAILY TREATMENT NOTE - [facility] -15    Patient Name: Jazz King  Date:2022  : 1955  [x]  Patient  Verified  Payor: VA MEDICARE / Plan: VA MEDICARE PART A & B / Product Type: Medicare /    In time:1100 Out time:  1200  Total Treatment Time (min): 60  Total Timed Codes (min): 40  1:1 Treatment Time ( [address] only): 40  Visit #:  14    Treatment Area: Other low back pain [M54.59]    SUBJECTIVEPain Level (0-10 scale): 2  Any medication changes, allergies to medications, adverse drug reactions, diagnosis change, or new procedure performed?: [x] No    [] Yes (see summary sheet for update)  Subjective functional status/changes:   [] No changes reported  I AM DOING THE HEP. DOING OKAY. A FIB IS WELL CONTROLLED. NO MEDS NEEDED AFTER ABLATION. EXERCISES TODAY WERE FINE. I GO TO [facility]. FOR MAINTENANCE CARDIAC REHAB   STILL FEELS TIGHT RIGHT HIP    OBJECTIVE  PIRIFORMIS STRETCH ON LEFT WNL,NOT OBJECTIVELY TIGHT ON RIGHT      30 min Therapeutic Exercise:  [x] See flow sheet :   Rationale: increase strength to improve the patients ability to TOLERATE ADLs        10 min Manual Therapy:     Rationale: decrease pain and increase ROM to improve the patients ability to TOLERATE ADLs              With   [x] TE   [] TA   [] neuro   [] other: Patient Education: [x] Review HEP  P/O, LTR, KTC, HC , TKE, SLOW STAIRS  [] Progressed/Changed HEP based on:   [] positioning   [] body mechanics   [] transfers   [] heat/ice application    [] other:      Other Objective/Functional Measures: STANDING PELVIC TILT WNL     Pain Level (0-10 scale) post treatment: 2    ASSESSMENT/Changes in Function:   \"FEELING GOOD. \"  Patient will continue to benefit from skilled PT services to modify and progress therapeutic interventions to attain remaining goals. Progress towards goals / Updated goals:  Short Term Goals: To be accomplished in 4 weeks:              INDEPENDENT IN HEP CORE STABILIZATION EXERS.   Met  Long Term Goals: To be accomplished in 8 weeks:              RESUME ALL ADLs AS BEFORE RECENT FLARE UP  Patient Goal (s): PAIN CONTROL    PLAN  []  Upgrade activities as tolerated     [x]  Continue plan of care  []  Update interventions per flow sheet       []  Discharge due to:_  []  Other:_      Ruthie Abad, PT 2/7/2022

## 2022-02-09 ENCOUNTER — HOSPITAL ENCOUNTER (OUTPATIENT)
Dept: PHYSICAL THERAPY | Age: 67
Discharge: HOME | End: 2022-02-09
Payer: MEDICARE

## 2022-02-09 PROCEDURE — 97110 THERAPEUTIC EXERCISES: CPT

## 2022-02-09 PROCEDURE — 97140 MANUAL THERAPY 1/> REGIONS: CPT

## 2022-02-09 NOTE — PROGRESS NOTES
PT DAILY TREATMENT NOTE - [facility] 2-15    Patient Name: Alycia Pat  Date:2022  : 1955  [x]  Patient  Verified  Payor: [place] MEDICARE / Plan: VA MEDICARE PART A & B / Product Type: Medicare /    In time:1140 Out time:  1240  Total Treatment Time (min): 60  Total Timed Codes (min): 45  1:1 Treatment Time ([facility] only): 45  Visit #:  15    Treatment Area: Other low back pain [M54.59]    SUBJECTIVEPain Level (0-10 scale): 2  Any medication changes, allergies to medications, adverse drug reactions, diagnosis change, or new procedure performed?: [x] No    [] Yes (see summary sheet for update)  Subjective functional status/changes:   [] No changes reported  I AM DOING THE HEP. DOING OKAY. A FIB IS WELL CONTROLLED. NO MEDS NEEDED AFTER ABLATION. EXERCISES TODAY WERE FINE. I GO TO [facility]. FOR MAINTENANCE CARDIAC REHAB   STILL FEELS TIGHT RIGHT HIP  BILAT. CALF SORENESS YESTERDAY AFTER CARDIAC REHAB    OBJECTIVE  PIRIFORMIS STRETCH ON LEFT WNL,NOT OBJECTIVELY TIGHT ON RIGHT      30 min Therapeutic Exercise:  [x] See flow sheet :   Rationale: increase strength to improve the patients ability to TOLERATE ADLs        10 min Manual Therapy:     Rationale: decrease pain and increase ROM to improve the patients ability to TOLERATE ADLs              With   [x] TE   [] TA   [] neuro   [] other: Patient Education: [x] Review HEP  P/O, LTR, KTC, HC , TKE, SLOW STAIRS  [] Progressed/Changed HEP based on:   [] positioning   [] body mechanics   [] transfers   [] heat/ice application    [] other:      Other Objective/Functional Measures: STANDING PELVIC TILT WNL     Pain Level (0-10 scale) post treatment: 2    ASSESSMENT/Changes in Function:   \"FEELING GOOD. \"  Patient will continue to benefit from skilled PT services to modify and progress therapeutic interventions to attain remaining goals.      Progress towards goals / Updated goals:  Short Term Goals: To be accomplished in 4 weeks:              INDEPENDENT IN HEP CORE STABILIZATION EXERS.   Met  Long Term Goals: To be accomplished in 8 weeks:              RESUME ALL ADLs AS BEFORE RECENT FLARE UP  Patient Goal (s): PAIN CONTROL    PLAN  []  Upgrade activities as tolerated     [x]  Continue plan of care  []  Update interventions per flow sheet       []  Discharge due to:_  []  Other:_      Diogo Olson, PT 2/9/2022

## 2022-02-14 ENCOUNTER — VIRTUAL VISIT (OUTPATIENT)
Dept: FAMILY MEDICINE CLINIC | Age: 67
End: 2022-02-14
Payer: MEDICARE

## 2022-02-14 ENCOUNTER — HOSPITAL ENCOUNTER (OUTPATIENT)
Dept: PHYSICAL THERAPY | Age: 67
Discharge: HOME | End: 2022-02-14
Payer: MEDICARE

## 2022-02-14 DIAGNOSIS — Z11.59 ENCOUNTER FOR HEPATITIS C SCREENING TEST FOR LOW RISK PATIENT: ICD-10-CM

## 2022-02-14 DIAGNOSIS — M79.10 MUSCLE PAIN: ICD-10-CM

## 2022-02-14 DIAGNOSIS — R53.82 CHRONIC FATIGUE: Primary | ICD-10-CM

## 2022-02-14 DIAGNOSIS — E55.9 VITAMIN D DEFICIENCY: ICD-10-CM

## 2022-02-14 PROCEDURE — 97110 THERAPEUTIC EXERCISES: CPT

## 2022-02-14 PROCEDURE — 1101F PT FALLS ASSESS-DOCD LE1/YR: CPT | Performed by: NURSE PRACTITIONER

## 2022-02-14 PROCEDURE — G8427 DOCREV CUR MEDS BY ELIG CLIN: HCPCS | Performed by: NURSE PRACTITIONER

## 2022-02-14 PROCEDURE — G8536 NO DOC ELDER MAL SCRN: HCPCS | Performed by: NURSE PRACTITIONER

## 2022-02-14 PROCEDURE — G8417 CALC BMI ABV UP PARAM F/U: HCPCS | Performed by: NURSE PRACTITIONER

## 2022-02-14 PROCEDURE — G8432 DEP SCR NOT DOC, RNG: HCPCS | Performed by: NURSE PRACTITIONER

## 2022-02-14 PROCEDURE — 3017F COLORECTAL CA SCREEN DOC REV: CPT | Performed by: NURSE PRACTITIONER

## 2022-02-14 PROCEDURE — 97140 MANUAL THERAPY 1/> REGIONS: CPT

## 2022-02-14 PROCEDURE — 99214 OFFICE O/P EST MOD 30 MIN: CPT | Performed by: NURSE PRACTITIONER

## 2022-02-14 PROCEDURE — G8756 NO BP MEASURE DOC: HCPCS | Performed by: NURSE PRACTITIONER

## 2022-02-14 RX ORDER — CLOMIPHENE CITRATE 50 MG/1
TABLET ORAL
COMMUNITY
Start: 2022-02-10 | End: 2022-09-01 | Stop reason: ALTCHOICE

## 2022-02-14 RX ORDER — FAMOTIDINE 40 MG/1
1 TABLET, FILM COATED ORAL 2 TIMES DAILY
COMMUNITY
Start: 2022-01-10

## 2022-02-14 NOTE — PROGRESS NOTES
PT DAILY TREATMENT NOTE - [facility] 2-15    Patient Name: Domenica Andrew  Date:2022  : 1955  [x]  Patient  Verified  Payor: Natacha Monreal / Plan: VA MEDICARE PART A & B / Product Type: Medicare /    In time:1045 Out time:  1140  Total Treatment Time (min): 55  Total Timed Codes (min): 50  1:1 Treatment Time ( [address] only): 50  Visit #:  16    Treatment Area: Other low back pain [M54.59]    SUBJECTIVEPain Level (0-10 scale): 3-4 right low back/flank  Any medication changes, allergies to medications, adverse drug reactions, diagnosis change, or new procedure performed?: [x] No    [] Yes (see summary sheet for update)  Subjective functional status/changes:   [] No changes reported  I AM DOING THE HEP. DOING OKAY. A FIB IS WELL CONTROLLED. NO MEDS NEEDED AFTER ABLATION. EXERCISES TODAY WERE FINE. I GO TO [facility]. FOR MAINTENANCE CARDIAC REHAB   STILL GOING TO CARDIAC REHAB MAINTENANCE SO NO NEED TO OVERDO IT ON TREADMILL HERE IN PT    OBJECTIVE  PIRIFORMIS STRETCH ON LEFT WNL,NOT OBJECTIVELY TIGHT ON RIGHT      40 min Therapeutic Exercise:  [x] See flow sheet :   Rationale: increase strength to improve the patients ability to TOLERATE ADLs        10 min Manual Therapy:     Rationale: decrease pain and increase ROM to improve the patients ability to TOLERATE ADLs              With   [x] TE   [] TA   [] neuro   [] other: Patient Education: [x] Review HEP  P/O, LTR, KTC, HC , TKE, SLOW STAIRS  [] Progressed/Changed HEP based on:   [] positioning   [] body mechanics   [] transfers   [] heat/ice application    [] other:      Other Objective/Functional Measures: STANDING PELVIC TILT WNL     Pain Level (0-10 scale) post treatment: 2    ASSESSMENT/Changes in Function:   \"FEELING GOOD. \"  Patient will continue to benefit from skilled PT services to modify and progress therapeutic interventions to attain remaining goals.      Progress towards goals / Updated goals:  Short Term Goals: To be accomplished in 4 weeks:              INDEPENDENT IN HEP CORE STABILIZATION EXERS.   Met  Long Term Goals: To be accomplished in 8 weeks:              RESUME ALL ADLs AS BEFORE RECENT FLARE UP  Patient Goal (s): PAIN CONTROL    PLAN  []  Upgrade activities as tolerated     [x]  Continue plan of care  []  Update interventions per flow sheet       []  Discharge due to:_  []  Other:_      Yasmine Cronin, PT 2/14/2022

## 2022-02-16 ENCOUNTER — HOSPITAL ENCOUNTER (OUTPATIENT)
Dept: PHYSICAL THERAPY | Age: 67
Discharge: HOME | End: 2022-02-16
Payer: MEDICARE

## 2022-02-16 PROCEDURE — 97110 THERAPEUTIC EXERCISES: CPT

## 2022-02-16 PROCEDURE — 97140 MANUAL THERAPY 1/> REGIONS: CPT

## 2022-02-16 NOTE — PROGRESS NOTES
PT DAILY TREATMENT NOTE - [facility] 2-15    Patient Name: Paul Younger  Date:2022  : 1955  [x]  Patient  Verified  Payor: Clark Mendiola / Plan: VA MEDICARE PART A & B / Product Type: Medicare /    In time:1040 Out time:  1145  Total Treatment Time (min): 55  Total Timed Codes (min): 50  1:1 Treatment Time ([facility] only): 50  Visit #:  17    Treatment Area: Other low back pain [M54.59]    SUBJECTIVEPain Level (0-10 scale): 3-4 right low back/flank  Any medication changes, allergies to medications, adverse drug reactions, diagnosis change, or new procedure performed?: [x] No    [] Yes (see summary sheet for update)  Subjective functional status/changes:   [] No changes reported  I AM DOING THE HEP. DOING OKAY. A FIB IS WELL CONTROLLED. NO MEDS NEEDED AFTER ABLATION. EXERCISES TODAY WERE FINE. I GO TO [facility]. FOR MAINTENANCE CARDIAC REHAB   STILL GOING TO CARDIAC REHAB MAINTENANCE SO NO NEED TO OVERDO IT ON TREADMILL HERE IN PT  TALKED TO MY DOC YESTERDAY; GOT A REFERRAL TO A [facility] DR. Dennise Loja TO SEE ABOUT INJECTION    OBJECTIVE  PIRIFORMIS STRETCH ON LEFT WNL,NOT OBJECTIVELY TIGHT ON RIGHT      40 min Therapeutic Exercise:  [x] See flow sheet :   Rationale: increase strength to improve the patients ability to TOLERATE ADLs        10 min Manual Therapy:     Rationale: decrease pain and increase ROM to improve the patients ability to TOLERATE ADLs              With   [x] TE   [] TA   [] neuro   [] other: Patient Education: [x] Review HEP  P/O, LTR, KTC, HC , TKE, SLOW STAIRS  [] Progressed/Changed HEP based on:   [] positioning   [] body mechanics   [] transfers   [] heat/ice application    [] other:      Other Objective/Functional Measures: STANDING PELVIC TILT WNL     Pain Level (0-10 scale) post treatment: 3    ASSESSMENT/Changes in Function:   \"FEELING GOOD. \"  Patient will continue to benefit from skilled PT services to modify and progress therapeutic interventions to attain remaining goals. Progress towards goals / Updated goals:  Short Term Goals: To be accomplished in 4 weeks:              INDEPENDENT IN HEP CORE STABILIZATION EXERS.   Met  Long Term Goals: To be accomplished in 8 weeks:              RESUME ALL ADLs AS BEFORE RECENT FLARE UP  Patient Goal (s): PAIN CONTROL    PLAN  []  Upgrade activities as tolerated     [x]  Continue plan of care  []  Update interventions per flow sheet       []  Discharge due to:_  []  Other:_      Evan Padilla, PT 2/16/2022

## 2022-02-21 ENCOUNTER — HOSPITAL ENCOUNTER (OUTPATIENT)
Dept: PHYSICAL THERAPY | Age: 67
Discharge: HOME | End: 2022-02-21
Payer: MEDICARE

## 2022-02-21 PROCEDURE — 97110 THERAPEUTIC EXERCISES: CPT

## 2022-02-23 ENCOUNTER — HOSPITAL ENCOUNTER (OUTPATIENT)
Dept: PHYSICAL THERAPY | Age: 67
Discharge: HOME | End: 2022-02-23
Payer: MEDICARE

## 2022-02-23 PROCEDURE — 97140 MANUAL THERAPY 1/> REGIONS: CPT

## 2022-02-23 PROCEDURE — 97110 THERAPEUTIC EXERCISES: CPT

## 2022-02-23 NOTE — PROGRESS NOTES
PT DAILY TREATMENT NOTE - [facility] 2-15    Patient Name: Errol Graft  Date:2022  : 1955  [x]  Patient  Verified  Payor: Yasmine Counter / Plan: VA MEDICARE PART A & B / Product Type: Medicare /    In time:  7384 Out time:  1220  Total Treatment Time (min): 50  Total Timed Codes (min): 45  1:1 Treatment Time ( [address] only): 45  Visit #:  19    Treatment Area: Other low back pain [M54.59]    SUBJECTIVEPain Level (0-10 scale): 3/10 for lat. Right hip  Any medication changes, allergies to medications, adverse drug reactions, diagnosis change, or new procedure performed?: [x] No    [] Yes (see summary sheet for update)  Subjective functional status/changes:   [] No changes reported  I AM DOING THE HEP. DOING OKAY. A FIB IS WELL CONTROLLED. NO MEDS NEEDED AFTER ABLATION. EXERCISES TODAY WERE FINE. I GO TO [facility]. FOR MAINTENANCE CARDIAC REHAB   STILL GOING TO CARDIAC REHAB MAINTENANCE SO NO NEED TO OVERDO IT ON TREADMILL HERE IN PT  TALKED TO  [address] ; GOT A REFERRAL TO A [facility] DR. Dayami Sanderson TO SEE ABOUT INJECTION    OBJECTIVE  PIRIFORMIS STRETCH wnl both right and left      35 min Therapeutic Exercise:  [x] See flow sheet :   Rationale: increase strength to improve the patients ability to TOLERATE ADLs        10 min Manual Therapy:     Rationale: decrease pain and increase ROM to improve the patients ability to TOLERATE ADLs              With   [x] TE   [] TA   [] neuro   [] other: Patient Education: [x] Review HEP  P/O, LTR, KTC, HC , TKE, SLOW STAIRS  [] Progressed/Changed HEP based on:   [] positioning   [] body mechanics   [] transfers   [] heat/ice application    [] other:      Other Objective/Functional Measures: STANDING PELVIC TILT WNL     Pain Level (0-10 scale) post treatment: 2    ASSESSMENT/Changes in Function:   \"FEELING GOOD. \"  Patient will continue to benefit from skilled PT services to modify and progress therapeutic interventions to attain remaining goals.      Progress towards goals / Updated goals:  Short Term Goals: To be accomplished in 4 weeks:              INDEPENDENT IN HEP CORE STABILIZATION EXERS. Met  Long Term Goals: To be accomplished in 8 weeks:              RESUME ALL ADLs AS BEFORE RECENT FLARE UP  Patient Goal (s): PAIN CONTROL    PLAN  []  Upgrade activities as tolerated     [x]  Continue plan of care   Awaiting ortho consult for lat.  Right hip pain  []  Update interventions per flow sheet       []  Discharge due to:_  []  Other:_      Ben Alexander, PT 2/23/2022

## 2022-02-23 NOTE — PROGRESS NOTES
PHYSICAL THERAPY PROGRESS REPORT AFTER 19 VISITS 2-15    Patient Name: Allegra Hence  Date:2022  : 1955  CHRONIC NECK AND BACK PAIN  Treatment Area: Other low back pain [M54.59]    SUBJECTIVE  Pain Level (0-10 scale): 3/10 FOR LATERAL RIGHT HIP  I AM DOING THE HOME EXERCISE PROGRAM.  A FIB IS WELL CONTROLLED. NO MEDS NEEDED AFTER ABLATION. I GO TO [address] [place] [address] REHAB     TALKED TO MY DOC ; [facility]ERRAL [facility]ricilla Jimenez TO SEE ABOUT INJECTION FOR RIGHT HIP AREA PAIN    OBJECTIVE  ROM AND STRENGTH TEST NORMAL. VERY ACTIVE LIFESTYE    ASSESSMENT/Changes in Function:   NO CHANGE IN FUNCTIONAL MEASURES. Progress towards goals / Updated goals:  Short Term Goals: To be accomplished in 4 weeks:              INDEPENDENT IN HEP CORE STABILIZATION EXERS. Met  Long Term Goals: To be accomplished in 8 weeks:              RESUME ALL ADLs AS BEFORE RECENT FLARE UP; NOT MET  Patient Goal (s): PAIN CONTROL    PLAN  []  Upgrade activities as tolerated     [x]  Continue plan of care   Awaiting ortho consult for lat.  Right hip pain      Stefano Diez, PT 2022

## 2022-02-24 ENCOUNTER — PATIENT MESSAGE (OUTPATIENT)
Dept: FAMILY MEDICINE CLINIC | Age: 67
End: 2022-02-24

## 2022-02-27 LAB
T4 FREE SERPL-MCNC: 1.17 NG/DL (ref 0.82–1.77)
TSH SERPL DL<=0.005 MIU/L-ACNC: 2.37 UIU/ML (ref 0.45–4.5)

## 2022-02-27 NOTE — PROGRESS NOTES
Thyroid tests results were ordered and done. Sent pt a [employer] message to notify of normal results.

## 2022-02-28 ENCOUNTER — HOSPITAL ENCOUNTER (OUTPATIENT)
Dept: PHYSICAL THERAPY | Age: 67
Discharge: HOME | End: 2022-02-28
Payer: MEDICARE

## 2022-02-28 LAB
25(OH)D3+25(OH)D2 SERPL-MCNC: 56.7 NG/ML (ref 30–100)
ALBUMIN SERPL-MCNC: 4.4 G/DL (ref 3.8–4.8)
ALBUMIN/CREAT UR: <1 MG/G CREAT (ref 0–29)
ALBUMIN/GLOB SERPL: 1.6 {RATIO} (ref 1.2–2.2)
ALP SERPL-CCNC: 79 IU/L (ref 44–121)
ALT SERPL-CCNC: 23 IU/L (ref 0–44)
AST SERPL-CCNC: 21 IU/L (ref 0–40)
BASOPHILS # BLD AUTO: 0.1 X10E3/UL (ref 0–0.2)
BASOPHILS NFR BLD AUTO: 1 %
BILIRUB SERPL-MCNC: 0.2 MG/DL (ref 0–1.2)
BUN SERPL-MCNC: 10 MG/DL (ref 8–27)
BUN/CREAT SERPL: 8 (ref 10–24)
CALCIUM SERPL-MCNC: 9.1 MG/DL (ref 8.6–10.2)
CHLORIDE SERPL-SCNC: 105 MMOL/L (ref 96–106)
CHOLEST SERPL-MCNC: 174 MG/DL (ref 100–199)
CO2 SERPL-SCNC: 20 MMOL/L (ref 20–29)
CREAT SERPL-MCNC: 1.31 MG/DL (ref 0.76–1.27)
CREAT UR-MCNC: 214.9 MG/DL
EOSINOPHIL # BLD AUTO: 0.2 X10E3/UL (ref 0–0.4)
EOSINOPHIL NFR BLD AUTO: 3 %
ERYTHROCYTE [DISTWIDTH] IN BLOOD BY AUTOMATED COUNT: 13.8 % (ref 11.6–15.4)
EST. AVERAGE GLUCOSE BLD GHB EST-MCNC: 131 MG/DL
GLOBULIN SER CALC-MCNC: 2.8 G/DL (ref 1.5–4.5)
GLUCOSE SERPL-MCNC: 99 MG/DL (ref 65–99)
HBA1C MFR BLD: 6.2 % (ref 4.8–5.6)
HCT VFR BLD AUTO: 45.4 % (ref 37.5–51)
HCV AB S/CO SERPL IA: <0.1 S/CO RATIO (ref 0–0.9)
HCV AB SERPL QL IA: NORMAL
HDLC SERPL-MCNC: 50 MG/DL
HGB BLD-MCNC: 14.8 G/DL (ref 13–17.7)
IMM GRANULOCYTES # BLD AUTO: 0 X10E3/UL (ref 0–0.1)
IMM GRANULOCYTES NFR BLD AUTO: 0 %
LDLC SERPL CALC-MCNC: 102 MG/DL (ref 0–99)
LYMPHOCYTES # BLD AUTO: 2.6 X10E3/UL (ref 0.7–3.1)
LYMPHOCYTES NFR BLD AUTO: 34 %
MCH RBC QN AUTO: 29.1 PG (ref 26.6–33)
MCHC RBC AUTO-ENTMCNC: 32.6 G/DL (ref 31.5–35.7)
MCV RBC AUTO: 89 FL (ref 79–97)
MICROALBUMIN UR-MCNC: <3 UG/ML
MONOCYTES # BLD AUTO: 0.8 X10E3/UL (ref 0.1–0.9)
MONOCYTES NFR BLD AUTO: 11 %
NEUTROPHILS # BLD AUTO: 3.9 X10E3/UL (ref 1.4–7)
NEUTROPHILS NFR BLD AUTO: 51 %
PLATELET # BLD AUTO: 248 X10E3/UL (ref 150–450)
POTASSIUM SERPL-SCNC: 4.3 MMOL/L (ref 3.5–5.2)
PROT SERPL-MCNC: 7.2 G/DL (ref 6–8.5)
RBC # BLD AUTO: 5.09 X10E6/UL (ref 4.14–5.8)
SODIUM SERPL-SCNC: 141 MMOL/L (ref 134–144)
TRIGL SERPL-MCNC: 124 MG/DL (ref 0–149)
VLDLC SERPL CALC-MCNC: 22 MG/DL (ref 5–40)
WBC # BLD AUTO: 7.6 X10E3/UL (ref 3.4–10.8)

## 2022-02-28 PROCEDURE — 97140 MANUAL THERAPY 1/> REGIONS: CPT

## 2022-02-28 PROCEDURE — 97110 THERAPEUTIC EXERCISES: CPT

## 2022-02-28 NOTE — PROGRESS NOTES
PT DAILY TREATMENT NOTE - [facility] 2-15    Patient Name: Jaun White  Date:2022  : 1955  [x]  Patient  Verified  Payor: Vignesh Kennedy / Plan: VA MEDICARE PART A & B / Product Type: Medicare /    In time:   Out time:  1140  Total Treatment Time (min): 55  Total Timed Codes (min): 45  1:1 Treatment Time ([facility] only): 45  Visit #:  20    Treatment Area: Other low back pain [M54.59]    SUBJECTIVEPain Level (0-10 scale): 4/10 for lat. LBP  Any medication changes, allergies to medications, adverse drug reactions, diagnosis change, or new procedure performed?: [x] No    [] Yes (see summary sheet for update)  Subjective functional status/changes:   [] No changes reported  I AM DOING THE HEP. DOING OKAY. A FIB IS WELL CONTROLLED. NO MEDS NEEDED AFTER ABLATION. EXERCISES TODAY WERE FINE. I GO TO [facility]. FOR MAINTENANCE CARDIAC REHAB   STILL GOING TO CARDIAC REHAB MAINTENANCE SO NO NEED TO OVERDO IT ON TREADMILL HERE IN PT  TALKED TO  [address] ; GOT A REFERRAL TO A [facility] DR. Nohemi Alvarenga TO SEE ABOUT INJECTION 3/14/22  OBJECTIVE  PIRIFORMIS STRETCH wnl both right and left      45 min Therapeutic Exercise:  [x] See flow sheet :   Rationale: increase strength to improve the patients ability to TOLERATE ADLs        10 min Manual Therapy:     Rationale: decrease pain and increase ROM to improve the patients ability to TOLERATE ADLs              With   [x] TE   [] TA   [] neuro   [] other: Patient Education: [x] Review HEP  P/O, LTR, KTC, HC , TKE, SLOW STAIRS  [] Progressed/Changed HEP based on:   [] positioning   [] body mechanics   [] transfers   [] heat/ice application    [] other:      Other Objective/Functional Measures: STANDING PELVIC TILT WNL     Pain Level (0-10 scale) post treatment: 2    ASSESSMENT/Changes in Function:   \"FEELING GOOD. \"  Patient will continue to benefit from skilled PT services to modify and progress therapeutic interventions to attain remaining goals.      Progress towards goals / Updated goals:  Short Term Goals: To be accomplished in 4 weeks:              INDEPENDENT IN HEP CORE STABILIZATION EXERS. Met  Long Term Goals: To be accomplished in 8 weeks:              RESUME ALL ADLs AS BEFORE RECENT FLARE UP  Patient Goal (s): PAIN CONTROL    PLAN  []  Upgrade activities as tolerated     [x]  Continue plan of care   Awaiting ortho consult for lat.  Right hip pain  []  Update interventions per flow sheet       []  Discharge due to:_  []  Other:_      Sapphire Santa, PT 2/28/2022

## 2022-03-02 ENCOUNTER — HOSPITAL ENCOUNTER (OUTPATIENT)
Dept: PHYSICAL THERAPY | Age: 67
Discharge: HOME | End: 2022-03-02
Payer: MEDICARE

## 2022-03-02 PROCEDURE — 97140 MANUAL THERAPY 1/> REGIONS: CPT

## 2022-03-02 PROCEDURE — 97110 THERAPEUTIC EXERCISES: CPT

## 2022-03-02 NOTE — PROGRESS NOTES
PT DAILY TREATMENT NOTE - [facility] 2-15    Patient Name: Irma Hankins  Date:3/2/2022  : 1955  [x]  Patient  Verified  Payor: Soheila Lagunas / Plan: VA MEDICARE PART A & B / Product Type: Medicare /    In time:   Out time:  1140  Total Treatment Time (min): 55  Total Timed Codes (min): 45  1:1 Treatment Time ( [address] only): 45  Visit #:  21    Treatment Area: Other low back pain [M54.59]    SUBJECTIVEPain Level (0-10 scale): 10 for lat. LBP  Any medication changes, allergies to medications, adverse drug reactions, diagnosis change, or new procedure performed?: [x] No    [] Yes (see summary sheet for update)  Subjective functional status/changes:   [] No changes reported  I AM DOING THE HEP. DOING OKAY. A FIB IS WELL CONTROLLED. NO MEDS NEEDED AFTER ABLATION. EXERCISES TODAY WERE FINE. I GO TO [facility]. FOR MAINTENANCE CARDIAC REHAB   STILL GOING TO CARDIAC REHAB MAINTENANCE SO NO NEED TO OVERDO IT ON TREADMILL HERE IN PT  TALKED TO  [address] ; GOT A REFERRAL TO A [facility] DR. Cinda Smith TO SEE ABOUT INJECTION 3/14/22  I F/UP TOMORROW WITH REFERRING DOC TO PT SO I WILL LET YOU KNOW WHAT COMES NEXT. OBJECTIVE  PIRIFORMIS STRETCH wnl both right and left      40 min Therapeutic Exercise:  [x] See flow sheet :   Rationale: increase strength to improve the patients ability to TOLERATE ADLs        10 min Manual Therapy:     Rationale: decrease pain and increase ROM to improve the patients ability to TOLERATE ADLs              With   [x] TE   [] TA   [] neuro   [] other: Patient Education: [x] Review HEP  P/O, LTR, KTC, HC , TKE, SLOW STAIRS  [] Progressed/Changed HEP based on:   [] positioning   [] body mechanics   [] transfers   [] heat/ice application    [] other:      Other Objective/Functional Measures: STANDING PELVIC TILT WNL     Pain Level (0-10 scale) post treatment: 2    ASSESSMENT/Changes in Function:   \"FEELING GOOD. \"  Patient will continue to benefit from skilled PT services to modify and progress therapeutic interventions to attain remaining goals. Progress towards goals / Updated goals:  \"GOOD PROGRESS BUT STILL WITH SX AT TIMES RIGHT LAT. HIP  Short Term Goals: To be accomplished in 4 weeks:              INDEPENDENT IN HEP CORE STABILIZATION EXERS. Met  Long Term Goals: To be accomplished in 8 weeks:              RESUME ALL ADLs AS BEFORE RECENT FLARE UP  Patient Goal (s): PAIN CONTROL    PLAN  []  Upgrade activities as tolerated     [x]  Continue plan of care   Awaiting ortho consult for lat.  Right hip pain  []  Update interventions per flow sheet       []  Discharge due to:_  []  Other:_      Diogo Olson, PT 3/2/2022

## 2022-03-11 ENCOUNTER — PATIENT MESSAGE (OUTPATIENT)
Dept: FAMILY MEDICINE CLINIC | Age: 67
End: 2022-03-11

## 2022-03-16 ENCOUNTER — HOSPITAL ENCOUNTER (EMERGENCY)
Age: 67
Discharge: HOME | End: 2022-03-16
Attending: FAMILY MEDICINE
Payer: MEDICARE

## 2022-03-16 ENCOUNTER — APPOINTMENT (OUTPATIENT)
Dept: CT IMAGING | Age: 67
End: 2022-03-16
Attending: EMERGENCY MEDICINE
Payer: MEDICARE

## 2022-03-16 VITALS
TEMPERATURE: 98.3 F | DIASTOLIC BLOOD PRESSURE: 81 MMHG | RESPIRATION RATE: 18 BRPM | HEART RATE: 73 BPM | HEIGHT: 69 IN | SYSTOLIC BLOOD PRESSURE: 139 MMHG | OXYGEN SATURATION: 95 % | WEIGHT: 203 LBS | BODY MASS INDEX: 30.07 KG/M2

## 2022-03-16 DIAGNOSIS — S09.90XA INJURY OF HEAD, INITIAL ENCOUNTER: Primary | ICD-10-CM

## 2022-03-16 DIAGNOSIS — M47.812 SPONDYLOSIS OF CERVICAL REGION WITHOUT MYELOPATHY OR RADICULOPATHY: ICD-10-CM

## 2022-03-16 PROCEDURE — 99284 EMERGENCY DEPT VISIT MOD MDM: CPT

## 2022-03-16 PROCEDURE — 70450 CT HEAD/BRAIN W/O DYE: CPT

## 2022-03-16 PROCEDURE — 72125 CT NECK SPINE W/O DYE: CPT

## 2022-03-16 NOTE — Clinical Note
[facility] EMERGENCY DEPARTMENT  [address] [place] [id number]  [phone]    Work/School Note    Date: 3/16/2022    To Whom It May concern:      Bibi Kowalski was seen and treated today in the emergency room by the following provider(s):  Attending Provider: Petar Cornell DO. Bibi Kowalski is excused from work/school on 03/16/22. He is clear to return to work/school on 03/17/22.         Sincerely,          Elvis Su DO

## 2022-03-16 NOTE — ED TRIAGE NOTES
Pt was at the [address] door was coming down, the patient didn't know and the door hit the patient in the forehead and made the pt posterior neck hurt. Pt takes plavis and eliquis. Pt states he felt dizzy afterwards but that has now subsided.

## 2022-03-17 NOTE — ED NOTES
Pt a&ox4. gcs 15. Pt self ambulated out of ED with discharge paperwork and personal belongings with wife.

## 2022-03-17 NOTE — DISCHARGE INSTRUCTIONS
Thank you! Thank you for allowing me to care for you in the emergency department. I sincerely hope that you are satisfied with your visit today. It is my goal to provide you with excellent care. Below you will find a list of your labs and imaging from your visit today. Should you have any questions regarding these results please do not hesitate to call the emergency department. Labs -   No results found for this or any previous visit (from the past 12 hour(s)). Radiologic Studies -   CT HEAD WO CONT   Final Result   The CT evaluation of the head is within normal limits for the patient's age   without acute intracranial pathology. CT SPINE CERV WO CONT   Final Result   1. This examination is negative for an acute fracture or subluxation injury to   the cervical spine. 2.  There are mild to moderate changes of disc degeneration at the C5-C6 and the   C6-C7 levels. CT Results  (Last 48 hours)                 03/16/22 1906  CT HEAD WO CONT Final result    Impression:  The CT evaluation of the head is within normal limits for the patient's age   without acute intracranial pathology. Narrative: The study is a thoracic CT examination of the head dated 3/16/2022. HISTORY: Injury. TECHNIQUE: Thin section axial imaging was performed followed by sagittal and   coronal reconstructed imaging. Dose Reduction Technique was employed to reduce radiation exposure - This   includes reduction optimization techniques as appropriate to a performed exam   with automated exposure control adjustments of the mA and/or Kv according to   patient size, or use of iterative reconstruction technique. COMPARISON: None. The ventricles are normal in size with a midline position. This examination is   negative for intracranial hemorrhage, mass effect or an extra axial collection.    The gray-white matter junctions are preserved without cytotoxic or vasogenic edema. An assessment of the white matter tracts demonstrates normal density   characteristics for the patient's age. ORBITS: This examination is negative for acute orbital pathology. PARANASAL SINUSES: The paranasal sinuses are well pneumatized. The mastoid air cells and middle ear cavities image in a normal fashion. The   sella and the suprasellar regions are unremarkable. The craniocervical junction   images in a normal fashion. OTHER: No barium is intact. The visualized facial bones image in a normal   fashion. 03/16/22 1906  CT SPINE CERV WO CONT Final result    Impression:  1. This examination is negative for an acute fracture or subluxation injury to   the cervical spine. 2.  There are mild to moderate changes of disc degeneration at the C5-C6 and the   C6-C7 levels. Narrative: The study is a CT examination of the cervical spine dated 3/16/2022. HISTORY: Injury. Technique: Thin section axial imaging was acquired the cervical spine. From the   axial imaging sequence, sagittal and coronal reconstructed imaging is submitted   for interpretation. Dose Reduction Technique was employed to reduce radiation exposure - This   includes reduction optimization techniques as appropriate to a performed exam   with automated exposure control adjustments of the mA and/or Kv according to   patient size, or use of iterative reconstruction technique. COMPARISON: None. Technique: Thin section axial imaging was acquired the cervical spine. From the   axial imaging sequence, sagittal and coronal reconstructed imaging is submitted   for interpretation. Findings: The anterior and posterior arches to the C1 vertebra are intact. There   is an appropriate alignment of the lateral masses of C1 vertebra relative to the   odontoid process without a Zane burst fracture.  The odontoid process images   in a normal fashion without an odontoid fracture. There is an appropriate alignment of the articular masses and the facet joints   without a hyperflexion injury resulting in facet subluxation. There is a normal   height and morphology to the vertebral bodies without an axial loading   compression fracture or a simple wedge fracture. The spinous processes are   intact. This examination is negative for prevertebral soft tissue swelling. There is mild to moderate disc space narrowing at the C5-C6 and the C6-C7   levels. There is milder uncovertebral joint bony hypertrophy at these 2 levels   with milder neural foraminal narrowing. CXR Results  (Last 48 hours)      None               If you feel that you have not received excellent quality care or timely care, please ask to speak to the nurse manager. Please choose us in the future for your continued health care needs. ------------------------------------------------------------------------------------------------------------  The exam and treatment you received in the Emergency Department were for an urgent problem and are not intended as complete care. It is important that you follow-up with a doctor, nurse practitioner, or physician assistant to:  (1) confirm your diagnosis,  (2) re-evaluation of changes in your illness and treatment, and  (3) for ongoing care. If your symptoms become worse or you do not improve as expected and you are unable to reach your usual health care provider, you should return to the Emergency Department. We are available 24 hours a day. Please take your discharge instructions with you when you go to your follow-up appointment. If you have any problem arranging a follow-up appointment, contact the Emergency Department immediately. If a prescription has been provided, please have it filled as soon as possible to prevent a delay in treatment. Read the entire medication instruction sheet provided to you by the pharmacy.  If you have any questions or reservations about taking the medication due to side effects or interactions with other medications, please call your primary care physician or contact the ER to speak with the charge nurse. Make an appointment with your family doctor or the physician you were referred to for follow-up of this visit as instructed on your discharge paperwork, as this is a mandatory follow-up. Return to the ER if you are unable to be seen or if you are unable to be seen in a timely manner. If you have any problem arranging the follow-up visit, contact the Emergency Department immediately.

## 2022-03-17 NOTE — ED PROVIDER NOTES
EMERGENCY DEPARTMENT HISTORY AND PHYSICAL EXAM      Date: 3/16/2022  Patient Name: Tawanna Howe    History of Presenting Illness     Chief Complaint   Patient presents with    Head Injury    Neck Pain       History Provided By:     HPI: Tawanna Howe, is an extremely pleasant 79 y.o. male presenting to the ED with a chief complaint of head injury and neck pain. Patient states prior to arrival of a door came down on his head. It hit him in the forehead. He denies loss of consciousness. He is awake the entire time. He complains of forehead pain as well as pain on either side of his neck. Patient is anticoagulated on Plavix and Eliquis. He endorses a brief episode of dizziness however this has entirely resolved. No numbness tingling or weakness in extremities. No midline neck pain. There are no other complaints, changes, or physical findings at this time. PCP: Germán Romo, DO    No current facility-administered medications on file prior to encounter. Current Outpatient Medications on File Prior to Encounter   Medication Sig Dispense Refill    clomiPHENE (CLOMID) 50 mg tablet       famotidine (PEPCID) 40 mg tablet Take 1 Tablet by mouth two (2) times a day.  meclizine (ANTIVERT) 25 mg tablet       ondansetron hcl (ZOFRAN) 4 mg tablet       BD Ultra-Fine II Lancets 30 gauge misc       glucose blood VI test strips (FreeStyle Lite Strips) strip Finger stick blood sugars Daily DX E11.9 100 Strip 3    fluticasone propionate (FLONASE) 50 mcg/actuation nasal spray 2 sprays to each nostril once daily 1 Each 3    lidocaine (LIDODERM) 5 % Apply patch to the affected area for 12 hours a day and remove for 12 hours a day. 25 Patch 3    multivitamin (ONE A DAY) tablet Take 1 Tablet by mouth daily.  butalbital-aspirin-caffeine (FIORINAL) capsule Take 1 Capsule by mouth every four (4) hours as needed for Headache.  clopidogreL (Plavix) 75 mg tab Take 75 mg by mouth.       LORazepam (ATIVAN) 0.5 mg tablet Take 0.5 mg by mouth every four (4) hours as needed for Anxiety.  polyethylene glycol (Miralax) 17 gram/dose powder Take 17 g by mouth daily.  acetaminophen (TYLENOL) 325 mg tablet Take 2 Tablets by mouth every six (6) hours as needed for Pain. 60 Tablet 0    amLODIPine (Norvasc) 2.5 mg tablet Take 1 Tablet by mouth nightly. 30 Tablet 2    Vitamin D3 25 mcg (1,000 unit) tablet Take 2 Tablets by mouth daily.  apixaban (Eliquis) 5 mg tablet Take 5 mg by mouth two (2) times a day.  evolocumab (Repatha SureClick) pen injection 374 mg by SubCUTAneous route every fourteen (14) days.  amLODIPine (NORVASC) 5 mg tablet Take 5 mg by mouth daily.  Every am         Past History     Past Medical History:  Past Medical History:   Diagnosis Date    A-fib [facility])     Acute coronary syndrome ([address] 11/19/2021    Atypical chest pain 11/19/2021    CAD (coronary artery disease) 06/15/2018    Coronary arteriosclerosis 8/7/2020    Gastric ulcer 12/14/2018    GERD (gastroesophageal reflux disease)     H/O seasonal allergies     High cholesterol     Hx of cardiac arrest     VT arrest     Hyperlipidemia 8/7/2020    Hypertension     Ill-defined condition     Urinary urgency    Kidney stone     MI (myocardial infarction) ([address]     ROQUE on CPAP     2/2/21 pt reports does not use cpap each night, only a few times per week    Torsades de pointes ([address] 11/19/2021    Ventricular fibrillation ([address] 11/19/2021    Ventricular premature beats 11/19/2021    Ventricular tachycardia ([address] 11/19/2021    Vitamin D deficiency 8/7/2020       Past Surgical History:  Past Surgical History:   Procedure Laterality Date    COLONOSCOPY N/A 11/12/2018    COLONOSCOPY performed by Pedro [facility]enna [facility] at [facility] HX COLONOSCOPY  2018    HX COLONOSCOPY      HX ENDOSCOPY      HX HEART CATHETERIZATION  2018    Cardiac cath Stents x2    HX HEART CATHETERIZATION  02/2020    \"patent stent obtuse marginal 1. significant disease noted in branch artery of RCA. THis artery appears to be borderline for any kind of intervention\"    HX LAP CHOLECYSTECTOMY  2005    HX LUMBAR DISKECTOMY      L4-L4    HX ORTHOPAEDIC  2006    Lumbar laminectomy    HX PACEMAKER      linq monitor    [place] CARDIAC SURG PROCEDURE UNLIST  09/09/2021    cardiac ablation       Family History:  Family History   Problem Relation Age of Onset    Diabetes Mother    Ambrosio Stroke Father     Hypertension Father        Social History:  Social History     Tobacco Use    Smoking status: Never Smoker    Smokeless tobacco: Never Used   Vaping Use    Vaping Use: Never used   Substance Use Topics    Alcohol use: No    Drug use: No       Allergies: Allergies   Allergen Reactions    Cefuroxime Unknown (comments)     abd pain    Ibuprofen Other (comments)     Patient states NSAIDS/ ibuprofen upsets his stomach    Statins-Hmg-Coa Reductase Inhibitors Other (comments)     Extremity weakness         Review of Systems     Review of Systems   Constitutional: Negative for activity change, appetite change, chills, fatigue and fever. HENT: Negative for congestion and sore throat. Eyes: Negative for photophobia and visual disturbance. Respiratory: Negative for cough, shortness of breath and wheezing. Cardiovascular: Negative for chest pain, palpitations and leg swelling. Gastrointestinal: Negative for abdominal pain, diarrhea, nausea and vomiting. Endocrine: Negative for cold intolerance and heat intolerance. Musculoskeletal: Positive for neck pain. Negative for gait problem and joint swelling. Skin: Negative for color change and rash. Neurological: Positive for headaches. Negative for tremors, syncope and light-headedness. Physical Exam     Physical Exam  Constitutional:       General: He is not in acute distress. Appearance: Normal appearance. He is not toxic-appearing. HENT:      Head: Normocephalic and atraumatic. Right Ear: External ear normal.      Left Ear: External ear normal.      Mouth/Throat:      Mouth: Mucous membranes are moist.      Pharynx: Oropharynx is clear. Eyes:      Extraocular Movements: Extraocular movements intact. Conjunctiva/sclera: Conjunctivae normal.   Neck:      Comments: No midline C-spine tenderness. There is tenderness along the trapezius muscles. Cardiovascular:      Rate and Rhythm: Normal rate and regular rhythm. Pulses: Normal pulses. Heart sounds: Normal heart sounds. Pulmonary:      Effort: Pulmonary effort is normal. No respiratory distress. Breath sounds: Normal breath sounds. No wheezing, rhonchi or rales. Abdominal:      General: There is no distension. Palpations: Abdomen is soft. Tenderness: There is no abdominal tenderness. There is no guarding or rebound. Musculoskeletal:         General: No deformity. Cervical back: Normal range of motion and neck supple. Right lower leg: No edema. Left lower leg: No edema. Skin:     Capillary Refill: Capillary refill takes less than 2 seconds. Findings: No erythema or rash. Neurological:      General: No focal deficit present. Mental Status: He is alert and oriented to person, place, and time. Gait: Gait normal.      Comments: No focal neurologic deficits, alert and oriented x4, cranial nerves II through XII grossly intact, no sensory deficits, no weakness in extremities, no pronator drift, no abnormal coordination, no abnormal gait, no abnormal deep tendon reflexes. No motor nor sensory deficits. No nystagmus. Psychiatric:         Mood and Affect: Mood normal.         Behavior: Behavior normal.         Lab and Diagnostic Study Results     Labs -   No results found for this or any previous visit (from the past 12 hour(s)).     Radiologic Studies -   @lastxrresult@  CT Results  (Last 48 hours)               03/16/22 1906  CT HEAD WO CONT Final result    Impression: The CT evaluation of the head is within normal limits for the patient's age   without acute intracranial pathology. Narrative: The study is a thoracic CT examination of the head dated 3/16/2022. HISTORY: Injury. TECHNIQUE: Thin section axial imaging was performed followed by sagittal and   coronal reconstructed imaging. Dose Reduction Technique was employed to reduce radiation exposure - This   includes reduction optimization techniques as appropriate to a performed exam   with automated exposure control adjustments of the mA and/or Kv according to   patient size, or use of iterative reconstruction technique. COMPARISON: None. The ventricles are normal in size with a midline position. This examination is   negative for intracranial hemorrhage, mass effect or an extra axial collection. The gray-white matter junctions are preserved without cytotoxic or vasogenic   edema. An assessment of the white matter tracts demonstrates normal density   characteristics for the patient's age. ORBITS: This examination is negative for acute orbital pathology. PARANASAL SINUSES: The paranasal sinuses are well pneumatized. The mastoid air cells and middle ear cavities image in a normal fashion. The   sella and the suprasellar regions are unremarkable. The craniocervical junction   images in a normal fashion. OTHER: No barium is intact. The visualized facial bones image in a normal   fashion. 03/16/22 1906  CT SPINE CERV WO CONT Final result    Impression:  1. This examination is negative for an acute fracture or subluxation injury to   the cervical spine. 2.  There are mild to moderate changes of disc degeneration at the C5-C6 and the   C6-C7 levels. Narrative: The study is a CT examination of the cervical spine dated 3/16/2022. HISTORY: Injury. Technique:  Thin section axial imaging was acquired the cervical spine. From the   axial imaging sequence, sagittal and coronal reconstructed imaging is submitted   for interpretation. Dose Reduction Technique was employed to reduce radiation exposure - This   includes reduction optimization techniques as appropriate to a performed exam   with automated exposure control adjustments of the mA and/or Kv according to   patient size, or use of iterative reconstruction technique. COMPARISON: None. Technique: Thin section axial imaging was acquired the cervical spine. From the   axial imaging sequence, sagittal and coronal reconstructed imaging is submitted   for interpretation. Findings: The anterior and posterior arches to the C1 vertebra are intact. There   is an appropriate alignment of the lateral masses of C1 vertebra relative to the   odontoid process without a Zane burst fracture. The odontoid process images   in a normal fashion without an odontoid fracture. There is an appropriate alignment of the articular masses and the facet joints   without a hyperflexion injury resulting in facet subluxation. There is a normal   height and morphology to the vertebral bodies without an axial loading   compression fracture or a simple wedge fracture. The spinous processes are   intact. This examination is negative for prevertebral soft tissue swelling. There is mild to moderate disc space narrowing at the C5-C6 and the C6-C7   levels. There is milder uncovertebral joint bony hypertrophy at these 2 levels   with milder neural foraminal narrowing. CXR Results  (Last 48 hours)    None            Medical Decision Making   - I am the first provider for this patient. - I reviewed the vital signs, available nursing notes, past medical history, past surgical history, family history and social history. - Initial assessment performed.  The patients presenting problems have been discussed, and they are in agreement with the care plan formulated and outlined with them. I have encouraged them to ask questions as they arise throughout their visit. Vital Signs-Reviewed the patient's vital signs. Patient Vitals for the past 12 hrs:   Temp Pulse Resp BP SpO2   03/16/22 2034  73 18 139/81 95 %   03/16/22 1825 98.3 °F (36.8 °C) 87 19 (!) 159/88 97 %         ED Course/ Provider Notes (Medical Decision Making):     Patient presented to the emergency department with a chief complaint of headache and neck pain. On examination the patient is nontoxic and well-appearing. Vitals were reviewed per above. Patient sent for stat CT head and neck given his head injury on blood thinners. Imaging reviewed. No acute abnormality. Patient declines observation. In the emergency department. He states he is asymptomatic and would like to go home. Patient was discharged home in stable condition. Tawanna Howe was given a thorough list of signs and symptoms that would warrant an immediate return to the emergency department. Otherwise [place] will follow up with PCP. Procedures   Medical Decision Makingedical Decision Making  Performed by: Pieter Foley DO  Procedures  None       Disposition   Disposition:     Home     All of the diagnostic tests were reviewed and questions answered. Diagnosis, care plan and treatment options were discussed. The patient understands the instructions and will follow up as directed. The patients results have been reviewed with them. They have been counseled regarding their diagnosis. The patient verbally convey understanding and agreement of the signs, symptoms, diagnosis, treatment and prognosis and additionally agrees to follow up as recommended with their PCP in 24 - 48 hours. They also agree with the care-plan and convey that all of their questions have been answered.   I have also put together some discharge instructions for them that include: 1) educational information regarding their diagnosis, 2) how to care for their diagnosis at home, as well a 3) list of reasons why they would want to return to the ED prior to their follow-up appointment, should their condition change. DISCHARGE PLAN:    1. Cannot display discharge medications since this patient is not currently admitted. 2.   Follow-up Information    None           3. Return to ED if worse       4. Discharge Medication List as of 3/16/2022  8:49 PM            Diagnosis     Clinical Impression:    1. Injury of head, initial encounter    2. Spondylosis of cervical region without myelopathy or radiculopathy        Attestations:    Cristina Braun, DO    Please note that this dictation was completed with blabfeed, the computer voice recognition software. Quite often unanticipated grammatical, syntax, homophones, and other interpretive errors are inadvertently transcribed by the computer software. Please disregard these errors. Please excuse any errors that have escaped final proofreading. Thank you.

## 2022-03-18 PROBLEM — F41.9 ANXIETY: Status: ACTIVE | Noted: 2020-08-07

## 2022-03-18 PROBLEM — I10 HYPERTENSION: Status: ACTIVE | Noted: 2018-02-28

## 2022-03-18 PROBLEM — R53.82 CHRONIC FATIGUE: Status: ACTIVE | Noted: 2020-08-07

## 2022-03-18 PROBLEM — R07.89 MUSCULAR CHEST PAIN: Status: ACTIVE | Noted: 2021-12-14

## 2022-03-18 PROBLEM — E11.9 TYPE 2 DIABETES MELLITUS (HCC): Status: ACTIVE | Noted: 2021-12-22

## 2022-03-19 PROBLEM — E78.5 HYPERLIPIDEMIA: Status: ACTIVE | Noted: 2020-08-07

## 2022-03-19 PROBLEM — N40.1 BENIGN PROSTATIC HYPERPLASIA WITH URINARY OBSTRUCTION: Status: ACTIVE | Noted: 2021-11-19

## 2022-03-19 PROBLEM — B34.9 ACUTE VIRAL SYNDROME: Status: ACTIVE | Noted: 2022-01-13

## 2022-03-19 PROBLEM — E55.9 VITAMIN D DEFICIENCY: Status: ACTIVE | Noted: 2020-08-07

## 2022-03-19 PROBLEM — R07.9 CHEST PAIN: Status: ACTIVE | Noted: 2021-07-30

## 2022-03-19 PROBLEM — G47.33 OBSTRUCTIVE SLEEP APNEA SYNDROME: Status: ACTIVE | Noted: 2018-02-28

## 2022-03-19 PROBLEM — G89.29 CHRONIC BACK PAIN: Status: ACTIVE | Noted: 2018-12-14

## 2022-03-19 PROBLEM — I48.91 ATRIAL FIBRILLATION, UNSPECIFIED TYPE (HCC): Status: ACTIVE | Noted: 2022-01-03

## 2022-03-19 PROBLEM — Z86.79 HISTORY OF CORONARY ARTERY DISEASE: Status: ACTIVE | Noted: 2021-11-19

## 2022-03-19 PROBLEM — F43.29 STRESS AND ADJUSTMENT REACTION: Status: ACTIVE | Noted: 2021-11-19

## 2022-03-19 PROBLEM — M54.9 CHRONIC BACK PAIN: Status: ACTIVE | Noted: 2018-12-14

## 2022-03-19 PROBLEM — N13.8 BENIGN PROSTATIC HYPERPLASIA WITH URINARY OBSTRUCTION: Status: ACTIVE | Noted: 2021-11-19

## 2022-03-20 PROBLEM — R00.2 PALPITATIONS: Status: ACTIVE | Noted: 2021-11-19

## 2022-03-20 PROBLEM — G43.909 MIGRAINE: Status: ACTIVE | Noted: 2018-02-28

## 2022-03-20 PROBLEM — M65.332 TRIGGER FINGER, LEFT MIDDLE FINGER: Status: ACTIVE | Noted: 2021-02-10

## 2022-03-20 PROBLEM — I25.10 CORONARY ARTERIOSCLEROSIS: Status: ACTIVE | Noted: 2020-08-07

## 2022-04-26 ENCOUNTER — NURSE TRIAGE (OUTPATIENT)
Dept: OTHER | Facility: CLINIC | Age: 67
End: 2022-04-26

## 2022-04-26 NOTE — TELEPHONE ENCOUNTER
Received call from [place] at [facility] with Red Flag Complaint. Subjective: Caller states \"I wake up in the morning and get light headed. I have to sit still for a while. \"     Current Symptoms: Intermittent dizziness - decreases throughout the day,       Reports BP as 118/85     Denies - black or tarry stool / numbness / tingling / headache / vomiting / diarrhea / heart palpitation / chest pain / visual disturbance    Onset: 2 days ago       Pain Severity: 0/10     Temperature: Denies     What has been tried: Nothing    Recommended disposition: See PCP within 3 Days    Care advice provided, patient verbalizes understanding; denies any other questions or concerns; instructed to call back for any new or worsening symptoms. Patient/Caller agrees with recommended disposition; writer provided warm transfer to Effie Diana at [facility] for appointment scheduling    Attention Provider: Thank you for allowing me to participate in the care of your patient. The patient was connected to triage in response to information provided to the ECC. Please do not respond through this encounter as the response is not directed to a shared pool.         Reason for Disposition   MILD dizziness (e.g., walking normally) AND has NOT been evaluated by physician for this (Exception: dizziness caused by heat exposure, sudden standing, or poor fluid intake)    Protocols used: DIZZINESS-ADULT-[place]

## 2022-04-26 NOTE — PROGRESS NOTES
HPI: Tenmaryanne Gilmore (: 1955) is a 79 y.o. male, patient, here for evaluation of the following chief complaint(s):    Hand Pain (Been going on 5 days seeing some discoloration around the wrist . tingling numbness and pain thumb 2nd and third finger )  Patient is seen today to evaluate his right wrist.  The patient states that he has developed numbness to the dorsal radial sensory nerve distribution involving his right wrist and hand. He had been wearing a fit bit watch on his right wrist on and off for the last 6 weeks. He then started to wear it more at night. He noticed that he was developing numbness and tingling in the to the dorsum of his thumb index and middle finger on around the evening of 2022. He immediately took the fit bit off and has had some continuation but admittedly slight improvement in numbness. He denies any motor changes. He underwent a successful left middle trigger finger release with us last year in  and he is seen for further treatment. Vitals:  Ht 5' 9\" (1.753 m)   Wt 202 lb (91.6 kg)   BMI 29.83 kg/m²    Body mass index is 29.83 kg/m². Allergies   Allergen Reactions    Cefuroxime Unknown (comments)     abd pain    Ibuprofen Other (comments)     Patient states NSAIDS/ ibuprofen upsets his stomach    Statins-Hmg-Coa Reductase Inhibitors Other (comments)     Extremity weakness       Current Outpatient Medications   Medication Sig    methylPREDNISolone (MEDROL DOSEPACK) 4 mg tablet Per dose pack instructions    clomiPHENE (CLOMID) 50 mg tablet     famotidine (PEPCID) 40 mg tablet Take 1 Tablet by mouth two (2) times a day.     meclizine (ANTIVERT) 25 mg tablet     ondansetron hcl (ZOFRAN) 4 mg tablet     BD Ultra-Fine II Lancets 30 gauge misc     glucose blood VI test strips (FreeStyle Lite Strips) strip Finger stick blood sugars Daily DX E11.9    fluticasone propionate (FLONASE) 50 mcg/actuation nasal spray 2 sprays to each nostril once daily    lidocaine (LIDODERM) 5 % Apply patch to the affected area for 12 hours a day and remove for 12 hours a day.  multivitamin (ONE A DAY) tablet Take 1 Tablet by mouth daily.  clopidogreL (Plavix) 75 mg tab Take 75 mg by mouth.  LORazepam (ATIVAN) 0.5 mg tablet Take 0.5 mg by mouth every four (4) hours as needed for Anxiety.  polyethylene glycol (Miralax) 17 gram/dose powder Take 17 g by mouth daily.  acetaminophen (TYLENOL) 325 mg tablet Take 2 Tablets by mouth every six (6) hours as needed for Pain.  amLODIPine (Norvasc) 2.5 mg tablet Take 1 Tablet by mouth nightly.  Vitamin D3 25 mcg (1,000 unit) tablet Take 2 Tablets by mouth daily.  apixaban (Eliquis) 5 mg tablet Take 5 mg by mouth two (2) times a day.  evolocumab (Repatha SureClick) pen injection 434 mg by SubCUTAneous route every fourteen (14) days.  amLODIPine (NORVASC) 5 mg tablet Take 5 mg by mouth daily. Every am    butalbital-aspirin-caffeine [facility]) capsule Take 1 Capsule by mouth every four (4) hours as needed for Headache. (Patient not taking: Reported on 4/27/2022)     No current facility-administered medications for this visit.        Past Medical History:   Diagnosis Date    A-fib [facility])     Acute coronary syndrome ([facility] 11/19/2021    Atypical chest pain 11/19/2021    CAD (coronary artery disease) 06/15/2018    Coronary arteriosclerosis 8/7/2020    Gastric ulcer 12/14/2018    GERD (gastroesophageal reflux disease)     H/O seasonal allergies     High cholesterol     Hx of cardiac arrest     VT arrest     Hyperlipidemia 8/7/2020    Hypertension     Ill-defined condition     Urinary urgency    Kidney stone     MI (myocardial infarction) ([address]     ROQUE on CPAP     2/2/21 pt reports does not use cpap each night, only a few times per week    Torsades de pointes ([address] 11/19/2021    Ventricular fibrillation ([address] 11/19/2021    Ventricular premature beats 11/19/2021    Ventricular tachycardia ([address] 11/19/2021  Vitamin D deficiency 8/7/2020        Past Surgical History:   Procedure Laterality Date    COLONOSCOPY N/A 11/12/2018    COLONOSCOPY performed by Armando Campbell MD at [facility] HX COLONOSCOPY  2018    HX COLONOSCOPY      HX ENDOSCOPY      HX HEART CATHETERIZATION  2018    Cardiac cath Stents x2    HX HEART CATHETERIZATION  02/2020    \"patent stent obtuse marginal 1. significant disease noted in branch artery of RCA. THis artery appears to be borderline for any kind of intervention\"    HX LAP CHOLECYSTECTOMY  2005    HX LUMBAR DISKECTOMY      L4-L4    HX ORTHOPAEDIC  2006    Lumbar laminectomy    HX PACEMAKER      linq monitor    [place] CARDIAC SURG PROCEDURE UNLIST  09/09/2021    cardiac ablation       Family History   Problem Relation Age of Onset    Diabetes Mother     Stroke Father     Hypertension Father         Social History     Tobacco Use    Smoking status: Never Smoker    Smokeless tobacco: Never Used   Vaping Use    Vaping Use: Never used   Substance Use Topics    Alcohol use: No    Drug use: No        Review of Systems   All other systems reviewed and are negative. Physical Exam    The patient demonstrates good symmetric elbow, forearm, wrist and hand range of motion. He has a well-healed scar at the base of his left middle finger from his prior trigger finger release in 2021. His right wrist has some markings from where the tight wristband had been in place just above the radiocarpal joint. No advanced Tinel's sign but still subjectively has tingling especially of the dorsum of the thumb index and middle fingers but not volarly and he had a negative Tinel's in the carpal tunnel. No complaints with left wrist and hand. Imaging:    No new x-rays today. ASSESSMENT/PLAN:  Below is the assessment and plan developed based on review of pertinent history, physical exam, labs, studies, and medications.     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 He fortunately removes the watch on 4/22/2022 and is seen some subtle but not complete improvement or resolution. I recommended a metal Dosepak and massage to the region. He does not really require a splint. He is can avoid wearing that fit bit but when he potentially goes back to wearing it he would not wear it at night and will also wear it more loosely during the day. Follow-up in 3 to 4 weeks. 1. Right hand pain  2. Right radial sensory nerve injury, initial encounter  -     methylPREDNISolone (MEDROL DOSEPACK) 4 mg tablet; Per dose pack instructions, Normal, Disp-1 Dose Pack, R-0      Return in about 4 weeks (around 5/25/2022). An electronic signature was used to authenticate this note.   -- Keny Lawler MD

## 2022-04-27 ENCOUNTER — OFFICE VISIT (OUTPATIENT)
Dept: ORTHOPEDIC SURGERY | Age: 67
End: 2022-04-27
Payer: MEDICARE

## 2022-04-27 VITALS — WEIGHT: 202 LBS | HEIGHT: 69 IN | BODY MASS INDEX: 29.92 KG/M2

## 2022-04-27 DIAGNOSIS — M79.641 RIGHT HAND PAIN: Primary | ICD-10-CM

## 2022-04-27 DIAGNOSIS — S54.21XA RIGHT RADIAL SENSORY NERVE INJURY, INITIAL ENCOUNTER: ICD-10-CM

## 2022-04-27 PROCEDURE — 1101F PT FALLS ASSESS-DOCD LE1/YR: CPT | Performed by: ORTHOPAEDIC SURGERY

## 2022-04-27 PROCEDURE — G8427 DOCREV CUR MEDS BY ELIG CLIN: HCPCS | Performed by: ORTHOPAEDIC SURGERY

## 2022-04-27 PROCEDURE — G8536 NO DOC ELDER MAL SCRN: HCPCS | Performed by: ORTHOPAEDIC SURGERY

## 2022-04-27 PROCEDURE — 3017F COLORECTAL CA SCREEN DOC REV: CPT | Performed by: ORTHOPAEDIC SURGERY

## 2022-04-27 PROCEDURE — G8432 DEP SCR NOT DOC, RNG: HCPCS | Performed by: ORTHOPAEDIC SURGERY

## 2022-04-27 PROCEDURE — G8417 CALC BMI ABV UP PARAM F/U: HCPCS | Performed by: ORTHOPAEDIC SURGERY

## 2022-04-27 PROCEDURE — G8756 NO BP MEASURE DOC: HCPCS | Performed by: ORTHOPAEDIC SURGERY

## 2022-04-27 PROCEDURE — 99213 OFFICE O/P EST LOW 20 MIN: CPT | Performed by: ORTHOPAEDIC SURGERY

## 2022-04-27 RX ORDER — METHYLPREDNISOLONE 4 MG/1
TABLET ORAL
Qty: 1 DOSE PACK | Refills: 0 | Status: SHIPPED | OUTPATIENT
Start: 2022-04-27 | End: 2022-06-01 | Stop reason: ALTCHOICE

## 2022-04-27 NOTE — LETTER
4/27/2022    Patient: Oly Carcamo   YOB: 1955   Date of Visit: 4/27/2022     Sae Sorensen NP  [facility] [address] [facility]  [address] [facility] [address]    Dear Sae Sorensen NP,      Thank you for referring Mr. Khoa Mohan to [facility] for evaluation. My notes for this consultation are attached. If you have questions, please do not hesitate to call me. I look forward to following your patient along with you.       Sincerely,    Jorge Lovell MD

## 2022-05-02 NOTE — PROGRESS NOTES
[address], [address] [facility]  Phone: [phone]   Fax: [phone]    Discharge Summary 2-15    Patient name: Irma Hankins  : 1955  Provider#: [id number]  Referral source: Yamile Medley NP      Medical/Treatment Diagnosis: Other low back pain [M54.59]     Prior Hospitalization: see medical history     Comorbidities: See Plan of Care  Prior Level of Function: See Plan of Care  Medications: Verified on Patient Summary List    Start of Care: 21      Onset Date:FLARED UP A COUPLE MONTHS AGO   Visits from Start of Care: 21     Missed Visits: NA  Reporting Period : 21 to 22    Assessment/Summary of care: SOME IMPROVEMENT WITH PT BUT PATIENT PAUSED PT PENDING ORTHO CONSULT 3/14/22    FOTO Functional Measure:   Discharge    Intake    Progress towards goals / Updated goals:  \"GOOD PROGRESS BUT STILL WITH SX AT TIMES RIGHT LAT. HIP  Short Term Goals: To be accomplished in 4 weeks:              INDEPENDENT IN HEP CORE STABILIZATION EXERS.   Met  Long Term Goals: To be accomplished in 8 weeks:              RESUME ALL ADLs AS BEFORE RECENT FLARE UP; MET BUT WITH SX  Patient Goal (s): PAIN CONTROLMET BUT STILL WITH SX    RECOMMENDATIONS:  [x]Discontinue therapy: [x]Patient has reached or is progressing toward set goals     []Patient is non-compliant or has abdicated     []Due to lack of appreciable progress towards set goals     [x]Other PER PATIENT TO  Pratima Gautam, PT 2022

## 2022-06-01 ENCOUNTER — OFFICE VISIT (OUTPATIENT)
Dept: ORTHOPEDIC SURGERY | Age: 67
End: 2022-06-01

## 2022-06-01 ENCOUNTER — OFFICE VISIT (OUTPATIENT)
Dept: FAMILY MEDICINE CLINIC | Age: 67
End: 2022-06-01
Payer: MEDICARE

## 2022-06-01 VITALS
HEART RATE: 75 BPM | BODY MASS INDEX: 29.77 KG/M2 | SYSTOLIC BLOOD PRESSURE: 138 MMHG | TEMPERATURE: 98.9 F | DIASTOLIC BLOOD PRESSURE: 75 MMHG | RESPIRATION RATE: 18 BRPM | HEIGHT: 69 IN | OXYGEN SATURATION: 97 % | WEIGHT: 201 LBS

## 2022-06-01 DIAGNOSIS — E78.2 MIXED HYPERLIPIDEMIA: ICD-10-CM

## 2022-06-01 DIAGNOSIS — Z00.00 ENCOUNTER FOR ROUTINE HISTORY AND PHYSICAL EXAMINATION OF ADULT: Primary | ICD-10-CM

## 2022-06-01 DIAGNOSIS — R94.4 ABNORMAL RESULTS OF KIDNEY FUNCTION STUDIES: ICD-10-CM

## 2022-06-01 DIAGNOSIS — F41.9 ANXIETY: ICD-10-CM

## 2022-06-01 DIAGNOSIS — Z91.09 ENVIRONMENTAL ALLERGIES: ICD-10-CM

## 2022-06-01 DIAGNOSIS — S54.21XD: Primary | ICD-10-CM

## 2022-06-01 DIAGNOSIS — R73.9 HYPERGLYCEMIA: ICD-10-CM

## 2022-06-01 DIAGNOSIS — E55.9 VITAMIN D DEFICIENCY: ICD-10-CM

## 2022-06-01 DIAGNOSIS — M79.641 RIGHT HAND PAIN: ICD-10-CM

## 2022-06-01 PROBLEM — N18.30 CHRONIC RENAL DISEASE, STAGE III (HCC): Status: ACTIVE | Noted: 2022-06-01

## 2022-06-01 PROCEDURE — G8432 DEP SCR NOT DOC, RNG: HCPCS | Performed by: ORTHOPAEDIC SURGERY

## 2022-06-01 PROCEDURE — G8536 NO DOC ELDER MAL SCRN: HCPCS | Performed by: NURSE PRACTITIONER

## 2022-06-01 PROCEDURE — 1123F ACP DISCUSS/DSCN MKR DOCD: CPT | Performed by: ORTHOPAEDIC SURGERY

## 2022-06-01 PROCEDURE — 1101F PT FALLS ASSESS-DOCD LE1/YR: CPT | Performed by: ORTHOPAEDIC SURGERY

## 2022-06-01 PROCEDURE — 99214 OFFICE O/P EST MOD 30 MIN: CPT | Performed by: NURSE PRACTITIONER

## 2022-06-01 PROCEDURE — G0439 PPPS, SUBSEQ VISIT: HCPCS | Performed by: NURSE PRACTITIONER

## 2022-06-01 PROCEDURE — G8417 CALC BMI ABV UP PARAM F/U: HCPCS | Performed by: ORTHOPAEDIC SURGERY

## 2022-06-01 PROCEDURE — G8427 DOCREV CUR MEDS BY ELIG CLIN: HCPCS | Performed by: NURSE PRACTITIONER

## 2022-06-01 PROCEDURE — G8754 DIAS BP LESS 90: HCPCS | Performed by: ORTHOPAEDIC SURGERY

## 2022-06-01 PROCEDURE — 3017F COLORECTAL CA SCREEN DOC REV: CPT | Performed by: ORTHOPAEDIC SURGERY

## 2022-06-01 PROCEDURE — G8752 SYS BP LESS 140: HCPCS | Performed by: NURSE PRACTITIONER

## 2022-06-01 PROCEDURE — 1101F PT FALLS ASSESS-DOCD LE1/YR: CPT | Performed by: NURSE PRACTITIONER

## 2022-06-01 PROCEDURE — G8752 SYS BP LESS 140: HCPCS | Performed by: ORTHOPAEDIC SURGERY

## 2022-06-01 PROCEDURE — G8432 DEP SCR NOT DOC, RNG: HCPCS | Performed by: NURSE PRACTITIONER

## 2022-06-01 PROCEDURE — G8417 CALC BMI ABV UP PARAM F/U: HCPCS | Performed by: NURSE PRACTITIONER

## 2022-06-01 PROCEDURE — G8536 NO DOC ELDER MAL SCRN: HCPCS | Performed by: ORTHOPAEDIC SURGERY

## 2022-06-01 PROCEDURE — 3017F COLORECTAL CA SCREEN DOC REV: CPT | Performed by: NURSE PRACTITIONER

## 2022-06-01 PROCEDURE — G8754 DIAS BP LESS 90: HCPCS | Performed by: NURSE PRACTITIONER

## 2022-06-01 PROCEDURE — 99212 OFFICE O/P EST SF 10 MIN: CPT | Performed by: ORTHOPAEDIC SURGERY

## 2022-06-01 PROCEDURE — G8427 DOCREV CUR MEDS BY ELIG CLIN: HCPCS | Performed by: ORTHOPAEDIC SURGERY

## 2022-06-01 RX ORDER — CHLORPHENIRAMINE MALEATE 4 MG
TABLET ORAL
COMMUNITY
Start: 2022-05-18

## 2022-06-01 RX ORDER — FLUTICASONE PROPIONATE 50 MCG
SPRAY, SUSPENSION (ML) NASAL
Qty: 1 EACH | Refills: 5 | Status: SHIPPED | OUTPATIENT
Start: 2022-06-01

## 2022-06-01 RX ORDER — CHOLECALCIFEROL (VITAMIN D3) 25 MCG
2000 TABLET ORAL DAILY
Qty: 180 TABLET | Refills: 3 | Status: SHIPPED | OUTPATIENT
Start: 2022-06-01

## 2022-06-01 RX ORDER — LORAZEPAM 0.5 MG/1
TABLET ORAL
Qty: 12 TABLET | Refills: 0 | Status: SHIPPED | OUTPATIENT
Start: 2022-06-01

## 2022-06-01 NOTE — LETTER
6/1/2022    Patient: Deborah Haley   YOB: 1955   Date of Visit: 6/1/2022     Deborah Guerrero NP  [facility] [address] [facility]  [address] [facility] [address]    Dear Deborah Guerrero NP,      Thank you for referring Mr. Aura Dos Santos to [facility] for evaluation. My notes for this consultation are attached. If you have questions, please do not hesitate to call me. I look forward to following your patient along with you.       Sincerely,    Aileen Mccray MD

## 2022-06-01 NOTE — PROGRESS NOTES
HPI: Jad Davies (: 1955) is a 79 y.o. 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 Given steroid pack on 22.)  Patient is seen today to evaluate his right wrist.  The patient states that he has developed numbness to the dorsal radial sensory nerve distribution involving his right wrist and hand. He had been wearing a fit bit watch on his right wrist on and off for the last 6 weeks. He then started to wear it more at night. He noticed that he was developing numbness and tingling in the to the dorsum of his thumb index and middle finger on around the evening of 2022. He immediately took the fit bit off and has had some continuation but admittedly slight improvement in numbness. He denies any motor changes. He underwent a successful left middle trigger finger release with us last year in . 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 Vitals: There were no vitals taken for this visit. There is no height or weight on file to calculate BMI. Allergies   Allergen Reactions    Cefuroxime Unknown (comments)     abd pain    Ibuprofen Other (comments)     Patient states NSAIDS/ ibuprofen upsets his stomach    Statins-Hmg-Coa Reductase Inhibitors Other (comments)     Extremity weakness       Current Outpatient Medications   Medication Sig    LORazepam (ATIVAN) 0.5 mg tablet Take 1 tab by mouth daily as needed for extreme anxiety for 30 days. Indications: anxious    Vitamin D3 25 mcg (1,000 unit) tablet Take 2 Tablets by mouth daily.  fluticasone propionate (Flonase Allergy Relief) 50 mcg/actuation nasal spray Instill 2 sprays into each nostril daily as needed for allergy symptoms.     clotrimazole (LOTRIMIN) 1 % topical cream     fluticasone propionate (FLONASE) 50 mcg/actuation nasal spray 2 sprays to each nostril once daily    clomiPHENE (CLOMID) 50 mg tablet     famotidine (PEPCID) 40 mg tablet Take 1 Tablet by mouth two (2) times a day.  meclizine (ANTIVERT) 25 mg tablet     ondansetron hcl (ZOFRAN) 4 mg tablet     BD Ultra-Fine II Lancets 30 gauge misc     glucose blood VI test strips (FreeStyle Lite Strips) strip Finger stick blood sugars Daily DX E11.9    lidocaine (LIDODERM) 5 % Apply patch to the affected area for 12 hours a day and remove for 12 hours a day.  multivitamin (ONE A DAY) tablet Take 1 Tablet by mouth daily.  butalbital-aspirin-caffeine (FIORINAL) capsule Take 1 Capsule by mouth every four (4) hours as needed for Headache.  clopidogreL (Plavix) 75 mg tab Take 75 mg by mouth.  polyethylene glycol (Miralax) 17 gram/dose powder Take 17 g by mouth daily.  acetaminophen (TYLENOL) 325 mg tablet Take 2 Tablets by mouth every six (6) hours as needed for Pain.  amLODIPine (Norvasc) 2.5 mg tablet Take 1 Tablet by mouth nightly.  apixaban (Eliquis) 5 mg tablet Take 5 mg by mouth two (2) times a day.  evolocumab (Repatha SureClick) pen injection 499 mg by SubCUTAneous route every fourteen (14) days.  amLODIPine (NORVASC) 5 mg tablet Take 5 mg by mouth daily. Every am     No current facility-administered medications for this visit.        Past Medical History:   Diagnosis Date    A-fib [facility])     [facility] ([facility] 11/19/2021    Atypical chest pain 11/19/2021    CAD (coronary artery disease) 06/15/2018    Coronary arteriosclerosis 8/7/2020    Gastric ulcer 12/14/2018    GERD (gastroesophageal reflux disease)     H/O seasonal allergies     High cholesterol     Hx of cardiac arrest     VT arrest     Hyperlipidemia 8/7/2020    Hypertension     Ill-defined condition     Urinary urgency    Kidney stone     MI [facility]myocardial infarction) [facility]     ROQUE on CPAP     2/2/21 pt reports does not use cpap each night, only a few times per week    Torsades de pointes ([facility] 11/19/2021    Ventricular fibrillation ([facility] 11/19/2021    Ventricular premature beats 11/19/2021    Ventricular tachycardia ([facility] 11/19/2021    Vitamin D deficiency 8/7/2020        Past Surgical History:   Procedure Laterality Date    COLONOSCOPY N/A 11/12/2018    COLON[facility] by Jaja [facility]aney [facility] HX COLONOSCOPY  2018    HX COLONOSCOPY      HX ENDOSCOPY      HX HEART CATHETERIZATION  2018    Cardiac cath Stents x2    HX HEART CATHETERIZATION  02/2020    \"patent stent obtuse marginal 1. significant disease noted in branch artery of RCA. THis artery appears to be borderline for any kind of intervention\"    HX LAP CHOLECYSTECTOMY  2005    HX LUMBAR DISKECTOMY      L4-L4    HX ORTHOPAEDIC  2006    Lumbar laminectomy    HX PACEMAKER      linq monitor    [place] CARDIAC SURG PROCEDURE UNLIST  09/09/2021    cardiac ablation       Family History   Problem Relation Age of Onset    Diabetes Mother     Stroke Father     Hypertension Father         Social History     Tobacco Use    Smoking status: Never Smoker    Smokeless tobacco: Never Used   Vaping Use    Vaping Use: Never used   Substance Use Topics    Alcohol use: No    Drug use: No        Review of Systems   All other systems reviewed and are negative. ROS     Positive for: Musculoskeletal    Last edited by Sincere Arndt on 6/1/2022  1:37 PM. (History)             Physical Exam    The patient demonstrates good symmetric elbow, forearm, wrist and hand range of motion. He has a well-healed scar at the base of his left middle finger from his prior trigger finger release in 2021. His right wrist previously had some markings from where the tight wristband had been in place just above the radiocarpal joint. Tinel's sign remains negative and now the dorsal sensation to the wrist and hand is returned and is very pleased with his quick recovery. Imaging:    No new x-rays today.     ASSESSMENT/PLAN:  Below is the assessment and plan developed based on review of pertinent history, physical exam, labs, studies, and medications. 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 He fortunately removes the watch on 4/22/2022 and had seen some subtle but not complete improvement or resolution. I recommended a medrol Dosepak and massage to the region. He does not really require a splint. He did complete the steroid pack and now his sensation is fully recovered. Is very pleased with the progress. He realized that his band was too tight and now has a longer band and is not having any problem but he is wearing it on the left rather than the right side. He may continue with motion and strength to tolerance and may return anytime for further treatment. 1. Right radial sensory nerve injury, subsequent encounter  2. Right hand pain      Return if symptoms worsen or fail to improve. An electronic signature was used to authenticate this note.   -- Jorge Lovell MD

## 2022-06-01 NOTE — PROGRESS NOTES
Visit Vitals  /75 (BP 1 Location: Right arm, BP Patient Position: Sitting, BP Cuff Size: Adult)   Pulse 75   Temp 98.9 °F (37.2 °C) (Temporal)   Resp 18   Ht 5' 9\" (1.753 m)   Wt 201 lb (91.2 kg)   SpO2 97%   BMI 29.68 kg/m²     Chief Complaint   Patient presents with    Physical

## 2022-06-01 NOTE — PROGRESS NOTES
Chief Complaint   Patient presents with    Physical     patient states he was feeling dizzy this morning and blood pressure was 140/90 when he checked it at home     Visit Vitals  /75 (BP 1 Location: Right arm, BP Patient Position: Sitting, BP Cuff Size: Adult)   Pulse 75   Temp 98.9 °F (37.2 °C) (Temporal)   Resp 18   Ht 5' 9\" (1.753 m)   Wt 201 lb (91.2 kg)   SpO2 97%   BMI 29.68 kg/m²     Subjective  Ayan Force is a 79 y.o. male. HPI   Presented for PE- B/P was up this 145/90 and he was dizzy. Had vertigo happened x 2 times this year. Meclizine worked for him. Did go to [facility] and had some discomfort under his L arm. Pt is unclear if he is really a diabetic. He said that  Luciana Peña put him on metformin. However, he has a dx in his record of T2DM. Currently not taking any DM medication. Pt has long hx of anxiety and depression. . Started counseling because of anxiety. I order a small monthly supply of lorazepam which he uses sparingly. The medication takes the edge off his anxiety when it is the worst. No abberancies noted on PDMP report on website. Pt has hx of atrial fibrillation. He started to have palpitations. He talked to his cardiologist and electrophysiologist. He has an implanted Carmona device.  He will continue regular f/u with his cardiologist.     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    Stress and adjustment reaction F43.29    Testosterone deficiency E34.9    Muscular chest pain R07.89    Type 2 diabetes mellitus E11.9    Atrial fibrillation, unspecified type (HCC) I48.91    Acute viral syndrome B34.9    Chronic renal disease, stage III N18.30     Past Medical History:   Diagnosis Date    A-fib ([address]     Acute coronary syndrome ([address] 11/19/2021    Atypical chest pain 11/19/2021    CAD (coronary artery disease) 06/15/2018    Coronary arteriosclerosis 8/7/2020    Gastric ulcer 12/14/2018    GERD (gastroesophageal reflux disease)     H/O seasonal allergies     High cholesterol     Hx of cardiac arrest     VT arrest     Hyperlipidemia 8/7/2020    Hypertension     Ill-defined condition     Urinary urgency    Kidney stone     MI (myocardial infarction) ([address]     ROUQE on CPAP     2/2/21 pt reports does not use cpap each night, only a few times per week    Torsades de pointes ([address] 11/19/2021    Ventricular fibrillation ([address] 11/19/2021    Ventricular premature beats 11/19/2021    Ventricular tachycardia ([address] 11/19/2021    Vitamin D deficiency 8/7/2020     Past Surgical History:   Procedure Laterality Date    COLONOSCOPY N/A 11/12/2018    [facility] performed by Nichelle Cain MD at [facility] HX COLONOSCOPY  2018    HX COLONOSCOPY      HX ENDOSCOPY      HX HEART CATHETERIZATION  2018    Cardiac cath Stents x2    HX HEART CATHETERIZATION  02/2020    \"patent stent obtuse marginal 1. significant disease noted in branch artery of RCA.   THis artery appears to be borderline for any kind of intervention\"    HX LAP CHOLECYSTECTOMY  2005    HX LUMBAR DISKECTOMY      L4-L4    HX ORTHOPAEDIC  2006    Lumbar laminectomy    HX PACEMAKER      linq monitor    [place] CARDIAC SURG PROCEDURE UNLIST  09/09/2021    cardiac ablation     Current Outpatient Medications   Medication Instructions    acetaminophen (TYLENOL) 650 mg, Oral, EVERY 6 HOURS AS NEEDED    amLODIPine (NORVASC) 5 mg, Oral, DAILY, Every am    amLODIPine (NORVASC) 2.5 mg, Oral, EVERY BEDTIME    apixaban (ELIQUIS) 5 mg, Oral, 2 TIMES DAILY    BD Ultra-Fine II Lancets 30 gauge misc No dose, route, or frequency recorded.  butalbital-aspirin-caffeine (FIORINAL) capsule 1 Capsule, Oral, EVERY 4 HOURS AS NEEDED    clomiPHENE (CLOMID) 50 mg tablet No dose, route, or frequency recorded.  clopidogreL (PLAVIX) 75 mg, Oral    clotrimazole (LOTRIMIN) 1 % topical cream No dose, route, or frequency recorded.  famotidine (PEPCID) 40 mg tablet 1 Tablet, Oral, 2 TIMES DAILY    fluticasone propionate (Flonase Allergy Relief) 50 mcg/actuation nasal spray Instill 2 sprays into each nostril daily as needed for allergy symptoms.  fluticasone propionate (FLONASE) 50 mcg/actuation nasal spray 2 sprays to each nostril once daily    glucose blood VI test strips (FreeStyle Lite Strips) strip Finger stick blood sugars Daily DX E11.9    lidocaine (LIDODERM) 5 % Apply patch to the affected area for 12 hours a day and remove for 12 hours a day.  LORazepam (ATIVAN) 0.5 mg tablet Take 1 tab by mouth daily as needed for extreme anxiety for 30 days.  meclizine (ANTIVERT) 25 mg tablet No dose, route, or frequency recorded.  multivitamin (ONE A DAY) tablet 1 Tablet, Oral, DAILY    ondansetron hcl (ZOFRAN) 4 mg tablet No dose, route, or frequency recorded.     polyethylene glycol (MIRALAX) 17 g, Oral, DAILY    Repatha SureClick 569 mg, SubCUTAneous, EVERY 14 DAYS    Vitamin D3 2,000 Units, Oral, DAILY     Allergies   Allergen Reactions    Cefuroxime Unknown (comments)     abd pain    Ibuprofen Other (comments)     Patient states NSAIDS/ ibuprofen upsets his stomach    Statins-Hmg-Coa Reductase Inhibitors Other (comments)     Extremity weakness     Social History     Tobacco Use    Smoking status: Never Smoker    Smokeless tobacco: Never Used   Vaping Use    Vaping Use: Never used   Substance Use Topics    Alcohol use: No    Drug use: No     Family History Problem Relation Age of Onset    Diabetes Mother     Stroke Father     Hypertension Father      Review of Systems   Constitutional: Negative for chills, fever and malaise/fatigue. HENT: Negative for congestion, ear pain and sore throat. Respiratory: Negative for cough, shortness of breath and wheezing. Cardiovascular: Positive for palpitations. Negative for chest pain and leg swelling. Gastrointestinal: Positive for heartburn. Negative for abdominal pain, constipation, diarrhea, nausea and vomiting. Musculoskeletal: Negative for back pain, falls, joint pain, myalgias and neck pain. Neurological: Positive for dizziness. Negative for tingling, sensory change, weakness and headaches. Psychiatric/Behavioral: Negative for depression. The patient is nervous/anxious and has insomnia. Has trouble getting to sleep and staying asleep. He is going to try melatonin. Objective  Physical Exam  Constitutional:       General: He is not in acute distress. Appearance: Normal appearance. He is normal weight. HENT:      Head: Normocephalic. Right Ear: Tympanic membrane, ear canal and external ear normal.      Left Ear: Tympanic membrane, ear canal and external ear normal.      Nose: Nose normal.      Mouth/Throat:      Mouth: Mucous membranes are moist.      Pharynx: Oropharynx is clear. Eyes:      Extraocular Movements: Extraocular movements intact. Conjunctiva/sclera: Conjunctivae normal.      Pupils: Pupils are equal, round, and reactive to light. Neck:      Vascular: No carotid bruit. Cardiovascular:      Rate and Rhythm: Normal rate and regular rhythm. Pulses: Normal pulses. Heart sounds: Normal heart sounds. No murmur heard. Pulmonary:      Effort: Pulmonary effort is normal. No respiratory distress. Breath sounds: Normal breath sounds. Abdominal:      General: Abdomen is flat. Bowel sounds are normal.      Palpations: Abdomen is soft.  There is no mass.      Tenderness: There is no abdominal tenderness. There is no right CVA tenderness or left CVA tenderness. Hernia: No hernia is present. Musculoskeletal:         General: No swelling or tenderness. Normal range of motion. Cervical back: Neck supple. No tenderness. Right lower leg: No edema. Left lower leg: No edema. Lymphadenopathy:      Cervical: No cervical adenopathy. Skin:     General: Skin is warm and dry. Coloration: Skin is not jaundiced. Findings: No lesion or rash. Neurological:      General: No focal deficit present. Mental Status: He is alert and oriented to person, place, and time. Sensory: No sensory deficit. Motor: No weakness. Coordination: Coordination normal.      Gait: Gait normal.      Deep Tendon Reflexes: Reflexes normal.   Psychiatric:         Mood and Affect: Mood normal.         Behavior: Behavior normal.         Thought Content: Thought content normal.         Judgment: Judgment normal.       Assessment & Plan      ICD-10-CM ICD-9-CM    1. Encounter for routine history and physical examination of adult  Z00.00 V70.0    2. Anxiety  F41.9 300.00 LORazepam (ATIVAN) 0.5 mg tablet   3. Vitamin D deficiency  E55.9 268.9 Vitamin D3 25 mcg (1,000 unit) tablet   4. Hyperglycemia  R73.9 790.29 HEMOGLOBIN A1C WITH EAG   5. Abnormal results of kidney function studies  U62.4 133.5 METABOLIC PANEL, COMPREHENSIVE   6. Environmental allergies  Z91.09 V15.09 fluticasone propionate (Flonase Allergy Relief) 50 mcg/actuation nasal spray   7. Mixed hyperlipidemia  E78.2 272.2 LIPID PANEL       1. Encounter for routine history and physical examination of adult  Encouraged to continue annual PE for health maintenance, preventive health updates and immunizations as needed. 2. Anxiety  Will continue on current dose and frequency of  lorazepam which I have advised him in past  I will not increase the frequency or number of tablets per month. Encouraged to keep physically active which can be very helpful for anxiety.    - LORazepam (ATIVAN) 0.5 mg tablet; Take 1 tab by mouth daily as needed for extreme anxiety for 30 days. Indications: anxious  Dispense: 12 Tablet; Refill: 0    3. Vitamin D deficiency  Last level in normal range at 56.7. Well controlled on current med regime which he will continue. - Vitamin D3 25 mcg (1,000 unit) tablet; Take 2 Tablets by mouth daily. Dispense: 180 Tablet; Refill: 3    4. Hyperglycemia    - HEMOGLOBIN A1C WITH EAG    5. Abnormal results of kidney function studies    - METABOLIC PANEL, COMPREHENSIVE    6. Environmental allergies    - fluticasone propionate (Flonase Allergy Relief) 50 mcg/actuation nasal spray; Instill 2 sprays into each nostril daily as needed for allergy symptoms. Dispense: 1 Each; Refill: 5    7. Mixed hyperlipidemia  Pt continues on Repatha injections every 2 weeks. He did not tolerate statins. His LDL is stable- remains mildly elevated above 100. His cardiologist is aware he is not on a statin. - LIPID PANEL    I have discussed the diagnosis with the patient and the intended plan as seen in the above orders. Pt/caretaker has expressed understanding. Questions were answered concerning future plans. I have discussed medication side effects and warnings as indicated with the patient as well.     Nanette Catherine NP

## 2022-06-02 NOTE — PROGRESS NOTES
Chief Complaint  Gout (Thinks medication needs upped not helping manage gout/) and Mental Health Problem (Buspar is not helping patient./)    Subjective        Christal Flores presents to [facility] for follow up (gout, depression, anxiety).    Gout  This is a chronic problem. Episode onset: 14-15 years. The problem occurs intermittently. The problem has been waxing and waning. Associated symptoms include fatigue, headaches, joint swelling and nausea. Pertinent negatives include no abdominal pain, anorexia, arthralgias, change in bowel habit, chest pain, chills, congestion, coughing, diaphoresis, fever, myalgias, neck pain, numbness, rash, sore throat, swollen glands, urinary symptoms, vertigo, visual change, vomiting or weakness. Exacerbated by: red meat. Treatments tried: Zyloprim.   Depression  Visit Type: follow-up  Patient presents with the following symptoms: confusion, decreased concentration, depressed mood, dizziness, excessive worry, fatigue, feelings of hopelessness, feelings of worthlessness, insomnia, irritability, malaise, muscle tension, nausea, nervousness/anxiety, obsessions, palpitations, panic, restlessness and shortness of breath.  Patient is not experiencing: anhedonia, chest pain, choking sensation, compulsions, dry mouth, hypersomnia, hyperventilation, impotence, memory impairment, psychomotor agitation, psychomotor retardation, suicidal ideas, suicidal planning, thoughts of death, weight gain and weight loss.  Frequency of symptoms: most days   Severity: moderate   Sleep quality: fair  Nighttime awakenings: occasional  Compliance with medications: pt tapered off of Lexapro (due to side effects); states Buspar is not helping with anxiety.        Anxiety  Presents for follow-up visit. Symptoms include confusion, decreased concentration, depressed mood, dizziness, excessive worry, insomnia, irritability, malaise, muscle tension, nausea, nervous/anxious behavior,  Jomar Montaño (: 1955) is a 79 y.o. male, established patient, here for evaluation of the following chief complaint(s): Anxiety and Medication Refill       ASSESSMENT/PLAN:  Below is the assessment and plan developed based on review of pertinent history, labs, studies, and medications. 1. Chronic fatigue  -     TSH 3RD GENERATION  -     T4, FREE  2. Muscle pain  -     REFERRAL TO PHYSICIAL MEDICINE REHAB  3. Vitamin D deficiency  -     VITAMIN D, 25 HYDROXY  4. Encounter for hepatitis C screening test for low risk patient  -     HCV AB W/RFLX TO [place]      Return if symptoms worsen or fail to improve, for Follow-up chronic medical conditions, Follow-up lab results. 1. Chronic fatigue    - TSH 3RD GENERATION  - T4, FREE    2. Muscle pain  Pt advised to call PMR provider for an appt. Will review consult note when available. - REFERRAL TO PHYSICIAL MEDICINE REHAB    3. Vitamin D deficiency  Add on supplement as indicated. Low Vit D can be a factor in fatigue.   - VITAMIN D, 25 HYDROXY    4. Encounter for hepatitis C screening test for low risk patient  One time recommended screening for pt in his age group per new guidelines. - HCV AB W/RFLX TO [place]      SUBJECTIVE/OBJECTIVE:      HPI     Patient presented for follow-up. Review of his record indicated that he has had several ER visits in recent past for flulike symptoms and fatigue. He was advised that most likely he was having a viral syndrome. During an appointment with his cardiologist Dr. Casey Mixon, he discussed his fatigue and Dr. Casey Mixon recommended he have his thyroid levels checked. Has been doing physical therapy for some time for chronic left-sided low back pain with left-sided sciatica. Overall he is improved but now has complaints of a tight painful muscle in the right hip area. The physical therapist has been unable to relieve the symptoms advised patient that he might need an injection to the area.   Patient has been using a "obsessions, palpitations, panic, restlessness and shortness of breath. Patient reports no chest pain, compulsions, dry mouth, feeling of choking, hyperventilation, impotence or suicidal ideas. Primary symptoms comment: pt states dreams that he dies. Symptoms occur most days. The severity of symptoms is moderate. The quality of sleep is fair. Nighttime awakenings: occasional.     His past medical history is significant for depression. Compliance with medications is % (pt tapered off of Lexapro (due to side effects); states Buspar is not helping with anxiety).     Objective   Vital Signs:  /84   Pulse 110   Temp 97.6 °F (36.4 °C) (Temporal)   Resp 18   Ht 175.3 cm (69\")   Wt 124 kg (273 lb)   SpO2 97%   BMI 40.32 kg/m²     Class 3 Severe Obesity (BMI >=40). Obesity-related health conditions include the following: hypertension. Obesity is unchanged. BMI is is above average; BMI management plan is completed. We discussed portion control and increasing exercise.      Physical Exam  Vitals and nursing note reviewed.   Constitutional:       General: He is awake.      Appearance: Normal appearance.   HENT:      Head: Normocephalic.      Right Ear: Hearing and external ear normal.      Left Ear: Hearing and external ear normal.      Nose: Nose normal.      Mouth/Throat:      Lips: Pink.      Mouth: Mucous membranes are moist.   Eyes:      General: Lids are normal.      Conjunctiva/sclera: Conjunctivae normal.      Pupils: Pupils are equal, round, and reactive to light.   Cardiovascular:      Rate and Rhythm: Normal rate and regular rhythm.      Heart sounds: Normal heart sounds.   Pulmonary:      Effort: Pulmonary effort is normal.      Breath sounds: Normal breath sounds.   Abdominal:      General: Abdomen is protuberant. Bowel sounds are normal.      Palpations: Abdomen is soft.      Tenderness: There is no abdominal tenderness.   Musculoskeletal:         General: Normal range of motion.      Cervical " lidocaine patch to the area which is somewhat helpful but he would like referral to Ortho to evaluate. I think a more appropriate referral might be to a PMR provider. Patient has anterosclerotic heart disease and is currently taking Plavix and Eliquis. Patient had a discussion with his cardiologist about coming off the medication but was told he most likely will need to remain on it for his lifespan. Patient has noted minor blood-tinged mucus when he blows his nose especially if he is using Flonase which tends to dry out his nose. Otherwise he denied any other excessive bruising, hematuria, black or bloody stools, or coughing up blood. Under the care of urologist Dr. Bronson Miller at [facility] for BPH and was recently diagnosed new medicine clomiphene which increases spermagenesis. Patient has history of low testosterone which can contribute to fatigue but he does not want to be on testosterone patches or injections due to his heart condition. Apparently this medication can help to raise the testosterone levels by raising sperm counts. Patient will discuss with his cardiologist because he wants to make sure this is a safe way to increase testosterone levels. Review of Systems   Constitutional: Negative for appetite change, chills, fatigue, fever and unexpected weight change. HENT: Negative for congestion, ear pain, postnasal drip, sinus pressure and sore throat. Eyes: Negative. Respiratory: Negative for cough, chest tightness, shortness of breath and wheezing. Cardiovascular: Negative for chest pain, palpitations and leg swelling. Gastrointestinal: Negative for abdominal pain, constipation, diarrhea, nausea and vomiting. Endocrine: Negative. Genitourinary: Negative for difficulty urinating, dysuria, frequency, hematuria and urgency. Musculoskeletal: Positive for arthralgias and myalgias. Tight muscle in R hip area- PT unable to relieve the tightness w/ therapy.    Skin: Negative. Negative for rash. Neurological: Negative for dizziness, syncope and headaches. Psychiatric/Behavioral: Negative. Negative for dysphoric mood and sleep disturbance. The patient is not nervous/anxious. No data recorded     Physical Exam    [INSTRUCTIONS:  \"[x]\" Indicates a positive item  \"[]\" Indicates a negative item  -- DELETE ALL ITEMS NOT EXAMINED]    Constitutional: [x] Appears well-developed and well-nourished [x] No apparent distress      [] Abnormal -     Mental status: [x] Alert and awake  [x] Oriented to person/place/time [x] Able to follow commands    [] Abnormal -     Eyes:   EOM    [x]  Normal    [] Abnormal -   Sclera  [x]  Normal    [] Abnormal -          Discharge [x]  None visible   [] Abnormal -     HENT: [x] Normocephalic, atraumatic  [] Abnormal -   [] Mouth/Throat: Mucous membranes are moist    External Ears [x] Normal  [] Abnormal -    Neck: [x] No visualized mass [] Abnormal -     Pulmonary/Chest: [x] Respiratory effort normal   [x] No visualized signs of difficulty breathing or respiratory distress        [] Abnormal -      Musculoskeletal:   [] Normal gait with no signs of ataxia         [x] Normal range of motion of neck        [] Abnormal -     Neurological:        [x] No Facial Asymmetry (Cranial nerve 7 motor function) (limited exam due to video visit)          [x] No gaze palsy        [] Abnormal -          Skin:        [x] No significant exanthematous lesions or discoloration noted on facial skin         [] Abnormal -            Psychiatric:       [x] Normal Affect [] Abnormal -        [x] No Hallucination      Neema Faulkner was evaluated through a synchronous (real-time) audio-video encounter. The patient (or guardian if applicable) is aware that this is a billable service, which includes applicable co-pays. Verbal consent to proceed has been obtained.  The visit was conducted pursuant to the emergency declaration under the [address] back: Normal range of motion.   Skin:     General: Skin is warm and dry.      Capillary Refill: Capillary refill takes less than 2 seconds.   Neurological:      Mental Status: He is alert and oriented to person, place, and time.      Sensory: Sensation is intact.      Motor: Motor function is intact.      Coordination: Coordination is intact.      Gait: Gait is intact.   Psychiatric:         Attention and Perception: Attention and perception normal.         Mood and Affect: Mood is depressed. Affect is flat.         Speech: Speech normal.         Behavior: Behavior is slowed. Behavior is cooperative.         Thought Content: Thought content normal.         Cognition and Memory: Cognition normal.         Judgment: Judgment normal.        Result Review :  The following data was reviewed by: TOI Bravo on 06/02/2022:  CMP    CMP 10/27/21   Glucose 95   BUN 15   Creatinine 1.05   eGFR Non African Am 81   Sodium 142   Potassium 4.7   Chloride 107   Calcium 9.6   Albumin 4.57   Total Bilirubin 0.3   Alkaline Phosphatase 91   AST (SGOT) 17   ALT (SGPT) 25           CBC w/diff    CBC w/Diff 10/27/21   WBC 7.27   RBC 5.06   Hemoglobin 14.4   Hematocrit 44.8   MCV 88.5   MCH 28.5   MCHC 32.1   RDW 13.9   Platelets 264   Neutrophil Rel % 71.6   Immature Granulocyte Rel % 0.3   Lymphocyte Rel % 18.2 (A)   Monocyte Rel % 6.5   Eosinophil Rel % 2.2   Basophil Rel % 1.2   (A) Abnormal value            Lipid Panel    Lipid Panel 10/27/21   Total Cholesterol 175   Triglycerides 97   HDL Cholesterol 32 (A)   VLDL Cholesterol 18   LDL Cholesterol  125 (A)   LDL/HDL Ratio 3.86   (A) Abnormal value            TSH    TSH 10/27/21   TSH 0.916           Assessment and Plan   Diagnoses and all orders for this visit:    1. Chronic gout involving toe of left foot without tophus, unspecified cause (Primary)  -     allopurinol (Zyloprim) 100 MG tablet; Take 2 tablets by mouth Daily.  Dispense: 30 tablet; Refill: 0    2. Anxiety  -      Emergencies Act, [facility] waiver authority and the Coronavirus Preparedness and Response Supplemental Appropriations Act. Patient identification was verified, and a caregiver was present when appropriate. The patient was located at home in a state where the provider was licensed to provide care. An electronic signature was used to authenticate this note.   -- Carolina Donaldson NP buPROPion XL (Wellbutrin XL) 150 MG 24 hr tablet; Take 1 tablet by mouth Daily.  Dispense: 30 tablet; Refill: 0  -     Ambulatory Referral to Behavioral Health    3. Depression, unspecified depression type  -     buPROPion XL (Wellbutrin XL) 150 MG 24 hr tablet; Take 1 tablet by mouth Daily.  Dispense: 30 tablet; Refill: 0  -     Ambulatory Referral to Behavioral Health         I spent 25 minutes caring for Christal on this date of service. This time includes time spent by me in the following activities:preparing for the visit, reviewing tests, obtaining and/or reviewing a separately obtained history, performing a medically appropriate examination and/or evaluation , counseling and educating the patient/family/caregiver, ordering medications, tests, or procedures, referring and communicating with other health care professionals , documenting information in the medical record, independently interpreting results and communicating that information with the patient/family/caregiver and care coordination     Follow Up   Return in about 4 weeks (around 6/30/2022) for Recheck anxiety/depression s/p initiation of wellbutrin.  Patient was given instructions and counseling regarding his condition or for health maintenance advice. Please see specific information pulled into the AVS if appropriate.       This document has been electronically signed by TOI Bravo  Marycarmen 3, 2022 09:56 EDT

## 2022-06-07 LAB
ALBUMIN SERPL-MCNC: 4.3 G/DL (ref 3.8–4.8)
ALBUMIN/GLOB SERPL: 1.7 {RATIO} (ref 1.2–2.2)
ALP SERPL-CCNC: 60 IU/L (ref 44–121)
ALT SERPL-CCNC: 16 IU/L (ref 0–44)
AST SERPL-CCNC: 18 IU/L (ref 0–40)
BILIRUB SERPL-MCNC: 0.3 MG/DL (ref 0–1.2)
BUN SERPL-MCNC: 12 MG/DL (ref 8–27)
BUN/CREAT SERPL: 9 (ref 10–24)
CALCIUM SERPL-MCNC: 9.1 MG/DL (ref 8.6–10.2)
CHLORIDE SERPL-SCNC: 105 MMOL/L (ref 96–106)
CHOLEST SERPL-MCNC: 174 MG/DL (ref 100–199)
CO2 SERPL-SCNC: 23 MMOL/L (ref 20–29)
CREAT SERPL-MCNC: 1.29 MG/DL (ref 0.76–1.27)
EGFR: 61 ML/MIN/1.73
EST. AVERAGE GLUCOSE BLD GHB EST-MCNC: 128 MG/DL
GLOBULIN SER CALC-MCNC: 2.6 G/DL (ref 1.5–4.5)
GLUCOSE SERPL-MCNC: 92 MG/DL (ref 65–99)
HBA1C MFR BLD: 6.1 % (ref 4.8–5.6)
HDLC SERPL-MCNC: 53 MG/DL
LDLC SERPL CALC-MCNC: 104 MG/DL (ref 0–99)
POTASSIUM SERPL-SCNC: 4.5 MMOL/L (ref 3.5–5.2)
PROT SERPL-MCNC: 6.9 G/DL (ref 6–8.5)
SODIUM SERPL-SCNC: 141 MMOL/L (ref 134–144)
TRIGL SERPL-MCNC: 95 MG/DL (ref 0–149)
VLDLC SERPL CALC-MCNC: 17 MG/DL (ref 5–40)

## 2022-06-30 NOTE — TELEPHONE ENCOUNTER
Labs that patient is referring to were addressed by provider after this date. Message was sent to patient and per system, patient has viewed her comments.  Will close this [facility]

## 2022-07-29 ENCOUNTER — HOSPITAL ENCOUNTER (EMERGENCY)
Age: 67
Discharge: HOME | End: 2022-07-29
Attending: EMERGENCY MEDICINE
Payer: MEDICARE

## 2022-07-29 VITALS
BODY MASS INDEX: 29.33 KG/M2 | SYSTOLIC BLOOD PRESSURE: 141 MMHG | HEIGHT: 69 IN | OXYGEN SATURATION: 99 % | RESPIRATION RATE: 16 BRPM | TEMPERATURE: 98.1 F | WEIGHT: 198 LBS | DIASTOLIC BLOOD PRESSURE: 86 MMHG | HEART RATE: 78 BPM

## 2022-07-29 DIAGNOSIS — R00.2 PALPITATIONS: Primary | ICD-10-CM

## 2022-07-29 LAB
ANION GAP SERPL CALC-SCNC: 6 MMOL/L (ref 5–15)
BASOPHILS # BLD: 0 K/UL (ref 0–0.1)
BASOPHILS NFR BLD: 1 % (ref 0–1)
BUN SERPL-MCNC: 16 MG/DL (ref 6–20)
BUN/CREAT SERPL: 12 (ref 12–20)
CA-I BLD-MCNC: 9 MG/DL (ref 8.5–10.1)
CHLORIDE SERPL-SCNC: 108 MMOL/L (ref 97–108)
CO2 SERPL-SCNC: 28 MMOL/L (ref 21–32)
CREAT SERPL-MCNC: 1.38 MG/DL (ref 0.7–1.3)
DIFFERENTIAL METHOD BLD: NORMAL
EOSINOPHIL # BLD: 0.2 K/UL (ref 0–0.4)
EOSINOPHIL NFR BLD: 3 % (ref 0–7)
ERYTHROCYTE [DISTWIDTH] IN BLOOD BY AUTOMATED COUNT: 13.2 % (ref 11.5–14.5)
GLUCOSE SERPL-MCNC: 104 MG/DL (ref 65–100)
HCT VFR BLD AUTO: 42.2 % (ref 36.6–50.3)
HGB BLD-MCNC: 13.9 G/DL (ref 12.1–17)
IMM GRANULOCYTES # BLD AUTO: 0 K/UL (ref 0–0.04)
IMM GRANULOCYTES NFR BLD AUTO: 0 % (ref 0–0.5)
LYMPHOCYTES # BLD: 3 K/UL (ref 0.8–3.5)
LYMPHOCYTES NFR BLD: 39 % (ref 12–49)
MAGNESIUM SERPL-MCNC: 2.2 MG/DL (ref 1.6–2.4)
MCH RBC QN AUTO: 29.4 PG (ref 26–34)
MCHC RBC AUTO-ENTMCNC: 32.9 G/DL (ref 30–36.5)
MCV RBC AUTO: 89.2 FL (ref 80–99)
MONOCYTES # BLD: 0.8 K/UL (ref 0–1)
MONOCYTES NFR BLD: 10 % (ref 5–13)
NEUTS SEG # BLD: 3.6 K/UL (ref 1.8–8)
NEUTS SEG NFR BLD: 47 % (ref 32–75)
PLATELET # BLD AUTO: 218 K/UL (ref 150–400)
PMV BLD AUTO: 10.6 FL (ref 8.9–12.9)
POTASSIUM SERPL-SCNC: 4.3 MMOL/L (ref 3.5–5.1)
RBC # BLD AUTO: 4.73 M/UL (ref 4.1–5.7)
SODIUM SERPL-SCNC: 142 MMOL/L (ref 136–145)
TROPONIN-HIGH SENSITIVITY: 8 NG/L (ref 0–76)
WBC # BLD AUTO: 7.6 K/UL (ref 4.1–11.1)

## 2022-07-29 PROCEDURE — 36415 COLL VENOUS BLD VENIPUNCTURE: CPT

## 2022-07-29 PROCEDURE — 83735 ASSAY OF MAGNESIUM: CPT

## 2022-07-29 PROCEDURE — 99284 EMERGENCY DEPT VISIT MOD MDM: CPT

## 2022-07-29 PROCEDURE — 85025 COMPLETE CBC W/AUTO DIFF WBC: CPT

## 2022-07-29 PROCEDURE — 84484 ASSAY OF TROPONIN QUANT: CPT

## 2022-07-29 PROCEDURE — 93005 ELECTROCARDIOGRAM TRACING: CPT

## 2022-07-29 PROCEDURE — 80048 BASIC METABOLIC PNL TOTAL CA: CPT

## 2022-07-30 NOTE — ED TRIAGE NOTES
Hx of afib but had ablation, kiran told him he was in \"possible AFIB\" took 0.5mg of lorazepam before coming

## 2022-07-30 NOTE — ED PROVIDER NOTES
EMERGENCY DEPARTMENT HISTORY AND PHYSICAL EXAM      Date: 7/29/2022  Patient Name: Martha Gill    History of Presenting Illness     Chief Complaint   Patient presents with    Palpitations       History Provided By: Patient    HPI: Martha Gill, 79 y.o. male   presents to the ED with cc of potation. Patient complains of intermittent episode of palpitation for last several hours. Patient describes a fast beating and his phone kiran showed that he was in A. fib. Patient had a ablation last year with resolution of the atrial fibrillation. The palpitation was not associated chest pain, shortness of breath, nausea, diaphoresis, dizziness, or syncope. Currently patient is asymptomatic at this time. PCP: Eleuterio Fulton NP    No current facility-administered medications on file prior to encounter. Current Outpatient Medications on File Prior to Encounter   Medication Sig Dispense Refill    LORazepam (ATIVAN) 0.5 mg tablet Take 1 tab by mouth daily as needed for extreme anxiety for 30 days. Indications: anxious 12 Tablet 0    Vitamin D3 25 mcg (1,000 unit) tablet Take 2 Tablets by mouth daily. 180 Tablet 3    fluticasone propionate (Flonase Allergy Relief) 50 mcg/actuation nasal spray Instill 2 sprays into each nostril daily as needed for allergy symptoms. 1 Each 5    clotrimazole (LOTRIMIN) 1 % topical cream       fluticasone propionate (FLONASE) 50 mcg/actuation nasal spray 2 sprays to each nostril once daily 1 Each 3    clomiPHENE (CLOMID) 50 mg tablet       famotidine (PEPCID) 40 mg tablet Take 1 Tablet by mouth two (2) times a day. meclizine (ANTIVERT) 25 mg tablet       ondansetron hcl (ZOFRAN) 4 mg tablet       BD Ultra-Fine II Lancets 30 gauge misc       glucose blood VI test strips (FreeStyle Lite Strips) strip Finger stick blood sugars Daily DX E11.9 100 Strip 3    lidocaine (LIDODERM) 5 % Apply patch to the affected area for 12 hours a day and remove for 12 hours a day.  25 Patch 3 multivitamin (ONE A DAY) tablet Take 1 Tablet by mouth daily. butalbital-aspirin-caffeine (FIORINAL) capsule Take 1 Capsule by mouth every four (4) hours as needed for Headache. clopidogreL (Plavix) 75 mg tab Take 75 mg by mouth.      polyethylene glycol (Miralax) 17 gram/dose powder Take 17 g by mouth daily. acetaminophen (TYLENOL) 325 mg tablet Take 2 Tablets by mouth every six (6) hours as needed for Pain. 60 Tablet 0    amLODIPine (Norvasc) 2.5 mg tablet Take 1 Tablet by mouth nightly. 30 Tablet 2    apixaban (Eliquis) 5 mg tablet Take 5 mg by mouth two (2) times a day. evolocumab (Repatha SureClick) pen injection 683 mg by SubCUTAneous route every fourteen (14) days. amLODIPine (NORVASC) 5 mg tablet Take 5 mg by mouth daily.  Every am         Past History     Past Medical History:  Past Medical History:   Diagnosis Date    A-fib ([address]     Acute coronary syndrome ([address] 11/19/2021    Atypical chest pain 11/19/2021    CAD (coronary artery disease) 06/15/2018    Coronary arteriosclerosis 8/7/2020    Gastric ulcer 12/14/2018    GERD (gastroesophageal reflux disease)     H/O seasonal allergies     High cholesterol     Hx of cardiac arrest     VT arrest     Hyperlipidemia 8/7/2020    Hypertension     Ill-defined condition     Urinary urgency    Kidney stone     MI (myocardial infarction) ([address]     ROQUE on CPAP     2/2/21 pt reports does not use cpap each night, only a few times per week    Torsades de pointes ([address] 11/19/2021    Ventricular fibrillation ([address] 11/19/2021    Ventricular premature beats 11/19/2021    Ventricular tachycardia ([address] 11/19/2021    Vitamin D deficiency 8/7/2020       Past Surgical History:  Past Surgical History:   Procedure Laterality Date    COLONOSCOPY N/A 11/12/2018    COLONOSCOPY performed by Natasha Burks MD at [facility] ENDOSCOPY    HX COLONOSCOPY  2018    HX COLONOSCOPY      HX ENDOSCOPY      HX HEART CATHETERIZATION  2018    Cardiac cath Stents x2    HX HEART CATHETERIZATION  02/2020    \"patent stent obtuse marginal 1. significant disease noted in branch artery of RCA. THis artery appears to be borderline for any kind of intervention\"    HX LAP CHOLECYSTECTOMY  2005    HX LUMBAR DISKECTOMY      L4-L4    HX ORTHOPAEDIC  2006    Lumbar laminectomy    HX PACEMAKER      linq monitor    UT CARDIAC SURG PROCEDURE UNLIST  09/09/2021    cardiac ablation       Family History:  Family History   Problem Relation Age of Onset    Diabetes Mother     Stroke Father     Hypertension Father        Social History:  Social History     Tobacco Use    Smoking status: Never    Smokeless tobacco: Never   Vaping Use    Vaping Use: Never used   Substance Use Topics    Alcohol use: No    Drug use: No       Allergies: Allergies   Allergen Reactions    Cefuroxime Unknown (comments)     abd pain    Ibuprofen Other (comments)     Patient states NSAIDS/ ibuprofen upsets his stomach    Statins-Hmg-Coa Reductase Inhibitors Other (comments)     Extremity weakness         Review of Systems   Review of Systems   Constitutional:  Negative for chills and fever. HENT:  Negative for sore throat. Eyes:  Negative for discharge. Respiratory:  Negative for cough. Cardiovascular:  Negative for chest pain. Gastrointestinal:  Negative for abdominal pain. Genitourinary:  Negative for difficulty urinating. Musculoskeletal:  Negative for back pain. Skin:  Negative for rash. Neurological:  Negative for headaches. Psychiatric/Behavioral:  Negative for confusion. All other systems reviewed and are negative. Physical Exam   Physical Exam  Vitals and nursing note reviewed. Constitutional:       General: He is not in acute distress. Appearance: He is well-developed. He is not ill-appearing, toxic-appearing or diaphoretic. HENT:      Head: Normocephalic and atraumatic. Nose: No congestion.       Mouth/Throat:      Mouth: Mucous membranes are moist.   Eyes:      Conjunctiva/sclera: Conjunctivae normal.   Cardiovascular:      Rate and Rhythm: Normal rate and regular rhythm. Pulmonary:      Effort: Pulmonary effort is normal.      Breath sounds: Normal breath sounds. Abdominal:      General: Bowel sounds are normal.      Palpations: Abdomen is soft. Musculoskeletal:      Cervical back: Neck supple. Skin:     General: Skin is warm and dry. Neurological:      General: No focal deficit present. Mental Status: He is alert. Psychiatric:         Mood and Affect: Mood normal.       Diagnostic Study Results     Labs -     Recent Results (from the past 12 hour(s))   CBC WITH AUTOMATED DIFF    Collection Time: 07/29/22  8:30 PM   Result Value Ref Range    WBC 7.6 4.1 - 11.1 K/uL    RBC 4.73 4.10 - 5.70 M/uL    HGB 13.9 12.1 - 17.0 g/dL    HCT 42.2 36.6 - 50.3 %    MCV 89.2 80.0 - 99.0 FL    MCH 29.4 26.0 - 34.0 PG    MCHC 32.9 30.0 - 36.5 g/dL    RDW 13.2 11.5 - 14.5 %    PLATELET 790 855 - 261 K/uL    MPV 10.6 8.9 - 12.9 FL    NEUTROPHILS 47 32 - 75 %    LYMPHOCYTES 39 12 - 49 %    MONOCYTES 10 5 - 13 %    EOSINOPHILS 3 0 - 7 %    BASOPHILS 1 0 - 1 %    IMMATURE GRANULOCYTES 0 0.0 - 0.5 %    ABS. NEUTROPHILS 3.6 1.8 - 8.0 K/UL    ABS. LYMPHOCYTES 3.0 0.8 - 3.5 K/UL    ABS. MONOCYTES 0.8 0.0 - 1.0 K/UL    ABS. EOSINOPHILS 0.2 0.0 - 0.4 K/UL    ABS. BASOPHILS 0.0 0.0 - 0.1 K/UL    ABS. IMM.  GRANS. 0.0 0.00 - 0.04 K/UL    DF AUTOMATED     METABOLIC PANEL, BASIC    Collection Time: 07/29/22  8:30 PM   Result Value Ref Range    Sodium 142 136 - 145 mmol/L    Potassium 4.3 3.5 - 5.1 mmol/L    Chloride 108 97 - 108 mmol/L    CO2 28 21 - 32 mmol/L    Anion gap 6 5 - 15 mmol/L    Glucose 104 (H) 65 - 100 mg/dL    BUN 16 6 - 20 mg/dL    Creatinine 1.38 (H) 0.70 - 1.30 mg/dL    BUN/Creatinine ratio 12 12 - 20      GFR est AA >60 >60 ml/min/1.73m2    GFR est non-AA 51 (L) >60 ml/min/1.73m2    Calcium 9.0 8.5 - 10.1 mg/dL   TROPONIN-HIGH SENSITIVITY    Collection Time: 07/29/22  8:30 PM   Result Value Ref Range    Troponin-High Sensitivity 8 0 - 76 ng/L   MAGNESIUM    Collection Time: 07/29/22  8:30 PM   Result Value Ref Range    Magnesium 2.2 1.6 - 2.4 mg/dL       Radiologic Studies -   No orders to display     CT Results  (Last 48 hours)      None          CXR Results  (Last 48 hours)      None              Medical Decision Making   I am the first provider for this patient. I reviewed the vital signs, available nursing notes, past medical history, past surgical history, family history and social history. Vital Signs-Reviewed the patient's vital signs. Patient Vitals for the past 12 hrs:   Temp Pulse Resp BP SpO2   07/29/22 2005 98.1 °F (36.7 °C) 73 16 (!) 148/75 100 %       Records Reviewed:     Provider Notes (Medical Decision Making):   EKG shows normal sinus rhythm with PAC. Cardio monitor throughout the stay emergency room showed a normal sinus rhythm. Electrolyte and troponin were negative. Patient was discharged in stable condition. ED Course:   Initial assessment performed. The patients presenting problems have been discussed, and they are in agreement with the care plan formulated and outlined with them. I have encouraged them to ask questions as they arise throughout their visit. Patient remains asymptomatic. PROCEDURES      Disposition: Condition stable   DC- Adult Discharges: All of the diagnostic tests were reviewed and questions answered. Diagnosis, care plan and treatment options were discussed. understand instructions and will follow up as directed. The patients results have been reviewed with them. They have been counseled regarding their diagnosis. The patient verbally convey understanding and agreement of the signs, symptoms, diagnosis, treatment and prognosis and additionally agrees to follow up as recommended. They also agree with the care-plan and convey that all of their questions have been answered.   I have also put together some discharge instructions for them that include: 1) educational information regarding their diagnosis, 2) how to care for their diagnosis at home, as well a 3) list of reasons why they would want to return to the ED prior to their follow-up appointment, should their condition change. PLAN:  1. Current Discharge Medication List        2. Follow-up Information       Follow up With Specialties Details Why Contact Info    Follow up with your primary care physician  Schedule an appointment as soon as possible for a visit in 3 days As needed           Return to ED if worse     Diagnosis     Clinical Impression:   1. Palpitations        Please note that this dictation was completed with [employer], the computer voice recognition software. Quite often unanticipated grammatical, syntax, homophones, and other interpretive errors are inadvertently transcribed by the computer software. Please disregard these errors. Please excuse any errors that have escaped final proofreading. Thank you.

## 2022-07-31 ENCOUNTER — APPOINTMENT (OUTPATIENT)
Dept: GENERAL RADIOLOGY | Age: 67
End: 2022-07-31
Attending: NURSE PRACTITIONER
Payer: MEDICARE

## 2022-07-31 ENCOUNTER — HOSPITAL ENCOUNTER (EMERGENCY)
Age: 67
Discharge: HOME | End: 2022-07-31
Attending: EMERGENCY MEDICINE
Payer: MEDICARE

## 2022-07-31 VITALS
DIASTOLIC BLOOD PRESSURE: 68 MMHG | TEMPERATURE: 97.9 F | WEIGHT: 198 LBS | SYSTOLIC BLOOD PRESSURE: 135 MMHG | HEIGHT: 69 IN | HEART RATE: 67 BPM | OXYGEN SATURATION: 99 % | RESPIRATION RATE: 12 BRPM | BODY MASS INDEX: 29.33 KG/M2

## 2022-07-31 DIAGNOSIS — I49.3 PVC'S (PREMATURE VENTRICULAR CONTRACTIONS): ICD-10-CM

## 2022-07-31 DIAGNOSIS — R00.2 PALPITATIONS: Primary | ICD-10-CM

## 2022-07-31 DIAGNOSIS — E86.0 DEHYDRATION: ICD-10-CM

## 2022-07-31 LAB
ALBUMIN SERPL-MCNC: 3.3 G/DL (ref 3.5–5)
ALBUMIN/GLOB SERPL: 0.9 {RATIO} (ref 1.1–2.2)
ALP SERPL-CCNC: 50 U/L (ref 45–117)
ALT SERPL-CCNC: 26 U/L (ref 12–78)
ANION GAP SERPL CALC-SCNC: 2 MMOL/L (ref 5–15)
AST SERPL W P-5'-P-CCNC: 28 U/L (ref 15–37)
BASOPHILS # BLD: 0.1 K/UL (ref 0–0.1)
BASOPHILS NFR BLD: 1 % (ref 0–1)
BILIRUB SERPL-MCNC: 0.4 MG/DL (ref 0.2–1)
BUN SERPL-MCNC: 10 MG/DL (ref 6–20)
BUN/CREAT SERPL: 8 (ref 12–20)
CA-I BLD-MCNC: 8.7 MG/DL (ref 8.5–10.1)
CHLORIDE SERPL-SCNC: 110 MMOL/L (ref 97–108)
CO2 SERPL-SCNC: 26 MMOL/L (ref 21–32)
CREAT SERPL-MCNC: 1.21 MG/DL (ref 0.7–1.3)
DIFFERENTIAL METHOD BLD: NORMAL
EOSINOPHIL # BLD: 0.2 K/UL (ref 0–0.4)
EOSINOPHIL NFR BLD: 2 % (ref 0–7)
ERYTHROCYTE [DISTWIDTH] IN BLOOD BY AUTOMATED COUNT: 13.3 % (ref 11.5–14.5)
GLOBULIN SER CALC-MCNC: 3.5 G/DL (ref 2–4)
GLUCOSE SERPL-MCNC: 107 MG/DL (ref 65–100)
HCT VFR BLD AUTO: 43.1 % (ref 36.6–50.3)
HGB BLD-MCNC: 14 G/DL (ref 12.1–17)
IMM GRANULOCYTES # BLD AUTO: 0 K/UL (ref 0–0.04)
IMM GRANULOCYTES NFR BLD AUTO: 0 % (ref 0–0.5)
LYMPHOCYTES # BLD: 2.6 K/UL (ref 0.8–3.5)
LYMPHOCYTES NFR BLD: 35 % (ref 12–49)
MAGNESIUM SERPL-MCNC: 2.1 MG/DL (ref 1.6–2.4)
MCH RBC QN AUTO: 28.9 PG (ref 26–34)
MCHC RBC AUTO-ENTMCNC: 32.5 G/DL (ref 30–36.5)
MCV RBC AUTO: 88.9 FL (ref 80–99)
MONOCYTES # BLD: 0.6 K/UL (ref 0–1)
MONOCYTES NFR BLD: 8 % (ref 5–13)
NEUTS SEG # BLD: 3.9 K/UL (ref 1.8–8)
NEUTS SEG NFR BLD: 54 % (ref 32–75)
NRBC # BLD: 0 K/UL (ref 0–0.01)
NRBC BLD-RTO: 0 PER 100 WBC
PLATELET # BLD AUTO: 209 K/UL (ref 150–400)
PMV BLD AUTO: 10.5 FL (ref 8.9–12.9)
POTASSIUM SERPL-SCNC: 4.3 MMOL/L (ref 3.5–5.1)
PROT SERPL-MCNC: 6.8 G/DL (ref 6.4–8.2)
RBC # BLD AUTO: 4.85 M/UL (ref 4.1–5.7)
SODIUM SERPL-SCNC: 138 MMOL/L (ref 136–145)
TROPONIN-HIGH SENSITIVITY: 7 NG/L (ref 0–76)
TSH SERPL DL<=0.05 MIU/L-ACNC: 1.32 UIU/ML (ref 0.36–3.74)
WBC # BLD AUTO: 7.4 K/UL (ref 4.1–11.1)

## 2022-07-31 PROCEDURE — 99285 EMERGENCY DEPT VISIT HI MDM: CPT

## 2022-07-31 PROCEDURE — 84443 ASSAY THYROID STIM HORMONE: CPT

## 2022-07-31 PROCEDURE — 83735 ASSAY OF MAGNESIUM: CPT

## 2022-07-31 PROCEDURE — 36415 COLL VENOUS BLD VENIPUNCTURE: CPT

## 2022-07-31 PROCEDURE — 85025 COMPLETE CBC W/AUTO DIFF WBC: CPT

## 2022-07-31 PROCEDURE — 74011250636 HC RX REV CODE- 250/636: Performed by: NURSE PRACTITIONER

## 2022-07-31 PROCEDURE — 93005 ELECTROCARDIOGRAM TRACING: CPT

## 2022-07-31 PROCEDURE — 71045 X-RAY EXAM CHEST 1 VIEW: CPT

## 2022-07-31 PROCEDURE — 80053 COMPREHEN METABOLIC PANEL: CPT

## 2022-07-31 PROCEDURE — 84484 ASSAY OF TROPONIN QUANT: CPT

## 2022-07-31 RX ADMIN — SODIUM CHLORIDE 1000 ML: 9 INJECTION, SOLUTION INTRAVENOUS at 13:18

## 2022-07-31 NOTE — ED PROVIDER NOTES
EMERGENCY DEPARTMENT HISTORY AND PHYSICAL EXAM      Date: 7/31/2022  Patient Name: John Acuna      History of Presenting Illness     Chief Complaint   Patient presents with    Palpitations       History Provided By: Patient and Patient's Wife    HPI: John Acuna, 79 y.o. male with a past medical history significant  Gerd, CAD, cardiac arrest, afib, vfib, high cholesterol, htn, MI, ROQUE, ACS, pvcs  presents to the ED with cc of   Palpitations for 2 dasy. Pt seen there at the [address] [place] [address]. Pt denies any chest pain, denies sob. Pt did have Afib with ablations 2021. There are no other complaints, changes, or physical findings at this time. PCP: Vernon Garcias NP    Current Outpatient Medications   Medication Sig Dispense Refill    LORazepam (ATIVAN) 0.5 mg tablet Take 1 tab by mouth daily as needed for extreme anxiety for 30 days. Indications: anxious 12 Tablet 0    Vitamin D3 25 mcg (1,000 unit) tablet Take 2 Tablets by mouth daily. 180 Tablet 3    fluticasone propionate (Flonase Allergy Relief) 50 mcg/actuation nasal spray Instill 2 sprays into each nostril daily as needed for allergy symptoms. 1 Each 5    clotrimazole (LOTRIMIN) 1 % topical cream       fluticasone propionate (FLONASE) 50 mcg/actuation nasal spray 2 sprays to each nostril once daily 1 Each 3    clomiPHENE (CLOMID) 50 mg tablet       famotidine (PEPCID) 40 mg tablet Take 1 Tablet by mouth two (2) times a day. meclizine (ANTIVERT) 25 mg tablet       ondansetron hcl (ZOFRAN) 4 mg tablet       BD Ultra-Fine II Lancets 30 gauge misc       glucose blood VI test strips (FreeStyle Lite Strips) strip Finger stick blood sugars Daily DX E11.9 100 Strip 3    lidocaine (LIDODERM) 5 % Apply patch to the affected area for 12 hours a day and remove for 12 hours a day. 25 Patch 3    multivitamin (ONE A DAY) tablet Take 1 Tablet by mouth daily.       butalbital-aspirin-caffeine (FIORINAL) capsule Take 1 Capsule by mouth every four (4) hours as needed for Headache. clopidogreL (Plavix) 75 mg tab Take 75 mg by mouth.      polyethylene glycol (Miralax) 17 gram/dose powder Take 17 g by mouth daily. acetaminophen (TYLENOL) 325 mg tablet Take 2 Tablets by mouth every six (6) hours as needed for Pain. 60 Tablet 0    amLODIPine (Norvasc) 2.5 mg tablet Take 1 Tablet by mouth nightly. 30 Tablet 2    apixaban (Eliquis) 5 mg tablet Take 5 mg by mouth two (2) times a day. evolocumab (Repatha SureClick) pen injection 032 mg by SubCUTAneous route every fourteen (14) days. amLODIPine (NORVASC) 5 mg tablet Take 5 mg by mouth daily. Every am         Past History   Past Medical History:  Past Medical History:   Diagnosis Date    A-fib ([address]     Acute coronary syndrome ([address] 11/19/2021    Atypical chest pain 11/19/2021    CAD (coronary artery disease) 06/15/2018    Coronary arteriosclerosis 8/7/2020    Gastric ulcer 12/14/2018    GERD (gastroesophageal reflux disease)     H/O seasonal allergies     High cholesterol     Hx of cardiac arrest     VT arrest     Hyperlipidemia 8/7/2020    Hypertension     Ill-defined condition     Urinary urgency    Kidney stone     MI (myocardial infarction) ([address]     ROQUE on CPAP     2/2/21 pt reports does not use cpap each night, only a few times per week    Torsades de pointes ([address] 11/19/2021    Ventricular fibrillation ([address] 11/19/2021    Ventricular premature beats 11/19/2021    Ventricular tachycardia ([address] 11/19/2021    Vitamin D deficiency 8/7/2020       Past Surgical History:  Past Surgical History:   Procedure Laterality Date    COLONOSCOPY N/A 11/12/2018    COLONOSCOPY performed by Anamaria Meyer MD at [facility] ENDOSCOPY    HX COLONOSCOPY  2018    HX COLONOSCOPY      HX ENDOSCOPY      HX HEART CATHETERIZATION  2018    Cardiac cath Stents x2    HX HEART CATHETERIZATION  02/2020    \"patent stent obtuse marginal 1. significant disease noted in branch artery of RCA.   THis artery appears to be borderline for any kind of intervention\" HX LAP CHOLECYSTECTOMY  2005    HX LUMBAR DISKECTOMY      L4-L4    HX ORTHOPAEDIC  2006    Lumbar laminectomy    HX PACEMAKER      linq monitor    [place] CARDIAC SURG PROCEDURE UNLIST  09/09/2021    cardiac ablation       Family History:  Family History   Problem Relation Age of Onset    Diabetes Mother     Stroke Father     Hypertension Father        Social History:  Social History     Tobacco Use    Smoking status: Never    Smokeless tobacco: Never   Vaping Use    Vaping Use: Never used   Substance Use Topics    Alcohol use: No    Drug use: No       Allergies: Allergies   Allergen Reactions    Cefuroxime Unknown (comments)     abd pain    Ibuprofen Other (comments)     Patient states NSAIDS/ ibuprofen upsets his stomach    Statins-Hmg-Coa Reductase Inhibitors Other (comments)     Extremity weakness     Review of Systems   Review of Systems   Constitutional: Negative. HENT: Negative. Respiratory: Negative. Negative for cough, chest tightness and shortness of breath. Cardiovascular:  Positive for palpitations. Negative for chest pain and leg swelling. Gastrointestinal: Negative. Negative for abdominal pain, nausea and vomiting. Genitourinary: Negative. Musculoskeletal: Negative. Negative for back pain. Neurological: Negative. Negative for weakness. All other systems reviewed and are negative. Physical Exam   Physical Exam  Vitals and nursing note reviewed. Constitutional:       Appearance: Normal appearance. He is normal weight. HENT:      Head: Normocephalic and atraumatic. Eyes:      Extraocular Movements: Extraocular movements intact. Pupils: Pupils are equal, round, and reactive to light. Cardiovascular:      Rate and Rhythm: Normal rate. Rhythm irregular. Pulses: Normal pulses. Heart sounds: Normal heart sounds. No murmur heard. No gallop. Comments: SR occasional PVC.  Appear unifocal    Pulmonary:      Effort: Pulmonary effort is normal. No respiratory distress. Breath sounds: Normal breath sounds. No wheezing, rhonchi or rales. Chest:      Chest wall: No tenderness. Abdominal:      General: Abdomen is flat. There is no distension. Tenderness: There is no right CVA tenderness or left CVA tenderness. Musculoskeletal:         General: No swelling. Normal range of motion. Cervical back: Normal range of motion. Skin:     General: Skin is warm and dry. Capillary Refill: Capillary refill takes less than 2 seconds. Neurological:      General: No focal deficit present. Mental Status: He is alert and oriented to person, place, and time. Psychiatric:         Mood and Affect: Mood normal.         Behavior: Behavior normal.       Lab and Diagnostic Study Results   Labs -     Recent Results (from the past 12 hour(s))   CBC WITH AUTOMATED DIFF    Collection Time: 07/31/22 11:10 AM   Result Value Ref Range    WBC 7.4 4.1 - 11.1 K/uL    RBC 4.85 4.10 - 5.70 M/uL    HGB 14.0 12.1 - 17.0 g/dL    HCT 43.1 36.6 - 50.3 %    MCV 88.9 80.0 - 99.0 FL    MCH 28.9 26.0 - 34.0 PG    MCHC 32.5 30.0 - 36.5 g/dL    RDW 13.3 11.5 - 14.5 %    PLATELET 037 287 - 416 K/uL    MPV 10.5 8.9 - 12.9 FL    NRBC 0.0 0.0  WBC    ABSOLUTE NRBC 0.00 0.00 - 0.01 K/uL    NEUTROPHILS 54 32 - 75 %    LYMPHOCYTES 35 12 - 49 %    MONOCYTES 8 5 - 13 %    EOSINOPHILS 2 0 - 7 %    BASOPHILS 1 0 - 1 %    IMMATURE GRANULOCYTES 0 0 - 0.5 %    ABS. NEUTROPHILS 3.9 1.8 - 8.0 K/UL    ABS. LYMPHOCYTES 2.6 0.8 - 3.5 K/UL    ABS. MONOCYTES 0.6 0.0 - 1.0 K/UL    ABS. EOSINOPHILS 0.2 0.0 - 0.4 K/UL    ABS. BASOPHILS 0.1 0.0 - 0.1 K/UL    ABS. IMM.  GRANS. 0.0 0.00 - 0.04 K/UL    DF AUTOMATED     METABOLIC PANEL, COMPREHENSIVE    Collection Time: 07/31/22 11:10 AM   Result Value Ref Range    Sodium 138 136 - 145 mmol/L    Potassium 4.3 3.5 - 5.1 mmol/L    Chloride 110 (H) 97 - 108 mmol/L    CO2 26 21 - 32 mmol/L    Anion gap 2 (L) 5 - 15 mmol/L    Glucose 107 (H) 65 - 100 mg/dL    BUN 10 6 - 20 mg/dL    Creatinine 1.21 0.70 - 1.30 mg/dL    BUN/Creatinine ratio 8 (L) 12 - 20      GFR est AA >60 >60 ml/min/1.73m2    GFR est non-AA 60 (L) >60 ml/min/1.73m2    Calcium 8.7 8.5 - 10.1 mg/dL    Bilirubin, total 0.4 0.2 - 1.0 mg/dL    AST (SGOT) 28 15 - 37 U/L    ALT (SGPT) 26 12 - 78 U/L    Alk. phosphatase 50 45 - 117 U/L    Protein, total 6.8 6.4 - 8.2 g/dL    Albumin 3.3 (L) 3.5 - 5.0 g/dL    Globulin 3.5 2.0 - 4.0 g/dL    A-G Ratio 0.9 (L) 1.1 - 2.2     TROPONIN-HIGH SENSITIVITY    Collection Time: 07/31/22 11:10 AM   Result Value Ref Range    Troponin-High Sensitivity 7 0 - 76 ng/L   TSH 3RD GENERATION    Collection Time: 07/31/22 11:10 AM   Result Value Ref Range    TSH 1.32 0.36 - 3.74 uIU/mL   MAGNESIUM    Collection Time: 07/31/22 11:10 AM   Result Value Ref Range    Magnesium 2.1 1.6 - 2.4 mg/dL       Radiologic Studies -   [unfilled]  CT Results  (Last 48 hours)      None          CXR Results  (Last 48 hours)                 07/31/22 1133  XR CHEST PORT Final result    Impression:  No acute infiltrate. Narrative:  Clinical indication: Palpitations. AP semiupright portable view of the chest obtained and compared to January 12, 2022. The heart size is normal. The inspiration is shallow. No acute infiltrate. Medical Decision Making and ED Course   - I am the first and primary provider for this patient AND AM THE PRIMARY PROVIDER OF RECORD. I reviewed the vital signs, available nursing notes, past medical history, past surgical history, family history and social history. - Initial assessment performed. The patients presenting problems have been discussed, and the staff are in agreement with the care plan formulated and outlined with them. I have encouraged them to ask questions as they arise throughout their visit.     Differential Diagnosis & Medical Decision Making Provider Note:   MDM  Number of Diagnoses or Management Options  Dehydration: new, needed workup  Palpitations: new, needed workup  PVC's (premature ventricular contractions): new, needed workup     Amount and/or Complexity of Data Reviewed  Clinical lab tests: ordered and reviewed  Tests in the radiology section of CPT®: ordered and reviewed    Risk of Complications, Morbidity, and/or Mortality  Presenting problems: moderate  Diagnostic procedures: moderate  Management options: moderate  General comments: IV x 1000ml given for hydration. Due to hx- suggested fu this week cardiology, he is scheduled Tuesday. Pt to discuss Holter monitor. Patient Progress  Patient progress: stable       Vital Signs-Reviewed the patient's vital signs. Patient Vitals for the past 12 hrs:   Temp Pulse Resp BP SpO2   07/31/22 1319 -- 70 17 124/78 100 %   07/31/22 1229 -- 83 18 -- 100 %   07/31/22 1216 -- 69 17 123/79 99 %   07/31/22 1048 -- -- -- 138/75 100 %   07/31/22 1046 97.9 °F (36.6 °C) 71 16 -- --       EKG interpretation:  Sinus rhythm with occasional Premature ventricular complexes and Premature   atrial complexes   T wave abnormality, consider lateral ischemia   Abnormal ECG   When compared with ECG of 29-JUL-2022 20:11, (Unconfirmed)   Premature ventricular complexes are now Present   Confirmed by Rossy Aponte ([id number]) on 8/1/2022 9:24:06 AM         Procedures and Critical Care     Performed by: Sameera Liu NP  Procedures  none  Sameera Liu NP    Disposition   Disposition: DC-The patient was given verbal follow-up instructions    Discharged    DISCHARGE PLAN:  1. Current Discharge Medication List        CONTINUE these medications which have NOT CHANGED    Details   LORazepam (ATIVAN) 0.5 mg tablet Take 1 tab by mouth daily as needed for extreme anxiety for 30 days. Indications: anxious  Qty: 12 Tablet, Refills: 0    Associated Diagnoses: Anxiety      Vitamin D3 25 mcg (1,000 unit) tablet Take 2 Tablets by mouth daily.   Qty: 180 Tablet, Refills: 3    Associated Diagnoses: Vitamin D deficiency !! fluticasone propionate (Flonase Allergy Relief) 50 mcg/actuation nasal spray Instill 2 sprays into each nostril daily as needed for allergy symptoms. Qty: 1 Each, Refills: 5    Associated Diagnoses: Environmental allergies      clotrimazole (LOTRIMIN) 1 % topical cream       !! fluticasone propionate (FLONASE) 50 mcg/actuation nasal spray 2 sprays to each nostril once daily  Qty: 1 Each, Refills: 3    Associated Diagnoses: Environmental allergies      clomiPHENE (CLOMID) 50 mg tablet       famotidine (PEPCID) 40 mg tablet Take 1 Tablet by mouth two (2) times a day. meclizine (ANTIVERT) 25 mg tablet       ondansetron hcl (ZOFRAN) 4 mg tablet       BD Ultra-Fine II Lancets 30 gauge misc       glucose blood VI test strips (FreeStyle Lite Strips) strip Finger stick blood sugars Daily DX E11.9  Qty: 100 Strip, Refills: 3    Associated Diagnoses: Type 2 diabetes mellitus without complication, without long-term current use of insulin ([facility])      lidocaine (LIDODERM) 5 % Apply patch to the affected area for 12 hours a day and remove for 12 hours a day. Qty: 25 Patch, Refills: 3    Associated Diagnoses: Muscular chest pain      multivitamin (ONE A DAY) tablet Take 1 Tablet by mouth daily. butalbital-aspirin-caffeine (FIORINAL) capsule Take 1 Capsule by mouth every four (4) hours as needed for Headache. clopidogreL (Plavix) 75 mg tab Take 75 mg by mouth.      polyethylene glycol (Miralax) 17 gram/dose powder Take 17 g by mouth daily. acetaminophen (TYLENOL) 325 mg tablet Take 2 Tablets by mouth every six (6) hours as needed for Pain. Qty: 60 Tablet, Refills: 0      !! amLODIPine (Norvasc) 2.5 mg tablet Take 1 Tablet by mouth nightly. Qty: 30 Tablet, Refills: 2      apixaban (Eliquis) 5 mg tablet Take 5 mg by mouth two (2) times a day. evolocumab (Repatha SureClick) pen injection 194 mg by SubCUTAneous route every fourteen (14) days. !!  amLODIPine (NORVASC) 5 mg tablet Take 5 mg by mouth daily. Every am       !! - Potential duplicate medications found. Please discuss with provider. 2.   Follow-up Information       Follow up With Specialties Details Why Contact Info    Antonio Roberts NP Nurse Practitioner In 2 days  [facility]  [place] [address] [place] [address]  [phone]            3. Return to ED if worse   4. Current Discharge Medication List        Remove if not discharged    Diagnosis/Clinical Impression     Clinical Impression:   1. Palpitations    2. PVC's (premature ventricular contractions)    3. Dehydration        Attestations: Mark MAGAÑA NP, am the primary clinician of record. Please note that this dictation was completed with [employer], the [employer] voice recognition software. Quite often unanticipated grammatical, syntax, homophones, and other interpretive errors are inadvertently transcribed by the computer software. Please disregard these errors. Please excuse any errors that have escaped final proofreading. Thank you.

## 2022-07-31 NOTE — ED TRIAGE NOTES
Pt arrives c/o palpitations x2 days. Notes he was seen at the freestanding ED 2 days ago. Denies chest pain, SOB. Reports hx Afib had an ablation September 2021. VSS, Afebrile.

## 2022-08-01 LAB
ATRIAL RATE: 74 BPM
CALCULATED P AXIS, ECG09: 47 DEGREES
CALCULATED R AXIS, ECG10: 6 DEGREES
CALCULATED T AXIS, ECG11: -142 DEGREES
DIAGNOSIS, 93000: NORMAL
P-R INTERVAL, ECG05: 136 MS
Q-T INTERVAL, ECG07: 372 MS
QRS DURATION, ECG06: 84 MS
QTC CALCULATION (BEZET), ECG08: 412 MS
VENTRICULAR RATE, ECG03: 74 BPM

## 2022-08-02 LAB
ATRIAL RATE: 71 BPM
CALCULATED P AXIS, ECG09: 30 DEGREES
CALCULATED R AXIS, ECG10: -5 DEGREES
CALCULATED T AXIS, ECG11: 5 DEGREES
DIAGNOSIS, 93000: NORMAL
P-R INTERVAL, ECG05: 140 MS
Q-T INTERVAL, ECG07: 398 MS
QRS DURATION, ECG06: 88 MS
QTC CALCULATION (BEZET), ECG08: 432 MS
VENTRICULAR RATE, ECG03: 71 BPM

## 2022-09-01 ENCOUNTER — OFFICE VISIT (OUTPATIENT)
Dept: FAMILY MEDICINE CLINIC | Age: 67
End: 2022-09-01
Payer: MEDICARE

## 2022-09-01 VITALS
HEART RATE: 79 BPM | RESPIRATION RATE: 18 BRPM | TEMPERATURE: 97.9 F | BODY MASS INDEX: 29.62 KG/M2 | HEIGHT: 69 IN | DIASTOLIC BLOOD PRESSURE: 71 MMHG | WEIGHT: 200 LBS | SYSTOLIC BLOOD PRESSURE: 116 MMHG | OXYGEN SATURATION: 98 %

## 2022-09-01 DIAGNOSIS — M23.92 LOCKING OF LEFT KNEE: Primary | ICD-10-CM

## 2022-09-01 PROBLEM — N18.30 CHRONIC RENAL DISEASE, STAGE III (HCC): Status: RESOLVED | Noted: 2022-06-01 | Resolved: 2022-09-01

## 2022-09-01 PROCEDURE — G8427 DOCREV CUR MEDS BY ELIG CLIN: HCPCS | Performed by: NURSE PRACTITIONER

## 2022-09-01 PROCEDURE — G8754 DIAS BP LESS 90: HCPCS | Performed by: NURSE PRACTITIONER

## 2022-09-01 PROCEDURE — G8752 SYS BP LESS 140: HCPCS | Performed by: NURSE PRACTITIONER

## 2022-09-01 PROCEDURE — G8432 DEP SCR NOT DOC, RNG: HCPCS | Performed by: NURSE PRACTITIONER

## 2022-09-01 PROCEDURE — 1101F PT FALLS ASSESS-DOCD LE1/YR: CPT | Performed by: NURSE PRACTITIONER

## 2022-09-01 PROCEDURE — 1123F ACP DISCUSS/DSCN MKR DOCD: CPT | Performed by: NURSE PRACTITIONER

## 2022-09-01 PROCEDURE — 99214 OFFICE O/P EST MOD 30 MIN: CPT | Performed by: NURSE PRACTITIONER

## 2022-09-01 PROCEDURE — G8417 CALC BMI ABV UP PARAM F/U: HCPCS | Performed by: NURSE PRACTITIONER

## 2022-09-01 PROCEDURE — G8536 NO DOC ELDER MAL SCRN: HCPCS | Performed by: NURSE PRACTITIONER

## 2022-09-01 PROCEDURE — 3017F COLORECTAL CA SCREEN DOC REV: CPT | Performed by: NURSE PRACTITIONER

## 2022-09-01 RX ORDER — CHLORHEXIDINE GLUCONATE 1.2 MG/ML
RINSE ORAL
COMMUNITY
Start: 2022-06-29

## 2022-09-01 RX ORDER — SENNOSIDES 25 MG/1
TABLET, FILM COATED ORAL
Qty: 45 G | Refills: 0 | Status: SHIPPED | OUTPATIENT
Start: 2022-09-01

## 2022-09-01 NOTE — PROGRESS NOTES
1. \"Have you been to the ER, urgent care clinic since your last visit? Hospitalized since your last visit? No     2. \"Have you seen or consulted any other health care providers outside of the [address] [place] [address] since your last visit? \" No     3. For patients aged 39-70: Has the patient had a colonoscopy / FIT/ Cologuard? 2018    If the patient is female:    4. For patients aged 41-77: Has the patient had a mammogram within the past 2 years? Na       5. For patients aged 21-65: Has the patient had a pap smear?    Seth Aguilar is a 79 y.o. male is coming in for with the following:     Chief Complaint   Patient presents with    Diabetes    Hypertension          With the following vitals:    Visit Vitals  /71 (BP 1 Location: Right upper arm, BP Patient Position: Sitting, BP Cuff Size: Large adult)   Pulse 79   Temp 97.9 °F (36.6 °C) (Temporal)   Resp 18   Ht 5' 9\" (1.753 m)   Wt 200 lb (90.7 kg)   SpO2 98%   BMI 29.53 kg/m²

## 2022-09-01 NOTE — PROGRESS NOTES
Chief Complaint   Patient presents with    Diabetes    Hypertension     Visit Vitals  /71 (BP 1 Location: Right upper arm, BP Patient Position: Sitting, BP Cuff Size: Large adult)   Pulse 79   Temp 97.9 °F (36.6 °C) (Temporal)   Resp 18   Ht 5' 9\" (1.753 m)   Wt 200 lb (90.7 kg)   SpO2 98%   BMI 29.53 kg/m²     Hayden Wolfe is a 79 y.o. male. HPI:  Pt presented for follow up. He was seen in [facility] ER on 7/31/2022 w/ c/o of palpitations x 2 days. He had also been [facility] several days before w/ same c/o. He has hx of atrial fibrillation in Sept 2021. Also has medical hx of controlled T2DM, CAD,GERD, cardiac arrest, V fib,  hypertension, hyperlipidemia, MI, ROQUE, PVCs, vit D deficiency. Workup indicated an abnormal EKG, and dehydration. He was repleted w/ IV fluids. He had appt scheduled w/ cardiology on 8/2/2022 but there is no note in his record at this time. No c/o recently of palpitations. He is physically active and continues w/ cardiac rehab but now is paying out of pocket for it. He has hx of BPH, difficulty emptying bladder, low libido, low testosterone levels and has been under care of [facility] Urology provider Dr. Paige Pemberton. He was on clomid for a time but has been d/c'd. Reviewed last urology note of 8/16/2022. All symptoms have resolved and he was feeling fine. Next follow up in 1 yr unless he has more issues. B/P good in office @ 116/71. Stable and well controlled on current med regime which he will continue. Pt has arthritis in both knees but lately the L knee has been giving him more pain. Under care of [facility] ortho provider Dr. Claudia Antunez. Last appt was 8/19/2022. Had  L knee xray at ortho office which indicated pt mild narrowing of the medial joint space of L knee. He wanted to hold off knee injections for at least 6 weeks and is was ordered Voltaren gel for topical use. He does not want to use the Voltaren gel because he is on a blood thinner.  Informed pt that that systemic absorption is considered minimal but he still does not want to use. Pt on amiodarone for control of his arrhythmias- latests thyroid testing in normal range. Type 2 DM is well controlled w/ latest A1c of 6.1. Currently not on DM medication.      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    Stress and adjustment reaction F43.29    Testosterone deficiency E34.9    Muscular chest pain R07.89    Type 2 diabetes mellitus E11.9    Atrial fibrillation, unspecified type (HCC) I48.91    Acute viral syndrome B34.9     Past Medical History:   Diagnosis Date    A-fib ([address]     Acute coronary syndrome ([address] 11/19/2021    Atypical chest pain 11/19/2021    CAD (coronary artery disease) 06/15/2018    Coronary arteriosclerosis 8/7/2020    Gastric ulcer 12/14/2018    GERD (gastroesophageal reflux disease)     H/O seasonal allergies     High cholesterol     Hx of cardiac arrest     VT arrest     Hyperlipidemia 8/7/2020    Hypertension     Ill-defined condition     Urinary urgency    Kidney stone     MI (myocardial infarction) ([address]     ROQUE on CPAP     2/2/21 pt reports does not use cpap each night, only a few times per week    Torsades de pointes ([address] 11/19/2021    Ventricular fibrillation ([address] 11/19/2021    Ventricular premature beats 11/19/2021    Ventricular tachycardia ([address] 11/19/2021    Vitamin D deficiency 8/7/2020     Past Surgical History:   Procedure Laterality Date    COLONOSCOPY N/A 11/12/2018    COLONOSCOPY performed by Felicita Roberts MD at [facility] ENDOSCOPY    HX COLONOSCOPY  2018    HX COLONOSCOPY      HX ENDOSCOPY      HX HEART CATHETERIZATION  2018    Cardiac cath Stents x2    HX HEART CATHETERIZATION  02/2020    \"patent stent obtuse marginal 1. significant disease noted in branch artery of RCA. THis artery appears to be borderline for any kind of intervention\"    HX LAP CHOLECYSTECTOMY  2005    HX LUMBAR DISKECTOMY      L4-L4    HX ORTHOPAEDIC  2006    Lumbar laminectomy    HX PACEMAKER      linq monitor    LA CARDIAC SURG PROCEDURE UNLIST  09/09/2021    cardiac ablation     Current Outpatient Medications   Medication Instructions    acetaminophen (TYLENOL) 650 mg, Oral, EVERY 6 HOURS AS NEEDED    amLODIPine (NORVASC) 5 mg, Oral, DAILY, Every am    amLODIPine (NORVASC) 2.5 mg, Oral, EVERY BEDTIME    apixaban (ELIQUIS) 5 mg, Oral, 2 TIMES DAILY    BD Ultra-Fine II Lancets 30 gauge misc No dose, route, or frequency recorded. butalbital-aspirin-caffeine (FIORINAL) capsule 1 Capsule, Oral, EVERY 4 HOURS AS NEEDED    chlorhexidine (PERIDEX) 0.12 % solution No dose, route, or frequency recorded. clopidogreL (PLAVIX) 75 mg, Oral    clotrimazole (LOTRIMIN) 1 % topical cream No dose, route, or frequency recorded. famotidine (PEPCID) 40 mg tablet 1 Tablet, Oral, 2 TIMES DAILY    fluticasone propionate (Flonase Allergy Relief) 50 mcg/actuation nasal spray Instill 2 sprays into each nostril daily as needed for allergy symptoms. fluticasone propionate (FLONASE) 50 mcg/actuation nasal spray 2 sprays to each nostril once daily    glucose blood VI test strips (FreeStyle Lite Strips) strip Finger stick blood sugars Daily DX E11.9    lidocaine (LIDODERM) 5 % Apply patch to the affected area for 12 hours a day and remove for 12 hours a day.      lidocaine 5 % topical cream Topical, 2 TIMES DAILY AS NEEDED    LORazepam (ATIVAN) 0.5 mg tablet Take 1 tab by mouth daily as needed for extreme anxiety for 30 days. meclizine (ANTIVERT) 25 mg tablet No dose, route, or frequency recorded. multivitamin (ONE A DAY) tablet 1 Tablet, Oral, DAILY    ondansetron hcl (ZOFRAN) 4 mg tablet No dose, route, or frequency recorded. polyethylene glycol (MIRALAX) 17 g, Oral, DAILY    Repatha SureClick 631 mg, SubCUTAneous, EVERY 14 DAYS    Vitamin D3 2,000 Units, Oral, DAILY     Allergies   Allergen Reactions    Cefuroxime Unknown (comments)     abd pain    Ibuprofen Other (comments)     Patient states NSAIDS/ ibuprofen upsets his stomach    Statins-Hmg-Coa Reductase Inhibitors Other (comments)     Extremity weakness     Social History     Tobacco Use    Smoking status: Never    Smokeless tobacco: Never   Vaping Use    Vaping Use: Never used   Substance Use Topics    Alcohol use: No    Drug use: No     Family History   Problem Relation Age of Onset    Diabetes Mother     Stroke Father     Hypertension Father      Review of Systems   Constitutional:  Negative for chills, fever and malaise/fatigue. HENT:  Negative for congestion, ear pain and sore throat. Respiratory:  Negative for cough, shortness of breath and wheezing. Cardiovascular:  Positive for palpitations. Negative for chest pain and leg swelling. Occasional w/ anxiety   Gastrointestinal:  Positive for nausea. Negative for abdominal pain, diarrhea, heartburn and vomiting. Genitourinary: Negative. Musculoskeletal:  Positive for joint pain. Negative for back pain, falls, myalgias and neck pain. L knee   Neurological:  Positive for dizziness and headaches. Negative for tingling, sensory change and weakness. Psychiatric/Behavioral:  Negative for depression. The patient is nervous/anxious and has insomnia. Objective  Physical Exam  Vitals reviewed. Constitutional:       General: He is not in acute distress. Appearance: Normal appearance. HENT:      Head: Normocephalic.       Right Ear: External ear normal.      Left Ear: External ear normal.      Nose: Nose normal.   Eyes:      Conjunctiva/sclera: Conjunctivae normal.   Neck:      Vascular: No carotid bruit. Cardiovascular:      Rate and Rhythm: Normal rate and regular rhythm. Heart sounds: Normal heart sounds. No murmur heard. Pulmonary:      Effort: Pulmonary effort is normal. No respiratory distress. Breath sounds: Normal breath sounds. Musculoskeletal:         General: No swelling or tenderness. Normal range of motion. Cervical back: Neck supple. Right lower leg: No edema. Left lower leg: No edema. Skin:     General: Skin is warm and dry. Coloration: Skin is not jaundiced. Findings: No lesion or rash. Neurological:      Mental Status: He is alert and oriented to person, place, and time. Motor: No weakness. Gait: Gait normal.   Psychiatric:         Mood and Affect: Mood normal.         Behavior: Behavior normal.         Thought Content: Thought content normal.         Judgment: Judgment normal.       Assessment & Plan      ICD-10-CM ICD-9-CM    1. Locking of left knee  M23.92 717.9 lidocaine 5 % topical cream          1. Locking of left knee  Discussed trying lidocaine topical cream to L knee for help w/ knee pain. Pt willing to try it. Also advised to apply cold pack to knee when bothering him and can take Tylenol ES routinely for several days to pain relief. - lidocaine 5 % topical cream; Apply  to affected area two (2) times daily as needed for Pain. Dispense: 45 g; Refill: 0     I have discussed the diagnosis with the patient and the intended plan as seen in the above orders. Pt/caretaker has expressed understanding. Questions were answered concerning future plans. I have discussed medication side effects and warnings as indicated with the patient as well.     Ev Bardales NP

## 2022-10-10 ENCOUNTER — TELEPHONE (OUTPATIENT)
Dept: FAMILY MEDICINE CLINIC | Age: 67
End: 2022-10-10

## 2022-11-22 ENCOUNTER — NURSE TRIAGE (OUTPATIENT)
Dept: OTHER | Facility: CLINIC | Age: 67
End: 2022-11-22

## 2022-11-22 ENCOUNTER — OFFICE VISIT (OUTPATIENT)
Dept: FAMILY MEDICINE CLINIC | Age: 67
End: 2022-11-22
Payer: MEDICARE

## 2022-11-22 VITALS
SYSTOLIC BLOOD PRESSURE: 139 MMHG | HEART RATE: 70 BPM | RESPIRATION RATE: 18 BRPM | TEMPERATURE: 97.7 F | BODY MASS INDEX: 29.62 KG/M2 | HEIGHT: 69 IN | WEIGHT: 200 LBS | DIASTOLIC BLOOD PRESSURE: 78 MMHG | OXYGEN SATURATION: 98 %

## 2022-11-22 DIAGNOSIS — R22.0 SWELLING OF UPPER LIP: Primary | ICD-10-CM

## 2022-11-22 RX ORDER — TRIAMCINOLONE ACETONIDE 1 MG/G
CREAM TOPICAL 2 TIMES DAILY
Qty: 15 G | Refills: 0 | Status: SHIPPED | OUTPATIENT
Start: 2022-11-22 | End: 2022-12-01

## 2022-11-22 NOTE — PROGRESS NOTES
Subjective  Chief Complaint   Patient presents with    Mouth Lesions    Lip Swelling     HPI:  Jomar Montaño is a 79 y.o. male with medical problems as listed below who presents for mouth lesions. Mouth lesion: Patient reports blisters and swelling of lip. He went to [facility] last week who trimmed facial hair around his mouth. He also had some dental work last Thursday. Friday morning, he woke up with swollen lips and some skin color changes. Dentist told him to use warm compresses. Then he developed blisters in right corner of mouth and top lip. No swelling of tongue or throat. When asked for further detail about dentist visit, patient says he had a tooth pulled 3 weeks ago, and last Thursday, he had additional dental work preparing for implants. He went to [facility] last night and was prescribed nystatin-triamcinolone topical. Swelling is slightly improved per patient.      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    Stress and adjustment reaction F43.29    Testosterone deficiency E34.9    Muscular chest pain R07.89    Type 2 diabetes mellitus E11.9    Atrial fibrillation, unspecified type (HCC) I48.91    Acute viral syndrome B34.9     Family History   Problem Relation Age of Onset    Diabetes Mother     Stroke Father     Hypertension Father      Social History     Tobacco Use    Smoking status: Never    Smokeless tobacco: Never   Vaping Use    Vaping Use: Never used   Substance Use Topics    Alcohol use: No    Drug use: No     Current Outpatient Medications on File Prior to Visit   Medication Sig Dispense Refill    chlorhexidine (PERIDEX) 0.12 % solution       lidocaine 5 % topical cream Apply  to affected area two (2) times daily as needed for Pain. 45 g 0    LORazepam (ATIVAN) 0.5 mg tablet Take 1 tab by mouth daily as needed for extreme anxiety for 30 days. Indications: anxious 12 Tablet 0    Vitamin D3 25 mcg (1,000 unit) tablet Take 2 Tablets by mouth daily. 180 Tablet 3    fluticasone propionate (Flonase Allergy Relief) 50 mcg/actuation nasal spray Instill 2 sprays into each nostril daily as needed for allergy symptoms. 1 Each 5    clotrimazole (LOTRIMIN) 1 % topical cream       fluticasone propionate (FLONASE) 50 mcg/actuation nasal spray 2 sprays to each nostril once daily 1 Each 3    famotidine (PEPCID) 40 mg tablet Take 1 Tablet by mouth two (2) times a day. meclizine (ANTIVERT) 25 mg tablet       ondansetron hcl (ZOFRAN) 4 mg tablet       BD Ultra-Fine II Lancets 30 gauge misc       glucose blood VI test strips (FreeStyle Lite Strips) strip Finger stick blood sugars Daily DX E11.9 100 Strip 3    lidocaine (LIDODERM) 5 % Apply patch to the affected area for 12 hours a day and remove for 12 hours a day. 25 Patch 3    multivitamin (ONE A DAY) tablet Take 1 Tablet by mouth daily. butalbital-aspirin-caffeine (FIORINAL) capsule Take 1 Capsule by mouth every four (4) hours as needed for Headache. clopidogreL (PLAVIX) 75 mg tab Take 75 mg by mouth.      polyethylene glycol (MIRALAX) 17 gram/dose powder Take 17 g by mouth daily. acetaminophen (TYLENOL) 325 mg tablet Take 2 Tablets by mouth every six (6) hours as needed for Pain. 60 Tablet 0    amLODIPine (Norvasc) 2.5 mg tablet Take 1 Tablet by mouth nightly. 30 Tablet 2    apixaban (ELIQUIS) 5 mg tablet Take 5 mg by mouth two (2) times a day.       evolocumab (Repatha SureClick) pen injection 150 mg by SubCUTAneous route every fourteen (14) days. amLODIPine (NORVASC) 5 mg tablet Take 5 mg by mouth daily. Every am       No current facility-administered medications on file prior to visit. Allergies   Allergen Reactions    Cefuroxime Unknown (comments)     abd pain    Ibuprofen Other (comments)     Patient states NSAIDS/ ibuprofen upsets his stomach    Statins-Hmg-Coa Reductase Inhibitors Other (comments)     Extremity weakness     Review of Systems   Constitutional:  Negative for chills and fever. HENT:          Lip lesion and swelling     Objective  Visit Vitals  /78 (BP 1 Location: Left upper arm, BP Patient Position: Sitting, BP Cuff Size: Large adult)   Pulse 70   Temp 97.7 °F (36.5 °C) (Temporal)   Resp 18   Ht 5' 9\" (1.753 m)   Wt 200 lb (90.7 kg)   SpO2 98%   BMI 29.53 kg/m²     Physical Exam  Constitutional:       General: He is not in acute distress. Appearance: Normal appearance. He is not ill-appearing. HENT:      Head: Normocephalic and atraumatic. Mouth/Throat:      Comments: Small abrasion to center upper lip border. Mild swelling of upper lip. Eyes:      Extraocular Movements: Extraocular movements intact. Conjunctiva/sclera: Conjunctivae normal.   Pulmonary:      Effort: Pulmonary effort is normal. No respiratory distress. Musculoskeletal:         General: No swelling. Normal range of motion. Cervical back: Normal range of motion and neck supple. Right lower leg: No edema. Left lower leg: No edema. Skin:     General: Skin is warm and dry. Neurological:      General: No focal deficit present. Mental Status: He is alert and oriented to person, place, and time. Psychiatric:         Mood and Affect: Mood normal.         Behavior: Behavior normal.         Thought Content: Thought content normal.         Judgment: Judgment normal.        Assessment & Plan    ICD-10-CM ICD-9-CM    1.  Swelling of upper lip  R22.0 784.2         Diagnoses and all orders for this visit: 1. Swelling of upper lip  Apply low potency steroid to upper lip for next 5 days. Can apply ice PRN as well. Continue to monitor. Suspect this was either a reaction to dental work or from trim at the [facility]. RTC if worsening symptoms or no improvement over next week. Other orders  -     triamcinolone acetonide (KENALOG) 0.1 % topical cream; Apply  to affected area two (2) times a day. Use thin layer for 5 days. Aspects of this note have been generated using voice recognition software. Despite editing, there may be some syntax errors.     Kaz Diaz, DO

## 2022-11-22 NOTE — TELEPHONE ENCOUNTER
Received call from [facility] with [facility] Flag Complaint. Subjective: Caller states \"I had a reaction when I went to the [facility] and my lip is swollen. \"     Current Symptoms: Went to the dentist on Thursday and then went to the [facility] after. Upper lip was swollen. Blister on upper lip. Blister on side of left lip. Onset: 6 days ago    Associated Symptoms: Difficulty opening mouth due to blisters. Pain Severity: 0/10; Temperature: N/A    What has been tried: contacted dentist and was told to \"use a warm\" compress. Haresh    Recommended disposition: See in Office Today    Care advice provided, patient verbalizes understanding; denies any other questions or concerns; instructed to call back for any new or worsening symptoms. Patient/Caller agrees with recommended disposition; writer provided warm transfer to [facility] at [facility] for appointment scheduling    Attention Provider: Thank you for allowing me to participate in the care of your patient. The patient was connected to triage in response to information provided to the [facility]. Please do not respond through this encounter as the response is not directed to a shared pool.     Reason for Disposition   Patient wants to be seen    Protocols used: Cold Sores (Fever Blisters)-ADULT-[place]

## 2022-11-22 NOTE — PROGRESS NOTES
1. Have you been to the ER, urgent care clinic since your last visit? Hospitalized since your last visit? No    2. Have you seen or consulted any other health care providers outside of the [address] [place] [address] since your last visit? Include any pap smears or colon screening.  No    Chief Complaint   Patient presents with    Blister    Mouth Lesions    Lip Swelling    Lip Laceration     Visit Vitals  /78 (BP 1 Location: Left upper arm, BP Patient Position: Sitting, BP Cuff Size: Large adult)   Pulse 70   Temp 97.7 °F (36.5 °C) (Temporal)   Resp 18   Ht 5' 9\" (1.753 m)   Wt 200 lb (90.7 kg)   SpO2 98%   BMI 29.53 kg/m²

## 2022-12-01 ENCOUNTER — OFFICE VISIT (OUTPATIENT)
Dept: FAMILY MEDICINE CLINIC | Age: 67
End: 2022-12-01
Payer: MEDICARE

## 2022-12-01 VITALS
HEART RATE: 75 BPM | HEIGHT: 69 IN | OXYGEN SATURATION: 99 % | BODY MASS INDEX: 29.47 KG/M2 | DIASTOLIC BLOOD PRESSURE: 76 MMHG | WEIGHT: 199 LBS | TEMPERATURE: 97.5 F | RESPIRATION RATE: 18 BRPM | SYSTOLIC BLOOD PRESSURE: 132 MMHG

## 2022-12-01 DIAGNOSIS — I25.10 CORONARY ARTERIOSCLEROSIS: ICD-10-CM

## 2022-12-01 DIAGNOSIS — R73.03 PREDIABETES: ICD-10-CM

## 2022-12-01 DIAGNOSIS — E78.2 MIXED HYPERLIPIDEMIA: ICD-10-CM

## 2022-12-01 DIAGNOSIS — R79.89 ELEVATED SERUM CREATININE: Primary | ICD-10-CM

## 2022-12-01 DIAGNOSIS — Z76.89 ENCOUNTER TO ESTABLISH CARE WITH NEW DOCTOR: ICD-10-CM

## 2022-12-01 DIAGNOSIS — G47.33 OBSTRUCTIVE SLEEP APNEA SYNDROME: ICD-10-CM

## 2022-12-01 DIAGNOSIS — N13.8 BENIGN PROSTATIC HYPERPLASIA WITH URINARY OBSTRUCTION: ICD-10-CM

## 2022-12-01 DIAGNOSIS — I10 PRIMARY HYPERTENSION: ICD-10-CM

## 2022-12-01 DIAGNOSIS — K21.9 GASTROESOPHAGEAL REFLUX DISEASE, UNSPECIFIED WHETHER ESOPHAGITIS PRESENT: ICD-10-CM

## 2022-12-01 DIAGNOSIS — F41.9 ANXIETY: ICD-10-CM

## 2022-12-01 DIAGNOSIS — N40.1 BENIGN PROSTATIC HYPERPLASIA WITH URINARY OBSTRUCTION: ICD-10-CM

## 2022-12-01 DIAGNOSIS — I48.91 ATRIAL FIBRILLATION, UNSPECIFIED TYPE (HCC): ICD-10-CM

## 2022-12-01 PROBLEM — R07.9 CHEST PAIN: Status: RESOLVED | Noted: 2021-07-30 | Resolved: 2022-12-01

## 2022-12-01 PROBLEM — M47.812 OSTEOARTHRITIS OF NECK: Status: ACTIVE | Noted: 2022-12-01

## 2022-12-01 PROBLEM — M65.332 TRIGGER FINGER, LEFT MIDDLE FINGER: Status: RESOLVED | Noted: 2021-02-10 | Resolved: 2022-12-01

## 2022-12-01 PROBLEM — R07.89 MUSCULAR CHEST PAIN: Status: RESOLVED | Noted: 2021-12-14 | Resolved: 2022-12-01

## 2022-12-01 PROBLEM — M65.332 TRIGGER FINGER, LEFT MIDDLE FINGER: Chronic | Status: RESOLVED | Noted: 2021-02-10 | Resolved: 2022-12-01

## 2022-12-01 PROBLEM — Z86.79 HISTORY OF CORONARY ARTERY DISEASE: Status: RESOLVED | Noted: 2021-11-19 | Resolved: 2022-12-01

## 2022-12-01 PROBLEM — B34.9 ACUTE VIRAL SYNDROME: Status: RESOLVED | Noted: 2022-01-13 | Resolved: 2022-12-01

## 2022-12-01 PROBLEM — R53.82 CHRONIC FATIGUE: Status: RESOLVED | Noted: 2020-08-07 | Resolved: 2022-12-01

## 2022-12-01 PROBLEM — F43.29 STRESS AND ADJUSTMENT REACTION: Status: RESOLVED | Noted: 2021-11-19 | Resolved: 2022-12-01

## 2022-12-01 PROBLEM — R00.2 PALPITATIONS: Status: RESOLVED | Noted: 2021-11-19 | Resolved: 2022-12-01

## 2022-12-01 NOTE — PROGRESS NOTES
1. Have you been to the ER, urgent care clinic since your last visit? Hospitalized since your last visit? No    2. Have you seen or consulted any other health care providers outside of the [address] [place] [address] since your last visit? Include any pap smears or colon screening.  No    Chief Complaint   Patient presents with    Establish Care     Visit Vitals  /76 (BP 1 Location: Left upper arm, BP Patient Position: Sitting, BP Cuff Size: Large adult)   Pulse 75   Temp 97.5 °F (36.4 °C) (Temporal)   Resp 18   Ht 5' 9\" (1.753 m)   Wt 199 lb (90.3 kg)   SpO2 99%   BMI 29.39 kg/m²

## 2022-12-01 NOTE — PROGRESS NOTES
Subjective  Chief Complaint   Patient presents with    [facility]     HPI:  Stacy Lucero is a 79 y.o. male with medical problems as listed below who presents to [facility]. Past medical history: CAD w MI (2018) s/p 3 stents, HTN, atrial fibrillation s/p cardiac ablation, GERD, prediabetes, BPH, sleep apnea (not wearing CPAP), HLD, vitamin D deficiency, anxiety, chronic back pain, seasonal allergies  Medications: Repatha (can't tolerate statins), lorazepam (uses once to twice per year), Flonase, vitamin D, famotidine BID, Zofran (prescribed by GI), meclizine PRN, multivitamin, Miralax PRN, Plavix, Eliquis, amlodipine 5mg qAM and 2.5mg qPM  Allergies: See list.   Specialists: Dr. Karyn Culver (Cardiology). [facility] (Pulmonology). Dr. Grabiel Serna (Urology). Surgical history: Cholecystectomy, back surgery, cardiac ablation, C  Family history: DM (mom). HTN, CVA (dad). Social history: No tobacco, no alcohol, no illicit drug use. Colon cancer screening: Last colonoscopy in 2018, no polyps. Instructed to return in 10 years. Dr. Natalie Dubon performed. Anxiety: Currently in counseling. Uses lorazepam 1-2 times per year.      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    Testosterone deficiency E34.9    Prediabetes R73.03    Atrial fibrillation, unspecified type (HCC) I48.91    Osteoarthritis of neck M47.812     Family History   Problem Relation Age of Onset    Diabetes Mother     Stroke Father     Hypertension Father      Social History     Tobacco Use    Smoking status: Never    Smokeless tobacco: Never   Vaping Use    Vaping Use: Never used   Substance Use Topics    Alcohol use: No    Drug use: No     Current Outpatient Medications on File Prior to Visit   Medication Sig Dispense Refill    lidocaine 5 % topical cream Apply  to affected area two (2) times daily as needed for Pain. 45 g 0    LORazepam (ATIVAN) 0.5 mg tablet Take 1 tab by mouth daily as needed for extreme anxiety for 30 days. Indications: anxious 12 Tablet 0    Vitamin D3 25 mcg (1,000 unit) tablet Take 2 Tablets by mouth daily. 180 Tablet 3    fluticasone propionate (Flonase Allergy Relief) 50 mcg/actuation nasal spray Instill 2 sprays into each nostril daily as needed for allergy symptoms. 1 Each 5    famotidine (PEPCID) 40 mg tablet Take 1 Tablet by mouth two (2) times a day. meclizine (ANTIVERT) 25 mg tablet       ondansetron hcl (ZOFRAN) 4 mg tablet       BD Ultra-Fine II Lancets 30 gauge misc       glucose blood VI test strips (FreeStyle Lite Strips) strip Finger stick blood sugars Daily DX E11.9 100 Strip 3    multivitamin (ONE A DAY) tablet Take 1 Tablet by mouth daily. clopidogreL (PLAVIX) 75 mg tab Take 75 mg by mouth.      polyethylene glycol (MIRALAX) 17 gram/dose powder Take 17 g by mouth daily. acetaminophen (TYLENOL) 325 mg tablet Take 2 Tablets by mouth every six (6) hours as needed for Pain. 60 Tablet 0    amLODIPine (Norvasc) 2.5 mg tablet Take 1 Tablet by mouth nightly. 30 Tablet 2    apixaban (ELIQUIS) 5 mg tablet Take 5 mg by mouth two (2) times a day. evolocumab (Repatha SureClick) pen injection 461 mg by SubCUTAneous route every fourteen (14) days. amLODIPine (NORVASC) 5 mg tablet Take 5 mg by mouth daily. Every am      clotrimazole (LOTRIMIN) 1 % topical cream        No current facility-administered medications on file prior to visit.      Allergies   Allergen Reactions    Cefuroxime Unknown (comments)     abd pain    Ibuprofen Other (comments)     Patient states NSAIDS/ ibuprofen upsets his stomach    Statins-Hmg-Coa Reductase Inhibitors Other (comments)     Extremity weakness     Review of Systems   Constitutional:  Negative for chills and fever. Respiratory:  Negative for cough, shortness of breath and wheezing. Cardiovascular:  Negative for chest pain, palpitations and leg swelling. Gastrointestinal:  Negative for abdominal pain, constipation, diarrhea, nausea and vomiting. Neurological:  Negative for weakness and headaches. Psychiatric/Behavioral:  Negative for depression. The patient is not nervous/anxious. Objective  Visit Vitals  /76 (BP 1 Location: Left upper arm, BP Patient Position: Sitting, BP Cuff Size: Large adult)   Pulse 75   Temp 97.5 °F (36.4 °C) (Temporal)   Resp 18   Ht 5' 9\" (1.753 m)   Wt 199 lb (90.3 kg)   SpO2 99%   BMI 29.39 kg/m²     Physical Exam  Constitutional:       General: He is not in acute distress. Appearance: Normal appearance. He is not ill-appearing. HENT:      Head: Normocephalic and atraumatic. Right Ear: External ear normal.      Left Ear: External ear normal.      Nose: Nose normal.      Mouth/Throat:      Mouth: Mucous membranes are moist.   Eyes:      Conjunctiva/sclera: Conjunctivae normal.   Cardiovascular:      Rate and Rhythm: Normal rate and regular rhythm. Heart sounds: Normal heart sounds. No murmur heard. Pulmonary:      Effort: Pulmonary effort is normal. No respiratory distress. Breath sounds: Normal breath sounds. Musculoskeletal:         General: No swelling. Normal range of motion. Cervical back: Normal range of motion and neck supple. Right lower leg: No edema. Left lower leg: No edema. Skin:     General: Skin is warm and dry. Neurological:      General: No focal deficit present. Mental Status: He is alert and oriented to person, place, and time. Psychiatric:         Mood and Affect: Mood normal.         Behavior: Behavior normal.         Thought Content:  Thought content normal.         Judgment: Judgment normal.        Assessment & Plan    ICD-10-CM ICD-9-CM    1. Elevated serum creatinine  G02.68 503.73 METABOLIC PANEL, BASIC      2. Coronary arteriosclerosis  I25.10 414.00       3. Primary hypertension  I10 401.9       4. Atrial fibrillation, unspecified type ([facility]  I48.91 427.31       5. Gastroesophageal reflux disease, unspecified whether esophagitis present  K21.9 530.81       6. Benign prostatic hyperplasia with urinary obstruction  N40.1 600.01     N13.8 599.69       7. Obstructive sleep apnea syndrome  G47.33 327.23       8. Mixed hyperlipidemia  E78.2 272.2       9. Anxiety  F41.9 300.00       10. Prediabetes  R73.03 790.29       11. Encounter to establish care with new doctor  Z76.89 V65.8         Diagnoses and all orders for this visit:    1. Elevated serum creatinine  -     METABOLIC PANEL, BASIC  Last Cr 1.38 in 07/2022. Repeat BMP. 2. Coronary arteriosclerosis  Followed by Cardiology. Not currently on beta blocker? Will need to inquire at follow up. Continue Repatha and Plavix. 3. Primary hypertension  Well controlled. Continue amlodipine 7.5mg daily. 4. Atrial fibrillation, unspecified type ([facility]  Followed by Cardiology. Continue Eliquis 5mg BID. 5. Gastroesophageal reflux disease, unspecified whether esophagitis present  Continue Pepcid 40mg daily. 6. Benign prostatic hyperplasia with urinary obstruction  Not currently on medication. 7. Obstructive sleep apnea syndrome  Will need to inquire whether patient is using CPAP at follow up. 8. Mixed hyperlipidemia  Last lipid panel in 06/2022. Unable to tolerate statins. Continue Repatha as prescribed. 9. Anxiety  Well controlled. Using lorazepam 1-2 times per year. 10. Prediabetes  Last A1c 6.1 in 06/2022. Diet controlled. 11. Encounter to establish care with new doctor  Chart reviewed. History collected from patient. Care gaps discussed. Aspects of this note have been generated using voice recognition software.  Despite editing, there may be some syntax errors. Follow-up and Dispositions    Return in about 3 months (around 3/1/2023) for Chronic problems.        Ava Red, DO

## 2022-12-17 LAB
BUN SERPL-MCNC: 13 MG/DL (ref 8–27)
BUN/CREAT SERPL: 11 (ref 10–24)
CALCIUM SERPL-MCNC: 8.9 MG/DL (ref 8.6–10.2)
CHLORIDE SERPL-SCNC: 107 MMOL/L (ref 96–106)
CO2 SERPL-SCNC: 24 MMOL/L (ref 20–29)
CREAT SERPL-MCNC: 1.18 MG/DL (ref 0.76–1.27)
EGFR: 68 ML/MIN/1.73
GLUCOSE SERPL-MCNC: 96 MG/DL (ref 70–99)
POTASSIUM SERPL-SCNC: 4.4 MMOL/L (ref 3.5–5.2)
SODIUM SERPL-SCNC: 140 MMOL/L (ref 134–144)

## 2022-12-27 ENCOUNTER — TELEPHONE (OUTPATIENT)
Dept: FAMILY MEDICINE CLINIC | Age: 67
End: 2022-12-27

## 2023-03-14 ENCOUNTER — PATIENT MESSAGE (OUTPATIENT)
Dept: FAMILY MEDICINE CLINIC | Age: 68
End: 2023-03-14

## 2023-03-14 RX ORDER — INSULIN PUMP SYRINGE, 3 ML
EACH MISCELLANEOUS
Qty: 1 KIT | Refills: 0 | Status: SHIPPED | OUTPATIENT
Start: 2023-03-14

## 2023-03-14 NOTE — TELEPHONE ENCOUNTER
From: Kelly William  To: Mena Paul DO  Sent: 3/14/2023 8:42 AM EDT  Subject: Blood Glucose Monitor Replacment    Good Morning Dr. Shanda Mccullough,  My blood glucose monitor is no longer working. Could you submit a prescription to the [facility] for a replacement?     Thanks,    Alona Gilmore

## 2023-03-17 ENCOUNTER — HOSPITAL ENCOUNTER (EMERGENCY)
Age: 68
Discharge: HOME | End: 2023-03-17
Attending: STUDENT IN AN ORGANIZED HEALTH CARE EDUCATION/TRAINING PROGRAM
Payer: MEDICARE

## 2023-03-17 VITALS
TEMPERATURE: 98 F | DIASTOLIC BLOOD PRESSURE: 76 MMHG | OXYGEN SATURATION: 95 % | HEIGHT: 69 IN | SYSTOLIC BLOOD PRESSURE: 148 MMHG | WEIGHT: 198 LBS | RESPIRATION RATE: 18 BRPM | HEART RATE: 80 BPM | BODY MASS INDEX: 29.33 KG/M2

## 2023-03-17 DIAGNOSIS — K64.9 HEMORRHOIDS, UNSPECIFIED HEMORRHOID TYPE: Primary | ICD-10-CM

## 2023-03-17 PROCEDURE — 99283 EMERGENCY DEPT VISIT LOW MDM: CPT

## 2023-03-17 RX ORDER — PRAMOXINE HYDROCHLORIDE 10 MG/G
AEROSOL, FOAM TOPICAL
Qty: 1 G | Refills: 0 | Status: SHIPPED | OUTPATIENT
Start: 2023-03-17

## 2023-03-17 NOTE — ED PROVIDER NOTES
Patient is a 27-year-old male history of coronary disease, V-fib, A-fib on anticoagulation presenting today secondary to hemorrhoids. Patient reports that he has had swelling in the anal region for over a week now. Initially was painful but it is no longer painful. It is not bothersome and itching. [facility] and they told him that they wanted to cut it but they could not do it there so they sent him here. Patient has been using Proctofoam with relief but states that it is still swollen and wants to be evaluated for excision. He has no fever. No bloody stools. No drainage. Past Medical History:   Diagnosis Date    A-fib [facility])     Acute coronary syndrome ([facility] 11/19/2021    Atypical chest pain 11/19/2021    CAD (coronary artery disease) 06/15/2018    Coronary arteriosclerosis 8/7/2020    Gastric ulcer 12/14/2018    GERD (gastroesophageal reflux disease)     H/O seasonal allergies     High cholesterol     Hx of cardiac arrest     VT arrest     Hyperlipidemia 8/7/2020    Hypertension     Ill-defined condition     Urinary urgency    Kidney stone     MI (myocardial infarction) ([address]     ROQUE on CPAP     2/2/21 pt reports does not use cpap each night, only a few times per week    Torsades de pointes 11/19/2021    Ventricular fibrillation ([address] 11/19/2021    Ventricular premature beats 11/19/2021    Ventricular tachycardia 11/19/2021    Vitamin D deficiency 8/7/2020       Past Surgical History:   Procedure Laterality Date    COLONOSCOPY N/A 11/12/2018    COLONOSCOPY performed by Flynn Boyle MD at [facility] ENDOSCOPY    HX COLONOSCOPY  2018    HX COLONOSCOPY      HX ENDOSCOPY      HX HEART CATHETERIZATION  2018    Cardiac cath Stents x2    HX HEART CATHETERIZATION  02/2020    \"patent stent obtuse marginal 1. significant disease noted in branch artery of RCA.   THis artery appears to be borderline for any kind of intervention\"    HX LAP CHOLECYSTECTOMY  2005    HX LUMBAR DISKECTOMY L4-L4    HX ORTHOPAEDIC  2006    Lumbar laminectomy    HX PACEMAKER      linq monitor    ND UNLISTED PROCEDURE CARDIAC SURGERY  09/09/2021    cardiac ablation         Family History:   Problem Relation Age of Onset    Diabetes Mother     Stroke Father     Hypertension Father        Social History     Socioeconomic History    Marital status:      Spouse name: Not on file    Number of children: Not on file    Years of education: Not on file    Highest education level: Not on file   Occupational History    Not on file   Tobacco Use    Smoking status: Never    Smokeless tobacco: Never   Vaping Use    Vaping Use: Never used   Substance and Sexual Activity    Alcohol use: No    Drug use: No    Sexual activity: Yes     Partners: Female   Other Topics Concern    Not on file   Social History Narrative    Not on file     Social Determinants of Health     Financial Resource Strain: Low Risk     Difficulty of Paying Living Expenses: Not very hard   Food Insecurity: No Food Insecurity    Worried About Running Out of Food in the Last Year: Never true    Ran Out of Food in the Last Year: Never true   Transportation Needs: Not on file   Physical Activity: Not on file   Stress: Not on file   Social Connections: Not on file   Intimate Partner Violence: Not on file   Housing Stability: Not on file         ALLERGIES: Cefuroxime, Ibuprofen, and Statins-hmg-coa reductase inhibitors    Review of Systems   Constitutional:  Negative for fever. Gastrointestinal:  Negative for constipation and diarrhea.        + Hemorrhoids   All other systems reviewed and are negative. Vitals:    03/17/23 0855   BP: (!) 148/76   Pulse: 80   Resp: 18   Temp: 98 °F (36.7 °C)   SpO2: 95%   Weight: 89.8 kg (198 lb)   Height: 5' 9\" (1.753 m)            Physical Exam  Constitutional:       General: He is not in acute distress. Appearance: He is well-developed. He is not ill-appearing. HENT:      Head: Normocephalic.    Eyes: Conjunctiva/sclera: Conjunctivae normal.   Pulmonary:      Effort: Pulmonary effort is normal. No respiratory distress. Genitourinary:     Comments: Exam performed with RN at bedside  2 cm nonthrombosed appearing nontender firm hemorrhoid without active bleeding. Musculoskeletal:         General: Normal range of motion. Cervical back: Normal range of motion. Skin:     General: Skin is warm and dry. Capillary Refill: Capillary refill takes less than 2 seconds. Neurological:      Mental Status: He is alert and oriented to person, place, and time. Psychiatric:         Behavior: Behavior normal.        Medical Decision Making  Patient is a 51-year-old male presenting today with hemorrhoids. He is currently on Proctofoam which seems to be helping. He has no pain. No signs or symptoms to suggest perirectal abscess. His exam and history is not consistent with a thrombosed hemorrhoid. Even if it were thrombosed he has had symptoms for over a week therefore not amenable to excision/I&D. Advised patient he would need to follow-up with colorectal surgery if symptoms were continuing to bother him. I refilled his Proctofoam. He has miralax at home which I advised him to take. Patient discharged home and return precautions provided. Problems Addressed:  Hemorrhoids, unspecified hemorrhoid type: acute illness or injury    Risk  OTC drugs.            Procedures

## 2023-03-17 NOTE — ED TRIAGE NOTES
Pt c/o hemorrhoid swelling and itching x 1 week, [facility], pt denies pain, states the swelling is more bothersome than anything; hx of hemorrhoids and had to have them drained 12 years ago

## 2023-03-20 ENCOUNTER — TELEPHONE (OUTPATIENT)
Dept: FAMILY MEDICINE CLINIC | Age: 68
End: 2023-03-20

## 2023-03-20 NOTE — TELEPHONE ENCOUNTER
----- Message from Mendoza Dailey sent at 3/15/2023  1:09 PM EDT -----  Subject: Referral Request    Reason for referral request? patient is requesting A1C check  Provider patient wants to be referred to(if known): Haylee Freeman    Provider Phone Number(if known): Additional Information for Provider? patient states he received a message   that he is due for his diabetic check.  patient wants to know if he needs   to fast.  ---------------------------------------------------------------------------  --------------  Tangela Childs [facility]    [phone]; OK to leave message on voicemail  ---------------------------------------------------------------------------  --------------

## 2023-03-20 NOTE — TELEPHONE ENCOUNTER
Left message for patient to call office. Will ask him if he would like appointment with locum or wait for Dr. Sterling Melendrez. Sending message to clinical staff to see if we can go ahead and put in a order for the a1c check.

## 2023-03-21 NOTE — TELEPHONE ENCOUNTER
I spoke with the patients wife on 3/17/23. I advised her that he will have a A1c the day of his visit.

## 2023-04-26 ENCOUNTER — PATIENT OUTREACH (OUTPATIENT)
Dept: CASE MANAGEMENT | Age: 68
End: 2023-04-26

## 2023-04-26 RX ORDER — LORAZEPAM 0.5 MG/1
0.5 TABLET ORAL
COMMUNITY

## 2023-04-26 RX ORDER — DOCUSATE SODIUM 100 MG/1
100 CAPSULE, LIQUID FILLED ORAL 2 TIMES DAILY
COMMUNITY

## 2023-04-26 NOTE — PROGRESS NOTES
Ambulatory Care Management Note    Date/Time:  4/26/2023 3:27 PM    Patient Current Location: [place]    This [facility]) reviewed and updated the following screenings during this call; general assessment, ACP assessment and note, medication reconciliation , and health maintenance review. Patient's challenges to self-management identified:  No challenges reported/identified at this time;  ACM will continue to assess during future outreach encounter(s). Medication Management:  good adherence and good understanding  Med Rec during this call  Current Outpatient Medications   Medication Sig    LORazepam (ATIVAN) 0.5 mg tablet Take 1 Tablet by mouth daily as needed for Anxiety. docusate sodium (COLACE) 100 mg capsule Take 1 Capsule by mouth two (2) times a day. pramoxine (Proctofoam) 1 % topical foam Apply  to affected area three (3) times daily as needed for Hemorrhoids. Blood-Glucose Meter monitoring kit Check glucose daily as needed. lidocaine 5 % topical cream Apply  to affected area two (2) times daily as needed for Pain. Vitamin D3 25 mcg (1,000 unit) tablet Take 2 Tablets by mouth daily. fluticasone propionate (Flonase Allergy Relief) 50 mcg/actuation nasal spray Instill 2 sprays into each nostril daily as needed for allergy symptoms. clotrimazole (LOTRIMIN) 1 % topical cream     famotidine (PEPCID) 40 mg tablet Take 1 Tablet by mouth two (2) times a day. BD Ultra-Fine II Lancets 30 gauge misc     glucose blood VI test strips (FreeStyle Lite Strips) strip Finger stick blood sugars Daily DX E11.9    multivitamin (ONE A DAY) tablet Take 1 Tablet by mouth daily. clopidogreL (PLAVIX) 75 mg tab Take 1 Tablet by mouth.    polyethylene glycol (MIRALAX) 17 gram/dose powder Take 17 g by mouth daily. acetaminophen (TYLENOL) 325 mg tablet Take 2 Tablets by mouth every six (6) hours as needed for Pain.     amLODIPine (Norvasc) 2.5 mg tablet Take 1 Tablet by mouth nightly. apixaban (ELIQUIS) 5 mg tablet Take 1 Tablet by mouth two (2) times a day. evolocumab (Repatha SureClick) pen injection 1 mL by SubCUTAneous route every fourteen (14) days. amLODIPine (NORVASC) 5 mg tablet Take 1 Tablet by mouth daily. Every am     No current facility-administered medications for this visit. Medications Discontinued During This Encounter   Medication Reason    meclizine (ANTIVERT) 25 mg tablet Therapy Completed    ondansetron hcl (ZOFRAN) 4 mg tablet Not A Current Medication     Meclizine discontinued because patient reports that this was a one-time order for treatment of Vertigo and therapy has been completed. Med rec updated today per patient report. Zofran discontinued because patient reports that this is not a current medication. Med rec updated today per patient report. Advance Care Planning:   Does patient have an Advance Directive:   currently not on file. Patient reports that he does have an Advance Medical Directive. Patient is encouraged to provide his PCP office with a copy of his Advance Medical Directive for his EMR; patient reports that he will have his wife take his Advance Medical Directive to his PCP office on Friday, 4/28/2023, so that it can be scanned into his chart. [employer], Referrals, and Durable Medical Equipment: No needs reported and/or identified at this time. Health Maintenance Due   Topic Date Due    Foot Exam Q1  Never done    Shingles Vaccine (1 of 2) Never done    COVID-19 Vaccine (3 - Booster for Moderna series) 04/22/2021    Diabetic Alb to Cr ratio (uACR) test  02/26/2023     Health Maintenance Reviewed: Foot Exam Q1: Patient reports that he is followed by a Podiatrist, Dr. Ingris Dunne; date of last appointment, per patient, was 2/21/2023 and his next scheduled appointment is 5/16/2023. Shingles Vaccine (1 of 2):  Patient reports that he has received this vaccine, including dose 1 and dose 2; patient is unable to recall the dates that he received these vaccines. COVID-19 Vaccine (3 - Booster for Moderna series): Patient reports that he received this vaccine on 4/07/2022; he also reports that he received 4 - Booster on 10/3/2022. Patient was asked to consider health care goals that they would like to focus on with this ACM. ACM will follow up with patient to discuss goals and establish care plan in the next 7-14 days. PCP/Specialist follow up: No future appointments.

## 2023-05-30 ENCOUNTER — OFFICE VISIT (OUTPATIENT)
Facility: CLINIC | Age: 68
End: 2023-05-30
Payer: MEDICARE

## 2023-05-30 VITALS
SYSTOLIC BLOOD PRESSURE: 125 MMHG | DIASTOLIC BLOOD PRESSURE: 69 MMHG | HEART RATE: 71 BPM | TEMPERATURE: 97.7 F | HEIGHT: 69 IN | OXYGEN SATURATION: 98 % | WEIGHT: 204.2 LBS | BODY MASS INDEX: 30.24 KG/M2

## 2023-05-30 DIAGNOSIS — K21.9 GASTROESOPHAGEAL REFLUX DISEASE WITHOUT ESOPHAGITIS: ICD-10-CM

## 2023-05-30 DIAGNOSIS — E78.5 HYPERLIPIDEMIA, UNSPECIFIED HYPERLIPIDEMIA TYPE: ICD-10-CM

## 2023-05-30 DIAGNOSIS — J30.2 SEASONAL ALLERGIC RHINITIS, UNSPECIFIED TRIGGER: ICD-10-CM

## 2023-05-30 DIAGNOSIS — F41.9 ANXIETY DISORDER, UNSPECIFIED TYPE: Primary | ICD-10-CM

## 2023-05-30 DIAGNOSIS — I48.11 LONGSTANDING PERSISTENT ATRIAL FIBRILLATION (HCC): ICD-10-CM

## 2023-05-30 DIAGNOSIS — E55.9 VITAMIN D DEFICIENCY: ICD-10-CM

## 2023-05-30 PROCEDURE — 3074F SYST BP LT 130 MM HG: CPT | Performed by: NURSE PRACTITIONER

## 2023-05-30 PROCEDURE — 3078F DIAST BP <80 MM HG: CPT | Performed by: NURSE PRACTITIONER

## 2023-05-30 PROCEDURE — 1036F TOBACCO NON-USER: CPT | Performed by: NURSE PRACTITIONER

## 2023-05-30 PROCEDURE — G8417 CALC BMI ABV UP PARAM F/U: HCPCS | Performed by: NURSE PRACTITIONER

## 2023-05-30 PROCEDURE — 1123F ACP DISCUSS/DSCN MKR DOCD: CPT | Performed by: NURSE PRACTITIONER

## 2023-05-30 PROCEDURE — G8427 DOCREV CUR MEDS BY ELIG CLIN: HCPCS | Performed by: NURSE PRACTITIONER

## 2023-05-30 PROCEDURE — 3017F COLORECTAL CA SCREEN DOC REV: CPT | Performed by: NURSE PRACTITIONER

## 2023-05-30 PROCEDURE — 99214 OFFICE O/P EST MOD 30 MIN: CPT | Performed by: NURSE PRACTITIONER

## 2023-05-30 RX ORDER — FAMOTIDINE 40 MG/1
40 TABLET, FILM COATED ORAL 2 TIMES DAILY
Qty: 180 TABLET | Refills: 3 | Status: SHIPPED | OUTPATIENT
Start: 2023-05-30

## 2023-05-30 RX ORDER — EZETIMIBE 10 MG/1
10 TABLET ORAL DAILY
Qty: 90 TABLET | Refills: 1 | Status: SHIPPED | OUTPATIENT
Start: 2023-05-30

## 2023-05-30 RX ORDER — LORAZEPAM 0.5 MG/1
0.5 TABLET ORAL EVERY 8 HOURS PRN
Qty: 12 TABLET | Refills: 2 | Status: SHIPPED | OUTPATIENT
Start: 2023-05-30 | End: 2023-06-02

## 2023-05-30 RX ORDER — BLOOD-GLUCOSE METER
KIT MISCELLANEOUS
COMMUNITY

## 2023-05-30 RX ORDER — BLOOD SUGAR DIAGNOSTIC
STRIP MISCELLANEOUS
COMMUNITY

## 2023-05-30 RX ORDER — FLUTICASONE PROPIONATE 50 MCG
2 SPRAY, SUSPENSION (ML) NASAL DAILY
Qty: 48 G | Refills: 3 | Status: SHIPPED | OUTPATIENT
Start: 2023-05-30

## 2023-05-30 RX ORDER — MULTIVITAMIN
1 TABLET ORAL DAILY
COMMUNITY

## 2023-05-30 NOTE — PROGRESS NOTES
Subjective:   Michael Plummer is a 76 y.o. male  established here with complaints of Follow-up and Diabetes  Patient presenting for hypertension followup. Patient reports blood pressures at home most frequently not checked. Patient denies chest pain, shortness of breath, weakness, orthostatic hypotension, cough, myalgias, rash, headaches, weight gain, leg swelling, palpitations, slow heart rate, fatigue, depression. Patient reports good compliance with medications, no side effects from medications noted. Current treatments include diet modification, weight loss, has follow-up with cardiology in 2 months. Patient presenting for hyperlipidemia check. Have been having issues filling Repatha through the pharmacy patient has history including no CAD, not diabetic, no hypertension, no tobacco, no obesity, and no prior TIA/stroke, LDL goal is under 100. Patient denies chest pain, shortness of breath, weakness, orthostatic hypotension, cough, myalgias, rash, headaches, weight gain, leg swelling, palpitations, slow heart rate, fatigue, depression. Patient reports good compliance with medications, no side effects from medications noted. Current treatments include diet modification, weight loss, exercise.        Patient Active Problem List   Diagnosis    Testosterone deficiency    Hypertension    Anxiety    Obstructive sleep apnea syndrome    GERD (gastroesophageal reflux disease)    Atrial fibrillation, unspecified type (HCC)    Benign prostatic hyperplasia with urinary obstruction    Scrotal varices    Chronic back pain    Vitamin D deficiency    Hyperlipidemia    Migraine    Coronary arteriosclerosis    Prediabetes    Osteoarthritis of neck     Patient Active Problem List    Diagnosis Date Noted    Osteoarthritis of neck 12/01/2022    [facility], unspecified type [facility] 01/03/2022    Prediabetes 12/22/2021    Benign prostatic hyperplasia with urinary obstruction 11/19/2021    GERD (gastroesophageal reflux

## 2023-05-30 NOTE — PROGRESS NOTES
Chief Complaint   Patient presents with    Follow-up    Diabetes     1. Have you been to the ER, urgent care clinic since your last visit? Hospitalized since your last visit? Yes : [facility] , 4/2023 [facility]    2. Have you seen or consulted any other health care providers outside of the [address] [place] [address] since your last visit? Include any pap smears or colon screening.  No    /69 (Site: Left Upper Arm, Position: Sitting, Cuff Size: Large Adult)   Pulse 71   Temp 97.7 °F (36.5 °C) (Temporal)   Ht 5' 9\" (1.753 m)   Wt 204 lb 3.2 oz (92.6 kg)   SpO2 98%   BMI 30.16 kg/m²

## 2023-06-22 ENCOUNTER — PATIENT MESSAGE (OUTPATIENT)
Facility: CLINIC | Age: 68
End: 2023-06-22

## 2023-06-22 DIAGNOSIS — K21.9 GASTROESOPHAGEAL REFLUX DISEASE WITHOUT ESOPHAGITIS: ICD-10-CM

## 2023-06-22 RX ORDER — FAMOTIDINE 40 MG/1
40 TABLET, FILM COATED ORAL 2 TIMES DAILY
Qty: 180 TABLET | Refills: 3 | Status: SHIPPED | OUTPATIENT
Start: 2023-06-22

## 2023-06-22 NOTE — TELEPHONE ENCOUNTER
From: Maria Fernanda Almeida  To: Chandriak Garner  Sent: 2023 8:37 AM EDT  Subject: Famotidine Prescription Refill    I vivi got off the phone with a pharmacy representative at [facility]. Torrey Ferraro recently experienced a shortage of Famotidine 40 MG Oral Tabs. As a result, any prescription written in the last two weeks were limited to 30 days supply per prescription. This problem has now been resolved. Based on guidance from Torrey Ferraro, Mrs. Fuentes Arteaga will have to resend the prescription for a 90 day supply based on her previous prescription because Torrey Ferraro had modified her previous submission.     Thanks,    Manav Patient  : 55

## 2023-06-22 NOTE — TELEPHONE ENCOUNTER
Requested Prescriptions     Pending Prescriptions Disp Refills    famotidine (PEPCID) 40 MG tablet 180 tablet 3     Sig: Take 1 tablet by mouth 2 times daily        Last OV:5/30/23  Next OV:12/5/23  Last Refill:5/30/23        Patient is requesting a new 90 days supply as Yinka Anton had the medication on back order

## 2023-08-02 ENCOUNTER — OFFICE VISIT (OUTPATIENT)
Facility: CLINIC | Age: 68
End: 2023-08-02
Payer: MEDICARE

## 2023-08-02 VITALS
HEART RATE: 82 BPM | SYSTOLIC BLOOD PRESSURE: 125 MMHG | BODY MASS INDEX: 29.62 KG/M2 | OXYGEN SATURATION: 98 % | HEIGHT: 69 IN | TEMPERATURE: 97.2 F | RESPIRATION RATE: 18 BRPM | DIASTOLIC BLOOD PRESSURE: 65 MMHG | WEIGHT: 200 LBS

## 2023-08-02 DIAGNOSIS — R10.32 LEFT LOWER QUADRANT ABDOMINAL PAIN: Primary | ICD-10-CM

## 2023-08-02 DIAGNOSIS — R94.4 DECREASED GLOMERULAR FILTRATION RATE (GFR): ICD-10-CM

## 2023-08-02 PROBLEM — M51.26 HERNIATED LUMBAR INTERVERTEBRAL DISC: Status: ACTIVE | Noted: 2023-08-02

## 2023-08-02 PROBLEM — R35.0 INCREASED FREQUENCY OF URINATION: Status: ACTIVE | Noted: 2023-08-02

## 2023-08-02 PROBLEM — D49.0: Status: ACTIVE | Noted: 2023-08-02

## 2023-08-02 PROBLEM — L90.9 HYPERTROPHIC AND ATROPHIC CONDITION OF SKIN: Status: ACTIVE | Noted: 2023-08-02

## 2023-08-02 PROBLEM — G47.00 INSOMNIA, UNSPECIFIED: Status: ACTIVE | Noted: 2023-08-02

## 2023-08-02 PROBLEM — R10.13 DYSPEPSIA: Status: ACTIVE | Noted: 2023-08-02

## 2023-08-02 PROBLEM — B35.1 ONYCHOMYCOSIS DUE TO DERMATOPHYTE: Status: ACTIVE | Noted: 2023-08-02

## 2023-08-02 PROBLEM — M19.90 LOCALIZED OSTEOARTHROSIS: Status: ACTIVE | Noted: 2023-08-02

## 2023-08-02 PROBLEM — H52.10 MYOPIA: Status: ACTIVE | Noted: 2023-08-02

## 2023-08-02 PROBLEM — K29.00 ACUTE EROSIVE GASTRITIS: Status: ACTIVE | Noted: 2023-08-02

## 2023-08-02 PROBLEM — M54.30 SCIATICA: Status: ACTIVE | Noted: 2023-08-02

## 2023-08-02 PROBLEM — J33.0 POLYP OF NASAL CAVITY: Status: ACTIVE | Noted: 2023-08-02

## 2023-08-02 PROBLEM — K25.9 GASTRIC ULCER: Status: ACTIVE | Noted: 2018-12-14

## 2023-08-02 PROBLEM — N50.9 DISORDER OF SCROTUM: Status: ACTIVE | Noted: 2023-08-02

## 2023-08-02 PROBLEM — H52.229 REGULAR ASTIGMATISM: Status: ACTIVE | Noted: 2023-08-02

## 2023-08-02 PROBLEM — K31.84 GASTROPARESIS: Status: ACTIVE | Noted: 2023-01-27

## 2023-08-02 PROBLEM — M50.20 HERNIATION OF INTERVERTEBRAL DISC OF CERVICAL REGION: Status: ACTIVE | Noted: 2023-08-02

## 2023-08-02 PROBLEM — H04.129 TEAR FILM INSUFFICIENCY: Status: ACTIVE | Noted: 2023-08-02

## 2023-08-02 PROBLEM — H16.9 KERATITIS: Status: ACTIVE | Noted: 2023-08-02

## 2023-08-02 PROBLEM — H04.123 DRY EYE SYNDROME OF BILATERAL LACRIMAL GLANDS: Status: ACTIVE | Noted: 2023-08-02

## 2023-08-02 PROBLEM — H16.143 PUNCTATE KERATITIS, BILATERAL: Status: ACTIVE | Noted: 2023-08-02

## 2023-08-02 PROBLEM — R53.81 MALAISE: Status: ACTIVE | Noted: 2023-08-02

## 2023-08-02 PROBLEM — L30.9 ECZEMA: Status: ACTIVE | Noted: 2023-08-02

## 2023-08-02 PROBLEM — H52.4 PRESBYOPIA: Status: ACTIVE | Noted: 2023-08-02

## 2023-08-02 PROBLEM — Z95.818 PRESENCE OF CARDIAC DEVICE: Status: ACTIVE | Noted: 2023-01-27

## 2023-08-02 PROBLEM — R12 HEARTBURN: Status: ACTIVE | Noted: 2023-01-27

## 2023-08-02 PROBLEM — M54.2 CERVICALGIA: Status: ACTIVE | Noted: 2017-10-23

## 2023-08-02 PROBLEM — H81.10 BENIGN PAROXYSMAL POSITIONAL VERTIGO: Status: ACTIVE | Noted: 2023-08-02

## 2023-08-02 PROBLEM — K12.0 APHTHOUS ULCER: Status: ACTIVE | Noted: 2023-08-02

## 2023-08-02 PROBLEM — K52.9 COLITIS: Status: ACTIVE | Noted: 2023-01-27

## 2023-08-02 PROBLEM — H01.009 BLEPHARITIS: Status: ACTIVE | Noted: 2023-08-02

## 2023-08-02 PROBLEM — R10.13 EPIGASTRIC PAIN: Status: ACTIVE | Noted: 2023-08-02

## 2023-08-02 PROBLEM — L91.9 HYPERTROPHIC AND ATROPHIC CONDITION OF SKIN: Status: ACTIVE | Noted: 2023-08-02

## 2023-08-02 PROBLEM — K58.0 IRRITABLE BOWEL SYNDROME WITH DIARRHEA: Status: ACTIVE | Noted: 2023-08-02

## 2023-08-02 PROBLEM — D14.0: Status: ACTIVE | Noted: 2023-08-02

## 2023-08-02 PROBLEM — E78.5 DYSLIPIDEMIA: Status: ACTIVE | Noted: 2023-08-02

## 2023-08-02 PROBLEM — R91.1 SOLITARY PULMONARY NODULE: Status: ACTIVE | Noted: 2023-08-02

## 2023-08-02 PROBLEM — K09.9 CYST OF ORAL SOFT TISSUE: Status: ACTIVE | Noted: 2023-08-02

## 2023-08-02 PROCEDURE — 1123F ACP DISCUSS/DSCN MKR DOCD: CPT

## 2023-08-02 PROCEDURE — 3074F SYST BP LT 130 MM HG: CPT

## 2023-08-02 PROCEDURE — 99213 OFFICE O/P EST LOW 20 MIN: CPT

## 2023-08-02 PROCEDURE — 3078F DIAST BP <80 MM HG: CPT

## 2023-08-02 RX ORDER — BUTALBITAL, ACETAMINOPHEN, CAFFEINE AND CODEINE PHOSPHATE 50; 325; 40; 30 MG/1; MG/1; MG/1; MG/1
1 CAPSULE ORAL EVERY 12 HOURS PRN
COMMUNITY

## 2023-08-02 SDOH — ECONOMIC STABILITY: FOOD INSECURITY: WITHIN THE PAST 12 MONTHS, THE FOOD YOU BOUGHT JUST DIDN'T LAST AND YOU DIDN'T HAVE MONEY TO GET MORE.: NEVER TRUE

## 2023-08-02 SDOH — ECONOMIC STABILITY: FOOD INSECURITY: WITHIN THE PAST 12 MONTHS, YOU WORRIED THAT YOUR FOOD WOULD RUN OUT BEFORE YOU GOT MONEY TO BUY MORE.: NEVER TRUE

## 2023-08-02 SDOH — ECONOMIC STABILITY: INCOME INSECURITY: HOW HARD IS IT FOR YOU TO PAY FOR THE VERY BASICS LIKE FOOD, HOUSING, MEDICAL CARE, AND HEATING?: NOT HARD AT ALL

## 2023-08-02 SDOH — ECONOMIC STABILITY: HOUSING INSECURITY
IN THE LAST 12 MONTHS, WAS THERE A TIME WHEN YOU DID NOT HAVE A STEADY PLACE TO SLEEP OR SLEPT IN A SHELTER (INCLUDING NOW)?: NO

## 2023-08-02 ASSESSMENT — PATIENT HEALTH QUESTIONNAIRE - PHQ9
SUM OF ALL RESPONSES TO PHQ9 QUESTIONS 1 & 2: 0
SUM OF ALL RESPONSES TO PHQ QUESTIONS 1-9: 0
1. LITTLE INTEREST OR PLEASURE IN DOING THINGS: 0
2. FEELING DOWN, DEPRESSED OR HOPELESS: 0
SUM OF ALL RESPONSES TO PHQ QUESTIONS 1-9: 0

## 2023-08-02 NOTE — PROGRESS NOTES
Subjective  Chief Complaint   Patient presents with    Flank Pain     Left side pain x 1 month     HPI:  Noelle Tripp is a 76 y.o. male, established patient who presents for left lower abdomenal pain X1 month. Abdominal Pain: Patient complains of abdominal pain. The pain is described as dull, and is 2/10 in intensity. Pain is located in the LLQ without radiation. Onset was 1  month ago. Symptoms have been unchanged since. Aggravating factors: movement. Alleviating factors: movement and pressure. Associated symptoms: headache. The patient denies anorexia, arthralgias, belching, chills, constipation, diarrhea, dysuria, fever, flatus, frequency, hematochezia, hematuria, melena, myalgias, nausea, sweats, and vomiting. Initially thought it was a pinch. Lidocaine patch helped a little. Denies any constipation or diarrhea - sees a GI doctor. Denies any urinary difficulty  Recently saw cardiology who is referral to a kidney specialists. Headaches     Burning/pinching. Past Medical History:   Diagnosis Date    A-fib [facility])     Acute coronary syndrome ([address]) 11/19/2021    Atypical chest pain 11/19/2021    CAD (coronary artery disease) 06/15/2018    Coronary arteriosclerosis 8/7/2020    Gastric ulcer 12/14/2018    GERD (gastroesophageal reflux disease)     H/O seasonal allergies     High cholesterol     Hx of cardiac arrest     VT arrest     Hyperlipidemia 8/7/2020    Hypertension     Ill-defined condition     Urinary urgency    Kidney stone     MI (myocardial infarction) ([address])     KELLIE on CPAP     2/2/21 pt reports does not use cpap each night, only a few times per week    Torsades de pointes ([address]) 11/19/2021    Ventricular fibrillation ([address]) 11/19/2021    Ventricular premature beats 11/19/2021    Ventricular tachycardia ([address]) 11/19/2021    Vitamin D deficiency 8/7/2020     Past Surgical History:   Procedure Laterality Date    CARDIAC CATHETERIZATION  02/2020    \"patent stent obtuse marginal 1.

## 2023-08-03 LAB
BASOPHILS # BLD AUTO: 0.1 X10E3/UL (ref 0–0.2)
BASOPHILS NFR BLD AUTO: 1 %
BUN SERPL-MCNC: 10 MG/DL (ref 8–27)
BUN/CREAT SERPL: 8 (ref 10–24)
CALCIUM SERPL-MCNC: 9.6 MG/DL (ref 8.6–10.2)
CHLORIDE SERPL-SCNC: 105 MMOL/L (ref 96–106)
CO2 SERPL-SCNC: 25 MMOL/L (ref 20–29)
CREAT SERPL-MCNC: 1.19 MG/DL (ref 0.76–1.27)
EGFRCR SERPLBLD CKD-EPI 2021: 67 ML/MIN/1.73
EOSINOPHIL # BLD AUTO: 0.2 X10E3/UL (ref 0–0.4)
EOSINOPHIL NFR BLD AUTO: 2 %
ERYTHROCYTE [DISTWIDTH] IN BLOOD BY AUTOMATED COUNT: 13.6 % (ref 11.6–15.4)
GLUCOSE SERPL-MCNC: 84 MG/DL (ref 70–99)
HCT VFR BLD AUTO: 44.5 % (ref 37.5–51)
HGB BLD-MCNC: 14.2 G/DL (ref 13–17.7)
IMM GRANULOCYTES # BLD AUTO: 0 X10E3/UL (ref 0–0.1)
IMM GRANULOCYTES NFR BLD AUTO: 0 %
LYMPHOCYTES # BLD AUTO: 2.9 X10E3/UL (ref 0.7–3.1)
LYMPHOCYTES NFR BLD AUTO: 38 %
MCH RBC QN AUTO: 28.7 PG (ref 26.6–33)
MCHC RBC AUTO-ENTMCNC: 31.9 G/DL (ref 31.5–35.7)
MCV RBC AUTO: 90 FL (ref 79–97)
MONOCYTES # BLD AUTO: 0.7 X10E3/UL (ref 0.1–0.9)
MONOCYTES NFR BLD AUTO: 9 %
NEUTROPHILS # BLD AUTO: 3.8 X10E3/UL (ref 1.4–7)
NEUTROPHILS NFR BLD AUTO: 50 %
PLATELET # BLD AUTO: 238 X10E3/UL (ref 150–450)
POTASSIUM SERPL-SCNC: 4.4 MMOL/L (ref 3.5–5.2)
RBC # BLD AUTO: 4.95 X10E6/UL (ref 4.14–5.8)
SODIUM SERPL-SCNC: 144 MMOL/L (ref 134–144)
WBC # BLD AUTO: 7.7 X10E3/UL (ref 3.4–10.8)

## 2023-08-14 ENCOUNTER — HOSPITAL ENCOUNTER (OUTPATIENT)
Facility: HOSPITAL | Age: 68
Discharge: HOME | End: 2023-08-17
Payer: MEDICARE

## 2023-08-14 DIAGNOSIS — R10.32 LEFT LOWER QUADRANT ABDOMINAL PAIN: ICD-10-CM

## 2023-08-14 PROCEDURE — 74177 CT ABD & PELVIS W/CONTRAST: CPT

## 2023-08-14 PROCEDURE — 6360000004 HC RX CONTRAST MEDICATION: Performed by: FAMILY MEDICINE

## 2023-08-14 RX ADMIN — IOPAMIDOL 100 ML: 755 INJECTION, SOLUTION INTRAVENOUS at 09:17

## 2023-08-17 ENCOUNTER — OFFICE VISIT (OUTPATIENT)
Facility: CLINIC | Age: 68
End: 2023-08-17

## 2023-08-17 ENCOUNTER — HOSPITAL ENCOUNTER (OUTPATIENT)
Facility: HOSPITAL | Age: 68
Discharge: HOME | End: 2023-08-17
Payer: MEDICARE

## 2023-08-17 VITALS
WEIGHT: 200 LBS | SYSTOLIC BLOOD PRESSURE: 118 MMHG | DIASTOLIC BLOOD PRESSURE: 72 MMHG | HEIGHT: 69 IN | RESPIRATION RATE: 18 BRPM | HEART RATE: 70 BPM | OXYGEN SATURATION: 97 % | TEMPERATURE: 97.7 F | BODY MASS INDEX: 29.62 KG/M2

## 2023-08-17 DIAGNOSIS — M79.644 FINGER PAIN, RIGHT: ICD-10-CM

## 2023-08-17 DIAGNOSIS — R10.32 LEFT LOWER QUADRANT PAIN: Primary | ICD-10-CM

## 2023-08-17 PROCEDURE — 73140 X-RAY EXAM OF FINGER(S): CPT

## 2023-08-17 SDOH — HEALTH STABILITY: PHYSICAL HEALTH: ON AVERAGE, HOW MANY DAYS PER WEEK DO YOU ENGAGE IN MODERATE TO STRENUOUS EXERCISE (LIKE A BRISK WALK)?: 5 DAYS

## 2023-08-17 SDOH — ECONOMIC STABILITY: TRANSPORTATION INSECURITY
IN THE PAST 12 MONTHS, HAS LACK OF TRANSPORTATION KEPT YOU FROM MEETINGS, WORK, OR FROM GETTING THINGS NEEDED FOR DAILY LIVING?: NO

## 2023-08-17 SDOH — ECONOMIC STABILITY: TRANSPORTATION INSECURITY
IN THE PAST 12 MONTHS, HAS THE LACK OF TRANSPORTATION KEPT YOU FROM MEDICAL APPOINTMENTS OR FROM GETTING MEDICATIONS?: NO

## 2023-08-17 SDOH — ECONOMIC STABILITY: HOUSING INSECURITY: IN THE LAST 12 MONTHS, HOW MANY PLACES HAVE YOU LIVED?: 1

## 2023-08-17 SDOH — ECONOMIC STABILITY: INCOME INSECURITY: IN THE LAST 12 MONTHS, WAS THERE A TIME WHEN YOU WERE NOT ABLE TO PAY THE MORTGAGE OR RENT ON TIME?: NO

## 2023-08-17 SDOH — HEALTH STABILITY: PHYSICAL HEALTH: ON AVERAGE, HOW MANY MINUTES DO YOU ENGAGE IN EXERCISE AT THIS LEVEL?: 40 MIN

## 2023-08-17 ASSESSMENT — ANXIETY QUESTIONNAIRES
6. BECOMING EASILY ANNOYED OR IRRITABLE: 0
3. WORRYING TOO MUCH ABOUT DIFFERENT THINGS: 0
2. NOT BEING ABLE TO STOP OR CONTROL WORRYING: 0
IF YOU CHECKED OFF ANY PROBLEMS ON THIS QUESTIONNAIRE, HOW DIFFICULT HAVE THESE PROBLEMS MADE IT FOR YOU TO DO YOUR WORK, TAKE CARE OF THINGS AT HOME, OR GET ALONG WITH OTHER PEOPLE: NOT DIFFICULT AT ALL
4. TROUBLE RELAXING: 0
7. FEELING AFRAID AS IF SOMETHING AWFUL MIGHT HAPPEN: 0
GAD7 TOTAL SCORE: 0
5. BEING SO RESTLESS THAT IT IS HARD TO SIT STILL: 0
1. FEELING NERVOUS, ANXIOUS, OR ON EDGE: 0

## 2023-08-17 ASSESSMENT — SOCIAL DETERMINANTS OF HEALTH (SDOH)
DO YOU BELONG TO ANY CLUBS OR ORGANIZATIONS SUCH AS CHURCH GROUPS UNIONS, FRATERNAL OR ATHLETIC GROUPS, OR SCHOOL GROUPS?: YES
WITHIN THE LAST YEAR, HAVE TO BEEN RAPED OR FORCED TO HAVE ANY KIND OF SEXUAL ACTIVITY BY YOUR PARTNER OR EX-PARTNER?: NO
HOW OFTEN DO YOU ATTEND CHURCH OR RELIGIOUS SERVICES?: MORE THAN 4 TIMES PER YEAR
HOW OFTEN DO YOU GET TOGETHER WITH FRIENDS OR RELATIVES?: MORE THAN THREE TIMES A WEEK
HOW OFTEN DO YOU ATTENT MEETINGS OF THE CLUB OR ORGANIZATION YOU BELONG TO?: MORE THAN 4 TIMES PER YEAR
WITHIN THE LAST YEAR, HAVE YOU BEEN HUMILIATED OR EMOTIONALLY ABUSED IN OTHER WAYS BY YOUR PARTNER OR EX-PARTNER?: NO
WITHIN THE LAST YEAR, HAVE YOU BEEN KICKED, HIT, SLAPPED, OR OTHERWISE PHYSICALLY HURT BY YOUR PARTNER OR EX-PARTNER?: NO
WITHIN THE LAST YEAR, HAVE YOU BEEN AFRAID OF YOUR PARTNER OR EX-PARTNER?: NO
IN A TYPICAL WEEK, HOW MANY TIMES DO YOU TALK ON THE PHONE WITH FAMILY, FRIENDS, OR NEIGHBORS?: MORE THAN THREE TIMES A WEEK

## 2023-08-17 ASSESSMENT — PATIENT HEALTH QUESTIONNAIRE - PHQ9
2. FEELING DOWN, DEPRESSED OR HOPELESS: 0
1. LITTLE INTEREST OR PLEASURE IN DOING THINGS: 0
SUM OF ALL RESPONSES TO PHQ QUESTIONS 1-9: 0
SUM OF ALL RESPONSES TO PHQ QUESTIONS 1-9: 0
SUM OF ALL RESPONSES TO PHQ9 QUESTIONS 1 & 2: 0
SUM OF ALL RESPONSES TO PHQ QUESTIONS 1-9: 0
SUM OF ALL RESPONSES TO PHQ QUESTIONS 1-9: 0

## 2023-08-17 ASSESSMENT — LIFESTYLE VARIABLES
HOW OFTEN DO YOU HAVE A DRINK CONTAINING ALCOHOL: NEVER
HOW MANY STANDARD DRINKS CONTAINING ALCOHOL DO YOU HAVE ON A TYPICAL DAY: PATIENT DOES NOT DRINK

## 2023-08-21 ENCOUNTER — HOSPITAL ENCOUNTER (OUTPATIENT)
Facility: HOSPITAL | Age: 68
Setting detail: OUTPATIENT SURGERY
Discharge: HOME | End: 2023-08-21
Attending: INTERNAL MEDICINE | Admitting: INTERNAL MEDICINE
Payer: MEDICARE

## 2023-08-21 ENCOUNTER — ANESTHESIA EVENT (OUTPATIENT)
Facility: HOSPITAL | Age: 68
End: 2023-08-21
Payer: MEDICARE

## 2023-08-21 ENCOUNTER — ANESTHESIA (OUTPATIENT)
Facility: HOSPITAL | Age: 68
End: 2023-08-21
Payer: MEDICARE

## 2023-08-21 VITALS
SYSTOLIC BLOOD PRESSURE: 115 MMHG | TEMPERATURE: 97.8 F | HEART RATE: 70 BPM | OXYGEN SATURATION: 95 % | RESPIRATION RATE: 20 BRPM | HEIGHT: 69 IN | DIASTOLIC BLOOD PRESSURE: 74 MMHG | BODY MASS INDEX: 29.18 KG/M2 | WEIGHT: 197 LBS

## 2023-08-21 PROCEDURE — 3600007502: Performed by: INTERNAL MEDICINE

## 2023-08-21 PROCEDURE — 6360000002 HC RX W HCPCS: Performed by: NURSE ANESTHETIST, CERTIFIED REGISTERED

## 2023-08-21 PROCEDURE — 3700000000 HC ANESTHESIA ATTENDED CARE: Performed by: INTERNAL MEDICINE

## 2023-08-21 PROCEDURE — 7100000010 HC PHASE II RECOVERY - FIRST 15 MIN: Performed by: INTERNAL MEDICINE

## 2023-08-21 PROCEDURE — 2580000003 HC RX 258: Performed by: NURSE ANESTHETIST, CERTIFIED REGISTERED

## 2023-08-21 PROCEDURE — 7100000011 HC PHASE II RECOVERY - ADDTL 15 MIN: Performed by: INTERNAL MEDICINE

## 2023-08-21 PROCEDURE — 2500000003 HC RX 250 WO HCPCS: Performed by: NURSE ANESTHETIST, CERTIFIED REGISTERED

## 2023-08-21 PROCEDURE — 2709999900 HC NON-CHARGEABLE SUPPLY: Performed by: INTERNAL MEDICINE

## 2023-08-21 RX ORDER — SODIUM CHLORIDE 0.9 % (FLUSH) 0.9 %
5-40 SYRINGE (ML) INJECTION PRN
Status: DISCONTINUED | OUTPATIENT
Start: 2023-08-21 | End: 2023-08-21 | Stop reason: HOSPADM

## 2023-08-21 RX ORDER — SODIUM CHLORIDE 0.9 % (FLUSH) 0.9 %
5-40 SYRINGE (ML) INJECTION EVERY 12 HOURS SCHEDULED
Status: DISCONTINUED | OUTPATIENT
Start: 2023-08-21 | End: 2023-08-21 | Stop reason: HOSPADM

## 2023-08-21 RX ORDER — SODIUM CHLORIDE 9 MG/ML
INJECTION, SOLUTION INTRAVENOUS CONTINUOUS PRN
Status: DISCONTINUED | OUTPATIENT
Start: 2023-08-21 | End: 2023-08-21 | Stop reason: SDUPTHER

## 2023-08-21 RX ORDER — LIDOCAINE HYDROCHLORIDE 20 MG/ML
INJECTION, SOLUTION EPIDURAL; INFILTRATION; INTRACAUDAL; PERINEURAL PRN
Status: DISCONTINUED | OUTPATIENT
Start: 2023-08-21 | End: 2023-08-21 | Stop reason: SDUPTHER

## 2023-08-21 RX ORDER — ASPIRIN 81 MG/1
81 TABLET, CHEWABLE ORAL DAILY
COMMUNITY

## 2023-08-21 RX ORDER — SODIUM CHLORIDE 9 MG/ML
INJECTION, SOLUTION INTRAVENOUS CONTINUOUS
Status: DISCONTINUED | OUTPATIENT
Start: 2023-08-21 | End: 2023-08-21 | Stop reason: HOSPADM

## 2023-08-21 RX ORDER — SODIUM CHLORIDE 9 MG/ML
25 INJECTION, SOLUTION INTRAVENOUS PRN
Status: DISCONTINUED | OUTPATIENT
Start: 2023-08-21 | End: 2023-08-21 | Stop reason: HOSPADM

## 2023-08-21 RX ADMIN — PROPOFOL 100 MG: 10 INJECTION, EMULSION INTRAVENOUS at 08:04

## 2023-08-21 RX ADMIN — SODIUM CHLORIDE: 9 INJECTION, SOLUTION INTRAVENOUS at 08:03

## 2023-08-21 RX ADMIN — PROPOFOL 50 MG: 10 INJECTION, EMULSION INTRAVENOUS at 08:05

## 2023-08-21 RX ADMIN — PROPOFOL 50 MG: 10 INJECTION, EMULSION INTRAVENOUS at 08:07

## 2023-08-21 RX ADMIN — PROPOFOL 50 MG: 10 INJECTION, EMULSION INTRAVENOUS at 08:08

## 2023-08-21 RX ADMIN — LIDOCAINE HYDROCHLORIDE 100 MG: 20 INJECTION, SOLUTION EPIDURAL; INFILTRATION; INTRACAUDAL; PERINEURAL at 08:04

## 2023-08-21 ASSESSMENT — PAIN - FUNCTIONAL ASSESSMENT: PAIN_FUNCTIONAL_ASSESSMENT: NONE - DENIES PAIN

## 2023-08-21 NOTE — OP NOTE
dilated given no dysphagia. Otherwise normal esophagus. Stomach: Normal. Patent pylorus. No retained food. Duodenum/jejunum: Normal.      Specimens:   * No specimens in log *     Impression: Mild, non-obstructive distal ring. Not dilated given no dysphagia. Otherwise normal upper endoscopy. Recommendations:  - pending upper GI series  - follow up with Dr. Terrie Cottrell  - can resume eliquis and plavix today     Thank you for entrusting me with this patient's care. Please do not hesitate to contact me with any questions.   Signed By: Jeffrey Pereira MD                        August 21, 2023

## 2023-08-21 NOTE — DISCHARGE INSTRUCTIONS
[facility]  [facility] - [address]  KARTHIKEYAN Chow MD  [phone]      August 21, 2023     Caridad Lorenzana  YOB: 1955    ENDOSCOPY DISCHARGE INSTRUCTIONS    If there is redness at IV site you should apply warm compress to area. If redness or soreness persist contact Dr. Pam Richmond or your primary care doctor. Gaseous discomfort may develop, but walking, belching will help relieve this. You may not operate a vehicle for 12 hours  You may not operate machinery or dangerous appliances for rest of today  You may not drink alcoholic beverages for 12 hours  Avoid making any critical decisions for 24 hours    DIET:  You may resume your normal diet, but some patients find that heavy or large meals may lead to indigestion or vomiting. I suggest a light meal as first food intake. MEDICATIONS:  The use of some over-the-counter pain medication may lead to bleeding after biopsies or other procedures you may have had done. Tylenol (also called acetaminophen) is safe to take and will not lead to bleeding. Based on the procedure you had today you {Actions; may/not:76288} safely take aspirin or aspirin-like products for the next seven (7) days. ACTIVITY:  You may resume your normal household activities, but it is recommended that you spend the remainder of the day resting -  avoid any strenuous activity. CALL DR. Marino Sánchez OFFICE IF:  Increasing pain, nausea, vomiting  Abdominal distension (swelling)  Significant new or increased bleeding (oral or rectal)  Fever/Chills  Chest pain/shortness of breath                   Additional instructions:   Impression: Mild, non-obstructive distal ring. Not dilated given no dysphagia. Otherwise normal upper endoscopy.             Recommendations:  - pending upper GI series  - follow up with Dr. Lisa Rome  - can resume eliquis and plavix today      Please let me or my office

## 2023-08-21 NOTE — PERIOP NOTE

## 2023-08-21 NOTE — ANESTHESIA PRE PROCEDURE
Department of Anesthesiology  Preprocedure Note       Name:  Lisa Jean Baptiste   Age:  76 y.o.  :  1955                                          MRN:  [id number]         Date:  2023      Surgeon: Topher Weiss):  Pop Urbina MD    Procedure: Procedure(s):  EGD ESOPHAGOGASTRODUODENOSCOPY    Medications prior to admission:   Prior to Admission medications    Medication Sig Start Date End Date Taking? Authorizing Provider   aspirin 81 MG chewable tablet Take 1 tablet by mouth daily   Yes Historical Provider, MD   butalbital-acetaminophen-caffeine-codeine (FIORICET WITH CODEINE) -55-30 MG per capsule Take 1 capsule by mouth every 12 hours as needed.     Historical Provider, MD   famotidine (PEPCID) 40 MG tablet Take 1 tablet by mouth 2 times daily    AMAURY Vazquez CNP   Multiple Vitamin (MULTIVITAMIN) tablet Take 1 tablet by mouth daily    Historical Provider, MD   blood glucose test strips (ASCENSIA AUTODISC VI;ONE TOUCH ULTRA TEST VI) strip Finger stick blood sugars Daily DX E11.9 21   Historical Provider, MD   Blood Glucose Monitoring Suppl (FREESTYLE FREEDOM LITE) w/Device KIT FreeStyle Freedom Lite kit    Historical Provider, MD   blood glucose test strips (EXACTECH TEST) strip FreeStyle Lite Strips    Historical Provider, MD   fluticasone (FLONASE) 50 MCG/ACT nasal spray 2 sprays by Nasal route daily    AMAURY Vazquez CNP   vitamin D (CHOLECALCIFEROL) 25 MCG (1000 UT) TABS tablet Take 2 tablets by mouth daily    AMAURY Vazquez CNP   ezetimibe (ZETIA) 10 MG tablet Take 1 tablet by mouth daily    AMAURY Vazquez CNP   acetaminophen (TYLENOL) 325 MG tablet Take 2 tablets by mouth every 6 hours as needed 21   Ar Automatic Reconciliation   amLODIPine (NORVASC) 2.5 MG tablet Take 1 tablet by mouth 21   Ar Automatic Reconciliation   amLODIPine (NORVASC) 5 MG tablet Take 1 tablet by mouth daily    Ar

## 2023-08-21 NOTE — H&P
76 y.o. male presents for diagnostic upper endoscopy for abdominal pain. Additional H&P data will be attached on the day of procedure.     Magno Alcocer MD

## 2023-08-21 NOTE — ANESTHESIA POSTPROCEDURE EVALUATION
Department of Anesthesiology  Postprocedure Note    Patient: Nidia Keating  MRN: [id number]  YOB: 1955  Date of evaluation: 8/21/2023      Procedure Summary     Date: 08/21/23 Room / Location: [facility] ENDO 02 / [facility] ENDOSCOPY    Anesthesia Start: 0803 Anesthesia Stop: 9623    Procedures:       EGD ESOPHAGOGASTRODUODENOSCOPY (Upper GI Region)      EGD BIOPSY (Upper GI Region) Diagnosis:       Encounter for long-term (current) use of antiplatelets/antithrombotics      Nausea      Heartburn      Gastroesophageal reflux disease, unspecified whether esophagitis present      (Encounter for long-term (current) use of antiplatelets/antithrombotics [Z79.02])      (Nausea [R11.0])      (Heartburn [R12])      (Gastroesophageal reflux disease, unspecified whether esophagitis present [K21.9])    Surgeons: Zak Keith MD Responsible Provider: Michelle Zapata DO    Anesthesia Type: MAC ASA Status: 3          Anesthesia Type: MAC    Rosaura Phase I: Rosaura Score: 10    Rosaura Phase II: Rosaura Score: 9      Anesthesia Post Evaluation    Patient location during evaluation: PACU  Level of consciousness: awake  Airway patency: patent  Nausea & Vomiting: no nausea  Complications: no  Cardiovascular status: hemodynamically stable  Respiratory status: acceptable  Hydration status: stable  Multimodal analgesia pain management approach  Pain management: adequate

## 2023-09-24 ASSESSMENT — ENCOUNTER SYMPTOMS: ABDOMINAL PAIN: 1

## 2023-10-18 ENCOUNTER — TELEMEDICINE (OUTPATIENT)
Facility: CLINIC | Age: 68
End: 2023-10-18
Payer: MEDICARE

## 2023-10-18 DIAGNOSIS — E55.9 VITAMIN D DEFICIENCY: ICD-10-CM

## 2023-10-18 DIAGNOSIS — R73.03 PREDIABETES: ICD-10-CM

## 2023-10-18 DIAGNOSIS — R53.83 LOW ENERGY: Primary | ICD-10-CM

## 2023-10-18 DIAGNOSIS — M25.512 ACUTE PAIN OF LEFT SHOULDER: ICD-10-CM

## 2023-10-18 PROCEDURE — G8427 DOCREV CUR MEDS BY ELIG CLIN: HCPCS | Performed by: FAMILY MEDICINE

## 2023-10-18 PROCEDURE — 3017F COLORECTAL CA SCREEN DOC REV: CPT | Performed by: FAMILY MEDICINE

## 2023-10-18 PROCEDURE — G8484 FLU IMMUNIZE NO ADMIN: HCPCS | Performed by: FAMILY MEDICINE

## 2023-10-18 PROCEDURE — 1036F TOBACCO NON-USER: CPT | Performed by: FAMILY MEDICINE

## 2023-10-18 PROCEDURE — 1123F ACP DISCUSS/DSCN MKR DOCD: CPT | Performed by: FAMILY MEDICINE

## 2023-10-18 PROCEDURE — G8417 CALC BMI ABV UP PARAM F/U: HCPCS | Performed by: FAMILY MEDICINE

## 2023-10-18 PROCEDURE — 99214 OFFICE O/P EST MOD 30 MIN: CPT | Performed by: FAMILY MEDICINE

## 2023-10-18 NOTE — PROGRESS NOTES
1. Have you been to the ER, urgent care clinic since your last visit? Hospitalized since your last visit? No    2. Have you seen or consulted any other health care providers outside of the [address] [place] [address] since your last visit? Include any pap smears or colon screening.  Yes,  Cardiologist    Chief Complaint   Patient presents with    Requesting labs    Shoulder Pain     Left     Patient-Reported Vitals  Patient-Reported Systolic (Top): 914 mmHg  Patient-Reported Diastolic (Bottom): 84 mmHg

## 2023-10-18 NOTE — PROGRESS NOTES
Subjective  Chief Complaint   Patient presents with    Requesting labs    Shoulder Pain     Left     HPI:  Gladys Cobos is a 76 y.o. male with medical problems as listed below who presents for shoulder pain and low energy. Left shoulder pain: Patient has had this shoulder pain several years ago at which time and XR showed osteoarthritis. He is currently having a flare. Used a pain patch last week. Flared starting 2 weeks ago. He has had this problem years ago at which point an Xray showed some arthritis. He has never done     Low energy: Patient saw Dr. Akira Mcgowan recently and was concerned that some of his CV medications were affecting his energy level, specifically his blood thinner (Plavix and Eliquis). He reports low energy, especially when he wakes up in the morning. It takes him a long time to get up and go. Dr. Akira Mcgowan suggested he have his thyroid function checked by his PCP. He had the patient wear a heart monitor for 3 days and has follow up in November. Of note, patient has a history of vitamin D deficiency.  He had thyroid function testing about 1 year ago which was normal.     Patient Active Problem List   Diagnosis    Testosterone deficiency    Hypertension    Anxiety    Obstructive sleep apnea syndrome    GERD (gastroesophageal reflux disease)    Atrial fibrillation, unspecified type (HCC)    Benign prostatic hyperplasia with urinary obstruction    Scrotal varices    Chronic back pain    Vitamin D deficiency    Hyperlipidemia    Migraine    Coronary arteriosclerosis    Prediabetes    Osteoarthritis of neck    Acute erosive gastritis    Aphthous ulcer    Benign paroxysmal positional vertigo    Blepharitis    Colitis    Cyst of oral soft tissue    Myopia    Disorder of scrotum    Dry eye syndrome of bilateral lacrimal glands    Dyslipidemia    Dyspepsia    Eczema    Gastric ulcer    Gastroparesis    Heartburn    Herniated lumbar intervertebral disc    Herniation of intervertebral disc of cervical

## 2023-10-30 ENCOUNTER — TRANSCRIBE ORDERS (OUTPATIENT)
Facility: HOSPITAL | Age: 68
End: 2023-10-30

## 2023-10-30 DIAGNOSIS — N18.30 STAGE 3 CHRONIC KIDNEY DISEASE, UNSPECIFIED WHETHER STAGE 3A OR 3B CKD (HCC): Primary | ICD-10-CM

## 2023-11-01 LAB
25(OH)D3+25(OH)D2 SERPL-MCNC: 55.9 NG/ML (ref 30–100)
HBA1C MFR BLD: 6.2 % (ref 4.8–5.6)
TSH SERPL DL<=0.005 MIU/L-ACNC: 2.29 UIU/ML (ref 0.45–4.5)

## 2023-11-02 ENCOUNTER — HOSPITAL ENCOUNTER (OUTPATIENT)
Facility: HOSPITAL | Age: 68
Discharge: HOME | End: 2023-11-02
Payer: MEDICARE

## 2023-11-02 DIAGNOSIS — M25.512 ACUTE PAIN OF LEFT SHOULDER: ICD-10-CM

## 2023-11-02 PROCEDURE — 73030 X-RAY EXAM OF SHOULDER: CPT

## 2023-11-07 ENCOUNTER — PATIENT MESSAGE (OUTPATIENT)
Facility: CLINIC | Age: 68
End: 2023-11-07

## 2023-11-07 DIAGNOSIS — R73.03 PREDIABETES: Primary | ICD-10-CM

## 2023-11-07 RX ORDER — LANCETS 30 GAUGE
1 EACH MISCELLANEOUS DAILY
Qty: 100 EACH | Refills: 3 | Status: SHIPPED | OUTPATIENT
Start: 2023-11-07

## 2023-11-07 RX ORDER — GLUCOSAMINE HCL/CHONDROITIN SU 500-400 MG
CAPSULE ORAL
Qty: 100 STRIP | Refills: 3 | Status: SHIPPED | OUTPATIENT
Start: 2023-11-07

## 2023-11-14 ENCOUNTER — HOSPITAL ENCOUNTER (OUTPATIENT)
Facility: HOSPITAL | Age: 68
Discharge: HOME | End: 2023-11-17
Attending: INTERNAL MEDICINE
Payer: MEDICARE

## 2023-11-14 DIAGNOSIS — N18.30 STAGE 3 CHRONIC KIDNEY DISEASE, UNSPECIFIED WHETHER STAGE 3A OR 3B CKD (HCC): ICD-10-CM

## 2023-11-14 PROCEDURE — 76770 US EXAM ABDO BACK WALL COMP: CPT

## 2023-11-25 ENCOUNTER — PATIENT MESSAGE (OUTPATIENT)
Facility: CLINIC | Age: 68
End: 2023-11-25

## 2023-11-28 NOTE — TELEPHONE ENCOUNTER
Requested Prescriptions     Pending Prescriptions Disp Refills    Blood Glucose Monitoring Suppl (FREESTYLE FREEDOM LITE) w/Device KIT 1 kit

## 2023-11-28 NOTE — TELEPHONE ENCOUNTER
From: Gladys Cobos  To: Dr. Damien Foreman: 11/25/2023 8:08 AM EST  Subject: Follow Up Questions Re: A1C    Dr. Juan Monahan,  I went to [address] [place] [address] to  the glucometer and sugar testing supplies last week. I received the testing supplies. There was no glucometer.     r/  Gladys Cobos

## 2023-11-30 RX ORDER — BLOOD-GLUCOSE METER
KIT MISCELLANEOUS
Qty: 1 KIT | Refills: 0 | Status: SHIPPED | OUTPATIENT
Start: 2023-11-30

## 2023-12-02 SDOH — ECONOMIC STABILITY: FOOD INSECURITY: WITHIN THE PAST 12 MONTHS, YOU WORRIED THAT YOUR FOOD WOULD RUN OUT BEFORE YOU GOT MONEY TO BUY MORE.: NEVER TRUE

## 2023-12-02 SDOH — ECONOMIC STABILITY: FOOD INSECURITY: WITHIN THE PAST 12 MONTHS, THE FOOD YOU BOUGHT JUST DIDN'T LAST AND YOU DIDN'T HAVE MONEY TO GET MORE.: NEVER TRUE

## 2023-12-02 SDOH — ECONOMIC STABILITY: INCOME INSECURITY: HOW HARD IS IT FOR YOU TO PAY FOR THE VERY BASICS LIKE FOOD, HOUSING, MEDICAL CARE, AND HEATING?: NOT HARD AT ALL

## 2023-12-02 ASSESSMENT — PATIENT HEALTH QUESTIONNAIRE - PHQ9
SUM OF ALL RESPONSES TO PHQ QUESTIONS 1-9: 1
4. FEELING TIRED OR HAVING LITTLE ENERGY: 0
SUM OF ALL RESPONSES TO PHQ9 QUESTIONS 1 & 2: 0
SUM OF ALL RESPONSES TO PHQ QUESTIONS 1-9: 0
6. FEELING BAD ABOUT YOURSELF - OR THAT YOU ARE A FAILURE OR HAVE LET YOURSELF OR YOUR FAMILY DOWN: NOT AT ALL
SUM OF ALL RESPONSES TO PHQ QUESTIONS 1-9: 0
2. FEELING DOWN, DEPRESSED OR HOPELESS: 0
6. FEELING BAD ABOUT YOURSELF - OR THAT YOU ARE A FAILURE OR HAVE LET YOURSELF OR YOUR FAMILY DOWN: 0
8. MOVING OR SPEAKING SO SLOWLY THAT OTHER PEOPLE COULD HAVE NOTICED. OR THE OPPOSITE, BEING SO FIGETY OR RESTLESS THAT YOU HAVE BEEN MOVING AROUND A LOT MORE THAN USUAL: 0
1. LITTLE INTEREST OR PLEASURE IN DOING THINGS: 0
SUM OF ALL RESPONSES TO PHQ QUESTIONS 1-9: 1
10. IF YOU CHECKED OFF ANY PROBLEMS, HOW DIFFICULT HAVE THESE PROBLEMS MADE IT FOR YOU TO DO YOUR WORK, TAKE CARE OF THINGS AT HOME, OR GET ALONG WITH OTHER PEOPLE: 0
2. FEELING DOWN, DEPRESSED OR HOPELESS: 0
7. TROUBLE CONCENTRATING ON THINGS, SUCH AS READING THE NEWSPAPER OR WATCHING TELEVISION: 0
SUM OF ALL RESPONSES TO PHQ9 QUESTIONS 1 & 2: 0
SUM OF ALL RESPONSES TO PHQ9 QUESTIONS 1 & 2: 0
SUM OF ALL RESPONSES TO PHQ QUESTIONS 1-9: 1
8. MOVING OR SPEAKING SO SLOWLY THAT OTHER PEOPLE COULD HAVE NOTICED. OR THE OPPOSITE - BEING SO FIDGETY OR RESTLESS THAT YOU HAVE BEEN MOVING AROUND A LOT MORE THAN USUAL: NOT AT ALL
3. TROUBLE FALLING OR STAYING ASLEEP: 1
2. FEELING DOWN, DEPRESSED OR HOPELESS: NOT AT ALL
1. LITTLE INTEREST OR PLEASURE IN DOING THINGS: 0
5. POOR APPETITE OR OVEREATING: 0
SUM OF ALL RESPONSES TO PHQ QUESTIONS 1-9: 0
1. LITTLE INTEREST OR PLEASURE IN DOING THINGS: NOT AT ALL
7. TROUBLE CONCENTRATING ON THINGS, SUCH AS READING THE NEWSPAPER OR WATCHING TELEVISION: NOT AT ALL
9. THOUGHTS THAT YOU WOULD BE BETTER OFF DEAD, OR OF HURTING YOURSELF: 0
SUM OF ALL RESPONSES TO PHQ QUESTIONS 1-9: 1
4. FEELING TIRED OR HAVING LITTLE ENERGY: NOT AT ALL
10. IF YOU CHECKED OFF ANY PROBLEMS, HOW DIFFICULT HAVE THESE PROBLEMS MADE IT FOR YOU TO DO YOUR WORK, TAKE CARE OF THINGS AT HOME, OR GET ALONG WITH OTHER PEOPLE: NOT DIFFICULT AT ALL
SUM OF ALL RESPONSES TO PHQ QUESTIONS 1-9: 0
5. POOR APPETITE OR OVEREATING: NOT AT ALL
3. TROUBLE FALLING OR STAYING ASLEEP: SEVERAL DAYS
9. THOUGHTS THAT YOU WOULD BE BETTER OFF DEAD, OR OF HURTING YOURSELF: NOT AT ALL
SUM OF ALL RESPONSES TO PHQ QUESTIONS 1-9: 1

## 2023-12-05 ENCOUNTER — OFFICE VISIT (OUTPATIENT)
Facility: CLINIC | Age: 68
End: 2023-12-05

## 2023-12-05 VITALS
OXYGEN SATURATION: 96 % | RESPIRATION RATE: 18 BRPM | TEMPERATURE: 98 F | DIASTOLIC BLOOD PRESSURE: 69 MMHG | HEART RATE: 74 BPM | HEIGHT: 69 IN | SYSTOLIC BLOOD PRESSURE: 122 MMHG | BODY MASS INDEX: 29.62 KG/M2 | WEIGHT: 200 LBS

## 2023-12-05 DIAGNOSIS — G47.33 OBSTRUCTIVE SLEEP APNEA SYNDROME: ICD-10-CM

## 2023-12-05 DIAGNOSIS — R73.03 PREDIABETES: Primary | ICD-10-CM

## 2023-12-05 DIAGNOSIS — I48.91 ATRIAL FIBRILLATION, UNSPECIFIED TYPE (HCC): ICD-10-CM

## 2023-12-05 DIAGNOSIS — G89.29 CHRONIC LOW BACK PAIN, UNSPECIFIED BACK PAIN LATERALITY, UNSPECIFIED WHETHER SCIATICA PRESENT: ICD-10-CM

## 2023-12-05 DIAGNOSIS — I10 PRIMARY HYPERTENSION: ICD-10-CM

## 2023-12-05 DIAGNOSIS — I25.10 CORONARY ARTERIOSCLEROSIS: ICD-10-CM

## 2023-12-05 DIAGNOSIS — M54.50 CHRONIC LOW BACK PAIN, UNSPECIFIED BACK PAIN LATERALITY, UNSPECIFIED WHETHER SCIATICA PRESENT: ICD-10-CM

## 2023-12-05 DIAGNOSIS — K21.9 GASTROESOPHAGEAL REFLUX DISEASE, UNSPECIFIED WHETHER ESOPHAGITIS PRESENT: ICD-10-CM

## 2023-12-05 PROBLEM — M54.2 CERVICALGIA: Status: RESOLVED | Noted: 2017-10-23 | Resolved: 2023-12-05

## 2023-12-05 PROBLEM — R10.13 EPIGASTRIC PAIN: Status: RESOLVED | Noted: 2023-08-02 | Resolved: 2023-12-05

## 2023-12-05 PROBLEM — B35.1 ONYCHOMYCOSIS DUE TO DERMATOPHYTE: Status: RESOLVED | Noted: 2023-08-02 | Resolved: 2023-12-05

## 2023-12-05 PROBLEM — E78.5 HYPERLIPIDEMIA: Status: RESOLVED | Noted: 2020-08-07 | Resolved: 2023-12-05

## 2023-12-05 PROBLEM — L90.9 HYPERTROPHIC AND ATROPHIC CONDITION OF SKIN: Status: RESOLVED | Noted: 2023-08-02 | Resolved: 2023-12-05

## 2023-12-05 PROBLEM — H16.9 KERATITIS: Status: RESOLVED | Noted: 2023-08-02 | Resolved: 2023-12-05

## 2023-12-05 PROBLEM — H01.009 BLEPHARITIS: Status: RESOLVED | Noted: 2023-08-02 | Resolved: 2023-12-05

## 2023-12-05 PROBLEM — R10.13 DYSPEPSIA: Status: RESOLVED | Noted: 2023-08-02 | Resolved: 2023-12-05

## 2023-12-05 PROBLEM — L91.9 HYPERTROPHIC AND ATROPHIC CONDITION OF SKIN: Status: RESOLVED | Noted: 2023-08-02 | Resolved: 2023-12-05

## 2023-12-05 PROBLEM — R35.0 INCREASED FREQUENCY OF URINATION: Status: RESOLVED | Noted: 2023-08-02 | Resolved: 2023-12-05

## 2023-12-05 PROBLEM — R53.81 MALAISE: Status: RESOLVED | Noted: 2023-08-02 | Resolved: 2023-12-05

## 2023-12-05 PROBLEM — R12 HEARTBURN: Status: RESOLVED | Noted: 2023-01-27 | Resolved: 2023-12-05

## 2023-12-05 PROBLEM — K29.00 ACUTE EROSIVE GASTRITIS: Status: RESOLVED | Noted: 2023-08-02 | Resolved: 2023-12-05

## 2023-12-05 PROBLEM — N50.9 DISORDER OF SCROTUM: Status: RESOLVED | Noted: 2023-08-02 | Resolved: 2023-12-05

## 2023-12-05 PROBLEM — K12.0 APHTHOUS ULCER: Status: RESOLVED | Noted: 2023-08-02 | Resolved: 2023-12-05

## 2023-12-05 PROBLEM — K09.9 CYST OF ORAL SOFT TISSUE: Status: RESOLVED | Noted: 2023-08-02 | Resolved: 2023-12-05

## 2023-12-05 RX ORDER — LIDOCAINE 50 MG/G
1 PATCH TOPICAL DAILY
Qty: 10 PATCH | Refills: 1 | Status: SHIPPED | OUTPATIENT
Start: 2023-12-05 | End: 2023-12-15

## 2023-12-05 ASSESSMENT — ANXIETY QUESTIONNAIRES
5. BEING SO RESTLESS THAT IT IS HARD TO SIT STILL: 0
2. NOT BEING ABLE TO STOP OR CONTROL WORRYING: 0
1. FEELING NERVOUS, ANXIOUS, OR ON EDGE: 0
3. WORRYING TOO MUCH ABOUT DIFFERENT THINGS: 0
4. TROUBLE RELAXING: 0
IF YOU CHECKED OFF ANY PROBLEMS ON THIS QUESTIONNAIRE, HOW DIFFICULT HAVE THESE PROBLEMS MADE IT FOR YOU TO DO YOUR WORK, TAKE CARE OF THINGS AT HOME, OR GET ALONG WITH OTHER PEOPLE: NOT DIFFICULT AT ALL
GAD7 TOTAL SCORE: 0
6. BECOMING EASILY ANNOYED OR IRRITABLE: 0
7. FEELING AFRAID AS IF SOMETHING AWFUL MIGHT HAPPEN: 0

## 2023-12-10 ENCOUNTER — PATIENT MESSAGE (OUTPATIENT)
Facility: CLINIC | Age: 68
End: 2023-12-10

## 2023-12-11 DIAGNOSIS — E55.9 VITAMIN D DEFICIENCY: ICD-10-CM

## 2023-12-11 NOTE — TELEPHONE ENCOUNTER
From: Basia Hood  To: Dr. Hussein Flores: 12/10/2023 12:39 PM EST  Subject: Prescription Refill    Dr. Jerome Godoy,    I need a prescription refill for the following:  Cholecalciferol 25 MCG (1000 units) tablets  I have no more refills left.     r/  Basia Hood

## 2023-12-11 NOTE — TELEPHONE ENCOUNTER
----- Message from Radha Vega sent at 12/10/2023 12:39 PM EST -----  Regarding: Prescription Refill  Contact: [phone]  Dr. Starr Dunbar,    I need a prescription refill for the following:  Cholecalciferol 25 MCG (1000 units) tablets  I have no more refills left.     r/  Radha Vega

## 2023-12-17 PROBLEM — K25.9 GASTRIC ULCER: Status: RESOLVED | Noted: 2018-12-14 | Resolved: 2023-12-17

## 2024-01-26 ENCOUNTER — HOSPITAL ENCOUNTER (EMERGENCY)
Facility: HOSPITAL | Age: 69
Discharge: HOME | End: 2024-01-26
Attending: EMERGENCY MEDICINE
Payer: MEDICARE

## 2024-01-26 ENCOUNTER — APPOINTMENT (OUTPATIENT)
Facility: HOSPITAL | Age: 69
End: 2024-01-26
Payer: MEDICARE

## 2024-01-26 VITALS
DIASTOLIC BLOOD PRESSURE: 73 MMHG | TEMPERATURE: 98.6 F | HEIGHT: 69 IN | RESPIRATION RATE: 16 BRPM | HEART RATE: 74 BPM | BODY MASS INDEX: 29.33 KG/M2 | SYSTOLIC BLOOD PRESSURE: 142 MMHG | OXYGEN SATURATION: 98 % | WEIGHT: 198 LBS

## 2024-01-26 DIAGNOSIS — R20.2 FACIAL PARESTHESIA: Primary | ICD-10-CM

## 2024-01-26 LAB
ANION GAP SERPL CALC-SCNC: 11 MMOL/L (ref 5–15)
BASOPHILS # BLD: 0.1 K/UL (ref 0–1)
BASOPHILS NFR BLD: 1 % (ref 0–1)
BUN SERPL-MCNC: 13 MG/DL (ref 8–23)
BUN/CREAT SERPL: 10 (ref 12–20)
CALCIUM SERPL-MCNC: 9.2 MG/DL (ref 8.8–10.2)
CHLORIDE SERPL-SCNC: 106 MMOL/L (ref 98–107)
CO2 SERPL-SCNC: 26 MMOL/L (ref 22–29)
CREAT SERPL-MCNC: 1.31 MG/DL (ref 0.7–1.2)
DIFFERENTIAL METHOD BLD: ABNORMAL
EKG ATRIAL RATE: 82 BPM
EKG DIAGNOSIS: NORMAL
EKG P AXIS: 48 DEGREES
EKG P-R INTERVAL: 146 MS
EKG Q-T INTERVAL: 362 MS
EKG QRS DURATION: 84 MS
EKG QTC CALCULATION (BAZETT): 422 MS
EKG R AXIS: 6 DEGREES
EKG T AXIS: 18 DEGREES
EKG VENTRICULAR RATE: 82 BPM
EOSINOPHIL # BLD: 0.1 K/UL (ref 0–0.4)
EOSINOPHIL NFR BLD: 1 % (ref 0–7)
ERYTHROCYTE [DISTWIDTH] IN BLOOD BY AUTOMATED COUNT: 13.6 % (ref 11.5–14.5)
GLUCOSE SERPL-MCNC: 109 MG/DL (ref 65–100)
HCT VFR BLD AUTO: 45.8 % (ref 36.6–50.3)
HGB BLD-MCNC: 15 G/DL (ref 12.1–17)
IMM GRANULOCYTES # BLD AUTO: 0 K/UL (ref 0–0.04)
IMM GRANULOCYTES NFR BLD AUTO: 0 % (ref 0–0.5)
LYMPHOCYTES # BLD: 4.1 K/UL (ref 0.8–3.5)
LYMPHOCYTES NFR BLD: 45 % (ref 12–49)
MAGNESIUM SERPL-MCNC: 2 MG/DL (ref 1.6–2.4)
MCH RBC QN AUTO: 29 PG (ref 26–34)
MCHC RBC AUTO-ENTMCNC: 32.8 G/DL (ref 30–36.5)
MCV RBC AUTO: 88.4 FL (ref 80–99)
MONOCYTES # BLD: 0.8 K/UL (ref 0–1)
MONOCYTES NFR BLD: 9 % (ref 5–13)
NEUTS SEG # BLD: 4 K/UL (ref 1.8–8)
NEUTS SEG NFR BLD: 44 % (ref 32–75)
NRBC # BLD: 0 K/UL (ref 0–0.01)
NRBC BLD-RTO: 0 PER 100 WBC
PLATELET # BLD AUTO: 223 K/UL (ref 150–400)
PMV BLD AUTO: 10.6 FL (ref 8.9–12.9)
POTASSIUM SERPL-SCNC: 3.7 MMOL/L (ref 3.5–5.1)
RBC # BLD AUTO: 5.18 M/UL (ref 4.1–5.7)
SODIUM SERPL-SCNC: 143 MMOL/L (ref 136–145)
TROPONIN I BLD-MCNC: <0.04 NG/ML (ref 0–0.08)
WBC # BLD AUTO: 9.1 K/UL (ref 4.1–11.1)

## 2024-01-26 PROCEDURE — 93010 ELECTROCARDIOGRAM REPORT: CPT | Performed by: STUDENT IN AN ORGANIZED HEALTH CARE EDUCATION/TRAINING PROGRAM

## 2024-01-26 PROCEDURE — 70450 CT HEAD/BRAIN W/O DYE: CPT

## 2024-01-26 PROCEDURE — 99284 EMERGENCY DEPT VISIT MOD MDM: CPT

## 2024-01-26 PROCEDURE — 93005 ELECTROCARDIOGRAM TRACING: CPT | Performed by: EMERGENCY MEDICINE

## 2024-01-26 PROCEDURE — 85025 COMPLETE CBC W/AUTO DIFF WBC: CPT

## 2024-01-26 PROCEDURE — 83735 ASSAY OF MAGNESIUM: CPT

## 2024-01-26 PROCEDURE — 84484 ASSAY OF TROPONIN QUANT: CPT

## 2024-01-26 PROCEDURE — 36415 COLL VENOUS BLD VENIPUNCTURE: CPT

## 2024-01-26 PROCEDURE — 80048 BASIC METABOLIC PNL TOTAL CA: CPT

## 2024-01-26 ASSESSMENT — ENCOUNTER SYMPTOMS
DIARRHEA: 0
COUGH: 0
VOMITING: 0
ABDOMINAL PAIN: 0
WHEEZING: 0
ABDOMINAL DISTENTION: 0
SHORTNESS OF BREATH: 0
ANAL BLEEDING: 0
NAUSEA: 0
BLOOD IN STOOL: 0

## 2024-01-26 ASSESSMENT — PAIN - FUNCTIONAL ASSESSMENT: PAIN_FUNCTIONAL_ASSESSMENT: NONE - DENIES PAIN

## 2024-01-26 NOTE — ED PROVIDER NOTES
recommended, particularly when comparing to results calculated using previous equations.  The CKD-EPI equation is less accurate in patients with extremes of muscle mass, extra-renal metabolism of creatinine, excessive creatine ingestion, or following therapy that affects renal tubular secretion.      Calcium 01/26/2024 9.2  8.8 - 10.2 MG/DL Final    Magnesium 01/26/2024 2.0  1.6 - 2.4 mg/dL Final    POC Troponin I 01/26/2024 <0.04  0.00 - 0.08 ng/mL Final    Comment: (NOTE)  The presence of detectable troponin indicates myocardial injury which   may be due to ischemia, myocarditis, trauma, etc. Clinical   correlation is necessary to determine the significance of this   finding. Recommend establishing a baseline troponin using main   clinical laboratory method if serial determinations are anticipated.           EMERGENCY DEPARTMENT COURSE and DIFFERENTIAL DIAGNOSIS/MDM:   Vitals:    Vitals:    01/26/24 0230   BP: (!) 142/73   Pulse: 74   Resp: 16   Temp: 98.6 °F (37 °C)   TempSrc: Oral   SpO2: 98%   Weight: 89.8 kg (198 lb)   Height: 1.753 m (5' 9\")           Medical Decision Making  69M w/ hx HTN, KELLIE, CAD s/p coronary stents, AF p/w 1d left sided facial paresthesias. Pt well appearing, afebrile, hemodynamically stable w/o resp distress or hypoxia. Atypical distribution of symptoms. Has no focal neuro deficits including CN2-12 intact, visual fields, cerebellar testing, extremity motor/sensory exam normal. CTH neg for acute findings. Unlikely acute CNS process including ischemic stroke and already on DOAC and plavix. EKG SR w/o ST changes (old TWI). Trop neg and no CP/SOB so doubt ACS. Labs including lytes unremarkable.    Patient given specific return precautions and explained signs/symptoms for which to come back to ED immediately but otherwise advised to f/u w/ PCP over next 2-3days.      Amount and/or Complexity of Data Reviewed  Labs: ordered. Decision-making details documented in ED Course.  Radiology: ordered

## 2024-01-26 NOTE — ED NOTES
Discharge instructions reviewed, discharge papers given and pt teaching completed. No prescription(s) discussed. Pt instructed to follow up with PCP regarding today's visit. Pt also instructed to report to ED if symptoms return, worsen, don't subside, and/or with onset of new symptoms.

## 2024-01-26 NOTE — ED TRIAGE NOTES
Pt arrived to ED ambulatory with cc of left cheek numbness and left eye discomfort that started around 1900.. Reports he went to sleep and when he woke up the sensation was still there. Denies tooth or mouth problems, denies eye drainage. Reports pain 0/10. Reports history of TMJ on left side of face.     Patient reports blood thinner use and reports cardiac history.

## 2024-02-26 ENCOUNTER — HOSPITAL ENCOUNTER (OUTPATIENT)
Facility: HOSPITAL | Age: 69
Discharge: HOME | End: 2024-02-29
Payer: MEDICARE

## 2024-02-26 VITALS
OXYGEN SATURATION: 99 % | HEIGHT: 70 IN | TEMPERATURE: 97.9 F | WEIGHT: 207 LBS | RESPIRATION RATE: 18 BRPM | HEART RATE: 73 BPM | DIASTOLIC BLOOD PRESSURE: 83 MMHG | SYSTOLIC BLOOD PRESSURE: 136 MMHG | BODY MASS INDEX: 29.63 KG/M2

## 2024-02-26 LAB
ALBUMIN SERPL-MCNC: 3.5 G/DL (ref 3.5–5)
ALBUMIN/GLOB SERPL: 0.9 (ref 1.1–2.2)
ALP SERPL-CCNC: 62 U/L (ref 45–117)
ALT SERPL-CCNC: 32 U/L (ref 12–78)
ANION GAP SERPL CALC-SCNC: 1 MMOL/L (ref 5–15)
APTT PPP: 32.6 SEC (ref 21.2–34.1)
AST SERPL W P-5'-P-CCNC: 17 U/L (ref 15–37)
BILIRUB SERPL-MCNC: 0.3 MG/DL (ref 0.2–1)
BUN SERPL-MCNC: 11 MG/DL (ref 6–20)
BUN/CREAT SERPL: 9 (ref 12–20)
CA-I BLD-MCNC: 8.7 MG/DL (ref 8.5–10.1)
CHLORIDE SERPL-SCNC: 111 MMOL/L (ref 97–108)
CO2 SERPL-SCNC: 29 MMOL/L (ref 21–32)
CREAT SERPL-MCNC: 1.29 MG/DL (ref 0.7–1.3)
ERYTHROCYTE [DISTWIDTH] IN BLOOD BY AUTOMATED COUNT: 13.5 % (ref 11.5–14.5)
GLOBULIN SER CALC-MCNC: 3.8 G/DL (ref 2–4)
GLUCOSE SERPL-MCNC: 91 MG/DL (ref 65–100)
HCT VFR BLD AUTO: 43.9 % (ref 36.6–50.3)
HGB BLD-MCNC: 14.6 G/DL (ref 12.1–17)
INR PPP: 1 (ref 0.9–1.1)
MCH RBC QN AUTO: 29.2 PG (ref 26–34)
MCHC RBC AUTO-ENTMCNC: 33.3 G/DL (ref 30–36.5)
MCV RBC AUTO: 87.8 FL (ref 80–99)
NRBC # BLD: 0 K/UL (ref 0–0.01)
NRBC BLD-RTO: 0 PER 100 WBC
PLATELET # BLD AUTO: 229 K/UL (ref 150–400)
PMV BLD AUTO: 10.6 FL (ref 8.9–12.9)
POTASSIUM SERPL-SCNC: 3.8 MMOL/L (ref 3.5–5.1)
PROT SERPL-MCNC: 7.3 G/DL (ref 6.4–8.2)
PROTHROMBIN TIME: 13.1 SEC (ref 11.9–14.6)
RBC # BLD AUTO: 5 M/UL (ref 4.1–5.7)
SODIUM SERPL-SCNC: 141 MMOL/L (ref 136–145)
THERAPEUTIC RANGE: NORMAL SEC (ref 82–109)
WBC # BLD AUTO: 8.5 K/UL (ref 4.1–11.1)

## 2024-02-26 PROCEDURE — 80053 COMPREHEN METABOLIC PANEL: CPT

## 2024-02-26 PROCEDURE — 85610 PROTHROMBIN TIME: CPT

## 2024-02-26 PROCEDURE — 36415 COLL VENOUS BLD VENIPUNCTURE: CPT

## 2024-02-26 PROCEDURE — 93005 ELECTROCARDIOGRAM TRACING: CPT | Performed by: INTERNAL MEDICINE

## 2024-02-26 PROCEDURE — 85027 COMPLETE CBC AUTOMATED: CPT

## 2024-02-26 PROCEDURE — 85730 THROMBOPLASTIN TIME PARTIAL: CPT

## 2024-02-26 RX ORDER — LIDOCAINE 50 MG/G
1 PATCH TOPICAL DAILY
COMMUNITY

## 2024-02-27 LAB
EKG ATRIAL RATE: 70 BPM
EKG DIAGNOSIS: NORMAL
EKG P AXIS: 25 DEGREES
EKG P-R INTERVAL: 136 MS
EKG Q-T INTERVAL: 388 MS
EKG QRS DURATION: 88 MS
EKG QTC CALCULATION (BAZETT): 419 MS
EKG R AXIS: -4 DEGREES
EKG T AXIS: -8 DEGREES
EKG VENTRICULAR RATE: 70 BPM

## 2024-02-29 ENCOUNTER — HOSPITAL ENCOUNTER (INPATIENT)
Facility: HOSPITAL | Age: 69
LOS: 1 days | Discharge: HOME HEALTH | DRG: 322 | End: 2024-03-01
Attending: INTERNAL MEDICINE | Admitting: INTERNAL MEDICINE
Payer: MEDICARE

## 2024-02-29 DIAGNOSIS — R94.8 ABNORMAL POSITRON EMISSION TOMOGRAPHY (PET) SCAN: ICD-10-CM

## 2024-02-29 PROBLEM — I25.119 CHEST PAIN DUE TO CORONARY ARTERY DISEASE (HCC): Status: ACTIVE | Noted: 2024-02-29

## 2024-02-29 PROBLEM — R06.09 DYSPNEA ON EXERTION: Status: ACTIVE | Noted: 2024-02-29

## 2024-02-29 LAB
ACT BLD: 244 SEC (ref 74–125)
ACT BLD: 266 SEC (ref 74–125)
ACT BLD: 293 SEC (ref 74–125)
ECHO BSA: 2.15 M2
PERFORMED BY:: ABNORMAL

## 2024-02-29 PROCEDURE — 93005 ELECTROCARDIOGRAM TRACING: CPT | Performed by: INTERNAL MEDICINE

## 2024-02-29 PROCEDURE — 6360000002 HC RX W HCPCS: Performed by: INTERNAL MEDICINE

## 2024-02-29 PROCEDURE — 92978 ENDOLUMINL IVUS OCT C 1ST: CPT | Performed by: INTERNAL MEDICINE

## 2024-02-29 PROCEDURE — 6360000004 HC RX CONTRAST MEDICATION: Performed by: INTERNAL MEDICINE

## 2024-02-29 PROCEDURE — 7100000000 HC PACU RECOVERY - FIRST 15 MIN: Performed by: INTERNAL MEDICINE

## 2024-02-29 PROCEDURE — C1769 GUIDE WIRE: HCPCS | Performed by: INTERNAL MEDICINE

## 2024-02-29 PROCEDURE — 85347 COAGULATION TIME ACTIVATED: CPT

## 2024-02-29 PROCEDURE — 2580000003 HC RX 258: Performed by: INTERNAL MEDICINE

## 2024-02-29 PROCEDURE — 2500000003 HC RX 250 WO HCPCS: Performed by: INTERNAL MEDICINE

## 2024-02-29 PROCEDURE — 2709999900 HC NON-CHARGEABLE SUPPLY: Performed by: INTERNAL MEDICINE

## 2024-02-29 PROCEDURE — 027036Z DILATION OF CORONARY ARTERY, ONE ARTERY WITH THREE DRUG-ELUTING INTRALUMINAL DEVICES, PERCUTANEOUS APPROACH: ICD-10-PCS | Performed by: INTERNAL MEDICINE

## 2024-02-29 PROCEDURE — C1894 INTRO/SHEATH, NON-LASER: HCPCS | Performed by: INTERNAL MEDICINE

## 2024-02-29 PROCEDURE — C1874 STENT, COATED/COV W/DEL SYS: HCPCS | Performed by: INTERNAL MEDICINE

## 2024-02-29 PROCEDURE — 6370000000 HC RX 637 (ALT 250 FOR IP): Performed by: INTERNAL MEDICINE

## 2024-02-29 PROCEDURE — 4A023N7 MEASUREMENT OF CARDIAC SAMPLING AND PRESSURE, LEFT HEART, PERCUTANEOUS APPROACH: ICD-10-PCS | Performed by: INTERNAL MEDICINE

## 2024-02-29 PROCEDURE — C1887 CATHETER, GUIDING: HCPCS | Performed by: INTERNAL MEDICINE

## 2024-02-29 PROCEDURE — 6370000000 HC RX 637 (ALT 250 FOR IP): Performed by: HOSPITALIST

## 2024-02-29 PROCEDURE — 7100000001 HC PACU RECOVERY - ADDTL 15 MIN: Performed by: INTERNAL MEDICINE

## 2024-02-29 PROCEDURE — B2111ZZ FLUOROSCOPY OF MULTIPLE CORONARY ARTERIES USING LOW OSMOLAR CONTRAST: ICD-10-PCS | Performed by: INTERNAL MEDICINE

## 2024-02-29 PROCEDURE — 99153 MOD SED SAME PHYS/QHP EA: CPT | Performed by: INTERNAL MEDICINE

## 2024-02-29 PROCEDURE — 93458 L HRT ARTERY/VENTRICLE ANGIO: CPT | Performed by: INTERNAL MEDICINE

## 2024-02-29 PROCEDURE — C9600 PERC DRUG-EL COR STENT SING: HCPCS | Performed by: INTERNAL MEDICINE

## 2024-02-29 PROCEDURE — 93571 IV DOP VEL&/PRESS C FLO 1ST: CPT | Performed by: INTERNAL MEDICINE

## 2024-02-29 PROCEDURE — C1753 CATH, INTRAVAS ULTRASOUND: HCPCS | Performed by: INTERNAL MEDICINE

## 2024-02-29 PROCEDURE — 76937 US GUIDE VASCULAR ACCESS: CPT | Performed by: INTERNAL MEDICINE

## 2024-02-29 PROCEDURE — G0378 HOSPITAL OBSERVATION PER HR: HCPCS

## 2024-02-29 PROCEDURE — 99152 MOD SED SAME PHYS/QHP 5/>YRS: CPT | Performed by: INTERNAL MEDICINE

## 2024-02-29 PROCEDURE — B241ZZ3 ULTRASONOGRAPHY OF MULTIPLE CORONARY ARTERIES, INTRAVASCULAR: ICD-10-PCS | Performed by: INTERNAL MEDICINE

## 2024-02-29 PROCEDURE — 4A033BC MEASUREMENT OF ARTERIAL PRESSURE, CORONARY, PERCUTANEOUS APPROACH: ICD-10-PCS | Performed by: INTERNAL MEDICINE

## 2024-02-29 DEVICE — STENT CORONARY ONYX FRONTIER RX 4X15 MM ZOTAROLIMUS ELUTE: Type: IMPLANTABLE DEVICE | Status: FUNCTIONAL

## 2024-02-29 DEVICE — STENT CORONARY ONYX FRONTIER RX 3X18 MM ZOTAROLIMUS ELUT: Type: IMPLANTABLE DEVICE | Status: FUNCTIONAL

## 2024-02-29 DEVICE — STENT ONYXNG40008UX ONYX 4.00X08RX
Type: IMPLANTABLE DEVICE | Status: FUNCTIONAL
Brand: ONYX FRONTIER™

## 2024-02-29 RX ORDER — SODIUM CHLORIDE 0.9 % (FLUSH) 0.9 %
5-40 SYRINGE (ML) INJECTION EVERY 12 HOURS SCHEDULED
Status: DISCONTINUED | OUTPATIENT
Start: 2024-02-29 | End: 2024-03-01 | Stop reason: HOSPADM

## 2024-02-29 RX ORDER — CLOPIDOGREL BISULFATE 75 MG/1
75 TABLET ORAL DAILY
Status: DISCONTINUED | OUTPATIENT
Start: 2024-03-01 | End: 2024-03-01 | Stop reason: HOSPADM

## 2024-02-29 RX ORDER — LIDOCAINE HYDROCHLORIDE 10 MG/ML
INJECTION, SOLUTION INFILTRATION; PERINEURAL PRN
Status: DISCONTINUED | OUTPATIENT
Start: 2024-02-29 | End: 2024-02-29 | Stop reason: HOSPADM

## 2024-02-29 RX ORDER — CLOPIDOGREL 300 MG/1
TABLET, FILM COATED ORAL PRN
Status: DISCONTINUED | OUTPATIENT
Start: 2024-02-29 | End: 2024-02-29 | Stop reason: HOSPADM

## 2024-02-29 RX ORDER — BUTALBITAL, ACETAMINOPHEN AND CAFFEINE 50; 325; 40 MG/1; MG/1; MG/1
1 TABLET ORAL EVERY 12 HOURS PRN
Status: DISCONTINUED | OUTPATIENT
Start: 2024-02-29 | End: 2024-03-01 | Stop reason: HOSPADM

## 2024-02-29 RX ORDER — FAMOTIDINE 20 MG/1
20 TABLET, FILM COATED ORAL 2 TIMES DAILY
Status: DISCONTINUED | OUTPATIENT
Start: 2024-02-29 | End: 2024-03-01 | Stop reason: HOSPADM

## 2024-02-29 RX ORDER — ASPIRIN 81 MG/1
81 TABLET, CHEWABLE ORAL DAILY
Status: DISCONTINUED | OUTPATIENT
Start: 2024-03-01 | End: 2024-03-01 | Stop reason: HOSPADM

## 2024-02-29 RX ORDER — FENTANYL CITRATE 50 UG/ML
INJECTION, SOLUTION INTRAMUSCULAR; INTRAVENOUS PRN
Status: DISCONTINUED | OUTPATIENT
Start: 2024-02-29 | End: 2024-02-29 | Stop reason: HOSPADM

## 2024-02-29 RX ORDER — ONDANSETRON 2 MG/ML
4 INJECTION INTRAMUSCULAR; INTRAVENOUS EVERY 6 HOURS PRN
Status: DISCONTINUED | OUTPATIENT
Start: 2024-02-29 | End: 2024-03-01 | Stop reason: HOSPADM

## 2024-02-29 RX ORDER — SODIUM CHLORIDE 9 MG/ML
INJECTION, SOLUTION INTRAVENOUS CONTINUOUS
Status: DISCONTINUED | OUTPATIENT
Start: 2024-02-29 | End: 2024-03-01 | Stop reason: HOSPADM

## 2024-02-29 RX ORDER — NICARDIPINE HYDROCHLORIDE 2.5 MG/ML
INJECTION INTRAVENOUS PRN
Status: DISCONTINUED | OUTPATIENT
Start: 2024-02-29 | End: 2024-02-29 | Stop reason: HOSPADM

## 2024-02-29 RX ORDER — AMLODIPINE BESYLATE 2.5 MG/1
2.5 TABLET ORAL NIGHTLY
Status: DISCONTINUED | OUTPATIENT
Start: 2024-02-29 | End: 2024-03-01 | Stop reason: HOSPADM

## 2024-02-29 RX ORDER — HEPARIN SODIUM 1000 [USP'U]/ML
INJECTION, SOLUTION INTRAVENOUS; SUBCUTANEOUS PRN
Status: DISCONTINUED | OUTPATIENT
Start: 2024-02-29 | End: 2024-02-29 | Stop reason: HOSPADM

## 2024-02-29 RX ORDER — ASPIRIN 81 MG/1
TABLET, CHEWABLE ORAL PRN
Status: DISCONTINUED | OUTPATIENT
Start: 2024-02-29 | End: 2024-02-29 | Stop reason: HOSPADM

## 2024-02-29 RX ORDER — MIDAZOLAM HYDROCHLORIDE 1 MG/ML
INJECTION INTRAMUSCULAR; INTRAVENOUS PRN
Status: DISCONTINUED | OUTPATIENT
Start: 2024-02-29 | End: 2024-02-29 | Stop reason: HOSPADM

## 2024-02-29 RX ORDER — 0.9 % SODIUM CHLORIDE 0.9 %
INTRAVENOUS SOLUTION INTRAVENOUS CONTINUOUS PRN
Status: COMPLETED | OUTPATIENT
Start: 2024-02-29 | End: 2024-02-29

## 2024-02-29 RX ORDER — SODIUM CHLORIDE 0.9 % (FLUSH) 0.9 %
5-40 SYRINGE (ML) INJECTION PRN
Status: DISCONTINUED | OUTPATIENT
Start: 2024-02-29 | End: 2024-03-01 | Stop reason: HOSPADM

## 2024-02-29 RX ORDER — HEPARIN SODIUM 200 [USP'U]/100ML
INJECTION, SOLUTION INTRAVENOUS CONTINUOUS PRN
Status: COMPLETED | OUTPATIENT
Start: 2024-02-29 | End: 2024-02-29

## 2024-02-29 RX ORDER — SODIUM CHLORIDE 9 MG/ML
INJECTION, SOLUTION INTRAVENOUS PRN
Status: DISCONTINUED | OUTPATIENT
Start: 2024-02-29 | End: 2024-03-01 | Stop reason: HOSPADM

## 2024-02-29 RX ORDER — DIPHENHYDRAMINE HYDROCHLORIDE 50 MG/ML
INJECTION INTRAMUSCULAR; INTRAVENOUS PRN
Status: DISCONTINUED | OUTPATIENT
Start: 2024-02-29 | End: 2024-02-29 | Stop reason: HOSPADM

## 2024-02-29 RX ORDER — SODIUM CHLORIDE 9 MG/ML
INJECTION, SOLUTION INTRAVENOUS CONTINUOUS PRN
Status: COMPLETED | OUTPATIENT
Start: 2024-02-29 | End: 2024-02-29

## 2024-02-29 RX ORDER — ACETAMINOPHEN 325 MG/1
650 TABLET ORAL EVERY 4 HOURS PRN
Status: DISCONTINUED | OUTPATIENT
Start: 2024-02-29 | End: 2024-03-01 | Stop reason: HOSPADM

## 2024-02-29 RX ADMIN — SODIUM CHLORIDE, PRESERVATIVE FREE 10 ML: 5 INJECTION INTRAVENOUS at 22:29

## 2024-02-29 RX ADMIN — AMLODIPINE BESYLATE 2.5 MG: 2.5 TABLET ORAL at 22:24

## 2024-02-29 RX ADMIN — FAMOTIDINE 20 MG: 20 TABLET, FILM COATED ORAL at 22:24

## 2024-02-29 RX ADMIN — SODIUM CHLORIDE: 9 INJECTION, SOLUTION INTRAVENOUS at 08:13

## 2024-02-29 ASSESSMENT — PAIN DESCRIPTION - LOCATION: LOCATION: WRIST

## 2024-02-29 ASSESSMENT — PAIN - FUNCTIONAL ASSESSMENT: PAIN_FUNCTIONAL_ASSESSMENT: NONE - DENIES PAIN

## 2024-02-29 ASSESSMENT — PAIN DESCRIPTION - ORIENTATION: ORIENTATION: RIGHT

## 2024-02-29 ASSESSMENT — PAIN SCALES - GENERAL: PAINLEVEL_OUTOF10: 5

## 2024-02-29 NOTE — PROGRESS NOTES
Information about the importance of antiplatelet medication compliance and cardiac rehab will be included in the patient's discharge paperwork. Patient was also provided with a folder containing this information from the cath lab staff after the procedure.    Patient was scheduled for an initial evaluation appointment for 3/27/24 at 1:30. This appointment, along with the address and contact information for the cardiac rehab facility to which he was referred will be included in the patient's discharge paperwork. Patient's information was forwarded to the cardiac rehab facility. Cardiac rehab staff will follow up to remind patient of this appointment.

## 2024-02-29 NOTE — PROGRESS NOTES
4 Eyes Skin Assessment     NAME:  Viraj Cho  YOB: 1955  MEDICAL RECORD NUMBER:  [id number]    The patient is being assessed for  Admission    I agree that at least one RN has performed a thorough Head to Toe Skin Assessment on the patient. ALL assessment sites listed below have been assessed.      Areas assessed by both nurses:    Head, Face, Ears, Shoulders, Back, Chest, Arms, Elbows, Hands, Sacrum. Buttock, Coccyx, Ischium, and Legs. Feet and Heels        Does the Patient have a Wound? No noted wound(s)       Jr Prevention initiated by RN: No  Wound Care Orders initiated by RN: No    Pressure Injury (Stage 3,4, Unstageable, DTI, NWPT, and Complex wounds) if present, place Wound referral order by RN under : No    New Ostomies, if present place, Ostomy referral order under : No     Nurse 1 eSignature: Electronically signed by Elizabeth Adhikari RN on 2/29/24 at 6:32 PM EST    **SHARE this note so that the co-signing nurse can place an eSignature**    Nurse 2 eSignature: Electronically signed by Santiago Vega RN on 2/29/24 at 7:08 PM EST

## 2024-02-29 NOTE — DISCHARGE INSTRUCTIONS
IMPORTANT    People who undergo angioplasty and/or stent placement procedures are prescribed aspirin along with certain medications called anticlotting or antiplatelet medications. These medications include: Plavix (clopidogrel), Effient (prasugrel), and Brilinta (ticagrelor). It is important to take the aspirin and the anticlotting medication that you were prescribed every day in order to reduce the probability that the stent will become filled with blood clot. Angioplasty and/or stent placement procedures are done so that a blocked artery can be opened. If the stent becomes filled with blood clot, the artery becomes blocked off again. This can result in a heart attack, or even death.    It is extremely important to take all the medicines prescribed by your cardiologist exactly as they were prescribed. Failure to take certain medications - particularly aspirin along with Plavix (clopidogrel),  Effient (prasugrel), or Brilinta(ticagrelor) - can result in a heart attack, or even death. Do not miss any doses. It is vital that aspirin along with Plavix, Effient, or Brilinta be taken every single day. If you have any questions or concerns regarding your medications, please consult your cardiologist. He/she is the only person who can adjust or stop these medications.    You must fill the prescription for these medications immediately upon discharge so that you do not miss any doses. If you cannot afford the medication prescribed to you, please let your cardiologist know immediately.  ____________________________________________________________________________________    Your cardiologist has ordered cardiac rehabilitation for you as part of your recovery process. Cardiac rehab is a very important way to help you to feel better and stronger more quickly as well as reduce the risks of heart problems in the future.    Cardiac rehabilitation services are provided at:  [facility] [address]  [address]  [place], [place] [address]  [phone]    An appointment has been scheduled  for your initial assessment to begin cardiac rehabilitation.  This appointment is scheduled for:    Wednesday, March 27 at 1:30    Your information has been forwarded to the staff at this facility.  Please bring your insurance information and a list of your current medications with you.  If you need to change this appointment, please call [phone] as soon as possible to reschedule.

## 2024-03-01 VITALS
HEART RATE: 67 BPM | WEIGHT: 207 LBS | BODY MASS INDEX: 29.63 KG/M2 | OXYGEN SATURATION: 100 % | TEMPERATURE: 97.9 F | DIASTOLIC BLOOD PRESSURE: 74 MMHG | RESPIRATION RATE: 14 BRPM | HEIGHT: 70 IN | SYSTOLIC BLOOD PRESSURE: 106 MMHG

## 2024-03-01 LAB
ANION GAP SERPL CALC-SCNC: 2 MMOL/L (ref 5–15)
BUN SERPL-MCNC: 13 MG/DL (ref 6–20)
BUN/CREAT SERPL: 10 (ref 12–20)
CA-I BLD-MCNC: 8.1 MG/DL (ref 8.5–10.1)
CHLORIDE SERPL-SCNC: 113 MMOL/L (ref 97–108)
CHOLEST SERPL-MCNC: 147 MG/DL
CO2 SERPL-SCNC: 26 MMOL/L (ref 21–32)
CREAT SERPL-MCNC: 1.33 MG/DL (ref 0.7–1.3)
EKG ATRIAL RATE: 69 BPM
EKG ATRIAL RATE: 71 BPM
EKG DIAGNOSIS: NORMAL
EKG DIAGNOSIS: NORMAL
EKG P AXIS: -4 DEGREES
EKG P AXIS: 22 DEGREES
EKG P-R INTERVAL: 134 MS
EKG P-R INTERVAL: 138 MS
EKG Q-T INTERVAL: 396 MS
EKG Q-T INTERVAL: 400 MS
EKG QRS DURATION: 86 MS
EKG QRS DURATION: 88 MS
EKG QTC CALCULATION (BAZETT): 424 MS
EKG QTC CALCULATION (BAZETT): 434 MS
EKG R AXIS: -12 DEGREES
EKG R AXIS: -9 DEGREES
EKG T AXIS: 21 DEGREES
EKG T AXIS: 53 DEGREES
EKG VENTRICULAR RATE: 69 BPM
EKG VENTRICULAR RATE: 71 BPM
ERYTHROCYTE [DISTWIDTH] IN BLOOD BY AUTOMATED COUNT: 13.8 % (ref 11.5–14.5)
GLUCOSE SERPL-MCNC: 104 MG/DL (ref 65–100)
HCT VFR BLD AUTO: 40.4 % (ref 36.6–50.3)
HDLC SERPL-MCNC: 54 MG/DL
HDLC SERPL: 2.7 (ref 0–5)
HGB BLD-MCNC: 13.6 G/DL (ref 12.1–17)
LDLC SERPL CALC-MCNC: 77.6 MG/DL (ref 0–100)
LIPID PANEL: NORMAL
MCH RBC QN AUTO: 29.4 PG (ref 26–34)
MCHC RBC AUTO-ENTMCNC: 33.7 G/DL (ref 30–36.5)
MCV RBC AUTO: 87.3 FL (ref 80–99)
NRBC # BLD: 0 K/UL (ref 0–0.01)
NRBC BLD-RTO: 0 PER 100 WBC
PLATELET # BLD AUTO: 204 K/UL (ref 150–400)
PMV BLD AUTO: 10.7 FL (ref 8.9–12.9)
POTASSIUM SERPL-SCNC: 4 MMOL/L (ref 3.5–5.1)
RBC # BLD AUTO: 4.63 M/UL (ref 4.1–5.7)
SODIUM SERPL-SCNC: 141 MMOL/L (ref 136–145)
TRIGL SERPL-MCNC: 77 MG/DL
VLDLC SERPL CALC-MCNC: 15.4 MG/DL
WBC # BLD AUTO: 8.7 K/UL (ref 4.1–11.1)

## 2024-03-01 PROCEDURE — 80048 BASIC METABOLIC PNL TOTAL CA: CPT

## 2024-03-01 PROCEDURE — 85027 COMPLETE CBC AUTOMATED: CPT

## 2024-03-01 PROCEDURE — 36415 COLL VENOUS BLD VENIPUNCTURE: CPT

## 2024-03-01 PROCEDURE — 80061 LIPID PANEL: CPT

## 2024-03-01 PROCEDURE — 2060000000 HC ICU INTERMEDIATE R&B

## 2024-03-01 PROCEDURE — 93005 ELECTROCARDIOGRAM TRACING: CPT | Performed by: INTERNAL MEDICINE

## 2024-03-01 PROCEDURE — 6370000000 HC RX 637 (ALT 250 FOR IP): Performed by: INTERNAL MEDICINE

## 2024-03-01 PROCEDURE — G0378 HOSPITAL OBSERVATION PER HR: HCPCS

## 2024-03-01 RX ORDER — ASPIRIN 81 MG/1
81 TABLET, CHEWABLE ORAL DAILY
Qty: 30 TABLET | Refills: 3 | Status: SHIPPED | OUTPATIENT
Start: 2024-03-02

## 2024-03-01 RX ADMIN — FAMOTIDINE 20 MG: 20 TABLET, FILM COATED ORAL at 09:14

## 2024-03-01 RX ADMIN — ASPIRIN 81 MG: 81 TABLET, CHEWABLE ORAL at 09:14

## 2024-03-01 RX ADMIN — CLOPIDOGREL BISULFATE 75 MG: 75 TABLET ORAL at 09:14

## 2024-03-01 RX ADMIN — APIXABAN 5 MG: 5 TABLET, FILM COATED ORAL at 09:14

## 2024-03-01 ASSESSMENT — PAIN SCALES - GENERAL: PAINLEVEL_OUTOF10: 0

## 2024-03-01 NOTE — DISCHARGE INSTR - DIET

## 2024-03-01 NOTE — DISCHARGE SUMMARY
Cardiology Discharge Summary     Patient ID:    Viraj Cho  [id number]  69 y.o.  1955    Admit date: 2/29/2024    Discharge date : 3/1/2024    Final Diagnoses: CAD    Reason for Hospitalization:  Observation overnight s/p cardiac cath     Hospital Course: Patient presented yesterday for scheduled cardiac catheterization. S/p PCI  to ostial, proximal, and mid LAD with 3 JENNIFER. Reports feeling well overnight. Denies chest pain and shortness of breath. Has ambulated throughout his room without dizziness. Will need to continue triple therapy for three months, followed by Lyudmila and Plavix lifelong.     Plan: Discharge home this morning. Follow-up as scheduled below.     Discharge Medications:      Medication List        START taking these medications      aspirin 81 MG chewable tablet  Take 1 tablet by mouth daily  Start taking on: March 2, 2024            CONTINUE taking these medications      acetaminophen 325 MG tablet  Commonly known as: TYLENOL     * amLODIPine 5 MG tablet  Commonly known as: NORVASC     * amLODIPine 2.5 MG tablet  Commonly known as: NORVASC     apixaban 5 MG Tabs tablet  Commonly known as: ELIQUIS     blood glucose test strips  Test 1 time a day & as needed for symptoms of irregular blood glucose. Dispense sufficient amount for indicated testing frequency plus additional to accommodate PRN testing needs.     BUTALBITAL-APAP-CAFF-COD PO     clopidogrel 75 MG tablet  Commonly known as: PLAVIX     famotidine 40 MG tablet  Commonly known as: PEPCID  Take 1 tablet by mouth 2 times daily     fluticasone 50 MCG/ACT nasal spray  Commonly known as: FLONASE  2 sprays by Nasal route daily     FreeStyle Freedom Lite w/Device Kit  Check glucose daily and as needed.     Lancets Misc  1 each by Does not apply route daily     lidocaine 5 %  Commonly known as: LIDODERM     Repatha SureClick 140 MG/ML Soaj  Generic drug: Evolocumab           * This list has 2 medication(s) that are the

## 2024-03-01 NOTE — PROGRESS NOTES
Transition of Care Plan:    RUR: obs  Prior Level of Functioning: independent  Disposition: home with wife and cardiac rehab  If SNF or IPR: Date FOC offered: n/a  Date FOC received: n/a  Accepting facility: n/a  Date authorization started with reference number: n/a  Date authorization received and expires: n/a  Follow up appointments: yes  DME needed: n/a  Transportation at discharge: wife  IM/IMM Medicare/ letter given: n/a  Is patient a [place] and connected with [facility]? N/a   If yes, was [place] transfer form completed and [facility] notified?   Caregiver Contact: n/a  Discharge Caregiver contacted prior to discharge? N/a  Care Conference needed? N/a  Barriers to discharge: n/a

## 2024-03-05 ENCOUNTER — TELEPHONE (OUTPATIENT)
Facility: CLINIC | Age: 69
End: 2024-03-05

## 2024-03-05 NOTE — TELEPHONE ENCOUNTER
Care Transitions Initial Follow Up Call    Outreach made within 2 business days of discharge: Yes    Patient: Viraj Cho Patient : 1955   MRN: [id number]  Reason for Admission: CAD     Spoke with: left vm     Discharge department/facility: [facility]      Scheduled appointment with PCP within 7-14 days    Follow Up  Future Appointments   Date Time Provider Department Center   3/27/2024  1:30 PM [facility]   2024  8:30 AM Anita Bah DO [facility]       Guerline Lawler MA

## 2024-03-12 ENCOUNTER — OFFICE VISIT (OUTPATIENT)
Facility: CLINIC | Age: 69
End: 2024-03-12

## 2024-03-12 VITALS
WEIGHT: 205 LBS | DIASTOLIC BLOOD PRESSURE: 72 MMHG | TEMPERATURE: 97.8 F | HEART RATE: 70 BPM | HEIGHT: 69 IN | RESPIRATION RATE: 18 BRPM | OXYGEN SATURATION: 98 % | BODY MASS INDEX: 30.36 KG/M2 | SYSTOLIC BLOOD PRESSURE: 127 MMHG

## 2024-03-12 DIAGNOSIS — I48.91 ATRIAL FIBRILLATION, UNSPECIFIED TYPE (HCC): ICD-10-CM

## 2024-03-12 DIAGNOSIS — I25.10 CORONARY ARTERIOSCLEROSIS: ICD-10-CM

## 2024-03-12 DIAGNOSIS — N18.30 STAGE 3 CHRONIC KIDNEY DISEASE, UNSPECIFIED WHETHER STAGE 3A OR 3B CKD (HCC): ICD-10-CM

## 2024-03-12 DIAGNOSIS — M25.551 RIGHT HIP PAIN: Primary | ICD-10-CM

## 2024-03-12 DIAGNOSIS — E78.5 DYSLIPIDEMIA: ICD-10-CM

## 2024-03-12 RX ORDER — BEMPEDOIC ACID 180 MG/1
TABLET, FILM COATED ORAL
COMMUNITY

## 2024-03-12 ASSESSMENT — PATIENT HEALTH QUESTIONNAIRE - PHQ9
SUM OF ALL RESPONSES TO PHQ QUESTIONS 1-9: 0
1. LITTLE INTEREST OR PLEASURE IN DOING THINGS: NOT AT ALL
2. FEELING DOWN, DEPRESSED OR HOPELESS: NOT AT ALL
SUM OF ALL RESPONSES TO PHQ9 QUESTIONS 1 & 2: 0

## 2024-03-12 NOTE — PROGRESS NOTES
\"Have you been to the ER, urgent care clinic since your last visit?  Hospitalized since your last visit?\"    Yes, [facility], and [facility]    “Have you seen or consulted any other health care providers outside of [facility] since your last visit?”    NO      Chief Complaint   Patient presents with    Follow-Up from Hospital    Hip Pain     X 1month     /72 (Site: Right Upper Arm, Position: Sitting, Cuff Size: Large Adult)   Pulse 70   Temp 97.8 °F (36.6 °C) (Temporal)   Resp 18   Ht 1.753 m (5' 9\")   Wt 93 kg (205 lb)   SpO2 98%   BMI 30.27 kg/m²            
   Sciatica    Solitary pulmonary nodule    Tear film insufficiency    Dyspnea on exertion    Chest pain due to coronary artery disease (HCC)     Family History   Problem Relation Age of Onset    Diabetes Mother     Hypertension Father     Stroke Father     Heart Disease Father       Social History     Tobacco Use    Smoking status: Never    Smokeless tobacco: Never   Vaping Use    Vaping Use: Never used   Substance Use Topics    Alcohol use: No    Drug use: No     Current Outpatient Medications on File Prior to Visit   Medication Sig Dispense Refill    Bempedoic Acid (NEXLETOL) 180 MG TABS Take by mouth      aspirin 81 MG chewable tablet Take 1 tablet by mouth daily 30 tablet 3    lidocaine (LIDODERM) 5 % Place 1 patch onto the skin daily 12 hours on, 12 hours off. backpain      Blood Glucose Monitoring Suppl (FREESTYLE FREEDOM LITE) w/Device KIT Check glucose daily and as needed. 1 kit 0    blood glucose monitor strips Test 1 time a day & as needed for symptoms of irregular blood glucose. Dispense sufficient amount for indicated testing frequency plus additional to accommodate PRN testing needs. 100 strip 3    Lancets MISC 1 each by Does not apply route daily 100 each 3    BUTALBITAL-APAP-CAFF-COD PO Take 1 capsule by mouth every 12 hours as needed for Migraine. Taking butalbital - apap and caffeine      famotidine (PEPCID) 40 MG tablet Take 1 tablet by mouth 2 times daily 180 tablet 3    fluticasone (FLONASE) 50 MCG/ACT nasal spray 2 sprays by Nasal route daily 48 g 3    acetaminophen (TYLENOL) 325 MG tablet Take 2 tablets by mouth every 6 hours as needed for Pain      amLODIPine (NORVASC) 2.5 MG tablet Take 1 tablet by mouth every evening Evening, 5mg am      amLODIPine (NORVASC) 5 MG tablet Take 1 tablet by mouth daily Morning dose, takes 2.5mg evenings      apixaban (ELIQUIS) 5 MG TABS tablet Take 1 tablet by mouth 2 times daily      clopidogrel (PLAVIX) 75 MG tablet Take 1 tablet by mouth daily

## 2024-03-14 ENCOUNTER — HOSPITAL ENCOUNTER (OUTPATIENT)
Facility: HOSPITAL | Age: 69
Discharge: HOME | End: 2024-03-14
Payer: MEDICARE

## 2024-03-14 DIAGNOSIS — M25.551 RIGHT HIP PAIN: ICD-10-CM

## 2024-03-14 PROCEDURE — 73502 X-RAY EXAM HIP UNI 2-3 VIEWS: CPT

## 2024-03-16 ENCOUNTER — APPOINTMENT (OUTPATIENT)
Facility: HOSPITAL | Age: 69
End: 2024-03-16
Payer: MEDICARE

## 2024-03-16 ENCOUNTER — HOSPITAL ENCOUNTER (EMERGENCY)
Facility: HOSPITAL | Age: 69
Discharge: HOME | End: 2024-03-16
Attending: EMERGENCY MEDICINE
Payer: MEDICARE

## 2024-03-16 VITALS
WEIGHT: 199 LBS | TEMPERATURE: 98.5 F | HEART RATE: 77 BPM | HEIGHT: 69 IN | RESPIRATION RATE: 16 BRPM | OXYGEN SATURATION: 99 % | DIASTOLIC BLOOD PRESSURE: 74 MMHG | SYSTOLIC BLOOD PRESSURE: 112 MMHG | BODY MASS INDEX: 29.47 KG/M2

## 2024-03-16 DIAGNOSIS — M79.89 PAIN AND SWELLING OF RIGHT FOREARM: Primary | ICD-10-CM

## 2024-03-16 DIAGNOSIS — M79.631 PAIN AND SWELLING OF RIGHT FOREARM: Primary | ICD-10-CM

## 2024-03-16 LAB — ECHO BSA: 2.1 M2

## 2024-03-16 PROCEDURE — 99284 EMERGENCY DEPT VISIT MOD MDM: CPT

## 2024-03-16 PROCEDURE — 93971 EXTREMITY STUDY: CPT

## 2024-03-16 ASSESSMENT — PAIN DESCRIPTION - FREQUENCY: FREQUENCY: CONTINUOUS

## 2024-03-16 ASSESSMENT — PAIN - FUNCTIONAL ASSESSMENT
PAIN_FUNCTIONAL_ASSESSMENT: ACTIVITIES ARE NOT PREVENTED
PAIN_FUNCTIONAL_ASSESSMENT: 0-10

## 2024-03-16 ASSESSMENT — PAIN DESCRIPTION - LOCATION: LOCATION: WRIST

## 2024-03-16 ASSESSMENT — PAIN DESCRIPTION - DESCRIPTORS: DESCRIPTORS: ACHING

## 2024-03-16 ASSESSMENT — PAIN DESCRIPTION - PAIN TYPE: TYPE: ACUTE PAIN

## 2024-03-16 ASSESSMENT — PAIN SCALES - GENERAL: PAINLEVEL_OUTOF10: 3

## 2024-03-16 ASSESSMENT — LIFESTYLE VARIABLES
HOW MANY STANDARD DRINKS CONTAINING ALCOHOL DO YOU HAVE ON A TYPICAL DAY: PATIENT DOES NOT DRINK
HOW OFTEN DO YOU HAVE A DRINK CONTAINING ALCOHOL: NEVER

## 2024-03-16 ASSESSMENT — PAIN DESCRIPTION - ONSET: ONSET: SUDDEN

## 2024-03-16 ASSESSMENT — PAIN DESCRIPTION - ORIENTATION: ORIENTATION: RIGHT

## 2024-03-16 NOTE — DISCHARGE INSTRUCTIONS
Your ultrasound is reassuring without concerning findings.  Your symptoms are likely due to some pooling of blood after your procedure.  This will resolve on its own.  We recommend warm compresses for now.  You may travel as you have planned.

## 2024-03-16 NOTE — ED PROVIDER NOTES
[facility] [place] EMERGENCY DEPT  EMERGENCY DEPARTMENT ENCOUNTER      Pt Name: Viraj Cho  MRN: [id number]  Birthdate 1955  Date of evaluation: 3/16/2024  Provider: Dutch Gerardo MD      HISTORY OF PRESENT ILLNESS      69-year-old male with history of coronary disease with a cardiac cath about 2 weeks ago presents to the emergency department with chief complaint of right wrist pain.  This was the site of his catheterization 2 weeks ago.  He was doing well until he woke up this morning with some pain and swelling.    The history is provided by the patient and medical records.           Nursing Notes were reviewed.    REVIEW OF SYSTEMS         Review of Systems        PAST MEDICAL HISTORY     Past Medical History:   Diagnosis Date    A-fib (HCC)     Acute coronary syndrome (HCC) 11/19/2021    Atypical chest pain 11/19/2021    CAD (coronary artery disease) 06/15/2018    Coronary arteriosclerosis 8/7/2020    Gastric ulcer 12/14/2018    GERD (gastroesophageal reflux disease)     H/O seasonal allergies     High cholesterol     Hx of cardiac arrest     VT arrest     Hyperlipidemia 8/7/2020    Hypertension     Ill-defined condition     Urinary urgency    Kidney stone     MI (myocardial infarction) (HCC)     KELLIE on CPAP     2/2/21 pt reports does not use cpap each night, only a few times per week. 2/24: uses dental appliance    Torsades de pointes (HCC) 11/19/2021    Ventricular fibrillation (HCC) 11/19/2021    Ventricular premature beats 11/19/2021    Ventricular tachycardia (HCC) 11/19/2021    Vitamin D deficiency 8/7/2020         SURGICAL HISTORY       Past Surgical History:   Procedure Laterality Date    CARDIAC CATHETERIZATION  02/2020    \"patent stent obtuse marginal 1. significant disease noted in branch artery of RCA.  THis artery appears to be borderline for any kind of intervention\"    CARDIAC CATHETERIZATION  2018    Cardiac cath Stents x2    CARDIAC PROCEDURE N/A 2/29/2024    Left heart cath / coronary  alert.             EMERGENCY DEPARTMENT COURSE and DIFFERENTIAL DIAGNOSIS/MDM:   Vitals:    Vitals:    03/16/24 0816   BP: 138/80   Pulse: 77   Resp: 16   Temp: 98.5 °F (36.9 °C)   TempSrc: Oral   SpO2: 98%   Weight: 90.3 kg (199 lb)   Height: 1.753 m (5' 9\")         Medical Decision Making  69-year-old male presents to the emergency department above with a chief complaint of right wrist pain after coronary catheterization about 2 weeks ago.  He has good pulses on exam with some mild swelling.  Suspect postprocedural hematoma.  Duplex without evidence of DVT.  Recommend warm compresses.  Should self resolve.    Amount and/or Complexity of Data Reviewed  External Data Reviewed: notes.            REASSESSMENT          CONSULTS:  None    PROCEDURES:     Procedures            (Please note that portions of this note were completed with a voice recognition program.  Efforts were made to edit the dictations but occasionally words are mis-transcribed.)    Dutch Gerardo MD (electronically signed)  Emergency Attending Physician              Dutch Gerardo MD  03/16/24 8880

## 2024-03-16 NOTE — ED TRIAGE NOTES
Pt states he has a cardiac cath  with 3 stents placed on 2/29 and woke up this morning with some pain in his right wrist, which was accessed during the procedure. He has some visible swelling around the area also.    He denies any recent fall, injury, or trauma to the area.   Pt denies any cardiac symptoms since his his cath.

## 2024-03-20 ENCOUNTER — HOSPITAL ENCOUNTER (OUTPATIENT)
Facility: HOSPITAL | Age: 69
Setting detail: RECURRING SERIES
Discharge: HOME | End: 2024-03-23
Payer: MEDICARE

## 2024-03-20 VITALS — BODY MASS INDEX: 30.07 KG/M2 | HEIGHT: 69 IN | WEIGHT: 203 LBS

## 2024-03-20 PROCEDURE — 93798 PHYS/QHP OP CAR RHAB W/ECG: CPT

## 2024-03-20 PROCEDURE — 93797 PHYS/QHP OP CAR RHAB WO ECG: CPT

## 2024-03-20 ASSESSMENT — PATIENT HEALTH QUESTIONNAIRE - PHQ9
SUM OF ALL RESPONSES TO PHQ QUESTIONS 1-9: 0
4. FEELING TIRED OR HAVING LITTLE ENERGY: NOT AT ALL
SUM OF ALL RESPONSES TO PHQ QUESTIONS 1-9: 0
10. IF YOU CHECKED OFF ANY PROBLEMS, HOW DIFFICULT HAVE THESE PROBLEMS MADE IT FOR YOU TO DO YOUR WORK, TAKE CARE OF THINGS AT HOME, OR GET ALONG WITH OTHER PEOPLE: NOT DIFFICULT AT ALL
1. LITTLE INTEREST OR PLEASURE IN DOING THINGS: NOT AT ALL
6. FEELING BAD ABOUT YOURSELF - OR THAT YOU ARE A FAILURE OR HAVE LET YOURSELF OR YOUR FAMILY DOWN: NOT AT ALL
7. TROUBLE CONCENTRATING ON THINGS, SUCH AS READING THE NEWSPAPER OR WATCHING TELEVISION: NOT AT ALL
SUM OF ALL RESPONSES TO PHQ9 QUESTIONS 1 & 2: 0
2. FEELING DOWN, DEPRESSED OR HOPELESS: NOT AT ALL
3. TROUBLE FALLING OR STAYING ASLEEP: NOT AT ALL
8. MOVING OR SPEAKING SO SLOWLY THAT OTHER PEOPLE COULD HAVE NOTICED. OR THE OPPOSITE, BEING SO FIGETY OR RESTLESS THAT YOU HAVE BEEN MOVING AROUND A LOT MORE THAN USUAL: NOT AT ALL
SUM OF ALL RESPONSES TO PHQ QUESTIONS 1-9: 0
5. POOR APPETITE OR OVEREATING: NOT AT ALL
SUM OF ALL RESPONSES TO PHQ QUESTIONS 1-9: 0
9. THOUGHTS THAT YOU WOULD BE BETTER OFF DEAD, OR OF HURTING YOURSELF: NOT AT ALL

## 2024-03-20 ASSESSMENT — EXERCISE STRESS TEST
PEAK_RPE: 12
PEAK_BP: 134/68
PEAK_HR: 91
PEAK_METS: 3.2
PEAK_HR: 91
PEAK_RPD: 0
PEAK_BP: 134/68
PEAK_BP: 134/68
PEAK_RPE: 12

## 2024-03-20 ASSESSMENT — LIFESTYLE VARIABLES: SMOKELESS_TOBACCO: NO

## 2024-03-20 ASSESSMENT — EJECTION FRACTION: EF_VALUE: 55

## 2024-03-20 ASSESSMENT — NEW YORK HEART ASSOCIATION (NYHA) CLASSIFICATION: NYHA FUNCTIONAL CLASS: NO NYHA CLASS OR UNABLE TO DETERMINE

## 2024-03-20 NOTE — CARDIO/PULMONARY
INTAKE APPOINTMENT NOTE  3/20/2024    NAME: Viraj Cho : 1955 AGE: 69 y.o.  GENDER: male    CARDIAC REHAB ADMITTING DIAGNOSIS: S/P coronary artery stent placement [Z95.5]    REFERRING PHYSICIAN: Fly Gutiérrez MD    MEDICAL HX:  Past Medical History:   Diagnosis Date    A-fib ([facility])     Acute coronary syndrome (HCC) 2021    Atypical chest pain 2021    CAD (coronary artery disease) 06/15/2018    Coronary arteriosclerosis 2020    Gastric ulcer 2018    GERD (gastroesophageal reflux disease)     H/O seasonal allergies     High cholesterol     Hx of cardiac arrest     VT arrest     Hyperlipidemia 2020    Hypertension     Ill-defined condition     Urinary urgency    Kidney stone     MI (myocardial infarction) ([facility])     KELLIE on CPAP     21 pt reports does not use cpap each night, only a few times per week. : uses dental appliance    Torsades de pointes (HCC) 2021    Ventricular fibrillation ([facility]) 2021    Ventricular premature beats 2021    Ventricular tachycardia ([facility]) 2021    Vitamin D deficiency 2020       LABS:     No results found for: \"HBA1C\", \"ACD4HLTP\"  Lab Results   Component Value Date/Time    CHOL 147 2024 03:00 AM    HDL 54 2024 03:00 AM    VLDL 17 2022 07:37 AM         ANTHROPOMETRICS:      Ht Readings from Last 1 Encounters:   24 1.753 m (5' 9\")      Wt Readings from Last 1 Encounters:   24 92.1 kg (203 lb)        WAIST: 35\"        VISIT SUMMARY:    Viraj Cho 69 y.o. presented to Cardiac Rehab for program orientation and 6 minute walk test today with a primary diagnosis of S/P coronary artery stent placement [Z95.5]. EF is 55%(24)  Cardiac risk factors include family history, dyslipidemia, hypertension, stress, prior MI.  Viraj Cho is  and lives with spouse. Pt. Has a social hx. Of never smoker, denies alcohol and/or drug use/abuse. Patient was evaluated for     Last 12/121  Next 4/5/22    PDMP 1/2/22 ok to fill 2/1/22  Last labs   AST/SGOT (U/L)   Date Value   08/17/2021 22     ALT/SGPT (U/L)   Date Value   08/17/2021 17     Creatinine (mg/dL)   Date Value   08/17/2021 0.86     CE checked

## 2024-03-21 ENCOUNTER — HOSPITAL ENCOUNTER (OUTPATIENT)
Facility: HOSPITAL | Age: 69
Setting detail: RECURRING SERIES
Discharge: HOME | End: 2024-03-24
Payer: MEDICARE

## 2024-03-21 VITALS — BODY MASS INDEX: 29.98 KG/M2 | WEIGHT: 203 LBS

## 2024-03-21 PROCEDURE — 93798 PHYS/QHP OP CAR RHAB W/ECG: CPT

## 2024-03-21 ASSESSMENT — EXERCISE STRESS TEST
PEAK_RPE: 12
PEAK_HR: 95
PEAK_METS: 3.2

## 2024-03-25 ENCOUNTER — HOSPITAL ENCOUNTER (OUTPATIENT)
Facility: HOSPITAL | Age: 69
Setting detail: RECURRING SERIES
Discharge: HOME | End: 2024-03-28
Payer: MEDICARE

## 2024-03-25 VITALS — BODY MASS INDEX: 29.98 KG/M2 | WEIGHT: 203 LBS

## 2024-03-25 PROCEDURE — 93797 PHYS/QHP OP CAR RHAB WO ECG: CPT

## 2024-03-25 PROCEDURE — 93798 PHYS/QHP OP CAR RHAB W/ECG: CPT

## 2024-03-25 ASSESSMENT — EXERCISE STRESS TEST
PEAK_HR: 95
PEAK_RPE: 12
PEAK_METS: 3.6

## 2024-03-27 ENCOUNTER — HOSPITAL ENCOUNTER (OUTPATIENT)
Facility: HOSPITAL | Age: 69
Setting detail: RECURRING SERIES
Discharge: HOME | End: 2024-03-30
Payer: MEDICARE

## 2024-03-27 ENCOUNTER — APPOINTMENT (OUTPATIENT)
Facility: HOSPITAL | Age: 69
End: 2024-03-27
Payer: MEDICARE

## 2024-03-27 VITALS — WEIGHT: 203 LBS | BODY MASS INDEX: 29.98 KG/M2

## 2024-03-27 PROCEDURE — 93798 PHYS/QHP OP CAR RHAB W/ECG: CPT

## 2024-03-27 ASSESSMENT — EXERCISE STRESS TEST
PEAK_HR: 99
PEAK_METS: 3.6
PEAK_RPE: 12

## 2024-03-28 ENCOUNTER — APPOINTMENT (OUTPATIENT)
Facility: HOSPITAL | Age: 69
End: 2024-03-28
Payer: MEDICARE

## 2024-04-01 ENCOUNTER — HOSPITAL ENCOUNTER (OUTPATIENT)
Facility: HOSPITAL | Age: 69
Setting detail: RECURRING SERIES
Discharge: HOME | End: 2024-04-04
Payer: MEDICARE

## 2024-04-01 VITALS — BODY MASS INDEX: 29.98 KG/M2 | WEIGHT: 203 LBS

## 2024-04-01 PROCEDURE — 93798 PHYS/QHP OP CAR RHAB W/ECG: CPT

## 2024-04-01 PROCEDURE — 93797 PHYS/QHP OP CAR RHAB WO ECG: CPT

## 2024-04-01 ASSESSMENT — EXERCISE STRESS TEST
PEAK_HR: 101
PEAK_METS: 3.8
PEAK_RPE: 12

## 2024-04-02 ENCOUNTER — HOSPITAL ENCOUNTER (OUTPATIENT)
Facility: HOSPITAL | Age: 69
Setting detail: RECURRING SERIES
Discharge: HOME | End: 2024-04-05
Payer: MEDICARE

## 2024-04-02 VITALS — BODY MASS INDEX: 29.98 KG/M2 | WEIGHT: 203 LBS

## 2024-04-02 PROCEDURE — 93798 PHYS/QHP OP CAR RHAB W/ECG: CPT

## 2024-04-02 ASSESSMENT — EXERCISE STRESS TEST
PEAK_HR: 97
PEAK_RPE: 12
PEAK_METS: 3.8

## 2024-04-03 ENCOUNTER — APPOINTMENT (OUTPATIENT)
Facility: HOSPITAL | Age: 69
End: 2024-04-03
Payer: MEDICARE

## 2024-04-04 ENCOUNTER — HOSPITAL ENCOUNTER (OUTPATIENT)
Facility: HOSPITAL | Age: 69
Setting detail: RECURRING SERIES
Discharge: HOME | End: 2024-04-07
Payer: MEDICARE

## 2024-04-04 VITALS — WEIGHT: 203 LBS | BODY MASS INDEX: 29.98 KG/M2

## 2024-04-04 PROCEDURE — 93798 PHYS/QHP OP CAR RHAB W/ECG: CPT

## 2024-04-04 ASSESSMENT — EXERCISE STRESS TEST
PEAK_METS: 4
PEAK_HR: 95
PEAK_RPE: 12

## 2024-04-08 ENCOUNTER — HOSPITAL ENCOUNTER (OUTPATIENT)
Facility: HOSPITAL | Age: 69
Setting detail: RECURRING SERIES
Discharge: HOME | End: 2024-04-11
Payer: MEDICARE

## 2024-04-08 VITALS — WEIGHT: 203 LBS | BODY MASS INDEX: 29.98 KG/M2

## 2024-04-08 PROCEDURE — 93797 PHYS/QHP OP CAR RHAB WO ECG: CPT

## 2024-04-08 PROCEDURE — 93798 PHYS/QHP OP CAR RHAB W/ECG: CPT

## 2024-04-08 ASSESSMENT — EXERCISE STRESS TEST
PEAK_RPE: 12
PEAK_METS: 4
PEAK_HR: 106

## 2024-04-10 ENCOUNTER — HOSPITAL ENCOUNTER (OUTPATIENT)
Facility: HOSPITAL | Age: 69
Setting detail: RECURRING SERIES
Discharge: HOME | End: 2024-04-13
Payer: MEDICARE

## 2024-04-10 VITALS — BODY MASS INDEX: 29.98 KG/M2 | WEIGHT: 203 LBS

## 2024-04-10 PROCEDURE — 93798 PHYS/QHP OP CAR RHAB W/ECG: CPT

## 2024-04-10 ASSESSMENT — EXERCISE STRESS TEST
PEAK_METS: 4.1
PEAK_RPE: 12
PEAK_HR: 97

## 2024-04-11 ENCOUNTER — HOSPITAL ENCOUNTER (OUTPATIENT)
Facility: HOSPITAL | Age: 69
Setting detail: RECURRING SERIES
Discharge: HOME | End: 2024-04-14
Payer: MEDICARE

## 2024-04-11 VITALS — BODY MASS INDEX: 29.98 KG/M2 | WEIGHT: 203 LBS

## 2024-04-11 PROCEDURE — 93798 PHYS/QHP OP CAR RHAB W/ECG: CPT

## 2024-04-11 ASSESSMENT — EXERCISE STRESS TEST
PEAK_METS: 4.1
PEAK_RPE: 12
PEAK_HR: 94

## 2024-04-15 ENCOUNTER — APPOINTMENT (OUTPATIENT)
Facility: HOSPITAL | Age: 69
End: 2024-04-15
Payer: MEDICARE

## 2024-04-17 ENCOUNTER — APPOINTMENT (OUTPATIENT)
Facility: HOSPITAL | Age: 69
End: 2024-04-17
Payer: MEDICARE

## 2024-04-18 ENCOUNTER — APPOINTMENT (OUTPATIENT)
Facility: HOSPITAL | Age: 69
End: 2024-04-18
Payer: MEDICARE

## 2024-04-22 ENCOUNTER — HOSPITAL ENCOUNTER (OUTPATIENT)
Facility: HOSPITAL | Age: 69
Setting detail: RECURRING SERIES
Discharge: HOME | End: 2024-04-25
Payer: MEDICARE

## 2024-04-22 ENCOUNTER — OFFICE VISIT (OUTPATIENT)
Facility: CLINIC | Age: 69
End: 2024-04-22
Payer: MEDICARE

## 2024-04-22 VITALS
RESPIRATION RATE: 18 BRPM | SYSTOLIC BLOOD PRESSURE: 133 MMHG | BODY MASS INDEX: 30.66 KG/M2 | HEART RATE: 98 BPM | OXYGEN SATURATION: 97 % | HEIGHT: 69 IN | TEMPERATURE: 96.3 F | DIASTOLIC BLOOD PRESSURE: 71 MMHG | WEIGHT: 207 LBS

## 2024-04-22 VITALS — WEIGHT: 203 LBS | BODY MASS INDEX: 29.98 KG/M2

## 2024-04-22 DIAGNOSIS — I25.10 CORONARY ARTERIOSCLEROSIS: ICD-10-CM

## 2024-04-22 DIAGNOSIS — I10 PRIMARY HYPERTENSION: ICD-10-CM

## 2024-04-22 DIAGNOSIS — R73.03 PREDIABETES: ICD-10-CM

## 2024-04-22 DIAGNOSIS — N18.30 STAGE 3 CHRONIC KIDNEY DISEASE, UNSPECIFIED WHETHER STAGE 3A OR 3B CKD (HCC): ICD-10-CM

## 2024-04-22 DIAGNOSIS — N40.1 BENIGN PROSTATIC HYPERPLASIA WITH URINARY OBSTRUCTION: ICD-10-CM

## 2024-04-22 DIAGNOSIS — N13.8 BENIGN PROSTATIC HYPERPLASIA WITH URINARY OBSTRUCTION: ICD-10-CM

## 2024-04-22 DIAGNOSIS — Z00.00 MEDICARE ANNUAL WELLNESS VISIT, SUBSEQUENT: Primary | ICD-10-CM

## 2024-04-22 DIAGNOSIS — F41.9 ANXIETY: ICD-10-CM

## 2024-04-22 DIAGNOSIS — I48.91 ATRIAL FIBRILLATION, UNSPECIFIED TYPE (HCC): ICD-10-CM

## 2024-04-22 LAB — HBA1C MFR BLD: 5.8 %

## 2024-04-22 PROCEDURE — 3078F DIAST BP <80 MM HG: CPT | Performed by: FAMILY MEDICINE

## 2024-04-22 PROCEDURE — 93797 PHYS/QHP OP CAR RHAB WO ECG: CPT

## 2024-04-22 PROCEDURE — G0439 PPPS, SUBSEQ VISIT: HCPCS | Performed by: FAMILY MEDICINE

## 2024-04-22 PROCEDURE — G8417 CALC BMI ABV UP PARAM F/U: HCPCS | Performed by: FAMILY MEDICINE

## 2024-04-22 PROCEDURE — 3075F SYST BP GE 130 - 139MM HG: CPT | Performed by: FAMILY MEDICINE

## 2024-04-22 PROCEDURE — 93798 PHYS/QHP OP CAR RHAB W/ECG: CPT

## 2024-04-22 PROCEDURE — 1123F ACP DISCUSS/DSCN MKR DOCD: CPT | Performed by: FAMILY MEDICINE

## 2024-04-22 PROCEDURE — 99214 OFFICE O/P EST MOD 30 MIN: CPT | Performed by: FAMILY MEDICINE

## 2024-04-22 PROCEDURE — 1036F TOBACCO NON-USER: CPT | Performed by: FAMILY MEDICINE

## 2024-04-22 PROCEDURE — 3017F COLORECTAL CA SCREEN DOC REV: CPT | Performed by: FAMILY MEDICINE

## 2024-04-22 PROCEDURE — 83036 HEMOGLOBIN GLYCOSYLATED A1C: CPT | Performed by: FAMILY MEDICINE

## 2024-04-22 PROCEDURE — G8427 DOCREV CUR MEDS BY ELIG CLIN: HCPCS | Performed by: FAMILY MEDICINE

## 2024-04-22 RX ORDER — AMOXICILLIN 500 MG/1
500 CAPSULE ORAL 2 TIMES DAILY
COMMUNITY
Start: 2024-04-21

## 2024-04-22 RX ORDER — MELATONIN
1000 DAILY
COMMUNITY
Start: 2024-03-09

## 2024-04-22 ASSESSMENT — EXERCISE STRESS TEST
PEAK_HR: 96
PEAK_RPE: 12
PEAK_METS: 4.1

## 2024-04-22 ASSESSMENT — PATIENT HEALTH QUESTIONNAIRE - PHQ9
SUM OF ALL RESPONSES TO PHQ QUESTIONS 1-9: 0
2. FEELING DOWN, DEPRESSED OR HOPELESS: NOT AT ALL
SUM OF ALL RESPONSES TO PHQ9 QUESTIONS 1 & 2: 0
SUM OF ALL RESPONSES TO PHQ QUESTIONS 1-9: 0
SUM OF ALL RESPONSES TO PHQ QUESTIONS 1-9: 0
1. LITTLE INTEREST OR PLEASURE IN DOING THINGS: NOT AT ALL
SUM OF ALL RESPONSES TO PHQ QUESTIONS 1-9: 0

## 2024-04-22 ASSESSMENT — LIFESTYLE VARIABLES: HOW MANY STANDARD DRINKS CONTAINING ALCOHOL DO YOU HAVE ON A TYPICAL DAY: PATIENT DOES NOT DRINK

## 2024-04-22 NOTE — PATIENT INSTRUCTIONS
Starting a Weight Loss Plan: Care Instructions  Overview     It can be a challenge to lose weight. But your doctor can help you make a weight-loss plan that meets your needs.  You don't have to make a lot of big changes at once. A better idea might be to focus on small changes and stick with them. When those changes become habit, you can add a few more changes.  Some people find it helpful to take an exercise or nutrition class. If you have questions, ask your doctor about seeing a registered dietitian or an exercise specialist. You might also think about joining a weight-loss support group.  If you're not ready to make changes right now, try to pick a date in the future. Then make an appointment with your doctor to talk about when and how you'll get started with a plan.  Follow-up care is a key part of your treatment and safety. Be sure to make and go to all appointments, and call your doctor if you are having problems. It's also a good idea to know your test results and keep a list of the medicines you take.  How can you care for yourself at home?  Set realistic goals. Many people expect to lose much more weight than is likely. A weight loss of 5% to 10% of your body weight may be enough to improve your health.  Get family and friends involved to provide support. Talk to them about why you are trying to lose weight, and ask them to help. They can help by participating in exercise and having meals with you, even if they may be eating something different.  Find what works best for you. If you do not have time or do not like to cook, a program that offers meal replacement bars or shakes may be better for you. Or if you like to prepare meals, finding a plan that includes daily menus and recipes may be best.  Ask your doctor about other health professionals who can help you achieve your weight loss goals.  A dietitian can help you make healthy changes in your diet.  An exercise specialist or  can help

## 2024-04-22 NOTE — PROGRESS NOTES
\"Have you been to the ER, urgent care clinic since your last visit?  Hospitalized since your last visit?\"    Yes , jaw pain [facility]     “Have you seen or consulted any other health care providers outside of [facility] since your last visit?”    NO    Chief Complaint   Patient presents with    Follow-up    Medicare AWV     /71 (Site: Right Upper Arm, Position: Sitting, Cuff Size: Large Adult)   Pulse 98   Temp (!) 96.3 °F (35.7 °C) (Temporal)   Resp 18   Ht 1.753 m (5' 9\")   Wt 93.9 kg (207 lb)   SpO2 97%   BMI 30.57 kg/m²         Click Here for Release of Records Request\

## 2024-04-22 NOTE — PROGRESS NOTES
Medicare Annual Wellness Visit    Viraj Cho is here for Follow-up and Medicare AWV    Assessment & Plan   Medicare annual wellness visit, subsequent  Chart reviewed and updated as needed. Care gaps discussed.     Prediabetes  -     AMB POC HEMOGLOBIN A1C  A1c 5.8 today. Diet controlled.     Benign prostatic hyperplasia with urinary obstruction  Stable. Followed by [facility].     Atrial fibrillation, unspecified type (HCC)  Managed by Dr. Gutiérrez. Continue Eliquis as prescribed.     Coronary arteriosclerosis  Managed by Dr. Gutiérrez. Continue Plavix, ASA, Repatha, and Nexletol as prescribed.     Primary hypertension  Well controlled. Continue amlodipine 2.5mg BID.     Stage 3 chronic kidney disease, unspecified whether stage 3a or 3b CKD (HCC)  Followed by Nephrology. Last Cr 1.33 in 03/2024, at baseline.     Anxiety  Patient historically has received PRN Ativan that he takes sparingly for his anxiety. This was briefly mentioned in our establish care visit in 12/2022, at which time patient said he takes Ativan 1-2 times per year. He has stated that he will need a refill soon. PDMP reviewed. Plan to send #6 tabs at most. Continue counseling.     Recommendations for Preventive Services Due: see orders and patient instructions/AVS.  Recommended screening schedule for the next 5-10 years is provided to the patient in written form: see Patient Instructions/AVS.     Return in about 3 months (around 7/22/2024) for Chronic problems.     Subjective   The following acute and/or chronic problems were also addressed today:    Diet: Patient says he eats well. He and his wife try to eat healthy due their health problems. They rarely eat out. Drinks a lot of juice like V8, Gatorade (non-zero).   Exercise: Does water aerobics and he is currently in cardiac rehab.   Vaccines: Has already had Shingrix x2.   Specialists: Dr. Burleson (will be seeing new doctor), Dr. Gutiérrez, Dr. Gusman, Dr. Orozco ([facility])  Colon cancer

## 2024-04-24 ENCOUNTER — HOSPITAL ENCOUNTER (OUTPATIENT)
Facility: HOSPITAL | Age: 69
Setting detail: RECURRING SERIES
Discharge: HOME | End: 2024-04-27
Payer: MEDICARE

## 2024-04-24 VITALS — BODY MASS INDEX: 29.98 KG/M2 | WEIGHT: 203 LBS

## 2024-04-24 PROCEDURE — 93798 PHYS/QHP OP CAR RHAB W/ECG: CPT

## 2024-04-24 ASSESSMENT — EXERCISE STRESS TEST
PEAK_METS: 4.1
PEAK_RPE: 12
PEAK_HR: 97

## 2024-04-25 ENCOUNTER — HOSPITAL ENCOUNTER (OUTPATIENT)
Facility: HOSPITAL | Age: 69
Setting detail: RECURRING SERIES
Discharge: HOME | End: 2024-04-28
Payer: MEDICARE

## 2024-04-25 VITALS — WEIGHT: 203 LBS | BODY MASS INDEX: 29.98 KG/M2

## 2024-04-25 PROCEDURE — 93798 PHYS/QHP OP CAR RHAB W/ECG: CPT

## 2024-04-25 ASSESSMENT — EXERCISE STRESS TEST
PEAK_METS: 4.1
PEAK_HR: 99
PEAK_RPE: 12

## 2024-04-29 ENCOUNTER — HOSPITAL ENCOUNTER (OUTPATIENT)
Facility: HOSPITAL | Age: 69
Setting detail: RECURRING SERIES
Discharge: HOME | End: 2024-05-02
Payer: MEDICARE

## 2024-04-29 ENCOUNTER — APPOINTMENT (OUTPATIENT)
Facility: HOSPITAL | Age: 69
End: 2024-04-29
Payer: MEDICARE

## 2024-04-29 VITALS — BODY MASS INDEX: 29.98 KG/M2 | WEIGHT: 203 LBS

## 2024-04-29 PROCEDURE — 93798 PHYS/QHP OP CAR RHAB W/ECG: CPT

## 2024-04-29 ASSESSMENT — EXERCISE STRESS TEST
PEAK_HR: 96
PEAK_METS: 3.6
PEAK_RPE: 12

## 2024-04-30 ENCOUNTER — HOSPITAL ENCOUNTER (OUTPATIENT)
Facility: HOSPITAL | Age: 69
Setting detail: RECURRING SERIES
Discharge: HOME | End: 2024-05-03
Payer: MEDICARE

## 2024-04-30 VITALS — WEIGHT: 203 LBS | BODY MASS INDEX: 29.98 KG/M2

## 2024-04-30 PROCEDURE — 93798 PHYS/QHP OP CAR RHAB W/ECG: CPT

## 2024-04-30 ASSESSMENT — EXERCISE STRESS TEST
PEAK_METS: 3.8
PEAK_RPE: 12
PEAK_HR: 95

## 2024-05-01 ENCOUNTER — APPOINTMENT (OUTPATIENT)
Facility: HOSPITAL | Age: 69
End: 2024-05-01
Payer: MEDICARE

## 2024-05-02 ENCOUNTER — HOSPITAL ENCOUNTER (OUTPATIENT)
Facility: HOSPITAL | Age: 69
Setting detail: RECURRING SERIES
Discharge: HOME | End: 2024-05-05
Payer: MEDICARE

## 2024-05-02 VITALS — WEIGHT: 203 LBS | BODY MASS INDEX: 29.98 KG/M2

## 2024-05-02 PROCEDURE — 93798 PHYS/QHP OP CAR RHAB W/ECG: CPT

## 2024-05-02 ASSESSMENT — EXERCISE STRESS TEST
PEAK_RPE: 12
PEAK_METS: 3.9
PEAK_HR: 99

## 2024-05-06 ENCOUNTER — HOSPITAL ENCOUNTER (OUTPATIENT)
Facility: HOSPITAL | Age: 69
Setting detail: RECURRING SERIES
Discharge: HOME | End: 2024-05-09
Payer: MEDICARE

## 2024-05-06 VITALS — WEIGHT: 204 LBS | BODY MASS INDEX: 30.13 KG/M2

## 2024-05-06 PROCEDURE — 93798 PHYS/QHP OP CAR RHAB W/ECG: CPT

## 2024-05-06 PROCEDURE — 93797 PHYS/QHP OP CAR RHAB WO ECG: CPT

## 2024-05-06 RX ORDER — LORAZEPAM 0.5 MG/1
0.5 TABLET ORAL DAILY PRN
COMMUNITY

## 2024-05-06 ASSESSMENT — EXERCISE STRESS TEST
PEAK_RPE: 12
PEAK_METS: 3.8

## 2024-05-08 ENCOUNTER — HOSPITAL ENCOUNTER (OUTPATIENT)
Facility: HOSPITAL | Age: 69
Setting detail: RECURRING SERIES
Discharge: HOME | End: 2024-05-11
Payer: MEDICARE

## 2024-05-08 VITALS — WEIGHT: 203 LBS | BODY MASS INDEX: 29.98 KG/M2

## 2024-05-08 PROCEDURE — 93798 PHYS/QHP OP CAR RHAB W/ECG: CPT

## 2024-05-08 ASSESSMENT — EXERCISE STRESS TEST
PEAK_RPE: 12
PEAK_METS: 3.8
PEAK_HR: 99

## 2024-05-09 ENCOUNTER — HOSPITAL ENCOUNTER (OUTPATIENT)
Facility: HOSPITAL | Age: 69
Setting detail: RECURRING SERIES
Discharge: HOME | End: 2024-05-12
Payer: MEDICARE

## 2024-05-09 ENCOUNTER — APPOINTMENT (OUTPATIENT)
Facility: HOSPITAL | Age: 69
End: 2024-05-09
Payer: MEDICARE

## 2024-05-09 VITALS — WEIGHT: 203 LBS | BODY MASS INDEX: 29.98 KG/M2

## 2024-05-09 PROCEDURE — 93798 PHYS/QHP OP CAR RHAB W/ECG: CPT

## 2024-05-09 ASSESSMENT — EXERCISE STRESS TEST
PEAK_METS: 3.8
PEAK_RPE: 12
PEAK_HR: 94

## 2024-05-13 ENCOUNTER — HOSPITAL ENCOUNTER (OUTPATIENT)
Facility: HOSPITAL | Age: 69
Setting detail: RECURRING SERIES
Discharge: HOME | End: 2024-05-16
Payer: MEDICARE

## 2024-05-13 VITALS — BODY MASS INDEX: 29.98 KG/M2 | WEIGHT: 203 LBS

## 2024-05-13 PROCEDURE — 93797 PHYS/QHP OP CAR RHAB WO ECG: CPT

## 2024-05-13 PROCEDURE — 93798 PHYS/QHP OP CAR RHAB W/ECG: CPT

## 2024-05-13 ASSESSMENT — LIFESTYLE VARIABLES: SMOKELESS_TOBACCO: NO

## 2024-05-13 ASSESSMENT — EJECTION FRACTION: EF_VALUE: 55

## 2024-05-13 ASSESSMENT — EXERCISE STRESS TEST
PEAK_RPE: 12
PEAK_HR: 96
PEAK_RPD: 0
PEAK_METS: 3.8

## 2024-05-14 ENCOUNTER — HOSPITAL ENCOUNTER (OUTPATIENT)
Facility: HOSPITAL | Age: 69
Setting detail: RECURRING SERIES
Discharge: HOME | End: 2024-05-17
Payer: MEDICARE

## 2024-05-14 VITALS — BODY MASS INDEX: 29.98 KG/M2 | WEIGHT: 203 LBS

## 2024-05-14 PROCEDURE — 93798 PHYS/QHP OP CAR RHAB W/ECG: CPT

## 2024-05-14 ASSESSMENT — EXERCISE STRESS TEST
PEAK_METS: 3.8
PEAK_RPE: 12
PEAK_HR: 91

## 2024-05-15 ENCOUNTER — APPOINTMENT (OUTPATIENT)
Facility: HOSPITAL | Age: 69
End: 2024-05-15
Payer: MEDICARE

## 2024-05-16 ENCOUNTER — HOSPITAL ENCOUNTER (OUTPATIENT)
Facility: HOSPITAL | Age: 69
Setting detail: RECURRING SERIES
Discharge: HOME | End: 2024-05-19
Payer: MEDICARE

## 2024-05-16 VITALS — BODY MASS INDEX: 29.98 KG/M2 | WEIGHT: 203 LBS

## 2024-05-16 PROCEDURE — 93798 PHYS/QHP OP CAR RHAB W/ECG: CPT

## 2024-05-16 ASSESSMENT — EXERCISE STRESS TEST
PEAK_RPE: 12
PEAK_METS: 4.2
PEAK_HR: 97

## 2024-05-20 ENCOUNTER — HOSPITAL ENCOUNTER (OUTPATIENT)
Facility: HOSPITAL | Age: 69
Setting detail: RECURRING SERIES
Discharge: HOME | End: 2024-05-23
Payer: MEDICARE

## 2024-05-20 VITALS — WEIGHT: 203 LBS | BODY MASS INDEX: 29.98 KG/M2

## 2024-05-20 PROCEDURE — 93797 PHYS/QHP OP CAR RHAB WO ECG: CPT

## 2024-05-20 PROCEDURE — 93798 PHYS/QHP OP CAR RHAB W/ECG: CPT

## 2024-05-20 ASSESSMENT — EXERCISE STRESS TEST
PEAK_METS: 4.2
PEAK_HR: 95
PEAK_RPE: 12

## 2024-05-22 ENCOUNTER — HOSPITAL ENCOUNTER (EMERGENCY)
Facility: HOSPITAL | Age: 69
Discharge: HOME | End: 2024-05-22
Attending: STUDENT IN AN ORGANIZED HEALTH CARE EDUCATION/TRAINING PROGRAM
Payer: MEDICARE

## 2024-05-22 ENCOUNTER — HOSPITAL ENCOUNTER (OUTPATIENT)
Facility: HOSPITAL | Age: 69
Setting detail: RECURRING SERIES
End: 2024-05-22
Payer: MEDICARE

## 2024-05-22 VITALS
BODY MASS INDEX: 29.71 KG/M2 | OXYGEN SATURATION: 97 % | DIASTOLIC BLOOD PRESSURE: 78 MMHG | HEART RATE: 64 BPM | RESPIRATION RATE: 16 BRPM | TEMPERATURE: 98.7 F | WEIGHT: 200.6 LBS | HEIGHT: 69 IN | SYSTOLIC BLOOD PRESSURE: 137 MMHG

## 2024-05-22 DIAGNOSIS — M54.9 UPPER BACK PAIN: Primary | ICD-10-CM

## 2024-05-22 LAB
ANION GAP SERPL CALC-SCNC: 11 MMOL/L (ref 5–15)
BASOPHILS # BLD: 0.1 K/UL (ref 0–1)
BASOPHILS NFR BLD: 1 % (ref 0–1)
BUN SERPL-MCNC: 12 MG/DL (ref 8–23)
BUN/CREAT SERPL: 9 (ref 12–20)
CALCIUM SERPL-MCNC: 8.9 MG/DL (ref 8.8–10.2)
CHLORIDE SERPL-SCNC: 107 MMOL/L (ref 98–107)
CO2 SERPL-SCNC: 24 MMOL/L (ref 22–29)
CREAT SERPL-MCNC: 1.27 MG/DL (ref 0.7–1.2)
DIFFERENTIAL METHOD BLD: NORMAL
EOSINOPHIL # BLD: 0.2 K/UL (ref 0–0.4)
EOSINOPHIL NFR BLD: 3 % (ref 0–7)
ERYTHROCYTE [DISTWIDTH] IN BLOOD BY AUTOMATED COUNT: 13.2 % (ref 11.5–14.5)
GLUCOSE SERPL-MCNC: 104 MG/DL (ref 65–100)
HCT VFR BLD AUTO: 41.7 % (ref 36.6–50.3)
HGB BLD-MCNC: 13.7 G/DL (ref 12.1–17)
IMM GRANULOCYTES # BLD AUTO: 0 K/UL (ref 0–0.04)
IMM GRANULOCYTES NFR BLD AUTO: 0 % (ref 0–0.5)
LYMPHOCYTES # BLD: 2.3 K/UL (ref 0.8–3.5)
LYMPHOCYTES NFR BLD: 26 % (ref 12–49)
MCH RBC QN AUTO: 29.1 PG (ref 26–34)
MCHC RBC AUTO-ENTMCNC: 32.9 G/DL (ref 30–36.5)
MCV RBC AUTO: 88.5 FL (ref 80–99)
MONOCYTES # BLD: 0.9 K/UL (ref 0–1)
MONOCYTES NFR BLD: 11 % (ref 5–13)
NEUTS SEG # BLD: 5.2 K/UL (ref 1.8–8)
NEUTS SEG NFR BLD: 59 % (ref 32–75)
NRBC # BLD: 0 K/UL (ref 0–0.01)
NRBC BLD-RTO: 0 PER 100 WBC
PLATELET # BLD AUTO: 237 K/UL (ref 150–400)
PMV BLD AUTO: 10.4 FL (ref 8.9–12.9)
POTASSIUM SERPL-SCNC: 4.1 MMOL/L (ref 3.5–5.1)
RBC # BLD AUTO: 4.71 M/UL (ref 4.1–5.7)
SODIUM SERPL-SCNC: 142 MMOL/L (ref 136–145)
TROPONIN T SERPL HS-MCNC: 11.2 NG/L (ref 0–22)
WBC # BLD AUTO: 8.8 K/UL (ref 4.1–11.1)

## 2024-05-22 PROCEDURE — 36415 COLL VENOUS BLD VENIPUNCTURE: CPT

## 2024-05-22 PROCEDURE — 2500000003 HC RX 250 WO HCPCS: Performed by: STUDENT IN AN ORGANIZED HEALTH CARE EDUCATION/TRAINING PROGRAM

## 2024-05-22 PROCEDURE — 80048 BASIC METABOLIC PNL TOTAL CA: CPT

## 2024-05-22 PROCEDURE — 20552 NJX 1/MLT TRIGGER POINT 1/2: CPT

## 2024-05-22 PROCEDURE — 99284 EMERGENCY DEPT VISIT MOD MDM: CPT

## 2024-05-22 PROCEDURE — 85025 COMPLETE CBC W/AUTO DIFF WBC: CPT

## 2024-05-22 PROCEDURE — 6370000000 HC RX 637 (ALT 250 FOR IP): Performed by: STUDENT IN AN ORGANIZED HEALTH CARE EDUCATION/TRAINING PROGRAM

## 2024-05-22 PROCEDURE — 84484 ASSAY OF TROPONIN QUANT: CPT

## 2024-05-22 PROCEDURE — 93005 ELECTROCARDIOGRAM TRACING: CPT | Performed by: STUDENT IN AN ORGANIZED HEALTH CARE EDUCATION/TRAINING PROGRAM

## 2024-05-22 RX ORDER — METHOCARBAMOL 500 MG/1
1000 TABLET, FILM COATED ORAL ONCE
Status: COMPLETED | OUTPATIENT
Start: 2024-05-22 | End: 2024-05-22

## 2024-05-22 RX ORDER — LIDOCAINE HYDROCHLORIDE 10 MG/ML
10 INJECTION, SOLUTION EPIDURAL; INFILTRATION; INTRACAUDAL; PERINEURAL ONCE
Status: COMPLETED | OUTPATIENT
Start: 2024-05-22 | End: 2024-05-22

## 2024-05-22 RX ORDER — METHOCARBAMOL 750 MG/1
750 TABLET, FILM COATED ORAL 4 TIMES DAILY PRN
Qty: 20 TABLET | Refills: 0 | Status: SHIPPED | OUTPATIENT
Start: 2024-05-22 | End: 2024-06-01

## 2024-05-22 RX ADMIN — LIDOCAINE HYDROCHLORIDE 10 ML: 10 INJECTION, SOLUTION EPIDURAL; INFILTRATION; INTRACAUDAL; PERINEURAL at 06:28

## 2024-05-22 RX ADMIN — METHOCARBAMOL TABLETS 1000 MG: 500 TABLET, COATED ORAL at 05:26

## 2024-05-22 ASSESSMENT — PAIN DESCRIPTION - DESCRIPTORS: DESCRIPTORS: ACHING

## 2024-05-22 ASSESSMENT — PAIN DESCRIPTION - LOCATION: LOCATION: BACK

## 2024-05-22 ASSESSMENT — PAIN SCALES - GENERAL
PAINLEVEL_OUTOF10: 6
PAINLEVEL_OUTOF10: 9

## 2024-05-22 ASSESSMENT — ENCOUNTER SYMPTOMS: SHORTNESS OF BREATH: 0

## 2024-05-22 ASSESSMENT — PAIN - FUNCTIONAL ASSESSMENT: PAIN_FUNCTIONAL_ASSESSMENT: 0-10

## 2024-05-22 ASSESSMENT — PAIN DESCRIPTION - ORIENTATION: ORIENTATION: LEFT;UPPER

## 2024-05-22 NOTE — ED TRIAGE NOTES
Pt.presents for evaluation of left shoulder blade pain onset yesterday. Pt.states that he is in cardiac rehab currently for recent stents.      Pt.states he is pretty sure pain is from recent addition of workout using bands.    Used pain patches,lidocaine patch,voltaren ,etc w/o any relief of sxs. Pain worse with movement

## 2024-05-22 NOTE — ED PROVIDER NOTES
[facility] [place] EMERGENCY DEPT  EMERGENCY DEPARTMENT ENCOUNTER      Pt Name: Viraj Cho  MRN: [id number]  Birthdate 1955  Date of evaluation: 5/22/2024  Provider: Fercho Adna MD    CHIEF COMPLAINT       Chief Complaint   Patient presents with    Back Pain         HISTORY OF PRESENT ILLNESS   69-year-old male with history significant for CAD, HTN, HLD presents to the ED with chief complaint of left upper back pain for the past 1 to 2 days.  Pain is along his left shoulder blade, worse with movement of his left shoulder.  He is currently in cardiac rehab, he attributes the pain to the exercises he has been doing.  He has tried Tylenol, lidocaine patches, Voltaren gel at home without much relief.  No fevers, chills, chest pain, difficulty breathing, abdominal pain, urinary symptoms, bowel symptoms.  Denies any specific fall or injury.    The history is provided by the patient.       Review of External Medical Records:     Nursing Notes were reviewed.    REVIEW OF SYSTEMS       Review of Systems   Respiratory:  Negative for shortness of breath.    Cardiovascular:  Negative for chest pain.       Except as noted above the remainder of the review of systems was reviewed and negative.       PAST MEDICAL HISTORY     Past Medical History:   Diagnosis Date    A-fib (HCC)     Acute coronary syndrome (HCC) 11/19/2021    Atypical chest pain 11/19/2021    CAD (coronary artery disease) 06/15/2018    Coronary arteriosclerosis 8/7/2020    Gastric ulcer 12/14/2018    GERD (gastroesophageal reflux disease)     H/O seasonal allergies     High cholesterol     Hx of cardiac arrest     VT arrest     Hyperlipidemia 8/7/2020    Hypertension     Ill-defined condition     Urinary urgency    Kidney stone     MI (myocardial infarction) (HCC)     KELLIE on CPAP     2/2/21 pt reports does not use cpap each night, only a few times per week. 2/24: uses dental appliance    Torsades de pointes (HCC) 11/19/2021    Ventricular fibrillation (HCC)  and/or family verbally conveyed their understanding and agreement of the patient's signs, symptoms, diagnosis, treatment and prognosis and additionally agree to follow-up as recommended in the discharge instructions or to return to the Emergency Department should their condition change or worsen prior to their follow-up appointment.  All questions have been answered and patient and/or available family express understanding.      LABORATORY RESULTS:  Labs Reviewed   BASIC METABOLIC PANEL - Abnormal; Notable for the following components:       Result Value    Glucose 104 (*)     Creatinine 1.27 (*)     BUN/Creatinine Ratio 9 (*)     All other components within normal limits   CBC WITH AUTO DIFFERENTIAL   TROPONIN T       All other labs were within normal range or not returned as of this dictation.    IMAGING RESULTS:  No orders to display        MEDICATIONS GIVEN:  Medications   methocarbamol (ROBAXIN) tablet 1,000 mg (1,000 mg Oral Given 5/22/24 0526)   lidocaine PF 1 % injection 10 mL (10 mLs IntraDERmal Given 5/22/24 0628)       IMPRESSION:  1. Upper back pain        PLAN:  DISPOSITION Decision To Discharge 05/22/2024 06:34:47 AM      PATIENT REFERRED TO:  Anita Bah, DO  [address] [place] [address]  [address]  [place] [id number]  [phone]    In 1 week  As needed      DISCHARGE MEDICATIONS:  New Prescriptions    METHOCARBAMOL (ROBAXIN-750) 750 MG TABLET    Take 1 tablet by mouth 4 times daily as needed (Pain or spasm)       Signed By: Fercho Adan MD     May 22, 2024          (Please note that portions of this note were completed with a voice recognition program.  Efforts were made to edit the dictations but occasionally words are mis-transcribed.)         Fercho Adan MD  05/22/24 0636

## 2024-05-23 ENCOUNTER — APPOINTMENT (OUTPATIENT)
Facility: HOSPITAL | Age: 69
End: 2024-05-23
Payer: MEDICARE

## 2024-05-23 LAB
EKG ATRIAL RATE: 62 BPM
EKG DIAGNOSIS: NORMAL
EKG P AXIS: -4 DEGREES
EKG P-R INTERVAL: 140 MS
EKG Q-T INTERVAL: 414 MS
EKG QRS DURATION: 88 MS
EKG QTC CALCULATION (BAZETT): 420 MS
EKG R AXIS: -13 DEGREES
EKG T AXIS: 0 DEGREES
EKG VENTRICULAR RATE: 62 BPM

## 2024-05-23 PROCEDURE — 93010 ELECTROCARDIOGRAM REPORT: CPT | Performed by: SPECIALIST

## 2024-05-24 ENCOUNTER — OFFICE VISIT (OUTPATIENT)
Facility: CLINIC | Age: 69
End: 2024-05-24

## 2024-05-24 VITALS
HEART RATE: 80 BPM | HEIGHT: 69 IN | OXYGEN SATURATION: 99 % | TEMPERATURE: 96.8 F | WEIGHT: 202 LBS | DIASTOLIC BLOOD PRESSURE: 68 MMHG | SYSTOLIC BLOOD PRESSURE: 126 MMHG | BODY MASS INDEX: 29.92 KG/M2 | RESPIRATION RATE: 18 BRPM

## 2024-05-24 DIAGNOSIS — M79.671 RIGHT FOOT PAIN: ICD-10-CM

## 2024-05-24 DIAGNOSIS — M25.512 ACUTE PAIN OF LEFT SHOULDER: Primary | ICD-10-CM

## 2024-05-24 DIAGNOSIS — M79.645 FINGER PAIN, LEFT: ICD-10-CM

## 2024-05-24 RX ORDER — LIDOCAINE 50 MG/G
1 PATCH TOPICAL DAILY
Qty: 30 PATCH | Refills: 1 | Status: SHIPPED | OUTPATIENT
Start: 2024-05-24

## 2024-05-24 NOTE — PROGRESS NOTES
\"Have you been to the ER, urgent care clinic since your last visit?  Hospitalized since your last visit?\"    YES - When: approximately 1 days ago.  Where and Why: back pain.    “Have you seen or consulted any other health care providers outside of [facility] since your last visit?”    NO    Chief Complaint   Patient presents with    Follow-up    Follow-Up from Hospital     /68 (Site: Left Upper Arm, Position: Sitting, Cuff Size: Large Adult)   Pulse 80   Temp 96.8 °F (36 °C) (Temporal)   Resp 18   Ht 1.753 m (5' 9\")   Wt 91.6 kg (202 lb)   SpO2 99%   BMI 29.83 kg/m²             Click Here for Release of Records Request    
3    acetaminophen (TYLENOL) 325 MG tablet Take 2 tablets by mouth every 6 hours as needed for Pain      amLODIPine (NORVASC) 2.5 MG tablet Take 1 tablet by mouth in the morning and at bedtime Evening, 5mg am      apixaban (ELIQUIS) 5 MG TABS tablet Take 1 tablet by mouth 2 times daily      clopidogrel (PLAVIX) 75 MG tablet Take 1 tablet by mouth daily      Evolocumab (REPATHA SURECLICK) 140 MG/ML SOAJ Inject 140 mg into the skin every 14 days Every 2 weeks, last dose Saturday the 24th       No current facility-administered medications for this visit.     Allergies   Allergen Reactions    Ampicillin Nausea Only    Cefuroxime      Other reaction(s): Unknown (comments)  abd pain    Ezetimibe     Ibuprofen Other (See Comments)     Patient states NSAIDS/ ibuprofen upsets his stomach    Nsaids Hives    Statins Other (See Comments)     Extremity weakness     Review of Systems    See HPI  Objective     Vitals:    05/24/24 0806   BP: 126/68   Site: Left Upper Arm   Position: Sitting   Cuff Size: Large Adult   Pulse: 80   Resp: 18   Temp: 96.8 °F (36 °C)   TempSrc: Temporal   SpO2: 99%   Weight: 91.6 kg (202 lb)   Height: 1.753 m (5' 9\")       Physical Exam  Vitals reviewed.   Constitutional:       Appearance: Normal appearance.   HENT:      Head: Normocephalic and atraumatic.      Right Ear: External ear normal.      Left Ear: External ear normal.      Nose: Nose normal.   Eyes:      Conjunctiva/sclera: Conjunctivae normal.   Cardiovascular:      Rate and Rhythm: Normal rate and regular rhythm.   Pulmonary:      Effort: Pulmonary effort is normal.      Breath sounds: Normal breath sounds.   Musculoskeletal:      Right lower leg: No edema.      Left lower leg: No edema.      Comments: Nontender to palpation along subscapularis or supraspinatus left shoulder.    Skin:     General: Skin is warm and dry.      Findings: No bruising, erythema, lesion or rash.      Comments: No evidence of abnormality of the left hand second

## 2024-05-29 ENCOUNTER — HOSPITAL ENCOUNTER (OUTPATIENT)
Facility: HOSPITAL | Age: 69
Setting detail: RECURRING SERIES
Discharge: HOME | End: 2024-06-01
Payer: MEDICARE

## 2024-05-29 VITALS — WEIGHT: 204 LBS | BODY MASS INDEX: 30.13 KG/M2

## 2024-05-29 PROCEDURE — 93798 PHYS/QHP OP CAR RHAB W/ECG: CPT

## 2024-05-29 ASSESSMENT — EXERCISE STRESS TEST
PEAK_METS: 4.2
PEAK_HR: 96
PEAK_RPE: 12

## 2024-05-30 ENCOUNTER — HOSPITAL ENCOUNTER (OUTPATIENT)
Facility: HOSPITAL | Age: 69
Setting detail: RECURRING SERIES
End: 2024-05-30
Payer: MEDICARE

## 2024-05-30 VITALS — BODY MASS INDEX: 30.13 KG/M2 | WEIGHT: 204 LBS

## 2024-05-30 PROCEDURE — 93798 PHYS/QHP OP CAR RHAB W/ECG: CPT

## 2024-05-30 ASSESSMENT — PATIENT HEALTH QUESTIONNAIRE - PHQ9
SUM OF ALL RESPONSES TO PHQ QUESTIONS 1-9: 0
SUM OF ALL RESPONSES TO PHQ QUESTIONS 1-9: 0
1. LITTLE INTEREST OR PLEASURE IN DOING THINGS: NOT AT ALL
SUM OF ALL RESPONSES TO PHQ9 QUESTIONS 1 & 2: 0
2. FEELING DOWN, DEPRESSED OR HOPELESS: NOT AT ALL
10. IF YOU CHECKED OFF ANY PROBLEMS, HOW DIFFICULT HAVE THESE PROBLEMS MADE IT FOR YOU TO DO YOUR WORK, TAKE CARE OF THINGS AT HOME, OR GET ALONG WITH OTHER PEOPLE: NOT DIFFICULT AT ALL
5. POOR APPETITE OR OVEREATING: NOT AT ALL
4. FEELING TIRED OR HAVING LITTLE ENERGY: NOT AT ALL
SUM OF ALL RESPONSES TO PHQ QUESTIONS 1-9: 0
7. TROUBLE CONCENTRATING ON THINGS, SUCH AS READING THE NEWSPAPER OR WATCHING TELEVISION: NOT AT ALL
9. THOUGHTS THAT YOU WOULD BE BETTER OFF DEAD, OR OF HURTING YOURSELF: NOT AT ALL
SUM OF ALL RESPONSES TO PHQ QUESTIONS 1-9: 0
6. FEELING BAD ABOUT YOURSELF - OR THAT YOU ARE A FAILURE OR HAVE LET YOURSELF OR YOUR FAMILY DOWN: NOT AT ALL
3. TROUBLE FALLING OR STAYING ASLEEP: NOT AT ALL
8. MOVING OR SPEAKING SO SLOWLY THAT OTHER PEOPLE COULD HAVE NOTICED. OR THE OPPOSITE, BEING SO FIGETY OR RESTLESS THAT YOU HAVE BEEN MOVING AROUND A LOT MORE THAN USUAL: NOT AT ALL

## 2024-05-30 ASSESSMENT — EXERCISE STRESS TEST
PEAK_HR: 95
PEAK_METS: 4.5
PEAK_RPE: 12

## 2024-06-03 ENCOUNTER — HOSPITAL ENCOUNTER (OUTPATIENT)
Facility: HOSPITAL | Age: 69
Setting detail: RECURRING SERIES
Discharge: HOME | End: 2024-06-06
Payer: MEDICARE

## 2024-06-03 ENCOUNTER — APPOINTMENT (OUTPATIENT)
Facility: HOSPITAL | Age: 69
End: 2024-06-03
Payer: MEDICARE

## 2024-06-03 VITALS — WEIGHT: 202 LBS | BODY MASS INDEX: 29.83 KG/M2

## 2024-06-03 PROCEDURE — 93798 PHYS/QHP OP CAR RHAB W/ECG: CPT

## 2024-06-03 ASSESSMENT — EXERCISE STRESS TEST
PEAK_RPE: 12
PEAK_HR: 96
PEAK_METS: 4.7

## 2024-06-05 ENCOUNTER — HOSPITAL ENCOUNTER (OUTPATIENT)
Facility: HOSPITAL | Age: 69
Setting detail: RECURRING SERIES
Discharge: HOME | End: 2024-06-08
Payer: MEDICARE

## 2024-06-05 VITALS — WEIGHT: 202 LBS | BODY MASS INDEX: 29.83 KG/M2

## 2024-06-05 ASSESSMENT — EXERCISE STRESS TEST
PEAK_METS: 4.9
PEAK_RPE: 23
PEAK_HR: 94

## 2024-06-06 ENCOUNTER — HOSPITAL ENCOUNTER (OUTPATIENT)
Facility: HOSPITAL | Age: 69
Setting detail: RECURRING SERIES
Discharge: HOME | End: 2024-06-09
Payer: MEDICARE

## 2024-06-06 VITALS — WEIGHT: 201 LBS | BODY MASS INDEX: 29.68 KG/M2

## 2024-06-06 PROCEDURE — 93798 PHYS/QHP OP CAR RHAB W/ECG: CPT

## 2024-06-06 ASSESSMENT — EXERCISE STRESS TEST
PEAK_BP: 134/64
PEAK_RPE: 12
PEAK_METS: 4.9
PEAK_HR: 107
PEAK_RPD: 0
PEAK_RPE: 12
PEAK_BP: 134/64
PEAK_HR: 107
PEAK_BP: 134/64

## 2024-06-06 ASSESSMENT — LIFESTYLE VARIABLES: SMOKELESS_TOBACCO: NO

## 2024-06-06 ASSESSMENT — EJECTION FRACTION: EF_VALUE: 55

## 2024-06-06 ASSESSMENT — NEW YORK HEART ASSOCIATION (NYHA) CLASSIFICATION: NYHA FUNCTIONAL CLASS: NO NYHA CLASS OR UNABLE TO DETERMINE

## 2024-07-22 ENCOUNTER — OFFICE VISIT (OUTPATIENT)
Facility: CLINIC | Age: 69
End: 2024-07-22
Payer: MEDICARE

## 2024-07-22 VITALS
BODY MASS INDEX: 29.62 KG/M2 | SYSTOLIC BLOOD PRESSURE: 127 MMHG | RESPIRATION RATE: 18 BRPM | HEART RATE: 82 BPM | OXYGEN SATURATION: 98 % | HEIGHT: 69 IN | DIASTOLIC BLOOD PRESSURE: 79 MMHG | TEMPERATURE: 96.9 F | WEIGHT: 200 LBS

## 2024-07-22 DIAGNOSIS — R53.83 FATIGUE, UNSPECIFIED TYPE: ICD-10-CM

## 2024-07-22 DIAGNOSIS — E78.5 DYSLIPIDEMIA: ICD-10-CM

## 2024-07-22 DIAGNOSIS — Z51.81 MEDICATION MONITORING ENCOUNTER: ICD-10-CM

## 2024-07-22 DIAGNOSIS — F41.9 ANXIETY: ICD-10-CM

## 2024-07-22 DIAGNOSIS — G47.33 OBSTRUCTIVE SLEEP APNEA SYNDROME: ICD-10-CM

## 2024-07-22 DIAGNOSIS — R73.03 PREDIABETES: Primary | ICD-10-CM

## 2024-07-22 DIAGNOSIS — R05.2 SUBACUTE COUGH: ICD-10-CM

## 2024-07-22 DIAGNOSIS — I48.91 ATRIAL FIBRILLATION, UNSPECIFIED TYPE (HCC): ICD-10-CM

## 2024-07-22 DIAGNOSIS — E55.9 VITAMIN D DEFICIENCY: ICD-10-CM

## 2024-07-22 DIAGNOSIS — I25.10 CORONARY ARTERIOSCLEROSIS: ICD-10-CM

## 2024-07-22 PROBLEM — J33.0 POLYP OF NASAL CAVITY: Status: RESOLVED | Noted: 2023-08-02 | Resolved: 2024-07-22

## 2024-07-22 PROBLEM — M25.512 ACUTE PAIN OF LEFT SHOULDER: Status: RESOLVED | Noted: 2024-05-24 | Resolved: 2024-07-22

## 2024-07-22 PROBLEM — H16.143 PUNCTATE KERATITIS, BILATERAL: Status: RESOLVED | Noted: 2023-08-02 | Resolved: 2024-07-22

## 2024-07-22 PROBLEM — H52.4 PRESBYOPIA: Status: RESOLVED | Noted: 2023-08-02 | Resolved: 2024-07-22

## 2024-07-22 PROBLEM — H04.123 DRY EYE SYNDROME OF BILATERAL LACRIMAL GLANDS: Status: RESOLVED | Noted: 2023-08-02 | Resolved: 2024-07-22

## 2024-07-22 PROBLEM — M54.30 SCIATICA: Status: RESOLVED | Noted: 2023-08-02 | Resolved: 2024-07-22

## 2024-07-22 PROBLEM — H52.229 REGULAR ASTIGMATISM: Status: RESOLVED | Noted: 2023-08-02 | Resolved: 2024-07-22

## 2024-07-22 LAB — HBA1C MFR BLD: 6 %

## 2024-07-22 PROCEDURE — G8417 CALC BMI ABV UP PARAM F/U: HCPCS | Performed by: FAMILY MEDICINE

## 2024-07-22 PROCEDURE — 99214 OFFICE O/P EST MOD 30 MIN: CPT | Performed by: FAMILY MEDICINE

## 2024-07-22 PROCEDURE — 3017F COLORECTAL CA SCREEN DOC REV: CPT | Performed by: FAMILY MEDICINE

## 2024-07-22 PROCEDURE — 1123F ACP DISCUSS/DSCN MKR DOCD: CPT | Performed by: FAMILY MEDICINE

## 2024-07-22 PROCEDURE — 83036 HEMOGLOBIN GLYCOSYLATED A1C: CPT | Performed by: FAMILY MEDICINE

## 2024-07-22 PROCEDURE — G8427 DOCREV CUR MEDS BY ELIG CLIN: HCPCS | Performed by: FAMILY MEDICINE

## 2024-07-22 PROCEDURE — 1036F TOBACCO NON-USER: CPT | Performed by: FAMILY MEDICINE

## 2024-07-22 PROCEDURE — 3078F DIAST BP <80 MM HG: CPT | Performed by: FAMILY MEDICINE

## 2024-07-22 PROCEDURE — 3074F SYST BP LT 130 MM HG: CPT | Performed by: FAMILY MEDICINE

## 2024-07-22 RX ORDER — TICAGRELOR 90 MG/1
90 TABLET ORAL 2 TIMES DAILY
COMMUNITY
Start: 2024-06-25

## 2024-07-22 RX ORDER — BENZONATATE 200 MG/1
200 CAPSULE ORAL 3 TIMES DAILY PRN
Qty: 30 CAPSULE | Refills: 1 | Status: SHIPPED | OUTPATIENT
Start: 2024-07-22 | End: 2024-08-11

## 2024-07-22 RX ORDER — CHOLECALCIFEROL (VITAMIN D3) 25 MCG
1000 TABLET ORAL DAILY
Qty: 90 TABLET | Refills: 1 | Status: SHIPPED | OUTPATIENT
Start: 2024-07-22

## 2024-07-22 NOTE — PROGRESS NOTES
Subjective  Chief Complaint   Patient presents with    Follow-up     HPI:  Viraj Cho is a 69 y.o. male with medical problems as listed below who presents for follow up of chronic conditions.     Atrial fibrillation/CAD/HTN/HLD: Followed/managed by Cardiology. Taking Repatha, Brilinta, Nexletol, Eliquis, amlodipine. Plavix was replaced with Brilinta recently.     Prediabetes: Last A1c 5.8 in 2024.     Chronic cough: Onset since COVID infection in early . Has intermittent cough now and throat irritation.     Fatigue: Started last week. Improved throughout the day. Occurred 4 days in a row. Feels better now. Hasn't been using CPAP like he's supposed to. He says it causes chest pain.     Vitamin D deficiency: Requesting refills of cholecalciferol.     Anxiety: Requesting refills of lorazepam. He takes this PRN. His current bottle of pills is about to . Last fill in 2023. No UDS or CSA on file.     Patient Active Problem List   Diagnosis    Testosterone deficiency    Hypertension    Anxiety    Obstructive sleep apnea syndrome    GERD (gastroesophageal reflux disease)    Atrial fibrillation, unspecified type (HCC)    Benign prostatic hyperplasia with urinary obstruction    Scrotal varices    Chronic back pain    Vitamin D deficiency    Migraine    Coronary arteriosclerosis    Stage 3 chronic kidney disease, unspecified whether stage 3a or 3b CKD (HCC)    Prediabetes    Osteoarthritis of neck    Benign paroxysmal positional vertigo    Colitis    Myopia    Dyslipidemia    Eczema    Gastroparesis    Herniated lumbar intervertebral disc    Herniation of intervertebral disc of cervical region    Insomnia, unspecified    Irritable bowel syndrome with diarrhea    Localized osteoarthrosis    Nasopharyngeal neoplasm    Papilloma of vestibule of nose    Presence of cardiac device    Solitary pulmonary nodule    Tear film insufficiency    Dyspnea on exertion    Chest pain due to coronary artery disease (HCC)

## 2024-07-22 NOTE — PROGRESS NOTES
\"Have you been to the ER, urgent care clinic since your last visit?  Hospitalized since your last visit?\"    NO    “Have you seen or consulted any other health care providers outside of [facility] since your last visit?”    NO    Chief Complaint   Patient presents with    Follow-up     /79 (Site: Left Upper Arm, Position: Sitting, Cuff Size: Large Adult)   Pulse 82   Temp 96.9 °F (36.1 °C) (Temporal)   Resp 18   Ht 1.753 m (5' 9\")   Wt 90.7 kg (200 lb)   SpO2 98%   BMI 29.53 kg/m²             Click Here for Release of Records Request

## 2024-07-25 ENCOUNTER — NURSE ONLY (OUTPATIENT)
Facility: CLINIC | Age: 69
End: 2024-07-25

## 2024-07-25 DIAGNOSIS — Z51.81 MEDICATION MONITORING ENCOUNTER: Primary | ICD-10-CM

## 2024-07-25 NOTE — PROGRESS NOTES
Patient presented to leave a urine sample for his drug urine screen for his medication agreement. Collected and bagged.

## 2024-07-30 LAB — DRUGS UR: NORMAL

## 2024-07-31 RX ORDER — LORAZEPAM 0.5 MG/1
0.5 TABLET ORAL DAILY PRN
Qty: 12 TABLET | Refills: 0 | Status: SHIPPED | OUTPATIENT
Start: 2024-07-31 | End: 2025-07-26

## 2024-08-03 NOTE — ANESTHESIA PREPROCEDURE EVALUATION
Anesthetic History   No history of anesthetic complications            Review of Systems / Medical History  Patient summary reviewed and pertinent labs reviewed    Pulmonary        Sleep apnea: CPAP           Neuro/Psych   Within defined limits           Cardiovascular    Hypertension          Pacemaker (PM), past MI, CAD and cardiac stents    Exercise tolerance: >4 METS  Comments: 2 stents after MI last year, on Brilinta   GI/Hepatic/Renal     GERD           Endo/Other  Within defined limits           Other Findings              Physical Exam    Airway  Mallampati: II  TM Distance: 4 - 6 cm  Neck ROM: normal range of motion   Mouth opening: Normal     Cardiovascular  Regular rate and rhythm,  S1 and S2 normal,  no murmur, click, rub, or gallop  Rhythm: regular  Rate: normal         Dental  No notable dental hx       Pulmonary  Breath sounds clear to auscultation               Abdominal  GI exam deferred       Other Findings            Anesthetic Plan    ASA: 3  Anesthesia type: MAC          Induction: Intravenous  Anesthetic plan and risks discussed with: Patient [FreeTextEntry1] : Change back to Raghu found. DC Wegovy. Patient reminded to have dexamethasone suppression test completed. Further recommendations pending blood work. F/U 1 month. Labs drawn in office.

## 2024-08-06 DIAGNOSIS — E55.9 VITAMIN D DEFICIENCY: ICD-10-CM

## 2024-08-06 NOTE — TELEPHONE ENCOUNTER
Requested Prescriptions     Pending Prescriptions Disp Refills    vitamin D3 (CHOLECALCIFEROL) 25 MCG (1000 UT) TABS tablet 90 tablet 1     Sig: Take 1 tablet by mouth daily

## 2024-08-09 RX ORDER — CHOLECALCIFEROL (VITAMIN D3) 25 MCG
1000 TABLET ORAL DAILY
Qty: 90 TABLET | Refills: 1 | Status: SHIPPED | OUTPATIENT
Start: 2024-08-09

## 2024-08-12 DIAGNOSIS — K21.9 GASTROESOPHAGEAL REFLUX DISEASE WITHOUT ESOPHAGITIS: ICD-10-CM

## 2024-08-12 NOTE — TELEPHONE ENCOUNTER
Requested Prescriptions     Pending Prescriptions Disp Refills    famotidine (PEPCID) 40 MG tablet 180 tablet 3     Sig: Take 1 tablet by mouth 2 times daily

## 2024-08-15 RX ORDER — FAMOTIDINE 40 MG/1
40 TABLET, FILM COATED ORAL 2 TIMES DAILY
Qty: 180 TABLET | Refills: 3 | Status: SHIPPED | OUTPATIENT
Start: 2024-08-15

## 2024-10-07 DIAGNOSIS — J30.2 SEASONAL ALLERGIC RHINITIS, UNSPECIFIED TRIGGER: ICD-10-CM

## 2024-10-08 NOTE — TELEPHONE ENCOUNTER
Requested Prescriptions     Pending Prescriptions Disp Refills    fluticasone (FLONASE) 50 MCG/ACT nasal spray 48 g 3     Si sprays by Nasal route daily     Date of last OV:  2024  Future OV visit scheduled:  [] Yes -> Date:   [x] No    Last Refill: [] N/A  Date: 2023  Qty: 48 g    # of refills: 3    Med pending for provider review:  [x] Yes  [] No (provide reason why):     Requested Pharmacy updated in ENC:  [] Yes    Applicable labs (provide date of completion):  [] Diabetic med- A1c:  [] Cholesterol med- Lipids:  [] BP med- CMP or BMP:   [] Thyroid med- TSH:  [] Gout med- Uric acid:  [] Prostate med- PSA:  [] Other (provide what type of med and lab):    Additional notes:

## 2024-10-10 RX ORDER — FLUTICASONE PROPIONATE 50 MCG
2 SPRAY, SUSPENSION (ML) NASAL DAILY
Qty: 1 EACH | Refills: 5 | Status: SHIPPED | OUTPATIENT
Start: 2024-10-10

## 2024-10-23 ENCOUNTER — PATIENT MESSAGE (OUTPATIENT)
Facility: CLINIC | Age: 69
End: 2024-10-23

## 2025-01-13 SDOH — ECONOMIC STABILITY: FOOD INSECURITY: WITHIN THE PAST 12 MONTHS, THE FOOD YOU BOUGHT JUST DIDN'T LAST AND YOU DIDN'T HAVE MONEY TO GET MORE.: NEVER TRUE

## 2025-01-13 SDOH — ECONOMIC STABILITY: INCOME INSECURITY: IN THE LAST 12 MONTHS, WAS THERE A TIME WHEN YOU WERE NOT ABLE TO PAY THE MORTGAGE OR RENT ON TIME?: NO

## 2025-01-13 SDOH — ECONOMIC STABILITY: FOOD INSECURITY: WITHIN THE PAST 12 MONTHS, YOU WORRIED THAT YOUR FOOD WOULD RUN OUT BEFORE YOU GOT MONEY TO BUY MORE.: NEVER TRUE

## 2025-01-14 ENCOUNTER — OFFICE VISIT (OUTPATIENT)
Facility: CLINIC | Age: 70
End: 2025-01-14
Payer: MEDICARE

## 2025-01-14 VITALS
TEMPERATURE: 98.2 F | HEART RATE: 74 BPM | DIASTOLIC BLOOD PRESSURE: 75 MMHG | WEIGHT: 205 LBS | RESPIRATION RATE: 16 BRPM | HEIGHT: 69 IN | BODY MASS INDEX: 30.36 KG/M2 | SYSTOLIC BLOOD PRESSURE: 124 MMHG | OXYGEN SATURATION: 97 %

## 2025-01-14 DIAGNOSIS — E55.9 VITAMIN D DEFICIENCY: ICD-10-CM

## 2025-01-14 DIAGNOSIS — B35.4 TINEA CORPORIS: Primary | ICD-10-CM

## 2025-01-14 DIAGNOSIS — F41.9 ANXIETY: ICD-10-CM

## 2025-01-14 PROBLEM — N18.30 STAGE 3 CHRONIC KIDNEY DISEASE, UNSPECIFIED WHETHER STAGE 3A OR 3B CKD (HCC): Status: RESOLVED | Noted: 2022-06-01 | Resolved: 2025-01-14

## 2025-01-14 PROBLEM — I48.91 ATRIAL FIBRILLATION, UNSPECIFIED TYPE (HCC): Status: RESOLVED | Noted: 2022-01-03 | Resolved: 2025-01-14

## 2025-01-14 PROCEDURE — 1160F RVW MEDS BY RX/DR IN RCRD: CPT | Performed by: NURSE PRACTITIONER

## 2025-01-14 PROCEDURE — 1159F MED LIST DOCD IN RCRD: CPT | Performed by: NURSE PRACTITIONER

## 2025-01-14 PROCEDURE — 3074F SYST BP LT 130 MM HG: CPT | Performed by: NURSE PRACTITIONER

## 2025-01-14 PROCEDURE — G8427 DOCREV CUR MEDS BY ELIG CLIN: HCPCS | Performed by: NURSE PRACTITIONER

## 2025-01-14 PROCEDURE — 3017F COLORECTAL CA SCREEN DOC REV: CPT | Performed by: NURSE PRACTITIONER

## 2025-01-14 PROCEDURE — 3078F DIAST BP <80 MM HG: CPT | Performed by: NURSE PRACTITIONER

## 2025-01-14 PROCEDURE — 99213 OFFICE O/P EST LOW 20 MIN: CPT | Performed by: NURSE PRACTITIONER

## 2025-01-14 PROCEDURE — G8417 CALC BMI ABV UP PARAM F/U: HCPCS | Performed by: NURSE PRACTITIONER

## 2025-01-14 PROCEDURE — 1036F TOBACCO NON-USER: CPT | Performed by: NURSE PRACTITIONER

## 2025-01-14 PROCEDURE — 1123F ACP DISCUSS/DSCN MKR DOCD: CPT | Performed by: NURSE PRACTITIONER

## 2025-01-14 RX ORDER — NYSTATIN AND TRIAMCINOLONE ACETONIDE 100000; 1 [USP'U]/G; MG/G
CREAM TOPICAL
Qty: 30 G | Refills: 0 | Status: SHIPPED | OUTPATIENT
Start: 2025-01-14

## 2025-01-14 RX ORDER — LORAZEPAM 0.5 MG/1
0.5 TABLET ORAL 3 TIMES DAILY PRN
Qty: 12 TABLET | Refills: 0 | Status: SHIPPED | OUTPATIENT
Start: 2025-01-14 | End: 2025-02-13

## 2025-01-14 RX ORDER — TERBINAFINE HYDROCHLORIDE 250 MG/1
250 TABLET ORAL DAILY
Qty: 30 TABLET | Refills: 0 | Status: SHIPPED | OUTPATIENT
Start: 2025-01-14 | End: 2025-02-13

## 2025-01-14 RX ORDER — CHOLECALCIFEROL (VITAMIN D3) 25 MCG
1000 TABLET ORAL DAILY
Qty: 90 TABLET | Refills: 3 | Status: SHIPPED | OUTPATIENT
Start: 2025-01-14

## 2025-01-14 ASSESSMENT — PATIENT HEALTH QUESTIONNAIRE - PHQ9
SUM OF ALL RESPONSES TO PHQ QUESTIONS 1-9: 0
SUM OF ALL RESPONSES TO PHQ QUESTIONS 1-9: 0
1. LITTLE INTEREST OR PLEASURE IN DOING THINGS: NOT AT ALL
SUM OF ALL RESPONSES TO PHQ QUESTIONS 1-9: 0
SUM OF ALL RESPONSES TO PHQ9 QUESTIONS 1 & 2: 0
SUM OF ALL RESPONSES TO PHQ QUESTIONS 1-9: 0
2. FEELING DOWN, DEPRESSED OR HOPELESS: NOT AT ALL

## 2025-01-14 NOTE — PROGRESS NOTES
[facility]  [address] [place] [address] [address]   [place] [address]  (P) [phone]   (F) [phone]    Subjective:   Viraj Cho is a 69 y.o. male  established here with complaints of navel  and Rash  Evaluation to date: None  Onset:2 weeks  Location: navel  Duration: constant  Characteristics: itching  Aggravating factors: rubbing it  Reliving factors: hydrocortisone  Treatment to date:  hydrocortisone  Relevant PMH: no known injuries    Patient Active Problem List   Diagnosis    Testosterone deficiency    Hypertension    Anxiety    Obstructive sleep apnea syndrome    GERD (gastroesophageal reflux disease)    Benign prostatic hyperplasia with urinary obstruction    Scrotal varices    Chronic back pain    Vitamin D deficiency    Migraine    Coronary arteriosclerosis    Prediabetes    Osteoarthritis of neck    Benign paroxysmal positional vertigo    Colitis    Myopia    Dyslipidemia    Eczema    Gastroparesis    Herniated lumbar intervertebral disc    Herniation of intervertebral disc of cervical region    Insomnia, unspecified    Irritable bowel syndrome with diarrhea    Localized osteoarthrosis    Nasopharyngeal neoplasm    Papilloma of vestibule of nose    Presence of cardiac device    Solitary pulmonary nodule    Tear film insufficiency    Dyspnea on exertion    Chest pain due to coronary artery disease (HCC)      Current Outpatient Medications   Medication Sig Dispense Refill    terbinafine (LAMISIL) 250 MG tablet Take 1 tablet by mouth daily 30 tablet 0    nystatin-triamcinolone (MYCOLOG II) 970234-5.1 UNIT/GM-% cream Apply topically 2 times daily. 30 g 0    vitamin D3 (CHOLECALCIFEROL) 25 MCG (1000 UT) TABS tablet Take 1 tablet by mouth daily 90 tablet 3    fluticasone (FLONASE) 50 MCG/ACT nasal spray 2 sprays by Nasal route daily 1 each 5    famotidine (PEPCID) 40 MG tablet Take 1 tablet by mouth 2 times daily 180 tablet 3    LORazepam (ATIVAN) 0.5 MG tablet Take 1

## 2025-01-14 NOTE — PROGRESS NOTES
\"Have you been to the ER, urgent care clinic since your last visit?  Hospitalized since your last visit?\"    NO    “Have you seen or consulted any other health care providers outside our system since your last visit?”    NO    Chief Complaint   Patient presents with    navel        /75 (Site: Left Upper Arm, Position: Sitting, Cuff Size: Large Adult)   Pulse 74   Temp 98.2 °F (36.8 °C) (Temporal)   Resp 16   Ht 1.753 m (5' 9\")   Wt 93 kg (205 lb)   SpO2 97%   BMI 30.27 kg/m²

## 2025-01-17 ENCOUNTER — HOSPITAL ENCOUNTER (EMERGENCY)
Facility: HOSPITAL | Age: 70
Discharge: HOME | End: 2025-01-17
Attending: EMERGENCY MEDICINE
Payer: MEDICARE

## 2025-01-17 VITALS
BODY MASS INDEX: 30.36 KG/M2 | WEIGHT: 205 LBS | RESPIRATION RATE: 18 BRPM | OXYGEN SATURATION: 100 % | HEIGHT: 69 IN | DIASTOLIC BLOOD PRESSURE: 96 MMHG | HEART RATE: 73 BPM | SYSTOLIC BLOOD PRESSURE: 147 MMHG | TEMPERATURE: 97.7 F

## 2025-01-17 DIAGNOSIS — K91.840 SURGICAL WOUND HEMORRHAGE AFTER DENTAL PROCEDURE: Primary | ICD-10-CM

## 2025-01-17 DIAGNOSIS — K06.8 BLEEDING GUMS: ICD-10-CM

## 2025-01-17 DIAGNOSIS — Z98.818 SURGICAL WOUND HEMORRHAGE AFTER DENTAL PROCEDURE: Primary | ICD-10-CM

## 2025-01-17 LAB
ALBUMIN SERPL-MCNC: 4.5 G/DL (ref 3.5–5.2)
ALBUMIN/GLOB SERPL: 1.4 (ref 1.1–2.2)
ALP SERPL-CCNC: 61 U/L (ref 40–129)
ALT SERPL-CCNC: 26 U/L (ref 10–50)
ANION GAP SERPL CALC-SCNC: 13 MMOL/L (ref 2–12)
APTT PPP: 35.7 SEC (ref 21.2–34.1)
AST SERPL-CCNC: 27 U/L (ref 10–50)
BASOPHILS # BLD: 0.08 K/UL (ref 0–0.1)
BASOPHILS NFR BLD: 0.8 % (ref 0–1)
BILIRUB SERPL-MCNC: 0.3 MG/DL (ref 0.2–1)
BUN SERPL-MCNC: 14 MG/DL (ref 8–23)
BUN/CREAT SERPL: 11 (ref 12–20)
CALCIUM SERPL-MCNC: 8.8 MG/DL (ref 8.8–10.2)
CHLORIDE SERPL-SCNC: 106 MMOL/L (ref 98–107)
CO2 SERPL-SCNC: 24 MMOL/L (ref 22–29)
CREAT SERPL-MCNC: 1.3 MG/DL (ref 0.7–1.2)
DIFFERENTIAL METHOD BLD: ABNORMAL
EOSINOPHIL # BLD: 0.15 K/UL (ref 0–0.4)
EOSINOPHIL NFR BLD: 1.4 % (ref 0–7)
ERYTHROCYTE [DISTWIDTH] IN BLOOD BY AUTOMATED COUNT: 13.4 % (ref 11.5–14.5)
GLOBULIN SER CALC-MCNC: 3.2 G/DL (ref 2–4)
GLUCOSE SERPL-MCNC: 114 MG/DL (ref 65–100)
HCT VFR BLD AUTO: 42.2 % (ref 36.6–50.3)
HGB BLD-MCNC: 14.2 G/DL (ref 12.1–17)
IMM GRANULOCYTES # BLD AUTO: 0.03 K/UL (ref 0–0.04)
IMM GRANULOCYTES NFR BLD AUTO: 0.3 % (ref 0–0.5)
INR PPP: 1 (ref 0.9–1.1)
LYMPHOCYTES # BLD: 4.65 K/UL (ref 0.8–3.5)
LYMPHOCYTES NFR BLD: 44.9 % (ref 12–49)
MCH RBC QN AUTO: 29.4 PG (ref 26–34)
MCHC RBC AUTO-ENTMCNC: 33.6 G/DL (ref 30–36.5)
MCV RBC AUTO: 87.4 FL (ref 80–99)
MONOCYTES # BLD: 0.88 K/UL (ref 0–1)
MONOCYTES NFR BLD: 8.5 % (ref 5–13)
NEUTS SEG # BLD: 4.57 K/UL (ref 1.8–8)
NEUTS SEG NFR BLD: 44.1 % (ref 32–75)
NRBC # BLD: 0 K/UL (ref 0–0.01)
NRBC BLD-RTO: 0 PER 100 WBC
PLATELET # BLD AUTO: 293 K/UL (ref 150–400)
PMV BLD AUTO: 10.5 FL (ref 8.9–12.9)
POTASSIUM SERPL-SCNC: 3.9 MMOL/L (ref 3.5–5.1)
PROT SERPL-MCNC: 7.7 G/DL (ref 6.4–8.3)
PROTHROMBIN TIME: 13.8 SEC (ref 11.9–14.6)
RBC # BLD AUTO: 4.83 M/UL (ref 4.1–5.7)
SODIUM SERPL-SCNC: 143 MMOL/L (ref 136–145)
THERAPEUTIC RANGE: ABNORMAL SECS (ref 82–109)
WBC # BLD AUTO: 10.4 K/UL (ref 4.1–11.1)

## 2025-01-17 PROCEDURE — 99283 EMERGENCY DEPT VISIT LOW MDM: CPT

## 2025-01-17 PROCEDURE — 80053 COMPREHEN METABOLIC PANEL: CPT

## 2025-01-17 PROCEDURE — 85730 THROMBOPLASTIN TIME PARTIAL: CPT

## 2025-01-17 PROCEDURE — 85610 PROTHROMBIN TIME: CPT

## 2025-01-17 PROCEDURE — 85025 COMPLETE CBC W/AUTO DIFF WBC: CPT

## 2025-01-17 PROCEDURE — 36415 COLL VENOUS BLD VENIPUNCTURE: CPT

## 2025-01-17 RX ORDER — TRANEXAMIC ACID 100 MG/ML
100 INJECTION, SOLUTION INTRAVENOUS ONCE
Status: DISCONTINUED | OUTPATIENT
Start: 2025-01-17 | End: 2025-01-17 | Stop reason: HOSPADM

## 2025-01-17 RX ORDER — TRANEXAMIC ACID 100 MG/ML
1000 INJECTION, SOLUTION INTRAVENOUS ONCE
Status: DISCONTINUED | OUTPATIENT
Start: 2025-01-17 | End: 2025-01-17 | Stop reason: HOSPADM

## 2025-01-17 RX ORDER — LIDOCAINE HYDROCHLORIDE AND EPINEPHRINE 15; 5 MG/ML; UG/ML
10 INJECTION, SOLUTION EPIDURAL ONCE
Status: DISCONTINUED | OUTPATIENT
Start: 2025-01-17 | End: 2025-01-17 | Stop reason: HOSPADM

## 2025-01-17 ASSESSMENT — PAIN - FUNCTIONAL ASSESSMENT: PAIN_FUNCTIONAL_ASSESSMENT: NONE - DENIES PAIN

## 2025-01-17 NOTE — ED TRIAGE NOTES
Patient ambulatory to ED by self. C/O left upper gum bleeding s/p dental procedure 10 hours ago. Patient just had basic dental cleaning done. Patient takes Brilinta and Eliquis. Patient tried salt forrest and did not help. Stated he has gone threw 6 soaked gauze pads. Denies pain.

## 2025-02-12 ENCOUNTER — OFFICE VISIT (OUTPATIENT)
Facility: CLINIC | Age: 70
End: 2025-02-12

## 2025-02-12 VITALS
SYSTOLIC BLOOD PRESSURE: 126 MMHG | RESPIRATION RATE: 18 BRPM | HEIGHT: 69 IN | TEMPERATURE: 97.8 F | DIASTOLIC BLOOD PRESSURE: 69 MMHG | WEIGHT: 205 LBS | BODY MASS INDEX: 30.36 KG/M2 | OXYGEN SATURATION: 97 % | HEART RATE: 70 BPM

## 2025-02-12 DIAGNOSIS — K11.7 XEROSTOMIA: ICD-10-CM

## 2025-02-12 DIAGNOSIS — G43.019 INTRACTABLE MIGRAINE WITHOUT AURA AND WITHOUT STATUS MIGRAINOSUS: Primary | ICD-10-CM

## 2025-02-12 DIAGNOSIS — L29.9 PRURITUS: ICD-10-CM

## 2025-02-12 RX ORDER — BUTALBITAL, ACETAMINOPHEN, CAFFEINE AND CODEINE PHOSPHATE 50; 325; 40; 30 MG/1; MG/1; MG/1; MG/1
1 CAPSULE ORAL DAILY PRN
Qty: 7 CAPSULE | Refills: 0 | Status: SHIPPED | OUTPATIENT
Start: 2025-02-12 | End: 2025-02-13

## 2025-02-12 NOTE — PROGRESS NOTES
\"Have you been to the ER, urgent care clinic since your last visit?  Hospitalized since your last visit?\"    NO    “Have you seen or consulted any other health care providers outside our system since your last visit?”    NO    Chief Complaint   Patient presents with    yeast around naval area       Chief Complaint   Patient presents with    yeast around naval area     /69 (Site: Right Upper Arm, Position: Sitting, Cuff Size: Large Adult)   Pulse 70   Temp 97.8 °F (36.6 °C) (Temporal)   Resp 18   Ht 1.753 m (5' 9\")   Wt 93 kg (205 lb)   SpO2 97%   BMI 30.27 kg/m²            
and caffeine Max Daily Amount: 1 capsule, Disp-7 capsule, R-0Normal  2. Pruritus  3. Xerostomia    Assessment & Plan  1. Pruritus in the umbilical region.  The patient's symptoms may be attributed to dry skin and a history of scarring in the area, potentially causing adhesions. The use of a stronger steroid cream than hydrocortisone could have alleviated the itching which appeared to \"improve his symptoms\". The condition does not appear to be fungal in nature in my medical opinion.     The primary issue seems to be chemical irritation from the chlorine in pool and skin dryness. He is advised to maintain skin hydration with regular moisturizers and to apply zinc oxide paste post-swimming for additional protection.     He is already almost complete with 4 week duration of lamisil.    Discussed not to have prolonged use of steroid creams    If there is no improvement, a referral to dermatology will be considered for further evaluation, including potential KOH prep    2. Migraines.  He experiences migraines approximately once every few months which can persist for half a day or a full day if untreated. He has been using Fioricet for relief for 15 years duration  He is aware that his current medication contains an opioid. A refill for his migraine medication has been requested. He is informed that this medication is not the first-line treatment for migraines, and alternatives such as sumatriptan or Imitrex are recommended. He reports he would like to stick with fioricet which was previously given by Dr Marian Petersen. Given he is using very minimal (has bottle with several pills left out of 15)    3. Anxiety.  He has a significant cardiac history and anxiety issues. He tries to keep it under control, but his cardiologist tells him anxiety is not good for him. He could not prescribe the lorazepam. He told him he has to go to his primary care provider. He always keeps them with him. In a year, he might not take anything. If he

## 2025-02-13 DIAGNOSIS — G43.019 INTRACTABLE MIGRAINE WITHOUT AURA AND WITHOUT STATUS MIGRAINOSUS: Primary | ICD-10-CM

## 2025-02-13 RX ORDER — BUTALBITAL, ACETAMINOPHEN, CAFFEINE AND CODEINE PHOSPHATE 300; 50; 40; 30 MG/1; MG/1; MG/1; MG/1
1 CAPSULE ORAL DAILY PRN
Qty: 7 CAPSULE | Refills: 0 | Status: SHIPPED
Start: 2025-02-13 | End: 2025-02-13 | Stop reason: SDUPTHER

## 2025-02-13 RX ORDER — BUTALBITAL, ACETAMINOPHEN, CAFFEINE AND CODEINE PHOSPHATE 300; 50; 40; 30 MG/1; MG/1; MG/1; MG/1
1 CAPSULE ORAL DAILY PRN
Qty: 7 CAPSULE | Refills: 0 | Status: SHIPPED | OUTPATIENT
Start: 2025-02-13 | End: 2025-05-14

## 2025-02-13 NOTE — TELEPHONE ENCOUNTER
Method of communication:  []Fax [x]Phone call []In person   []Other:    Who is making request:[facility]  What medication/s (include strength and dosing):  Fiorecet - Strength -86-30    This is for a:   []Refill    []New medication request  []Follow up on prior request  PROBLEM WITH MEDICATION    Pharmacy:[facility]  Best contact for patient:[phone]    Additional notes: [facility] called and stated they do not have the Fiorect in strength -93-30 tablet but they do have the strength of 300-40 mg tablet.  Please let them know if they can replace strength and if not the patient will need to have medication filled outside at another pharmacy.

## 2025-02-13 NOTE — TELEPHONE ENCOUNTER
Requested Prescriptions     Pending Prescriptions Disp Refills    butalbital-acetaminophen-caffeine-codeine (FIORICET WITH CODEINE) -74-30 MG per capsule 7 capsule 0     Sig: Take 1 capsule by mouth daily as needed (severe migraine) for up to 90 days. Max Daily Amount: 1 capsule     Signed Prescriptions Disp Refills    butalbital-acetaminophen-caffeine-codeine (FIORICET WITH CODEINE) -83-30 MG per capsule 7 capsule 0     Sig: Take 1 capsule by mouth daily as needed (severe migraine) for up to 90 days. Max Daily Amount: 1 capsule     Authorizing Provider: DINESH SETHI            Patient is requesting that medication be resent to [facility]

## 2025-05-30 ENCOUNTER — TELEPHONE (OUTPATIENT)
Facility: CLINIC | Age: 70
End: 2025-05-30

## 2025-05-30 NOTE — TELEPHONE ENCOUNTER
Attempted to contact patient regarding upcoming Medicare wellness appointment and completion of HRA questionnaire. LVM for patient to please return call at [phone].

## 2025-05-31 SDOH — HEALTH STABILITY: PHYSICAL HEALTH: ON AVERAGE, HOW MANY MINUTES DO YOU ENGAGE IN EXERCISE AT THIS LEVEL?: 60 MIN

## 2025-05-31 SDOH — HEALTH STABILITY: PHYSICAL HEALTH: ON AVERAGE, HOW MANY DAYS PER WEEK DO YOU ENGAGE IN MODERATE TO STRENUOUS EXERCISE (LIKE A BRISK WALK)?: 3 DAYS

## 2025-05-31 ASSESSMENT — PATIENT HEALTH QUESTIONNAIRE - PHQ9
SUM OF ALL RESPONSES TO PHQ QUESTIONS 1-9: 0
2. FEELING DOWN, DEPRESSED OR HOPELESS: NOT AT ALL
1. LITTLE INTEREST OR PLEASURE IN DOING THINGS: NOT AT ALL
SUM OF ALL RESPONSES TO PHQ QUESTIONS 1-9: 0

## 2025-05-31 ASSESSMENT — LIFESTYLE VARIABLES
HOW OFTEN DO YOU HAVE SIX OR MORE DRINKS ON ONE OCCASION: 1
HOW OFTEN DO YOU HAVE A DRINK CONTAINING ALCOHOL: 1
HOW MANY STANDARD DRINKS CONTAINING ALCOHOL DO YOU HAVE ON A TYPICAL DAY: 0
HOW MANY STANDARD DRINKS CONTAINING ALCOHOL DO YOU HAVE ON A TYPICAL DAY: PATIENT DOES NOT DRINK
HOW OFTEN DO YOU HAVE A DRINK CONTAINING ALCOHOL: NEVER

## 2025-06-03 ENCOUNTER — OFFICE VISIT (OUTPATIENT)
Facility: CLINIC | Age: 70
End: 2025-06-03
Payer: MEDICARE

## 2025-06-03 VITALS
HEART RATE: 71 BPM | BODY MASS INDEX: 29.18 KG/M2 | RESPIRATION RATE: 20 BRPM | DIASTOLIC BLOOD PRESSURE: 70 MMHG | WEIGHT: 197 LBS | SYSTOLIC BLOOD PRESSURE: 134 MMHG | TEMPERATURE: 97.9 F | HEIGHT: 69 IN | OXYGEN SATURATION: 98 %

## 2025-06-03 DIAGNOSIS — R73.03 PREDIABETES: ICD-10-CM

## 2025-06-03 DIAGNOSIS — Z00.00 MEDICARE ANNUAL WELLNESS VISIT, SUBSEQUENT: Primary | ICD-10-CM

## 2025-06-03 DIAGNOSIS — I25.10 CORONARY ARTERIOSCLEROSIS: ICD-10-CM

## 2025-06-03 DIAGNOSIS — I48.0 PAROXYSMAL ATRIAL FIBRILLATION (HCC): ICD-10-CM

## 2025-06-03 DIAGNOSIS — E78.5 DYSLIPIDEMIA: ICD-10-CM

## 2025-06-03 DIAGNOSIS — E55.9 VITAMIN D DEFICIENCY: ICD-10-CM

## 2025-06-03 DIAGNOSIS — G47.33 OBSTRUCTIVE SLEEP APNEA SYNDROME: ICD-10-CM

## 2025-06-03 DIAGNOSIS — I10 PRIMARY HYPERTENSION: ICD-10-CM

## 2025-06-03 PROCEDURE — 1123F ACP DISCUSS/DSCN MKR DOCD: CPT | Performed by: FAMILY MEDICINE

## 2025-06-03 PROCEDURE — 3078F DIAST BP <80 MM HG: CPT | Performed by: FAMILY MEDICINE

## 2025-06-03 PROCEDURE — 1160F RVW MEDS BY RX/DR IN RCRD: CPT | Performed by: FAMILY MEDICINE

## 2025-06-03 PROCEDURE — G0439 PPPS, SUBSEQ VISIT: HCPCS | Performed by: FAMILY MEDICINE

## 2025-06-03 PROCEDURE — G8427 DOCREV CUR MEDS BY ELIG CLIN: HCPCS | Performed by: FAMILY MEDICINE

## 2025-06-03 PROCEDURE — 99213 OFFICE O/P EST LOW 20 MIN: CPT | Performed by: FAMILY MEDICINE

## 2025-06-03 PROCEDURE — G8417 CALC BMI ABV UP PARAM F/U: HCPCS | Performed by: FAMILY MEDICINE

## 2025-06-03 PROCEDURE — 1126F AMNT PAIN NOTED NONE PRSNT: CPT | Performed by: FAMILY MEDICINE

## 2025-06-03 PROCEDURE — 3017F COLORECTAL CA SCREEN DOC REV: CPT | Performed by: FAMILY MEDICINE

## 2025-06-03 PROCEDURE — 3075F SYST BP GE 130 - 139MM HG: CPT | Performed by: FAMILY MEDICINE

## 2025-06-03 PROCEDURE — 1036F TOBACCO NON-USER: CPT | Performed by: FAMILY MEDICINE

## 2025-06-03 PROCEDURE — 1159F MED LIST DOCD IN RCRD: CPT | Performed by: FAMILY MEDICINE

## 2025-06-03 RX ORDER — INCLISIRAN 284 MG/1.5ML
284 INJECTION, SOLUTION SUBCUTANEOUS ONCE
COMMUNITY

## 2025-06-03 NOTE — PROGRESS NOTES
Have you been to the ER, urgent care clinic since your last visit?  Hospitalized since your last visit?   NO    Have you seen or consulted any other health care providers outside our system since your last visit?   NO         Chief Complaint   Patient presents with    Medicare AW     /70 (BP Site: Left Upper Arm, Patient Position: Sitting, BP Cuff Size: Large Adult)   Pulse 71   Temp 97.9 °F (36.6 °C) (Temporal)   Resp 20   Ht 1.753 m (5' 9\")   Wt 89.4 kg (197 lb)   SpO2 98%   BMI 29.09 kg/m²

## 2025-06-03 NOTE — PROGRESS NOTES
Medicare Annual Wellness Visit    Viraj Cho is here for Medicare AWV    Assessment & Plan  1. Annual wellness visit.  - His GFR remains stable at 60, indicating satisfactory kidney function. His blood counts are within normal limits. Blood pressure and heart rate are stable, albeit with a heightened sensitivity to fluctuations.   - He is advised to receive the tetanus booster from the pharmacy. We will check VIIS to determine whether his pneumococcal vaccine is up to date. A comprehensive panel, lipid panel, A1c, CBC, and vitamin D level will be ordered. He will return tomorrow morning for fasting labs.  - He is encouraged to maintain consistent eating habits throughout the day to manage blood sugar levels. He is also advised to consult with his CPAP provider regarding potential adjustments to his machine settings.    2. Hyperlipidemia.  - He is currently on LEQVIO and Nexletol for cholesterol management. He received the first shot of LEQVIO in the office and will return in July for the second shot, followed by shots every six months.  - He reports tolerating the medication well with only minor transient side effects. A lipid panel will be ordered to assess the effectiveness of the new medication regimen.    3. Hypertension.  - He is on a low dose of blood pressure medication, which he takes at 7:00 AM and 2.5 mg before bed. He reports occasional lightheadedness in the morning, which may be related to his blood pressure medication timing.  - He is advised to experiment with taking his blood pressure medication earlier to see if it alleviates the lightheadedness.    4. Atrial Fibrillation (AFib) and KELLIE.  - He has a history of Afib and KELLIE and uses a CPAP machine nightly. He reports that recent adjustments made during a sleep study improved his symptoms.  - He is advised to bring his CPAP machine to the provider to confirm if further adjustments are needed.    5. Prostate issues.  - He continues to follow up

## 2025-06-04 ENCOUNTER — HOSPITAL ENCOUNTER (EMERGENCY)
Facility: HOSPITAL | Age: 70
Discharge: HOME | End: 2025-06-04
Attending: EMERGENCY MEDICINE
Payer: MEDICARE

## 2025-06-04 VITALS
DIASTOLIC BLOOD PRESSURE: 73 MMHG | OXYGEN SATURATION: 97 % | HEIGHT: 69 IN | TEMPERATURE: 97.9 F | HEART RATE: 64 BPM | RESPIRATION RATE: 10 BRPM | WEIGHT: 195 LBS | BODY MASS INDEX: 28.88 KG/M2 | SYSTOLIC BLOOD PRESSURE: 146 MMHG

## 2025-06-04 DIAGNOSIS — I49.1 PAC (PREMATURE ATRIAL CONTRACTION): ICD-10-CM

## 2025-06-04 DIAGNOSIS — I49.3 SYMPTOMATIC PVCS: Primary | ICD-10-CM

## 2025-06-04 LAB
ALBUMIN SERPL-MCNC: 4.2 G/DL (ref 3.5–5.2)
ALBUMIN/GLOB SERPL: 1.4 (ref 1.1–2.2)
ALP SERPL-CCNC: 62 U/L (ref 40–129)
ALT SERPL-CCNC: 10 U/L (ref 10–50)
ANION GAP SERPL CALC-SCNC: 12 MMOL/L (ref 2–12)
AST SERPL-CCNC: 19 U/L (ref 10–50)
BASOPHILS # BLD: 0.09 K/UL (ref 0–0.1)
BASOPHILS NFR BLD: 0.8 % (ref 0–1)
BILIRUB SERPL-MCNC: 0.3 MG/DL (ref 0.2–1)
BUN SERPL-MCNC: 15 MG/DL (ref 8–23)
BUN/CREAT SERPL: 11 (ref 12–20)
CALCIUM SERPL-MCNC: 8.8 MG/DL (ref 8.8–10.2)
CHLORIDE SERPL-SCNC: 106 MMOL/L (ref 98–107)
CO2 SERPL-SCNC: 24 MMOL/L (ref 22–29)
CREAT SERPL-MCNC: 1.35 MG/DL (ref 0.7–1.2)
DIFFERENTIAL METHOD BLD: ABNORMAL
EOSINOPHIL # BLD: 0.1 K/UL (ref 0–0.4)
EOSINOPHIL NFR BLD: 0.9 % (ref 0–7)
ERYTHROCYTE [DISTWIDTH] IN BLOOD BY AUTOMATED COUNT: 13.5 % (ref 11.5–14.5)
GLOBULIN SER CALC-MCNC: 2.9 G/DL (ref 2–4)
GLUCOSE SERPL-MCNC: 99 MG/DL (ref 65–100)
HCT VFR BLD AUTO: 43.3 % (ref 36.6–50.3)
HGB BLD-MCNC: 14.4 G/DL (ref 12.1–17)
IMM GRANULOCYTES # BLD AUTO: 0.03 K/UL (ref 0–0.04)
IMM GRANULOCYTES NFR BLD AUTO: 0.3 % (ref 0–0.5)
LYMPHOCYTES # BLD: 4.64 K/UL (ref 0.8–3.5)
LYMPHOCYTES NFR BLD: 41.4 % (ref 12–49)
MAGNESIUM SERPL-MCNC: 1.9 MG/DL (ref 1.6–2.4)
MCH RBC QN AUTO: 29.3 PG (ref 26–34)
MCHC RBC AUTO-ENTMCNC: 33.3 G/DL (ref 30–36.5)
MCV RBC AUTO: 88 FL (ref 80–99)
MONOCYTES # BLD: 1.01 K/UL (ref 0–1)
MONOCYTES NFR BLD: 9 % (ref 5–13)
NEUTS SEG # BLD: 5.34 K/UL (ref 1.8–8)
NEUTS SEG NFR BLD: 47.6 % (ref 32–75)
NRBC # BLD: 0 K/UL (ref 0–0.01)
NRBC BLD-RTO: 0 PER 100 WBC
PLATELET # BLD AUTO: 272 K/UL (ref 150–400)
PMV BLD AUTO: 10.4 FL (ref 8.9–12.9)
POTASSIUM SERPL-SCNC: 4 MMOL/L (ref 3.5–5.1)
PROT SERPL-MCNC: 7.1 G/DL (ref 6.4–8.3)
RBC # BLD AUTO: 4.92 M/UL (ref 4.1–5.7)
SODIUM SERPL-SCNC: 142 MMOL/L (ref 136–145)
TROPONIN T SERPL HS-MCNC: 12.8 NG/L (ref 0–22)
WBC # BLD AUTO: 11.2 K/UL (ref 4.1–11.1)

## 2025-06-04 PROCEDURE — 85025 COMPLETE CBC W/AUTO DIFF WBC: CPT

## 2025-06-04 PROCEDURE — 80053 COMPREHEN METABOLIC PANEL: CPT

## 2025-06-04 PROCEDURE — 36415 COLL VENOUS BLD VENIPUNCTURE: CPT

## 2025-06-04 PROCEDURE — 93005 ELECTROCARDIOGRAM TRACING: CPT | Performed by: EMERGENCY MEDICINE

## 2025-06-04 PROCEDURE — 83735 ASSAY OF MAGNESIUM: CPT

## 2025-06-04 PROCEDURE — 99284 EMERGENCY DEPT VISIT MOD MDM: CPT

## 2025-06-04 PROCEDURE — 84484 ASSAY OF TROPONIN QUANT: CPT

## 2025-06-04 ASSESSMENT — PAIN SCALES - GENERAL: PAINLEVEL_OUTOF10: 0

## 2025-06-04 NOTE — ED PROVIDER NOTES
[facility] EMERG[facility] DEPARTMENT  EMERGENCY DEPARTMENT ENCOUNTER      Pt Name: Viraj Cho  MRN: [id number]  Birthdate 1955  Date of evaluation: 6/4/2025  Provider: Anam Pike MD    CHIEF COMPLAINT       Chief Complaint   Patient presents with    Irregular Heart Beat       ALLERGIES     Ampicillin, Cefuroxime, Ezetimibe, Ibuprofen, Nsaids, and Statins    ENCOUNTER     HISTORY OF PRESENT ILLNESS  70-year-old male with a history of paroxysmal atrial fibrillation, coronary artery disease status post percutaneous coronary intervention, prior atrial fibrillation ablation (3-4 years ago), hyperlipidemia, peptic ulcer disease, sleep apnea, and hypertension presented from home. History obtained from patient and wife. He awoke around 5 AM with a fluttering sensation in his chest, described as \"extra beats,\" and noted a heart rate of 56-57 bpm on his home monitor, which is abnormal for him. Symptoms resolved after he laid back down; he denies chest pain, dyspnea, dizziness, nausea, vomiting, or lightheadedness. Pre-arrival meds: Used Fitbit and cardiac monitoring device at home, recorded episode for physician review. Review of prior records: Attempted to contact on-call provider for [facility]; triage nurse will call back after 8 AM.    HEALTHCARE PROVIDERS  - PCP - Dr. Anita Bah  - Cardiologist - Dr. Gutiérrez ([facility])  - Electrophysiologist - Dr. De La Cruz ([facility])    PAST MEDICAL HISTORY  - Paroxysmal atrial fibrillation  - Coronary artery disease status post PCI  - Prior atrial fibrillation ablation (3-4 years ago)  - Hyperlipidemia  - Peptic ulcer disease  - Sleep apnea  - Hypertension    PAST SURGICAL HISTORY  - Prior AFib ablation (3-4 years ago)  - PCI for CAD    PHYSICAL EXAM  Vitals: Stable: /71 mm Hg, T 97.9 °F, HR 73 bpm, RR 14/min, SpO2 98%.  General: Afebrile, conversant in full sentences, nontoxic.  Eyes: Appear normal with no scleral icterus.  HENT:  patient's management. The triage nurse reported that a call back would occur after 8 AM. No direct discussion with a consulting physician occurred prior to discharge.    MEDICAL DECISION MAKING  The patient presented with palpitations and a fluttering sensation in the chest upon awakening, with home monitoring showing bradycardia (56-57 bpm). In the ED, he was hemodynamically stable, afebrile, and nontoxic, with normal cardiovascular and pulmonary exams. EKG revealed sinus rhythm with PACs and PVCs, and no ischemic changes. Labs were reassuring except for a chronically elevated creatinine, and troponin was negative.    Given his history of paroxysmal atrial fibrillation and CAD, a moderate-risk HEART score was calculated (4 points), but the absence of chest pain, ischemic EKG changes, or elevated troponin supported a non-ischemic etiology. The patient’s symptoms resolved, and he remained stable throughout observation. He has a LINQ implant and home cardiac monitoring, and is closely followed by cardiology.    Shared decision-making: The patient expressed comfort with outpatient management, wishes to return home, resume his medications, and will call his cardiologist. Therefore, discharge home with cardiology follow-up is the safest plan.    DIFFERENTIAL DIAGNOSES  - Atrial fibrillation: Considered because of history of paroxysmal AFib and reported palpitations; less likely due to sinus rhythm on ED EKG, absence of irregularly irregular pulse, and no rapid heart rate or hemodynamic instability during evaluation.  - Ventricular tachycardia: Considered due to palpitations and cardiac history; less likely as EKG showed only isolated PACs/PVCs, no sustained ventricular arrhythmia, and patient remained stable without syncope or hypotension.  - Sick sinus syndrome: Considered given age and arrhythmia history; less likely as heart rate was within patient’s normal range (73 bpm in ED), no bradycardia, syncope, or altered

## 2025-06-04 NOTE — ED TRIAGE NOTES
Patient arrived to the ER via pov with complaint of fluttering in his chest. Patient states that he has a history of A-fib but has had an ablation in 2022. Patient denies chest pain, sob, dizziness, n/v. Patient reports that his fitbit was showing low heart rate and he used another device showed \"possible a-fib\" Patient states that he has a significant cardiac history.

## 2025-06-04 NOTE — DISCHARGE INSTRUCTIONS
DIAGNOSIS: Premature Ventricular Contractions (PVCs) and Premature Atrial Contractions (PACs) - these are extra heartbeats that start in different parts of your heart. They are common and usually not dangerous, especially when you do not have other symptoms.    WHAT WAS DONE IN THE EMERGENCY DEPARTMENT:  - We listened to your story and checked your vital signs.  - We did a physical exam and checked your heart and lungs.  - We did an EKG (heart tracing) that showed your heart was in a normal rhythm but had some extra beats (PACs and PVCs).  - We checked your blood tests, including your kidney function, electrolytes, and a heart enzyme (troponin) to make sure there was no heart damage. All were reassuring except for your creatinine, which is known to be a little high for you.  - We reviewed your home heart monitor and Fitbit recordings.  - We tried to contact your cardiology team for follow-up.    OTHER DIAGNOSES CONSIDERED:  We checked for other heart problems like atrial fibrillation, dangerous fast heart rhythms (ventricular tachycardia), sick sinus syndrome, and heart attacks. Your EKG and blood tests did not show any signs of these problems.    WHAT MAY HAVE CAUSED THIS:  Extra heartbeats can happen for many reasons, including stress, caffeine, changes in sleep, or sometimes for no clear reason. Your history of heart problems may make you more likely to notice these extra beats.    WHAT TO EXPECT:  Most people with PACs and PVCs do not have serious problems. These extra beats may come and go. You may feel them sometimes, especially when you are resting or lying down. They are usually not dangerous if you do not have chest pain, trouble breathing, or fainting.    TREATMENT AND SELF-CARE:  - You did not need any special treatment in the emergency department.  - Keep taking your regular medicines as prescribed.  - Try to avoid too much caffeine or stress, as these can sometimes make extra beats worse.  - Make sure

## 2025-06-05 ENCOUNTER — CARE COORDINATION (OUTPATIENT)
Dept: OTHER | Facility: CLINIC | Age: 70
End: 2025-06-05

## 2025-06-05 LAB
EKG ATRIAL RATE: 74 BPM
EKG DIAGNOSIS: NORMAL
EKG P AXIS: 50 DEGREES
EKG P-R INTERVAL: 148 MS
EKG Q-T INTERVAL: 372 MS
EKG QRS DURATION: 84 MS
EKG QTC CALCULATION (BAZETT): 412 MS
EKG R AXIS: -2 DEGREES
EKG T AXIS: 23 DEGREES
EKG VENTRICULAR RATE: 74 BPM

## 2025-06-05 PROCEDURE — 93010 ELECTROCARDIOGRAM REPORT: CPT | Performed by: SPECIALIST

## 2025-06-05 NOTE — CARE COORDINATION
Ambulatory Care Coordination Note     2025 12:51 PM     Patient Current Location:  [place]     This patient was received as a referral from Population health report .    ACM contacted the patient by telephone. Verified name and  with patient as identifiers. Provided introduction to self, and explanation of the ACM role.   Patient accepted care management services at this time.          ACM: Sangeetha Austin RN     Challenges to be reviewed by the provider   Additional needs identified to be addressed with provider No                 Method of communication with provider: none.    Utilization: Initial Call - Discharge Date: 25   Discharge Facility: [facility]  Reason for ED Visit: Chest fluttering  Visit Diagnosis: PVC, PAC    Number of ED visits in the last 6 months: 2      Do you have any ongoing symptoms? No  Did you call your PCP prior to going to the ED? No, did not call the PCP office.  Was early AM    Review of Discharge Instructions:   [x] AVS discharge instructions  [] Right Care, Right Place, Right Time document  [] Medication changes  [x] Follow up appointments  [] Referral follow up          Care Summary Note: Care management outreach with patient following an ER visit for fluttering in his chest.  Pt has history of Afib, and has an internal cardiac monitoring device.  His HR was low in the 50's at the time, and his devise said possible Afib which prompted him to go to the ER.  Pt is no longer having any symptoms.  He was able to speak to Cardiology yesterday and has an appt on .  Pt had recent appt with PCP with fasting labs ordered. He did go and complete that this morning.    Pt denies specific questions or concerns at this time.  ACM will follow up to assess for further needs.    Offered patient enrollment in the Remote Patient Monitoring (RPM) program for in-home monitoring: Patient is not eligible for RPM program because: affiliate provider.     Assessments Completed:

## 2025-06-06 LAB
ERYTHROCYTE [DISTWIDTH] IN BLOOD BY AUTOMATED COUNT: 13.5 % (ref 11.6–15.4)
HBA1C MFR BLD: 6.1 % (ref 4.8–5.6)
HCT VFR BLD AUTO: 45.4 % (ref 37.5–51)
HGB BLD-MCNC: 14.3 G/DL (ref 13–17.7)
MCH RBC QN AUTO: 29.4 PG (ref 26.6–33)
MCHC RBC AUTO-ENTMCNC: 31.5 G/DL (ref 31.5–35.7)
MCV RBC AUTO: 93 FL (ref 79–97)
PLATELET # BLD AUTO: 274 X10E3/UL (ref 150–450)
RBC # BLD AUTO: 4.87 X10E6/UL (ref 4.14–5.8)
WBC # BLD AUTO: 8.7 X10E3/UL (ref 3.4–10.8)

## 2025-06-07 LAB
25(OH)D3+25(OH)D2 SERPL-MCNC: 51.5 NG/ML (ref 30–100)
ALBUMIN SERPL-MCNC: 4.4 G/DL (ref 3.9–4.9)
ALP SERPL-CCNC: 55 IU/L (ref 44–121)
ALT SERPL-CCNC: 16 IU/L (ref 0–44)
AST SERPL-CCNC: 18 IU/L (ref 0–40)
BILIRUB SERPL-MCNC: 0.3 MG/DL (ref 0–1.2)
BUN SERPL-MCNC: 15 MG/DL (ref 8–27)
BUN/CREAT SERPL: 10 (ref 10–24)
CALCIUM SERPL-MCNC: 9.5 MG/DL (ref 8.6–10.2)
CHLORIDE SERPL-SCNC: 106 MMOL/L (ref 96–106)
CHOLEST SERPL-MCNC: 133 MG/DL (ref 100–199)
CO2 SERPL-SCNC: 19 MMOL/L (ref 20–29)
CREAT SERPL-MCNC: 1.44 MG/DL (ref 0.76–1.27)
EGFRCR SERPLBLD CKD-EPI 2021: 52 ML/MIN/1.73
GLOBULIN SER CALC-MCNC: 2.3 G/DL (ref 1.5–4.5)
GLUCOSE SERPL-MCNC: 90 MG/DL (ref 70–99)
HDLC SERPL-MCNC: 60 MG/DL
LDLC SERPL CALC-MCNC: 62 MG/DL (ref 0–99)
POTASSIUM SERPL-SCNC: 4.4 MMOL/L (ref 3.5–5.2)
PROT SERPL-MCNC: 6.7 G/DL (ref 6–8.5)
SODIUM SERPL-SCNC: 140 MMOL/L (ref 134–144)
TRIGL SERPL-MCNC: 51 MG/DL (ref 0–149)
VLDLC SERPL CALC-MCNC: 11 MG/DL (ref 5–40)

## 2025-06-27 ENCOUNTER — CARE COORDINATION (OUTPATIENT)
Dept: OTHER | Facility: CLINIC | Age: 70
End: 2025-06-27

## 2025-06-27 NOTE — CARE COORDINATION
Ambulatory Care Coordination Note     6/27/2025 9:27 AM     Patient outreach attempt by this ACM today to perform care management follow up . ACM was unable to reach the patient by telephone today;   left voice message requesting a return phone call to this ACM.     ACM: Sangeetha Austin RN         PCP/Specialist follow up:   Future Appointments         Provider Specialty Dept Phone    7/2/2025 4:10 PM Colton Sanderson MD Orthopedic Surgery [phone]    12/5/2025 9:00 AM Anita Bah,  [facility] [phone]            Follow Up:   Plan for next AC outreach in approximately 2 weeks to complete:  - CC Protocol assessments  - disease specific assessments  - SDOH assessments  - follow-up appointment with Cardiology.           Sangeetha Austin RN  Ambulatory Care Manager  [phone]  [email or web address]

## 2025-07-24 ENCOUNTER — CARE COORDINATION (OUTPATIENT)
Dept: OTHER | Facility: CLINIC | Age: 70
End: 2025-07-24

## 2025-07-24 NOTE — CARE COORDINATION
Ambulatory Care Coordination Note     7/24/2025 10:31 AM     Patient outreach attempt by this ACM today to perform care management follow up . ACM was unable to reach the patient by telephone today;   left voice message requesting a return phone call to this ACM.     ACM: Sangeetha Austin RN         PCP/Specialist follow up:   Future Appointments         Provider Specialty Dept Phone    12/5/2025 9:00 AM Anita Bah, [facility] [phone]            Follow Up:   Plan for next AC outreach in approximately 3 weeks to complete:  - CC Protocol assessments  - disease specific assessments  - SDOH assessments  - medication review  - follow-up appointment with Cardiology.         Sangeetha Austin RN  Ambulatory Care Manager  [phone]  [email or web address]

## 2025-08-14 ENCOUNTER — CARE COORDINATION (OUTPATIENT)
Dept: OTHER | Facility: CLINIC | Age: 70
End: 2025-08-14

## 2025-09-05 ENCOUNTER — CARE COORDINATION (OUTPATIENT)
Dept: OTHER | Facility: CLINIC | Age: 70
End: 2025-09-05

## (undated) DEVICE — MEDI-TRACE CADENCE ADULT, DEFIBRILLATION ELECTRODE -RTS  (10 PR/PK) - PHYSIO-CONTROL: Brand: MEDI-TRACE CADENCE

## (undated) DEVICE — PERCLOSE PROGLIDE™ SUTURE-MEDIATED CLOSURE SYSTEM: Brand: PERCLOSE PROGLIDE™

## (undated) DEVICE — BLADE OPHTH MINI BEAV SHRP --

## (undated) DEVICE — SURGICAL PROCEDURE TRAY CRD CATH 3 PRT

## (undated) DEVICE — CABLE CONN TO CATH TO CARTO 3 --

## (undated) DEVICE — TUBE SET IRR PUMP THERMALCOOL -- SMARTABLATE

## (undated) DEVICE — CATHETER US 8FR L90CM GRN TIP OVERLAY FOR GE-VIVID I VIVID

## (undated) DEVICE — 48" PROBE COVER W/GEL, ULTRASOUND, STERILE: Brand: SITE-RITE

## (undated) DEVICE — SET ADMIN 16ML TBNG L100IN 2 Y INJ SITE IV PIGGY BK DISP

## (undated) DEVICE — WRAP COHESIVE W2INXL5YD TAN SELF ADH BNDG HND NON STERILE TEAR CARING

## (undated) DEVICE — BANDAGE,GAUZE,BULKEE II,2.25"X3YD,STRL: Brand: MEDLINE

## (undated) DEVICE — CANN NASAL O2 CAPNOGRAPHY AD -- FILTERLINE

## (undated) DEVICE — CATH IV AUTOGRD BC PNK 20GA 25 -- INSYTE

## (undated) DEVICE — BAG TRASH WST 122 CM 183 CM 1400 CC FEMALE LUER PVC DEPOT

## (undated) DEVICE — CATH BI DIR 7FR DEFL CS NON --

## (undated) DEVICE — CATHETER GUID 6FR L100CM DIA0.071IN NYL SHFT JL3.5 W/O SIDE

## (undated) DEVICE — PATCH CARTO 3 EXT REF --

## (undated) DEVICE — CATHETER GUID JL3.5 5 FRX100 CM SM ATRAUM SFT CONVEY

## (undated) DEVICE — CATHETER ABLAT 8FR L115CM 1-6-2MM SPC TIP 3.5MM FJ CRV

## (undated) DEVICE — DRESSING HEMOSTATIC SFT INTVENT W/O SLT DBL WRP QUIKCLOT LF

## (undated) DEVICE — BRUSH CLEANING PKG/5 CLEANING

## (undated) DEVICE — WASTEBAG DRIP/ADAPTER: Brand: MEDLINE INDUSTRIES, INC.

## (undated) DEVICE — CABLE CATH L2.7M CONNECTS TO CARTO 3 SYS PIU FOR LASSO ECO

## (undated) DEVICE — PINNACLE INTRODUCER SHEATH: Brand: PINNACLE

## (undated) DEVICE — TORFLEX TRANSSEPTAL SHEATH; TRANSSEPTAL DILATOR; J-TIP GUIDEWIRE: Brand: TORFLEX TRANSSEPTAL GUIDING SHEATH

## (undated) DEVICE — BITEBLOCK ENDOSCP 60FR MAXI WHT POLYETH STURDY W/ VELC WVN

## (undated) DEVICE — SYRINGE MED 10ML RED POLYCARB BRL FIX M LUER CONN FLAT GRP

## (undated) DEVICE — ADULT SPO2 SENSOR: Brand: NELLCOR

## (undated) DEVICE — COVER LT HNDL PLAS RIG 1 PER PK

## (undated) DEVICE — HAND I-LF: Brand: MEDLINE INDUSTRIES, INC.

## (undated) DEVICE — SYRINGE MED 10ML PUR GAM COMPATIBLE POLYCARB FIX M LUER CONN

## (undated) DEVICE — PRESSURE MONITORING SET: Brand: TRUWAVE

## (undated) DEVICE — Device: Brand: NRG TRANSSEPTAL NEEDLE

## (undated) DEVICE — Device

## (undated) DEVICE — SUT ETHLN 4-0 18IN PS2 BLK --

## (undated) DEVICE — THE DV8® RETRACTOR ESOPHAGEAL BALLOON (DV8 RETRACTOR) IS A NON-STERILE, SINGLE-USE, DISPOSABLE ESOPHAGEAL BALLOON RETRACTOR. THE DV8 RETRACTOR IS DEPLOYED ORALLY AND INFLATED INSIDE THE ESOPHAGUS. THE RETRACTOR GENTLY AND EFFECTIVELY RETRACTS THE ESOPHAGUS TO CREATE A STABLE OPERATING FIELD.: Brand: DV8® RETRACTOR

## (undated) DEVICE — BAG BELONG PT PERS CLEAR HANDL

## (undated) DEVICE — DRESSING,GAUZE,XEROFORM,CURAD,1"X8",ST: Brand: CURAD

## (undated) DEVICE — KENDALL RADIOLUCENT FOAM MONITORING ELECTRODE -RECTANGULAR SHAPE: Brand: KENDALL

## (undated) DEVICE — PRESSURE TUBING: Brand: TRUWAVE

## (undated) DEVICE — REM POLYHESIVE ADULT PATIENT RETURN ELECTRODE: Brand: VALLEYLAB

## (undated) DEVICE — BASIN EMSIS 16OZ GRAPHITE PLAS KID SHP MOLD GRAD FOR ORAL

## (undated) DEVICE — COPILOT BLEEDBACK CONTROL VALVE: Brand: COPILOT

## (undated) DEVICE — SOLIDIFIER MEDC 1200ML -- CONVERT TO 356117

## (undated) DEVICE — Device: Brand: RFP-100A CONNECTOR CABLE

## (undated) DEVICE — Device: Brand: PROTRACK PIGTAIL WIRE

## (undated) DEVICE — CONTINU-FLO SOLUTION SET, 2 INJECTION SITES, MALE LUER LOCK ADAPTER WITH RETRACTABLE COLLAR, LARGE BORE STOPCOCK WITH ROTATING MALE LUER LOCK EXTENSION SET, 2 INJECTION SITES, MALE LUER LOCK ADAPTER WITH RETRACTABLE COLLAR: Brand: INTERLINK/CONTINU-FLO

## (undated) DEVICE — PROBE ES TEMP HOT AND CLD FAST ACCURATE SFT FLX CIRCA S CATH

## (undated) DEVICE — CABLE CATH L10FT BLU CONN 10-34 PIN ELECTROGRAM CONDUCTION

## (undated) DEVICE — INFECTION CONTROL KIT SYS

## (undated) DEVICE — 1200 GUARD II KIT W/5MM TUBE W/O VAC TUBE: Brand: GUARDIAN

## (undated) DEVICE — SOL IRR SOD CL 0.9% 1000ML BTL --

## (undated) DEVICE — CATH EP MAP 2-6-2 7FR D CRV -- PENTARAY

## (undated) DEVICE — RUNTHROUGH NS EXTRA FLOPPY PTCA GUIDEWIRE: Brand: RUNTHROUGH

## (undated) DEVICE — COVER CATH ACUNAV ULTRASOUND 5X72IN ANTI STATIC

## (undated) DEVICE — SPONGE GZ W4XL4IN COT 12 PLY TYP VII WVN C FLD DSGN

## (undated) DEVICE — BOWL MED M 16OZ PLAS CAP GRAD

## (undated) DEVICE — ZIMMER® STERILE DISPOSABLE TOURNIQUET CUFF WITH PLC, DUAL PORT, SINGLE BLADDER, 18 IN. (46 CM)

## (undated) DEVICE — Device: Brand: OMNIWIRE PRESSURE GUIDE WIRE

## (undated) DEVICE — 3M™ TEGADERM™ TRANSPARENT FILM DRESSING FRAME STYLE, 1624W, 2-3/8 IN X 2-3/4 IN (6 CM X 7 CM), 100/CT 4CT/CASE: Brand: 3M™ TEGADERM™

## (undated) DEVICE — 3M™ STERI-DRAPE™ SMALL DRAPE WITH ADHESIVE APERTURE 1092 25/BX,4/CS&#X20;: Brand: STERI-DRAPE™

## (undated) DEVICE — SHTH GUID 8.5F 17MM SM CRV -- CARTO VIZIGO

## (undated) DEVICE — SIMPLICITY FLUFF UNDERPAD 23X36, MODERATE: Brand: SIMPLICITY

## (undated) DEVICE — PREP SKN CHLRAPRP APL 26ML STR --

## (undated) DEVICE — STERILE POLYISOPRENE POWDER-FREE SURGICAL GLOVES: Brand: PROTEXIS

## (undated) DEVICE — CATHETER DIAG 5FR L100CM LUMN ID0.047IN JR4 CRV 0 SIDE H

## (undated) DEVICE — SOLUTION LACTATED RINGERS INJECTION USP

## (undated) DEVICE — CABLE CATH L10FT RED PIN CONN 34-34 FOR THERMOCOOL

## (undated) DEVICE — KIT COLON W/ 1.1OZ LUB AND 2 END

## (undated) DEVICE — DEVICE INFL W/ HEM VLV TORQ

## (undated) DEVICE — BIPOLAR FORCEPS CORD: Brand: VALLEYLAB

## (undated) DEVICE — GUIDEWIRE VASC L260CM DIA0.035IN RAD 3MM J TIP L7CM PTFE

## (undated) DEVICE — 40CC-15ATM VOLUMINOUS INFLATION DEVICE: Brand: VOLUMINOUS™

## (undated) DEVICE — CUFF RMFG BP INF SZ 11 DISP -- LAWSON OEM ITEM 238915

## (undated) DEVICE — TUBING IRRIG COMPATIBLE W ERBE MEDIVATOR PMP HYDR

## (undated) DEVICE — Device: Brand: EAGLE EYE PLATINUM ST RX DIGITAL IVUS CATHETER

## (undated) DEVICE — GLIDESHEATH SLENDER STAINLESS STEEL KIT: Brand: GLIDESHEATH SLENDER

## (undated) DEVICE — CATH IV AUTOGRD BC BLU 22GA 25 -- INSYTE

## (undated) DEVICE — CATHETER DIAG 5FR L100CM LUMN ID0.047IN JL3.5 CRV 0 SIDE H

## (undated) DEVICE — DEVICE COMPR LNG 27 CM VASC BND

## (undated) DEVICE — CANNULA CUSH AD W/ 14FT TBG

## (undated) DEVICE — 3M™ CUROS™ DISINFECTING CAP FOR NEEDLELESS CONNECTORS 270/CARTON 20 CARTONS/CASE CFF1-270: Brand: CUROS™